# Patient Record
Sex: MALE | Race: WHITE | URBAN - METROPOLITAN AREA
[De-identification: names, ages, dates, MRNs, and addresses within clinical notes are randomized per-mention and may not be internally consistent; named-entity substitution may affect disease eponyms.]

---

## 2020-12-14 ENCOUNTER — HISTORICAL (OUTPATIENT)
Dept: ADMINISTRATIVE | Facility: HOSPITAL | Age: 65
End: 2020-12-14

## 2022-04-07 ENCOUNTER — HISTORICAL (OUTPATIENT)
Dept: ADMINISTRATIVE | Facility: HOSPITAL | Age: 67
End: 2022-04-07

## 2022-04-24 VITALS
DIASTOLIC BLOOD PRESSURE: 75 MMHG | SYSTOLIC BLOOD PRESSURE: 156 MMHG | WEIGHT: 260.38 LBS | BODY MASS INDEX: 33.42 KG/M2 | HEIGHT: 74 IN

## 2022-10-22 ENCOUNTER — LAB REQUISITION (OUTPATIENT)
Dept: LAB | Facility: HOSPITAL | Age: 67
End: 2022-10-22

## 2022-10-22 DIAGNOSIS — I61.3 NONTRAUMATIC INTRACEREBRAL HEMORRHAGE IN BRAIN STEM: ICD-10-CM

## 2022-10-22 LAB
ALBUMIN SERPL-MCNC: 3.3 GM/DL (ref 3.4–4.8)
ALBUMIN/GLOB SERPL: 1 RATIO (ref 1.1–2)
ALP SERPL-CCNC: 77 UNIT/L (ref 40–150)
ALT SERPL-CCNC: 25 UNIT/L (ref 0–55)
AST SERPL-CCNC: 18 UNIT/L (ref 5–34)
BASOPHILS # BLD AUTO: 0.12 X10(3)/MCL (ref 0–0.2)
BASOPHILS NFR BLD AUTO: 1.4 %
BILIRUBIN DIRECT+TOT PNL SERPL-MCNC: 0.6 MG/DL
BUN SERPL-MCNC: 15.8 MG/DL (ref 8.4–25.7)
CALCIUM SERPL-MCNC: 9.3 MG/DL (ref 8.8–10)
CHLORIDE SERPL-SCNC: 104 MMOL/L (ref 98–107)
CHOLEST SERPL-MCNC: 110 MG/DL
CHOLEST/HDLC SERPL: 4 {RATIO} (ref 0–5)
CO2 SERPL-SCNC: 26 MMOL/L (ref 23–31)
CREAT SERPL-MCNC: 1.14 MG/DL (ref 0.73–1.18)
EOSINOPHIL # BLD AUTO: 0.47 X10(3)/MCL (ref 0–0.9)
EOSINOPHIL NFR BLD AUTO: 5.6 %
ERYTHROCYTE [DISTWIDTH] IN BLOOD BY AUTOMATED COUNT: 14.6 % (ref 11.5–17)
EST. AVERAGE GLUCOSE BLD GHB EST-MCNC: 119.8 MG/DL
GFR SERPLBLD CREATININE-BSD FMLA CKD-EPI: >60 MLS/MIN/1.73/M2
GLOBULIN SER-MCNC: 3.4 GM/DL (ref 2.4–3.5)
GLUCOSE SERPL-MCNC: 143 MG/DL (ref 82–115)
HBA1C MFR BLD: 5.8 %
HCT VFR BLD AUTO: 34.4 % (ref 42–52)
HDLC SERPL-MCNC: 27 MG/DL (ref 35–60)
HGB BLD-MCNC: 12 GM/DL (ref 14–18)
IMM GRANULOCYTES # BLD AUTO: 0.03 X10(3)/MCL (ref 0–0.04)
IMM GRANULOCYTES NFR BLD AUTO: 0.4 %
LDLC SERPL CALC-MCNC: 54 MG/DL (ref 50–140)
LYMPHOCYTES # BLD AUTO: 2 X10(3)/MCL (ref 0.6–4.6)
LYMPHOCYTES NFR BLD AUTO: 24 %
MCH RBC QN AUTO: 30.1 PG (ref 27–31)
MCHC RBC AUTO-ENTMCNC: 34.9 MG/DL (ref 33–36)
MCV RBC AUTO: 86.2 FL (ref 80–94)
MONOCYTES # BLD AUTO: 0.84 X10(3)/MCL (ref 0.1–1.3)
MONOCYTES NFR BLD AUTO: 10.1 %
NEUTROPHILS # BLD AUTO: 4.9 X10(3)/MCL (ref 2.1–9.2)
NEUTROPHILS NFR BLD AUTO: 58.5 %
NRBC BLD AUTO-RTO: 0 %
PLATELET # BLD AUTO: 308 X10(3)/MCL (ref 130–400)
PMV BLD AUTO: 8.9 FL (ref 7.4–10.4)
POTASSIUM SERPL-SCNC: 4.2 MMOL/L (ref 3.5–5.1)
PREALB SERPL-MCNC: 17.1 MG/DL (ref 16–42)
PROT SERPL-MCNC: 6.7 GM/DL (ref 5.8–7.6)
RBC # BLD AUTO: 3.99 X10(6)/MCL (ref 4.7–6.1)
SODIUM SERPL-SCNC: 142 MMOL/L (ref 136–145)
TRIGL SERPL-MCNC: 143 MG/DL (ref 34–140)
VLDLC SERPL CALC-MCNC: 29 MG/DL
WBC # SPEC AUTO: 8.3 X10(3)/MCL (ref 4.5–11.5)

## 2022-10-22 PROCEDURE — 80061 LIPID PANEL: CPT | Performed by: INTERNAL MEDICINE

## 2022-10-22 PROCEDURE — 84134 ASSAY OF PREALBUMIN: CPT | Performed by: INTERNAL MEDICINE

## 2022-10-22 PROCEDURE — 83036 HEMOGLOBIN GLYCOSYLATED A1C: CPT | Performed by: INTERNAL MEDICINE

## 2022-10-22 PROCEDURE — 85025 COMPLETE CBC W/AUTO DIFF WBC: CPT | Performed by: INTERNAL MEDICINE

## 2022-10-22 PROCEDURE — 80053 COMPREHEN METABOLIC PANEL: CPT | Performed by: INTERNAL MEDICINE

## 2022-12-05 ENCOUNTER — LAB REQUISITION (OUTPATIENT)
Dept: LAB | Facility: HOSPITAL | Age: 67
End: 2022-12-05
Payer: MEDICARE

## 2022-12-05 DIAGNOSIS — I69.391 DYSPHAGIA FOLLOWING CEREBRAL INFARCTION: ICD-10-CM

## 2022-12-05 LAB
ALBUMIN SERPL-MCNC: 3.5 GM/DL (ref 3.4–4.8)
ALBUMIN/GLOB SERPL: 0.8 RATIO (ref 1.1–2)
ALP SERPL-CCNC: 85 UNIT/L (ref 40–150)
ALT SERPL-CCNC: 16 UNIT/L (ref 0–55)
AST SERPL-CCNC: 13 UNIT/L (ref 5–34)
BASOPHILS # BLD AUTO: 0.14 X10(3)/MCL (ref 0–0.2)
BASOPHILS NFR BLD AUTO: 1.4 %
BILIRUBIN DIRECT+TOT PNL SERPL-MCNC: 0.7 MG/DL
BUN SERPL-MCNC: 55 MG/DL (ref 8.4–25.7)
CALCIUM SERPL-MCNC: 9.8 MG/DL (ref 8.8–10)
CHLORIDE SERPL-SCNC: 106 MMOL/L (ref 98–107)
CO2 SERPL-SCNC: 21 MMOL/L (ref 23–31)
CREAT SERPL-MCNC: 1.68 MG/DL (ref 0.73–1.18)
EOSINOPHIL # BLD AUTO: 0.26 X10(3)/MCL (ref 0–0.9)
EOSINOPHIL NFR BLD AUTO: 2.7 %
ERYTHROCYTE [DISTWIDTH] IN BLOOD BY AUTOMATED COUNT: 14.1 % (ref 11.5–17)
GFR SERPLBLD CREATININE-BSD FMLA CKD-EPI: 44 MLS/MIN/1.73/M2
GLOBULIN SER-MCNC: 4.5 GM/DL (ref 2.4–3.5)
GLUCOSE SERPL-MCNC: 116 MG/DL (ref 82–115)
HCT VFR BLD AUTO: 38.5 % (ref 42–52)
HGB BLD-MCNC: 13.4 GM/DL (ref 14–18)
IMM GRANULOCYTES # BLD AUTO: 0.03 X10(3)/MCL (ref 0–0.04)
IMM GRANULOCYTES NFR BLD AUTO: 0.3 %
LYMPHOCYTES # BLD AUTO: 2.65 X10(3)/MCL (ref 0.6–4.6)
LYMPHOCYTES NFR BLD AUTO: 27.1 %
MCH RBC QN AUTO: 31.8 PG (ref 27–31)
MCHC RBC AUTO-ENTMCNC: 34.8 MG/DL (ref 33–36)
MCV RBC AUTO: 91.2 FL (ref 80–94)
MONOCYTES # BLD AUTO: 0.92 X10(3)/MCL (ref 0.1–1.3)
MONOCYTES NFR BLD AUTO: 9.4 %
NEUTROPHILS # BLD AUTO: 5.8 X10(3)/MCL (ref 2.1–9.2)
NEUTROPHILS NFR BLD AUTO: 59.1 %
NRBC BLD AUTO-RTO: 0 %
PLATELET # BLD AUTO: 360 X10(3)/MCL (ref 130–400)
PMV BLD AUTO: 10 FL (ref 7.4–10.4)
POTASSIUM SERPL-SCNC: 4.2 MMOL/L (ref 3.5–5.1)
PROT SERPL-MCNC: 8 GM/DL (ref 5.8–7.6)
RBC # BLD AUTO: 4.22 X10(6)/MCL (ref 4.7–6.1)
SODIUM SERPL-SCNC: 141 MMOL/L (ref 136–145)
WBC # SPEC AUTO: 9.8 X10(3)/MCL (ref 4.5–11.5)

## 2022-12-05 PROCEDURE — 80053 COMPREHEN METABOLIC PANEL: CPT | Performed by: INTERNAL MEDICINE

## 2022-12-05 PROCEDURE — 85025 COMPLETE CBC W/AUTO DIFF WBC: CPT | Performed by: INTERNAL MEDICINE

## 2022-12-09 ENCOUNTER — LAB REQUISITION (OUTPATIENT)
Dept: LAB | Facility: HOSPITAL | Age: 67
End: 2022-12-09
Payer: MEDICARE

## 2022-12-09 DIAGNOSIS — E86.9 VOLUME DEPLETION, UNSPECIFIED: ICD-10-CM

## 2022-12-09 LAB
ALBUMIN SERPL-MCNC: 3.2 GM/DL (ref 3.4–4.8)
ALBUMIN/GLOB SERPL: 0.8 RATIO (ref 1.1–2)
ALP SERPL-CCNC: 75 UNIT/L (ref 40–150)
ALT SERPL-CCNC: 13 UNIT/L (ref 0–55)
AST SERPL-CCNC: 12 UNIT/L (ref 5–34)
BILIRUBIN DIRECT+TOT PNL SERPL-MCNC: 0.6 MG/DL
BUN SERPL-MCNC: 26.1 MG/DL (ref 8.4–25.7)
CALCIUM SERPL-MCNC: 9.2 MG/DL (ref 8.8–10)
CHLORIDE SERPL-SCNC: 106 MMOL/L (ref 98–107)
CO2 SERPL-SCNC: 21 MMOL/L (ref 23–31)
CREAT SERPL-MCNC: 0.99 MG/DL (ref 0.73–1.18)
GFR SERPLBLD CREATININE-BSD FMLA CKD-EPI: >60 MLS/MIN/1.73/M2
GLOBULIN SER-MCNC: 3.8 GM/DL (ref 2.4–3.5)
GLUCOSE SERPL-MCNC: 84 MG/DL (ref 82–115)
POTASSIUM SERPL-SCNC: 3.7 MMOL/L (ref 3.5–5.1)
PROT SERPL-MCNC: 7 GM/DL (ref 5.8–7.6)
SODIUM SERPL-SCNC: 139 MMOL/L (ref 136–145)

## 2022-12-09 PROCEDURE — 80053 COMPREHEN METABOLIC PANEL: CPT | Performed by: NURSE PRACTITIONER

## 2023-01-30 ENCOUNTER — LAB REQUISITION (OUTPATIENT)
Dept: LAB | Facility: HOSPITAL | Age: 68
End: 2023-01-30
Payer: MEDICARE

## 2023-01-30 DIAGNOSIS — I10 ESSENTIAL (PRIMARY) HYPERTENSION: ICD-10-CM

## 2023-01-30 LAB
ALBUMIN SERPL-MCNC: 2.7 G/DL (ref 3.4–4.8)
ALBUMIN/GLOB SERPL: 0.4 RATIO (ref 1.1–2)
ALP SERPL-CCNC: 60 UNIT/L (ref 40–150)
ALT SERPL-CCNC: 77 UNIT/L (ref 0–55)
AST SERPL-CCNC: 38 UNIT/L (ref 5–34)
BASOPHILS # BLD AUTO: 0.11 X10(3)/MCL (ref 0–0.2)
BASOPHILS NFR BLD AUTO: 0.7 %
BILIRUBIN DIRECT+TOT PNL SERPL-MCNC: 0.5 MG/DL
BUN SERPL-MCNC: >125 MG/DL (ref 8.4–25.7)
CALCIUM SERPL-MCNC: 10.4 MG/DL (ref 8.8–10)
CHLORIDE SERPL-SCNC: 113 MMOL/L (ref 98–107)
CO2 SERPL-SCNC: 18 MMOL/L (ref 23–31)
CREAT SERPL-MCNC: 2.74 MG/DL (ref 0.73–1.18)
EOSINOPHIL # BLD AUTO: 0.09 X10(3)/MCL (ref 0–0.9)
EOSINOPHIL NFR BLD AUTO: 0.6 %
ERYTHROCYTE [DISTWIDTH] IN BLOOD BY AUTOMATED COUNT: 14 % (ref 11.5–17)
GFR SERPLBLD CREATININE-BSD FMLA CKD-EPI: 25 MLS/MIN/1.73/M2
GLOBULIN SER-MCNC: 6.1 GM/DL (ref 2.4–3.5)
GLUCOSE SERPL-MCNC: 148 MG/DL (ref 82–115)
HCT VFR BLD AUTO: 32.1 % (ref 42–52)
HGB BLD-MCNC: 10.2 GM/DL (ref 14–18)
IMM GRANULOCYTES # BLD AUTO: 0.16 X10(3)/MCL (ref 0–0.04)
IMM GRANULOCYTES NFR BLD AUTO: 1 %
LYMPHOCYTES # BLD AUTO: 2.43 X10(3)/MCL (ref 0.6–4.6)
LYMPHOCYTES NFR BLD AUTO: 15.1 %
MCH RBC QN AUTO: 31.5 PG
MCHC RBC AUTO-ENTMCNC: 31.8 MG/DL (ref 33–36)
MCV RBC AUTO: 99.1 FL (ref 80–94)
MONOCYTES # BLD AUTO: 1.09 X10(3)/MCL (ref 0.1–1.3)
MONOCYTES NFR BLD AUTO: 6.8 %
NEUTROPHILS # BLD AUTO: 12.24 X10(3)/MCL (ref 2.1–9.2)
NEUTROPHILS NFR BLD AUTO: 75.8 %
NRBC BLD AUTO-RTO: 0 %
PLATELET # BLD AUTO: 519 X10(3)/MCL (ref 130–400)
PMV BLD AUTO: 9.8 FL (ref 7.4–10.4)
POTASSIUM SERPL-SCNC: 5.3 MMOL/L (ref 3.5–5.1)
PROT SERPL-MCNC: 8.8 GM/DL (ref 5.8–7.6)
RBC # BLD AUTO: 3.24 X10(6)/MCL (ref 4.7–6.1)
SODIUM SERPL-SCNC: 145 MMOL/L (ref 136–145)
WBC # SPEC AUTO: 16.1 X10(3)/MCL (ref 4.5–11.5)

## 2023-01-30 PROCEDURE — 85025 COMPLETE CBC W/AUTO DIFF WBC: CPT | Performed by: INTERNAL MEDICINE

## 2023-01-30 PROCEDURE — 80053 COMPREHEN METABOLIC PANEL: CPT | Performed by: INTERNAL MEDICINE

## 2023-02-02 ENCOUNTER — LAB REQUISITION (OUTPATIENT)
Dept: LAB | Facility: HOSPITAL | Age: 68
End: 2023-02-02
Payer: MEDICARE

## 2023-02-02 DIAGNOSIS — R62.7 ADULT FAILURE TO THRIVE: ICD-10-CM

## 2023-02-02 LAB
ALBUMIN SERPL-MCNC: 2.5 G/DL (ref 3.4–4.8)
ALBUMIN/GLOB SERPL: 0.6 RATIO (ref 1.1–2)
ALP SERPL-CCNC: 64 UNIT/L (ref 40–150)
ALT SERPL-CCNC: 61 UNIT/L (ref 0–55)
AST SERPL-CCNC: 23 UNIT/L (ref 5–34)
BILIRUBIN DIRECT+TOT PNL SERPL-MCNC: 0.4 MG/DL
BUN SERPL-MCNC: 68.9 MG/DL (ref 8.4–25.7)
CALCIUM SERPL-MCNC: 9 MG/DL (ref 8.8–10)
CHLORIDE SERPL-SCNC: 119 MMOL/L (ref 98–107)
CO2 SERPL-SCNC: 19 MMOL/L (ref 23–31)
CREAT SERPL-MCNC: 1.25 MG/DL (ref 0.73–1.18)
ERYTHROCYTE [DISTWIDTH] IN BLOOD BY AUTOMATED COUNT: 14 % (ref 11.5–17)
GFR SERPLBLD CREATININE-BSD FMLA CKD-EPI: >60 MLS/MIN/1.73/M2
GLOBULIN SER-MCNC: 4 GM/DL (ref 2.4–3.5)
GLUCOSE SERPL-MCNC: 143 MG/DL (ref 82–115)
HCT VFR BLD AUTO: 29.9 % (ref 42–52)
HGB BLD-MCNC: 9.2 GM/DL (ref 14–18)
MCH RBC QN AUTO: 30.9 PG
MCHC RBC AUTO-ENTMCNC: 30.8 MG/DL (ref 33–36)
MCV RBC AUTO: 100.3 FL (ref 80–94)
NRBC BLD AUTO-RTO: 0 %
PLATELET # BLD AUTO: 436 X10(3)/MCL (ref 130–400)
PMV BLD AUTO: 9.7 FL (ref 7.4–10.4)
POTASSIUM SERPL-SCNC: 5 MMOL/L (ref 3.5–5.1)
PROT SERPL-MCNC: 6.5 GM/DL (ref 5.8–7.6)
RBC # BLD AUTO: 2.98 X10(6)/MCL (ref 4.7–6.1)
SODIUM SERPL-SCNC: 149 MMOL/L (ref 136–145)
WBC # SPEC AUTO: 11.1 X10(3)/MCL (ref 4.5–11.5)

## 2023-02-02 PROCEDURE — 85027 COMPLETE CBC AUTOMATED: CPT | Performed by: NURSE PRACTITIONER

## 2023-02-02 PROCEDURE — 80053 COMPREHEN METABOLIC PANEL: CPT | Performed by: NURSE PRACTITIONER

## 2023-06-02 ENCOUNTER — LAB REQUISITION (OUTPATIENT)
Dept: LAB | Facility: HOSPITAL | Age: 68
End: 2023-06-02
Payer: MEDICARE

## 2023-06-02 DIAGNOSIS — N17.9 ACUTE KIDNEY FAILURE, UNSPECIFIED: ICD-10-CM

## 2023-06-02 DIAGNOSIS — E11.9 TYPE 2 DIABETES MELLITUS WITHOUT COMPLICATIONS: ICD-10-CM

## 2023-06-02 DIAGNOSIS — I63.9 CEREBRAL INFARCTION, UNSPECIFIED: ICD-10-CM

## 2023-06-02 DIAGNOSIS — E78.5 HYPERLIPIDEMIA, UNSPECIFIED: ICD-10-CM

## 2023-06-02 DIAGNOSIS — I10 ESSENTIAL (PRIMARY) HYPERTENSION: ICD-10-CM

## 2023-06-02 LAB
ALBUMIN SERPL-MCNC: 3.3 G/DL (ref 3.4–4.8)
ALBUMIN/GLOB SERPL: 1 RATIO (ref 1.1–2)
ALP SERPL-CCNC: 84 UNIT/L (ref 40–150)
ALT SERPL-CCNC: 7 UNIT/L (ref 0–55)
AST SERPL-CCNC: 12 UNIT/L (ref 5–34)
BASOPHILS # BLD AUTO: 0.08 X10(3)/MCL
BASOPHILS NFR BLD AUTO: 1.1 %
BILIRUBIN DIRECT+TOT PNL SERPL-MCNC: 0.4 MG/DL
BUN SERPL-MCNC: 21 MG/DL (ref 8.4–25.7)
CALCIUM SERPL-MCNC: 9.3 MG/DL (ref 8.8–10)
CHLORIDE SERPL-SCNC: 103 MMOL/L (ref 98–107)
CHOLEST SERPL-MCNC: 153 MG/DL
CHOLEST/HDLC SERPL: 5 {RATIO} (ref 0–5)
CO2 SERPL-SCNC: 25 MMOL/L (ref 23–31)
CREAT SERPL-MCNC: 0.8 MG/DL (ref 0.73–1.18)
EOSINOPHIL # BLD AUTO: 0.36 X10(3)/MCL (ref 0–0.9)
EOSINOPHIL NFR BLD AUTO: 4.9 %
ERYTHROCYTE [DISTWIDTH] IN BLOOD BY AUTOMATED COUNT: 15.2 % (ref 11.5–17)
GFR SERPLBLD CREATININE-BSD FMLA CKD-EPI: >60 MLS/MIN/1.73/M2
GLOBULIN SER-MCNC: 3.4 GM/DL (ref 2.4–3.5)
GLUCOSE SERPL-MCNC: 102 MG/DL (ref 82–115)
HCT VFR BLD AUTO: 37.7 % (ref 42–52)
HDLC SERPL-MCNC: 30 MG/DL (ref 35–60)
HGB BLD-MCNC: 12.3 G/DL (ref 14–18)
IMM GRANULOCYTES # BLD AUTO: 0.02 X10(3)/MCL (ref 0–0.04)
IMM GRANULOCYTES NFR BLD AUTO: 0.3 %
LDLC SERPL CALC-MCNC: 87 MG/DL (ref 50–140)
LYMPHOCYTES # BLD AUTO: 2.08 X10(3)/MCL (ref 0.6–4.6)
LYMPHOCYTES NFR BLD AUTO: 28.1 %
MCH RBC QN AUTO: 28.7 PG (ref 27–31)
MCHC RBC AUTO-ENTMCNC: 32.6 G/DL (ref 33–36)
MCV RBC AUTO: 88.1 FL (ref 80–94)
MONOCYTES # BLD AUTO: 0.58 X10(3)/MCL (ref 0.1–1.3)
MONOCYTES NFR BLD AUTO: 7.8 %
NEUTROPHILS # BLD AUTO: 4.29 X10(3)/MCL (ref 2.1–9.2)
NEUTROPHILS NFR BLD AUTO: 57.8 %
NRBC BLD AUTO-RTO: 0 %
PLATELET # BLD AUTO: 331 X10(3)/MCL (ref 130–400)
PMV BLD AUTO: 9.8 FL (ref 7.4–10.4)
POTASSIUM SERPL-SCNC: 3.7 MMOL/L (ref 3.5–5.1)
PREALB SERPL-MCNC: 23.5 MG/DL (ref 16–42)
PROT SERPL-MCNC: 6.7 GM/DL (ref 5.8–7.6)
PSA SERPL-MCNC: 3.13 NG/ML
RBC # BLD AUTO: 4.28 X10(6)/MCL (ref 4.7–6.1)
SODIUM SERPL-SCNC: 139 MMOL/L (ref 136–145)
TRIGL SERPL-MCNC: 178 MG/DL (ref 34–140)
VLDLC SERPL CALC-MCNC: 36 MG/DL
WBC # SPEC AUTO: 7.41 X10(3)/MCL (ref 4.5–11.5)

## 2023-06-02 PROCEDURE — 80053 COMPREHEN METABOLIC PANEL: CPT | Performed by: INTERNAL MEDICINE

## 2023-06-02 PROCEDURE — 84134 ASSAY OF PREALBUMIN: CPT | Performed by: INTERNAL MEDICINE

## 2023-06-02 PROCEDURE — 85025 COMPLETE CBC W/AUTO DIFF WBC: CPT | Performed by: INTERNAL MEDICINE

## 2023-06-02 PROCEDURE — 84153 ASSAY OF PSA TOTAL: CPT | Performed by: INTERNAL MEDICINE

## 2023-06-02 PROCEDURE — 80061 LIPID PANEL: CPT | Performed by: INTERNAL MEDICINE

## 2023-09-27 ENCOUNTER — LAB REQUISITION (OUTPATIENT)
Dept: LAB | Facility: HOSPITAL | Age: 68
End: 2023-09-27
Payer: MEDICARE

## 2023-09-27 DIAGNOSIS — R53.81 OTHER MALAISE: ICD-10-CM

## 2023-09-27 LAB
ALBUMIN SERPL-MCNC: 3.4 G/DL (ref 3.4–4.8)
ALBUMIN/GLOB SERPL: 0.8 RATIO (ref 1.1–2)
ALP SERPL-CCNC: 88 UNIT/L (ref 40–150)
ALT SERPL-CCNC: 10 UNIT/L (ref 0–55)
AST SERPL-CCNC: 15 UNIT/L (ref 5–34)
BASOPHILS # BLD AUTO: 0.09 X10(3)/MCL
BASOPHILS NFR BLD AUTO: 1.1 %
BILIRUB SERPL-MCNC: 0.4 MG/DL
BUN SERPL-MCNC: 19.9 MG/DL (ref 8.4–25.7)
CALCIUM SERPL-MCNC: 9.8 MG/DL (ref 8.8–10)
CHLORIDE SERPL-SCNC: 104 MMOL/L (ref 98–107)
CO2 SERPL-SCNC: 24 MMOL/L (ref 23–31)
CREAT SERPL-MCNC: 0.82 MG/DL (ref 0.73–1.18)
EOSINOPHIL # BLD AUTO: 0.26 X10(3)/MCL (ref 0–0.9)
EOSINOPHIL NFR BLD AUTO: 3.2 %
ERYTHROCYTE [DISTWIDTH] IN BLOOD BY AUTOMATED COUNT: 13.8 % (ref 11.5–17)
GFR SERPLBLD CREATININE-BSD FMLA CKD-EPI: >60 MLS/MIN/1.73/M2
GLOBULIN SER-MCNC: 4.3 GM/DL (ref 2.4–3.5)
GLUCOSE SERPL-MCNC: 94 MG/DL (ref 82–115)
HCT VFR BLD AUTO: 38 % (ref 42–52)
HGB BLD-MCNC: 12.4 G/DL (ref 14–18)
IMM GRANULOCYTES # BLD AUTO: 0.01 X10(3)/MCL (ref 0–0.04)
IMM GRANULOCYTES NFR BLD AUTO: 0.1 %
LYMPHOCYTES # BLD AUTO: 2.16 X10(3)/MCL (ref 0.6–4.6)
LYMPHOCYTES NFR BLD AUTO: 26.4 %
MCH RBC QN AUTO: 30.1 PG (ref 27–31)
MCHC RBC AUTO-ENTMCNC: 32.6 G/DL (ref 33–36)
MCV RBC AUTO: 92.2 FL (ref 80–94)
MONOCYTES # BLD AUTO: 0.79 X10(3)/MCL (ref 0.1–1.3)
MONOCYTES NFR BLD AUTO: 9.7 %
NEUTROPHILS # BLD AUTO: 4.86 X10(3)/MCL (ref 2.1–9.2)
NEUTROPHILS NFR BLD AUTO: 59.5 %
NRBC BLD AUTO-RTO: 0 %
PLATELET # BLD AUTO: 344 X10(3)/MCL (ref 130–400)
PMV BLD AUTO: 10.1 FL (ref 7.4–10.4)
POTASSIUM SERPL-SCNC: 4.1 MMOL/L (ref 3.5–5.1)
PROT SERPL-MCNC: 7.7 GM/DL (ref 5.8–7.6)
RBC # BLD AUTO: 4.12 X10(6)/MCL (ref 4.7–6.1)
SODIUM SERPL-SCNC: 141 MMOL/L (ref 136–145)
WBC # SPEC AUTO: 8.17 X10(3)/MCL (ref 4.5–11.5)

## 2023-09-27 PROCEDURE — 80053 COMPREHEN METABOLIC PANEL: CPT

## 2023-09-27 PROCEDURE — 85025 COMPLETE CBC W/AUTO DIFF WBC: CPT

## 2023-11-30 ENCOUNTER — LAB REQUISITION (OUTPATIENT)
Dept: LAB | Facility: HOSPITAL | Age: 68
End: 2023-11-30
Payer: MEDICARE

## 2023-11-30 DIAGNOSIS — E11.9 TYPE 2 DIABETES MELLITUS WITHOUT COMPLICATIONS: ICD-10-CM

## 2023-11-30 DIAGNOSIS — I63.9 CEREBRAL INFARCTION, UNSPECIFIED: ICD-10-CM

## 2023-11-30 DIAGNOSIS — N17.9 ACUTE KIDNEY FAILURE, UNSPECIFIED: ICD-10-CM

## 2023-11-30 LAB
ALBUMIN SERPL-MCNC: 3.4 G/DL (ref 3.4–4.8)
ALBUMIN/GLOB SERPL: 1.1 RATIO (ref 1.1–2)
ALP SERPL-CCNC: 62 UNIT/L (ref 40–150)
ALT SERPL-CCNC: 9 UNIT/L (ref 0–55)
AST SERPL-CCNC: 10 UNIT/L (ref 5–34)
BASOPHILS # BLD AUTO: 0.06 X10(3)/MCL
BASOPHILS NFR BLD AUTO: 0.8 %
BILIRUB SERPL-MCNC: 0.5 MG/DL
BUN SERPL-MCNC: 23.3 MG/DL (ref 8.4–25.7)
CALCIUM SERPL-MCNC: 9 MG/DL (ref 8.8–10)
CHLORIDE SERPL-SCNC: 108 MMOL/L (ref 98–107)
CHOLEST SERPL-MCNC: 129 MG/DL
CHOLEST/HDLC SERPL: 5 {RATIO} (ref 0–5)
CO2 SERPL-SCNC: 25 MMOL/L (ref 23–31)
CREAT SERPL-MCNC: 0.84 MG/DL (ref 0.73–1.18)
EOSINOPHIL # BLD AUTO: 0.11 X10(3)/MCL (ref 0–0.9)
EOSINOPHIL NFR BLD AUTO: 1.5 %
ERYTHROCYTE [DISTWIDTH] IN BLOOD BY AUTOMATED COUNT: 13.2 % (ref 11.5–17)
GFR SERPLBLD CREATININE-BSD FMLA CKD-EPI: >60 MLS/MIN/1.73/M2
GLOBULIN SER-MCNC: 3.1 GM/DL (ref 2.4–3.5)
GLUCOSE SERPL-MCNC: 103 MG/DL (ref 82–115)
HCT VFR BLD AUTO: 35.1 % (ref 42–52)
HDLC SERPL-MCNC: 28 MG/DL (ref 35–60)
HGB BLD-MCNC: 11.4 G/DL (ref 14–18)
IMM GRANULOCYTES # BLD AUTO: 0.03 X10(3)/MCL (ref 0–0.04)
IMM GRANULOCYTES NFR BLD AUTO: 0.4 %
LDLC SERPL CALC-MCNC: 82 MG/DL (ref 50–140)
LYMPHOCYTES # BLD AUTO: 2.21 X10(3)/MCL (ref 0.6–4.6)
LYMPHOCYTES NFR BLD AUTO: 29.2 %
MCH RBC QN AUTO: 30 PG (ref 27–31)
MCHC RBC AUTO-ENTMCNC: 32.5 G/DL (ref 33–36)
MCV RBC AUTO: 92.4 FL (ref 80–94)
MONOCYTES # BLD AUTO: 0.6 X10(3)/MCL (ref 0.1–1.3)
MONOCYTES NFR BLD AUTO: 7.9 %
NEUTROPHILS # BLD AUTO: 4.57 X10(3)/MCL (ref 2.1–9.2)
NEUTROPHILS NFR BLD AUTO: 60.2 %
NRBC BLD AUTO-RTO: 0 %
PLATELET # BLD AUTO: 280 X10(3)/MCL (ref 130–400)
PMV BLD AUTO: 10.3 FL (ref 7.4–10.4)
POTASSIUM SERPL-SCNC: 3.8 MMOL/L (ref 3.5–5.1)
PREALB SERPL-MCNC: 23.4 MG/DL (ref 16–42)
PROT SERPL-MCNC: 6.5 GM/DL (ref 5.8–7.6)
RBC # BLD AUTO: 3.8 X10(6)/MCL (ref 4.7–6.1)
SODIUM SERPL-SCNC: 141 MMOL/L (ref 136–145)
TRIGL SERPL-MCNC: 97 MG/DL (ref 34–140)
VLDLC SERPL CALC-MCNC: 19 MG/DL
WBC # SPEC AUTO: 7.58 X10(3)/MCL (ref 4.5–11.5)

## 2023-11-30 PROCEDURE — 80053 COMPREHEN METABOLIC PANEL: CPT | Performed by: INTERNAL MEDICINE

## 2023-11-30 PROCEDURE — 84134 ASSAY OF PREALBUMIN: CPT | Performed by: INTERNAL MEDICINE

## 2023-11-30 PROCEDURE — 80061 LIPID PANEL: CPT | Performed by: INTERNAL MEDICINE

## 2023-11-30 PROCEDURE — 85025 COMPLETE CBC W/AUTO DIFF WBC: CPT | Performed by: INTERNAL MEDICINE

## 2023-12-06 ENCOUNTER — LAB REQUISITION (OUTPATIENT)
Dept: LAB | Facility: HOSPITAL | Age: 68
End: 2023-12-06
Payer: MEDICARE

## 2023-12-06 DIAGNOSIS — R05.9 COUGH, UNSPECIFIED: ICD-10-CM

## 2023-12-06 LAB
BASOPHILS # BLD AUTO: 0.03 X10(3)/MCL
BASOPHILS NFR BLD AUTO: 0.6 %
EOSINOPHIL # BLD AUTO: 0.04 X10(3)/MCL (ref 0–0.9)
EOSINOPHIL NFR BLD AUTO: 0.8 %
ERYTHROCYTE [DISTWIDTH] IN BLOOD BY AUTOMATED COUNT: 13.4 % (ref 11.5–17)
HCT VFR BLD AUTO: 36.6 % (ref 42–52)
HGB BLD-MCNC: 12.2 G/DL (ref 14–18)
IMM GRANULOCYTES # BLD AUTO: 0.02 X10(3)/MCL (ref 0–0.04)
IMM GRANULOCYTES NFR BLD AUTO: 0.4 %
LYMPHOCYTES # BLD AUTO: 1.51 X10(3)/MCL (ref 0.6–4.6)
LYMPHOCYTES NFR BLD AUTO: 28.7 %
MCH RBC QN AUTO: 30.2 PG (ref 27–31)
MCHC RBC AUTO-ENTMCNC: 33.3 G/DL (ref 33–36)
MCV RBC AUTO: 90.6 FL (ref 80–94)
MONOCYTES # BLD AUTO: 0.51 X10(3)/MCL (ref 0.1–1.3)
MONOCYTES NFR BLD AUTO: 9.7 %
NEUTROPHILS # BLD AUTO: 3.16 X10(3)/MCL (ref 2.1–9.2)
NEUTROPHILS NFR BLD AUTO: 59.8 %
NRBC BLD AUTO-RTO: 0 %
PLATELET # BLD AUTO: 258 X10(3)/MCL (ref 130–400)
PMV BLD AUTO: 10.2 FL (ref 7.4–10.4)
RBC # BLD AUTO: 4.04 X10(6)/MCL (ref 4.7–6.1)
WBC # SPEC AUTO: 5.27 X10(3)/MCL (ref 4.5–11.5)

## 2023-12-06 PROCEDURE — 85025 COMPLETE CBC W/AUTO DIFF WBC: CPT | Performed by: INTERNAL MEDICINE

## 2024-01-11 ENCOUNTER — LAB REQUISITION (OUTPATIENT)
Dept: LAB | Facility: HOSPITAL | Age: 69
End: 2024-01-11
Payer: OTHER GOVERNMENT

## 2024-01-11 DIAGNOSIS — R11.2 NAUSEA WITH VOMITING, UNSPECIFIED: ICD-10-CM

## 2024-01-11 LAB
ALBUMIN SERPL-MCNC: 3.8 G/DL (ref 3.4–4.8)
ALBUMIN/GLOB SERPL: 0.9 RATIO (ref 1.1–2)
ALP SERPL-CCNC: 85 UNIT/L (ref 40–150)
ALT SERPL-CCNC: 21 UNIT/L (ref 0–55)
AST SERPL-CCNC: 16 UNIT/L (ref 5–34)
BASOPHILS # BLD AUTO: 0.06 X10(3)/MCL
BASOPHILS NFR BLD AUTO: 0.3 %
BILIRUB SERPL-MCNC: 0.9 MG/DL
BUN SERPL-MCNC: 19.5 MG/DL (ref 8.4–25.7)
CALCIUM SERPL-MCNC: 9.9 MG/DL (ref 8.8–10)
CHLORIDE SERPL-SCNC: 106 MMOL/L (ref 98–107)
CO2 SERPL-SCNC: 21 MMOL/L (ref 23–31)
CREAT SERPL-MCNC: 0.8 MG/DL (ref 0.73–1.18)
EOSINOPHIL # BLD AUTO: 0.01 X10(3)/MCL (ref 0–0.9)
EOSINOPHIL NFR BLD AUTO: 0.1 %
ERYTHROCYTE [DISTWIDTH] IN BLOOD BY AUTOMATED COUNT: 14.2 % (ref 11.5–17)
GFR SERPLBLD CREATININE-BSD FMLA CKD-EPI: >60 MLS/MIN/1.73/M2
GLOBULIN SER-MCNC: 4.4 GM/DL (ref 2.4–3.5)
GLUCOSE SERPL-MCNC: 124 MG/DL (ref 82–115)
HCT VFR BLD AUTO: 42.8 % (ref 42–52)
HGB BLD-MCNC: 14.3 G/DL (ref 14–18)
IMM GRANULOCYTES # BLD AUTO: 0.08 X10(3)/MCL (ref 0–0.04)
IMM GRANULOCYTES NFR BLD AUTO: 0.4 %
LYMPHOCYTES # BLD AUTO: 1.3 X10(3)/MCL (ref 0.6–4.6)
LYMPHOCYTES NFR BLD AUTO: 6.6 %
MCH RBC QN AUTO: 30.2 PG (ref 27–31)
MCHC RBC AUTO-ENTMCNC: 33.4 G/DL (ref 33–36)
MCV RBC AUTO: 90.5 FL (ref 80–94)
MONOCYTES # BLD AUTO: 1.43 X10(3)/MCL (ref 0.1–1.3)
MONOCYTES NFR BLD AUTO: 7.3 %
NEUTROPHILS # BLD AUTO: 16.73 X10(3)/MCL (ref 2.1–9.2)
NEUTROPHILS NFR BLD AUTO: 85.3 %
NRBC BLD AUTO-RTO: 0 %
PLATELET # BLD AUTO: 375 X10(3)/MCL (ref 130–400)
PMV BLD AUTO: 10.1 FL (ref 7.4–10.4)
POTASSIUM SERPL-SCNC: 3.5 MMOL/L (ref 3.5–5.1)
PROT SERPL-MCNC: 8.2 GM/DL (ref 5.8–7.6)
RBC # BLD AUTO: 4.73 X10(6)/MCL (ref 4.7–6.1)
SODIUM SERPL-SCNC: 141 MMOL/L (ref 136–145)
WBC # SPEC AUTO: 19.61 X10(3)/MCL (ref 4.5–11.5)

## 2024-01-11 PROCEDURE — 80053 COMPREHEN METABOLIC PANEL: CPT | Performed by: NURSE PRACTITIONER

## 2024-01-11 PROCEDURE — 85025 COMPLETE CBC W/AUTO DIFF WBC: CPT | Performed by: NURSE PRACTITIONER

## 2024-01-12 ENCOUNTER — LAB REQUISITION (OUTPATIENT)
Dept: LAB | Facility: HOSPITAL | Age: 69
End: 2024-01-12
Payer: OTHER GOVERNMENT

## 2024-01-12 DIAGNOSIS — D72.829 ELEVATED WHITE BLOOD CELL COUNT, UNSPECIFIED: ICD-10-CM

## 2024-01-12 LAB — LACTATE SERPL-SCNC: 1.8 MMOL/L (ref 0.5–2.2)

## 2024-01-12 PROCEDURE — 83605 ASSAY OF LACTIC ACID: CPT | Performed by: NURSE PRACTITIONER

## 2024-01-29 ENCOUNTER — LAB REQUISITION (OUTPATIENT)
Dept: LAB | Facility: HOSPITAL | Age: 69
End: 2024-01-29
Payer: OTHER GOVERNMENT

## 2024-01-29 DIAGNOSIS — Z29.9 ENCOUNTER FOR PROPHYLACTIC MEASURES, UNSPECIFIED: ICD-10-CM

## 2024-01-29 LAB
BASOPHILS # BLD AUTO: 0.12 X10(3)/MCL
BASOPHILS NFR BLD AUTO: 0.8 %
EOSINOPHIL # BLD AUTO: 0.13 X10(3)/MCL (ref 0–0.9)
EOSINOPHIL NFR BLD AUTO: 0.8 %
ERYTHROCYTE [DISTWIDTH] IN BLOOD BY AUTOMATED COUNT: 14.1 % (ref 11.5–17)
HCT VFR BLD AUTO: 31.8 % (ref 42–52)
HGB BLD-MCNC: 10.4 G/DL (ref 14–18)
IMM GRANULOCYTES # BLD AUTO: 0.23 X10(3)/MCL (ref 0–0.04)
IMM GRANULOCYTES NFR BLD AUTO: 1.4 %
LYMPHOCYTES # BLD AUTO: 2.52 X10(3)/MCL (ref 0.6–4.6)
LYMPHOCYTES NFR BLD AUTO: 15.8 %
MCH RBC QN AUTO: 29.8 PG (ref 27–31)
MCHC RBC AUTO-ENTMCNC: 32.7 G/DL (ref 33–36)
MCV RBC AUTO: 91.1 FL (ref 80–94)
MONOCYTES # BLD AUTO: 1.02 X10(3)/MCL (ref 0.1–1.3)
MONOCYTES NFR BLD AUTO: 6.4 %
NEUTROPHILS # BLD AUTO: 11.92 X10(3)/MCL (ref 2.1–9.2)
NEUTROPHILS NFR BLD AUTO: 74.8 %
NRBC BLD AUTO-RTO: 0 %
PLATELET # BLD AUTO: 540 X10(3)/MCL (ref 130–400)
PMV BLD AUTO: 8.8 FL (ref 7.4–10.4)
RBC # BLD AUTO: 3.49 X10(6)/MCL (ref 4.7–6.1)
WBC # SPEC AUTO: 15.94 X10(3)/MCL (ref 4.5–11.5)

## 2024-01-29 PROCEDURE — 85025 COMPLETE CBC W/AUTO DIFF WBC: CPT | Performed by: NURSE PRACTITIONER

## 2024-02-03 NOTE — PROVIDER TRANSFER
(Physician in Lead of Transfers)  Outside Transfer Acceptance Note / Regional Referral Center    Upon patient arrival to floor, please send SecureChat to [facility] or call extension [phone] (if no answer, do NOT leave a callback number after the beep, rather please send a SecureChat to [facility]), for Hospital Medicine admit team assignment and for additional admit orders for the patient.  Do not page the attending physician associated with the patient on arrival (this physician may not be on duty at the time of arrival).  Rather, always send a SecureChat to [facility] or call [phone] to reach the triage physician for orders and team assignment.    Referring facility: [facility]   Referring provider: JAIDA JUAN  Accepting facility: [facility]  Accepting provider: KESHA PARHAM  Reason for transfer: Higher Level of Care   Transfer diagnosis: CBD STONE  Transfer specialty requested: Biliary  Transfer specialty notified: Yes  Transfer level: NUMBER 1-5: 2  Bed type requested: med-tele  Isolation status: No active isolations   Admission class or status: IP- Inpatient      Narrative     68-year-old male with a history of stroke with G-tube placement, hypertension, diabetes, hyperlipidemia, recent cholecystitis, and dysphagia admitted to [facility] on January 30 from a local nursing home because of abnormal labs/imaging.  He was found to have high white blood cells and was sent to the ED. Patient had no specific complaints.  He was noted to have a recent CT scan concerning for cholecystitis.  He was seen by General Surgery, and their note mentions patient had reported abdominal pain for over a year.  -CT abdomen and pelvis noted cholelithiasis and choledocholithiasis with changes of acute cholecystitis.  Fatty stranding around the gallbladder and adjacent duodenum.  No significant biliary ductal dilatation.  Possible constipation with no  evidence of an acute process elsewhere within the abdomen.  Development of small nonspecific pericardial effusion.  -Ultrasound showed cholelithiasis and gallbladder sludge with questionable mild thickening of the gallbladder walls.  No other significant findings by ultrasound.  No biliary dilatation.    He was felt to have acute cholecystitis with cholelithiasis, and he underwent laparoscopic cholecystectomy with intraoperative cholangiogram on February 1.  The operative report noted the gallbladder was necrotic and fell apart.  The back wall of the gallbladder had an abscess that was perforated.  There were spilled stones and purulent bile.  A cholangiogram showed patent common duct with single stone at the ampulla that appears to large to pass spontaneously.  There is flow of bile around that.  Referring team spoke with Biliary Service at [facility].  It was felt that the patient would likely need ERCP.  Will plan transfer to Hospital Medicine at [facility] for further treatment.  Referring team noted patient has residual deficits from prior stroke, but he is able to interact.  He has a PEG-tube and an abdominal drain in place.  He is receiving meropenem and LR.  Oxygenation is stable on room air.    February 2: Sodium 141, potassium 3.8, chloride 111, CO2 21, BUN 17.02, creatinine 0.63, glucose 98, albumin 2.3, total bilirubin 0.4, AST 22, ALT 16, white blood cells 14.45, hemoglobin 7.1, hematocrit 22.2, platelets 370    January 31:  Sodium 138, potassium 3.9, chloride 106, CO2 24, BUN 24.12, creatinine 0.79, glucose 113, total bilirubin 0.5, AST 11, ALT 13, white blood cells 13.96, hemoglobin 9.9, hematocrit 30.6, platelets 444  -Abdominal ultrasound noted cholelithiasis and gallbladder sludge with questionable mild thickening of the gallbladder walls.  Evaluation of the gallbladder is limited by adjacent bowel.  No biliary dilatation.    January 30:  Total bilirubin 0.3, AST 12, ALT 16, lipase 28,  white blood cells 14.94, hemoglobin 9.7, platelets 479    VS:  Afebrile, heart rate 85, respirations 16, blood pressure 112/59, O2 sats 98% on room air      Instructions    Admit to Hospital Medicine  Consult Biliary Service      LIU Baez MD  Hospital Medicine Staff  Cell: [phone]

## 2024-02-04 ENCOUNTER — HOSPITAL ENCOUNTER (INPATIENT)
Facility: HOSPITAL | Age: 69
LOS: 19 days | Discharge: NURSING, REHAB OR LONG-TERM CARE | DRG: 862 | End: 2024-02-23
Attending: INTERNAL MEDICINE | Admitting: INTERNAL MEDICINE
Payer: OTHER GOVERNMENT

## 2024-02-04 DIAGNOSIS — R00.0 TACHYCARDIA: ICD-10-CM

## 2024-02-04 DIAGNOSIS — K83.1 COMMON BILE DUCT (CBD) OBSTRUCTION: ICD-10-CM

## 2024-02-04 DIAGNOSIS — R94.31 QT PROLONGATION: ICD-10-CM

## 2024-02-04 DIAGNOSIS — R33.9 URINARY RETENTION: ICD-10-CM

## 2024-02-04 DIAGNOSIS — R18.8 ABDOMINAL FLUID COLLECTION: Primary | ICD-10-CM

## 2024-02-04 DIAGNOSIS — R07.9 CHEST PAIN: ICD-10-CM

## 2024-02-04 DIAGNOSIS — Z91.89 AT RISK FOR PROLONGED QT INTERVAL SYNDROME: ICD-10-CM

## 2024-02-04 PROBLEM — E11.9 TYPE 2 DIABETES MELLITUS: Status: ACTIVE | Noted: 2024-02-04

## 2024-02-04 PROBLEM — I63.9 CVA (CEREBRAL VASCULAR ACCIDENT): Status: ACTIVE | Noted: 2024-02-04

## 2024-02-04 PROBLEM — I10 PRIMARY HYPERTENSION: Status: ACTIVE | Noted: 2024-02-04

## 2024-02-04 PROCEDURE — 63600175 PHARM REV CODE 636 W HCPCS

## 2024-02-04 PROCEDURE — 25000003 PHARM REV CODE 250

## 2024-02-04 PROCEDURE — 11000001 HC ACUTE MED/SURG PRIVATE ROOM

## 2024-02-04 RX ORDER — NALOXONE HCL 0.4 MG/ML
0.02 VIAL (ML) INJECTION
Status: DISCONTINUED | OUTPATIENT
Start: 2024-02-04 | End: 2024-02-23 | Stop reason: HOSPADM

## 2024-02-04 RX ORDER — INSULIN ASPART 100 [IU]/ML
0-5 INJECTION, SOLUTION INTRAVENOUS; SUBCUTANEOUS
Status: DISCONTINUED | OUTPATIENT
Start: 2024-02-04 | End: 2024-02-23 | Stop reason: HOSPADM

## 2024-02-04 RX ORDER — MORPHINE SULFATE 2 MG/ML
2 INJECTION, SOLUTION INTRAMUSCULAR; INTRAVENOUS EVERY 4 HOURS PRN
Status: DISCONTINUED | OUTPATIENT
Start: 2024-02-04 | End: 2024-02-23 | Stop reason: HOSPADM

## 2024-02-04 RX ORDER — PANTOPRAZOLE SODIUM 40 MG/1
40 TABLET, DELAYED RELEASE ORAL DAILY
Status: ON HOLD | COMMUNITY
End: 2024-02-04 | Stop reason: CLARIF

## 2024-02-04 RX ORDER — IBUPROFEN 200 MG
16 TABLET ORAL
Status: DISCONTINUED | OUTPATIENT
Start: 2024-02-04 | End: 2024-02-23 | Stop reason: HOSPADM

## 2024-02-04 RX ORDER — SODIUM CHLORIDE 0.9 % (FLUSH) 0.9 %
10 SYRINGE (ML) INJECTION EVERY 12 HOURS PRN
Status: DISCONTINUED | OUTPATIENT
Start: 2024-02-04 | End: 2024-02-23 | Stop reason: HOSPADM

## 2024-02-04 RX ORDER — TALC
6 POWDER (GRAM) TOPICAL NIGHTLY PRN
Status: DISCONTINUED | OUTPATIENT
Start: 2024-02-04 | End: 2024-02-23 | Stop reason: HOSPADM

## 2024-02-04 RX ORDER — ONDANSETRON HYDROCHLORIDE 2 MG/ML
4 INJECTION, SOLUTION INTRAVENOUS EVERY 8 HOURS PRN
Status: DISCONTINUED | OUTPATIENT
Start: 2024-02-04 | End: 2024-02-07

## 2024-02-04 RX ORDER — IBUPROFEN 200 MG
24 TABLET ORAL
Status: DISCONTINUED | OUTPATIENT
Start: 2024-02-04 | End: 2024-02-23 | Stop reason: HOSPADM

## 2024-02-04 RX ORDER — GLUCAGON 1 MG
1 KIT INJECTION
Status: DISCONTINUED | OUTPATIENT
Start: 2024-02-04 | End: 2024-02-23 | Stop reason: HOSPADM

## 2024-02-04 RX ORDER — SODIUM CHLORIDE, SODIUM LACTATE, POTASSIUM CHLORIDE, CALCIUM CHLORIDE 600; 310; 30; 20 MG/100ML; MG/100ML; MG/100ML; MG/100ML
INJECTION, SOLUTION INTRAVENOUS CONTINUOUS
Status: DISCONTINUED | OUTPATIENT
Start: 2024-02-04 | End: 2024-02-12

## 2024-02-04 RX ADMIN — SODIUM CHLORIDE, POTASSIUM CHLORIDE, SODIUM LACTATE AND CALCIUM CHLORIDE: 600; 310; 30; 20 INJECTION, SOLUTION INTRAVENOUS at 09:02

## 2024-02-04 RX ADMIN — MEROPENEM 1 G: 1 INJECTION INTRAVENOUS at 09:02

## 2024-02-04 NOTE — Clinical Note
Right: Abdomen.   Scrubbed with Chlorhexidine/Alcohol.    Hair: N/A.  Skin prep dry before draping.  Prepped by: Trace Avila RT.

## 2024-02-05 PROBLEM — K81.9 CHOLECYSTITIS: Status: ACTIVE | Noted: 2024-02-05

## 2024-02-05 PROBLEM — E83.39 HYPOPHOSPHATEMIA: Status: ACTIVE | Noted: 2024-02-05

## 2024-02-05 PROBLEM — D64.9 NORMOCYTIC ANEMIA: Status: ACTIVE | Noted: 2024-02-05

## 2024-02-05 PROBLEM — E83.42 HYPOMAGNESEMIA: Chronic | Status: ACTIVE | Noted: 2024-02-05

## 2024-02-05 PROBLEM — I63.9 CVA (CEREBRAL VASCULAR ACCIDENT): Chronic | Status: ACTIVE | Noted: 2024-02-04

## 2024-02-05 PROBLEM — E83.42 HYPOMAGNESEMIA: Status: ACTIVE | Noted: 2024-02-05

## 2024-02-05 PROBLEM — E11.9 TYPE 2 DIABETES MELLITUS: Chronic | Status: ACTIVE | Noted: 2024-02-04

## 2024-02-05 PROBLEM — I10 PRIMARY HYPERTENSION: Chronic | Status: ACTIVE | Noted: 2024-02-04

## 2024-02-05 LAB
ALBUMIN SERPL BCP-MCNC: 2 G/DL (ref 3.5–5.2)
ALP SERPL-CCNC: 55 U/L (ref 55–135)
ALT SERPL W/O P-5'-P-CCNC: 7 U/L (ref 10–44)
ANION GAP SERPL CALC-SCNC: 5 MMOL/L (ref 8–16)
AST SERPL-CCNC: 8 U/L (ref 10–40)
BASOPHILS # BLD AUTO: 0.06 K/UL (ref 0–0.2)
BASOPHILS NFR BLD: 0.7 % (ref 0–1.9)
BILIRUB SERPL-MCNC: 0.4 MG/DL (ref 0.1–1)
BUN SERPL-MCNC: 12 MG/DL (ref 8–23)
CALCIUM SERPL-MCNC: 8.6 MG/DL (ref 8.7–10.5)
CHLORIDE SERPL-SCNC: 106 MMOL/L (ref 95–110)
CO2 SERPL-SCNC: 25 MMOL/L (ref 23–29)
CREAT SERPL-MCNC: 0.6 MG/DL (ref 0.5–1.4)
DIFFERENTIAL METHOD BLD: ABNORMAL
EOSINOPHIL # BLD AUTO: 0.3 K/UL (ref 0–0.5)
EOSINOPHIL NFR BLD: 3.3 % (ref 0–8)
ERYTHROCYTE [DISTWIDTH] IN BLOOD BY AUTOMATED COUNT: 13.5 % (ref 11.5–14.5)
EST. GFR  (NO RACE VARIABLE): >60 ML/MIN/1.73 M^2
ESTIMATED AVG GLUCOSE: 91 MG/DL (ref 68–131)
GLUCOSE SERPL-MCNC: 87 MG/DL (ref 70–110)
HBA1C MFR BLD: 4.8 % (ref 4–5.6)
HCT VFR BLD AUTO: 25.8 % (ref 40–54)
HGB BLD-MCNC: 8.5 G/DL (ref 14–18)
IMM GRANULOCYTES # BLD AUTO: 0.05 K/UL (ref 0–0.04)
IMM GRANULOCYTES NFR BLD AUTO: 0.6 % (ref 0–0.5)
LYMPHOCYTES # BLD AUTO: 2.4 K/UL (ref 1–4.8)
LYMPHOCYTES NFR BLD: 28.2 % (ref 18–48)
MAGNESIUM SERPL-MCNC: 1.4 MG/DL (ref 1.6–2.6)
MCH RBC QN AUTO: 30.1 PG (ref 27–31)
MCHC RBC AUTO-ENTMCNC: 32.9 G/DL (ref 32–36)
MCV RBC AUTO: 92 FL (ref 82–98)
MONOCYTES # BLD AUTO: 0.6 K/UL (ref 0.3–1)
MONOCYTES NFR BLD: 7 % (ref 4–15)
NEUTROPHILS # BLD AUTO: 5.2 K/UL (ref 1.8–7.7)
NEUTROPHILS NFR BLD: 60.2 % (ref 38–73)
NRBC BLD-RTO: 0 /100 WBC
OHS QRS DURATION: 72 MS
OHS QTC CALCULATION: 428 MS
PHOSPHATE SERPL-MCNC: 1.9 MG/DL (ref 2.7–4.5)
PLATELET # BLD AUTO: 308 K/UL (ref 150–450)
PMV BLD AUTO: 8.9 FL (ref 9.2–12.9)
POCT GLUCOSE: 104 MG/DL (ref 70–110)
POCT GLUCOSE: 83 MG/DL (ref 70–110)
POCT GLUCOSE: 90 MG/DL (ref 70–110)
POCT GLUCOSE: 91 MG/DL (ref 70–110)
POTASSIUM SERPL-SCNC: 3.5 MMOL/L (ref 3.5–5.1)
PROT SERPL-MCNC: 6.3 G/DL (ref 6–8.4)
RBC # BLD AUTO: 2.82 M/UL (ref 4.6–6.2)
SODIUM SERPL-SCNC: 136 MMOL/L (ref 136–145)
WBC # BLD AUTO: 8.61 K/UL (ref 3.9–12.7)

## 2024-02-05 PROCEDURE — 25000003 PHARM REV CODE 250

## 2024-02-05 PROCEDURE — 99223 1ST HOSP IP/OBS HIGH 75: CPT | Mod: ,,, | Performed by: PHYSICIAN ASSISTANT

## 2024-02-05 PROCEDURE — 85025 COMPLETE CBC W/AUTO DIFF WBC: CPT

## 2024-02-05 PROCEDURE — 25000003 PHARM REV CODE 250: Performed by: PHYSICIAN ASSISTANT

## 2024-02-05 PROCEDURE — 93010 ELECTROCARDIOGRAM REPORT: CPT | Mod: ,,, | Performed by: INTERNAL MEDICINE

## 2024-02-05 PROCEDURE — 93005 ELECTROCARDIOGRAM TRACING: CPT

## 2024-02-05 PROCEDURE — 63600175 PHARM REV CODE 636 W HCPCS

## 2024-02-05 PROCEDURE — 36415 COLL VENOUS BLD VENIPUNCTURE: CPT

## 2024-02-05 PROCEDURE — 11000001 HC ACUTE MED/SURG PRIVATE ROOM

## 2024-02-05 PROCEDURE — 83036 HEMOGLOBIN GLYCOSYLATED A1C: CPT

## 2024-02-05 PROCEDURE — 80053 COMPREHEN METABOLIC PANEL: CPT

## 2024-02-05 PROCEDURE — 63600175 PHARM REV CODE 636 W HCPCS: Performed by: PHYSICIAN ASSISTANT

## 2024-02-05 PROCEDURE — 84100 ASSAY OF PHOSPHORUS: CPT

## 2024-02-05 PROCEDURE — 83735 ASSAY OF MAGNESIUM: CPT

## 2024-02-05 PROCEDURE — 99223 1ST HOSP IP/OBS HIGH 75: CPT | Mod: ,,, | Performed by: INTERNAL MEDICINE

## 2024-02-05 PROCEDURE — 25000003 PHARM REV CODE 250: Performed by: HOSPITALIST

## 2024-02-05 RX ORDER — ASPIRIN 81 MG/1
81 TABLET ORAL DAILY
Status: DISCONTINUED | OUTPATIENT
Start: 2024-02-05 | End: 2024-02-06

## 2024-02-05 RX ORDER — METRONIDAZOLE 500 MG/100ML
500 INJECTION, SOLUTION INTRAVENOUS
Status: DISCONTINUED | OUTPATIENT
Start: 2024-02-05 | End: 2024-02-10

## 2024-02-05 RX ORDER — SODIUM,POTASSIUM PHOSPHATES 280-250MG
2 POWDER IN PACKET (EA) ORAL 3 TIMES DAILY
Status: COMPLETED | OUTPATIENT
Start: 2024-02-05 | End: 2024-02-05

## 2024-02-05 RX ORDER — SERTRALINE HYDROCHLORIDE 25 MG/1
25 TABLET, FILM COATED ORAL DAILY
Status: DISCONTINUED | OUTPATIENT
Start: 2024-02-05 | End: 2024-02-23 | Stop reason: HOSPADM

## 2024-02-05 RX ORDER — SENNOSIDES 8.6 MG/1
8.6 TABLET ORAL DAILY
Status: DISCONTINUED | OUTPATIENT
Start: 2024-02-05 | End: 2024-02-15

## 2024-02-05 RX ORDER — ENOXAPARIN SODIUM 100 MG/ML
40 INJECTION SUBCUTANEOUS EVERY 24 HOURS
Status: DISCONTINUED | OUTPATIENT
Start: 2024-02-05 | End: 2024-02-06

## 2024-02-05 RX ORDER — POTASSIUM CHLORIDE 20 MEQ/1
40 TABLET, EXTENDED RELEASE ORAL
Status: COMPLETED | OUTPATIENT
Start: 2024-02-05 | End: 2024-02-05

## 2024-02-05 RX ORDER — ATORVASTATIN CALCIUM 40 MG/1
80 TABLET, FILM COATED ORAL NIGHTLY
Status: DISCONTINUED | OUTPATIENT
Start: 2024-02-05 | End: 2024-02-23 | Stop reason: HOSPADM

## 2024-02-05 RX ORDER — FAMOTIDINE 20 MG/1
20 TABLET, FILM COATED ORAL DAILY
Status: DISCONTINUED | OUTPATIENT
Start: 2024-02-05 | End: 2024-02-05

## 2024-02-05 RX ORDER — LISINOPRIL 10 MG/1
10 TABLET ORAL DAILY
Status: DISCONTINUED | OUTPATIENT
Start: 2024-02-05 | End: 2024-02-08

## 2024-02-05 RX ORDER — MAGNESIUM SULFATE HEPTAHYDRATE 40 MG/ML
2 INJECTION, SOLUTION INTRAVENOUS
Status: COMPLETED | OUTPATIENT
Start: 2024-02-05 | End: 2024-02-05

## 2024-02-05 RX ORDER — FAMOTIDINE 20 MG/1
20 TABLET, FILM COATED ORAL 2 TIMES DAILY
Status: DISCONTINUED | OUTPATIENT
Start: 2024-02-05 | End: 2024-02-10

## 2024-02-05 RX ORDER — SODIUM,POTASSIUM PHOSPHATES 280-250MG
2 POWDER IN PACKET (EA) ORAL 3 TIMES DAILY
Status: DISCONTINUED | OUTPATIENT
Start: 2024-02-05 | End: 2024-02-05

## 2024-02-05 RX ADMIN — METRONIDAZOLE 500 MG: 500 INJECTION, SOLUTION INTRAVENOUS at 03:02

## 2024-02-05 RX ADMIN — POTASSIUM & SODIUM PHOSPHATES POWDER PACK 280-160-250 MG 2 PACKET: 280-160-250 PACK at 03:02

## 2024-02-05 RX ADMIN — MAGNESIUM SULFATE HEPTAHYDRATE 2 G: 40 INJECTION, SOLUTION INTRAVENOUS at 11:02

## 2024-02-05 RX ADMIN — CEFTRIAXONE 2 G: 2 INJECTION, POWDER, FOR SOLUTION INTRAMUSCULAR; INTRAVENOUS at 03:02

## 2024-02-05 RX ADMIN — POTASSIUM CHLORIDE 40 MEQ: 1500 TABLET, EXTENDED RELEASE ORAL at 08:02

## 2024-02-05 RX ADMIN — ASPIRIN 81 MG: 81 TABLET, COATED ORAL at 03:02

## 2024-02-05 RX ADMIN — SENNOSIDES 8.6 MG: 8.6 TABLET, FILM COATED ORAL at 03:02

## 2024-02-05 RX ADMIN — LISINOPRIL 10 MG: 10 TABLET ORAL at 11:02

## 2024-02-05 RX ADMIN — MEROPENEM 1 G: 1 INJECTION INTRAVENOUS at 04:02

## 2024-02-05 RX ADMIN — FAMOTIDINE 20 MG: 20 TABLET, FILM COATED ORAL at 09:02

## 2024-02-05 RX ADMIN — POTASSIUM & SODIUM PHOSPHATES POWDER PACK 280-160-250 MG 2 PACKET: 280-160-250 PACK at 08:02

## 2024-02-05 RX ADMIN — ATORVASTATIN CALCIUM 80 MG: 40 TABLET, FILM COATED ORAL at 09:02

## 2024-02-05 RX ADMIN — MAGNESIUM SULFATE HEPTAHYDRATE 2 G: 40 INJECTION, SOLUTION INTRAVENOUS at 09:02

## 2024-02-05 RX ADMIN — POTASSIUM CHLORIDE 40 MEQ: 1500 TABLET, EXTENDED RELEASE ORAL at 09:02

## 2024-02-05 RX ADMIN — BUSPIRONE HYDROCHLORIDE 7.5 MG: 5 TABLET ORAL at 08:02

## 2024-02-05 RX ADMIN — FAMOTIDINE 20 MG: 20 TABLET, FILM COATED ORAL at 11:02

## 2024-02-05 RX ADMIN — MORPHINE SULFATE 2 MG: 2 INJECTION, SOLUTION INTRAMUSCULAR; INTRAVENOUS at 09:02

## 2024-02-05 RX ADMIN — SODIUM CHLORIDE, POTASSIUM CHLORIDE, SODIUM LACTATE AND CALCIUM CHLORIDE: 600; 310; 30; 20 INJECTION, SOLUTION INTRAVENOUS at 04:02

## 2024-02-05 RX ADMIN — SERTRALINE HYDROCHLORIDE 25 MG: 25 TABLET ORAL at 03:02

## 2024-02-05 RX ADMIN — POTASSIUM & SODIUM PHOSPHATES POWDER PACK 280-160-250 MG 2 PACKET: 280-160-250 PACK at 09:02

## 2024-02-05 RX ADMIN — ENOXAPARIN SODIUM 40 MG: 40 INJECTION SUBCUTANEOUS at 06:02

## 2024-02-05 RX ADMIN — MORPHINE SULFATE 2 MG: 2 INJECTION, SOLUTION INTRAMUSCULAR; INTRAVENOUS at 12:02

## 2024-02-05 NOTE — TREATMENT PLAN
Home Medication Reconciliation obtained from [facility]    Fluconazole nasal spray 1 spray per nostril BID  Reglan 5mg TID  ASA EC 81mg QD  Buspar 7.5mg QD  Megace 625mg QD  Omeprazole 40mg QD  Senna 8.6mg QD  Zoloft 25mg QD  Sulcrafate 10mg QD  Insulin-Regular sliding scale with bolus feeds  Trazodone 50mg qhs  Zofran 3mg PRN    Will restart home medications as appropriate  -Cole Messina MD

## 2024-02-05 NOTE — HPI
Mr Velazquez is a 68 year old male with history of CVA with dysphagia s/p PEG tube, HTN, HLD, DMII presents from OSH for AES eval of possible retained stone after cholecystectomy 02/01.    Patient initially presented from NH with leukocytosis w/o specific symptoms, imaging showed cholecystitis. He underwent lap irlanda 2/1, the operative report noted the gallbladder was necrotic and fell apart. The back wall of the gallbladder had an abscess that was perforated. There were spilled stones and purulent bile. A cholangiogram showed patent common duct with single stone at the ampulla that appears too large to pass spontaneously. Patient transferred to [facility] for possible ERCP.  While at OSH patient was treated with empiric Meropenem. On arrival, patient reports mild abdominal pain, denies N/V/D. He is AFVSS.  No leukocytosis. LFTS WNL. ID consulted for recommendations.  Patient reports being told he had a PCN allergy as a child. Cannot recall specific reaction. Gi following. Ordered MRCP.

## 2024-02-05 NOTE — HPI
68M w/PMH stroke with G-tube placement, hypertension, diabetes, hyperlipidemia, recent cholecystitis, and dysphagia presents as transfer from OSH for AES eval. He initially presented from NH with leukocytosis w/o specific symptoms, imaging showed cholecystitis. Underwent lap irlanda 2/1, the operative report noted the gallbladder was necrotic and fell apart. The back wall of the gallbladder had an abscess that was perforated. There were spilled stones and purulent bile. A cholangiogram showed patent common duct with single stone at the ampulla that appears too large to pass spontaneously. Patient is transferred to [facility] for possible ERCP.     On arrival, patient reports some abdominal pain, denies N/V/D. He is AFVSS. Admitted to  for further management.

## 2024-02-05 NOTE — CONSULTS
[facility] - Surgery  Infectious Disease  Consult Note    Patient Name: Seymour Velazquez  MRN: [id number]  Admission Date: 2/4/2024  Hospital Length of Stay: 1 days  Attending Physician: Bhumi Joshua*  Primary Care Provider: Eliazar Rosas MD     Isolation Status: No active isolations    Patient information was obtained from patient and past medical records.      Inpatient consult to Infectious Diseases  Consult performed by: Lesly Alford PA-C  Consult ordered by: Vincenzo Boston MD        Assessment/Plan:     GI  Cholecystitis    68 year old male with history of CVA, dysphagia, PEG tube, HTN,  DMII presents from OSH for AES eval of possible retained stone after cholecystectomy 02/01 and need for ERCP. Patient presented to OSH with cholecystitis s/p laparoscopic cholecystectomy 2/1. Necrotic gallbladder noted with abscess that perforated c/b spilled stones and purulent bile. He was treated with empiric Meropenem. On arrival to [facility] afebrile without a leukocytosis. LFTS WNL. HDS. GI consulted. MRCP ordered.    Recommendations  Discussed documented PCN allergy. Patient does not recall specific allergic reaction but was told he had an allergy as a child. Denies known hx of anaphylaxis. Recommend deescalating Meropenem to Ceftriaxone and Flagyl. Monitor closely for adverse reactions.   Follow up MRCP  EKG ordered to assess QT interval in the event IV antibiotics can be transitioned to orals (? flouroquinolone)  Discussed plan with hospital medicine service. ID will follow.        Thank you for your consult. I will follow-up with patient. Please contact us if you have any additional questions.    Lesly Alford PA-C  Infectious Disease  [facility] - Surgery    Subjective:     Principal Problem: Common bile duct (CBD) obstruction    HPI: Mr Velazquez is a 68 year old male with history of CVA with dysphagia s/p PEG tube, HTN, HLD, DMII presents from OSH for AES eval of possible retained stone after  cholecystectomy 02/01.    Patient initially presented from NH with leukocytosis w/o specific symptoms, imaging showed cholecystitis. He underwent lap irlanda 2/1, the operative report noted the gallbladder was necrotic and fell apart. The back wall of the gallbladder had an abscess that was perforated. There were spilled stones and purulent bile. A cholangiogram showed patent common duct with single stone at the ampulla that appears too large to pass spontaneously. Patient transferred to [facility] for possible ERCP.  While at OSH patient was treated with empiric Meropenem. On arrival, patient reports mild abdominal pain, denies N/V/D. He is AFVSS.  No leukocytosis. LFTS WNL. ID consulted for recommendations.  Patient reports being told he had a PCN allergy as a child. Cannot recall specific reaction. Gi following. Ordered MRCP.    History reviewed. No pertinent past medical history.    Past Surgical History:   Procedure Laterality Date    ABDOMINAL SURGERY         Review of patient's allergies indicates:   Allergen Reactions    Pcn [penicillins]        Medications:  No medications prior to admission.     Antibiotics (From admission, onward)      Start     Stop Route Frequency Ordered    02/04/24 2115  meropenem (MERREM) 1 g in sodium chloride 0.9 % 100 mL IVPB (MB+)         -- IV Every 8 hours (non-standard times) 02/04/24 2005          Antifungals (From admission, onward)      None          Antivirals (From admission, onward)      None             Immunization History   Administered Date(s) Administered    COVID-19, mRNA, LNP-S, PF (Moderna 2023)Ages 12+ 10/19/2023    COVID-19, mRNA, LNP-S, bivalent booster, PF (Moderna Omicron)6mo-5Years 05/25/2023    COVID-19, mRNA, LNP-S, bivalent booster, PF (PFIZER OMICRON) 12/14/2022       Family History    None       Social History     Socioeconomic History    Marital status: Unknown   Tobacco Use    Smoking status: Never    Smokeless tobacco: Never   Substance and Sexual Activity     Alcohol use: Not Currently     Review of Systems   Constitutional:  Negative for chills, diaphoresis and fever.   HENT:  Positive for trouble swallowing. Negative for congestion and sore throat.    Respiratory:  Negative for cough and shortness of breath.    Cardiovascular:  Negative for chest pain and leg swelling.   Gastrointestinal:  Positive for abdominal pain (mild). Negative for diarrhea, nausea and vomiting.   Genitourinary:  Negative for dysuria.   Musculoskeletal:  Negative for arthralgias and myalgias.   Skin:  Negative for rash.   Neurological:  Positive for weakness. Negative for light-headedness and headaches.   Hematological:  Bruises/bleeds easily.   Psychiatric/Behavioral:  Negative for agitation and confusion. The patient is not nervous/anxious.      Objective:     Vital Signs (Most Recent):  Temp: 97.7 °F (36.5 °C) (02/05/24 1125)  Pulse: 77 (02/05/24 1125)  Resp: 16 (02/05/24 1125)  BP: 132/72 (02/05/24 1125)  SpO2: 99 % (02/05/24 1125) Vital Signs (24h Range):  Temp:  [97.7 °F (36.5 °C)-98.4 °F (36.9 °C)] 97.7 °F (36.5 °C)  Pulse:  [75-83] 77  Resp:  [16-19] 16  SpO2:  [96 %-99 %] 99 %  BP: (118-159)/(62-74) 132/72     Weight: 79.6 kg (175 lb 8 oz)  Body mass index is 22.53 kg/m².    Estimated Creatinine Clearance: 132.7 mL/min (based on SCr of 0.6 mg/dL).     Physical Exam  Vitals and nursing note reviewed.   Constitutional:       General: He is not in acute distress.     Appearance: Normal appearance. He is well-developed. He is not ill-appearing or diaphoretic.   HENT:      Head: Normocephalic and atraumatic.      Right Ear: External ear normal.      Left Ear: External ear normal.      Nose: Nose normal.      Mouth/Throat:      Mouth: Mucous membranes are dry.   Eyes:      General: No scleral icterus.        Right eye: No discharge.         Left eye: No discharge.      Extraocular Movements: Extraocular movements intact.      Conjunctiva/sclera: Conjunctivae normal.   Cardiovascular:       "Rate and Rhythm: Normal rate and regular rhythm.   Pulmonary:      Effort: Pulmonary effort is normal. No respiratory distress.      Breath sounds: No stridor.   Abdominal:      General: There is no distension.      Palpations: Abdomen is soft.      Tenderness: There is no abdominal tenderness.      Comments: PEG  Drain with SS output   Musculoskeletal:      Right lower leg: No edema.      Left lower leg: No edema.   Skin:     General: Skin is dry.      Coloration: Skin is pale. Skin is not jaundiced.      Findings: No erythema.   Neurological:      General: No focal deficit present.      Mental Status: He is alert and oriented to person, place, and time. Mental status is at baseline.   Psychiatric:         Mood and Affect: Mood normal.         Behavior: Behavior normal.         Thought Content: Thought content normal.         Judgment: Judgment normal.          Significant Labs: Blood Culture: No results for input(s): "LABBLOO" in the last 4320 hours.  CBC:   Recent Labs   Lab 02/05/24  0440   WBC 8.61   HGB 8.5*   HCT 25.8*        CMP:   Recent Labs   Lab 02/05/24  0440      K 3.5      CO2 25   GLU 87   BUN 12   CREATININE 0.6   CALCIUM 8.6*   PROT 6.3   ALBUMIN 2.0*   BILITOT 0.4   ALKPHOS 55   AST 8*   ALT 7*   ANIONGAP 5*     Microbiology Results (last 7 days)       ** No results found for the last 168 hours. **          Procalcitonin: No results for input(s): "PROCAL" in the last 48 hours.  Quantiferon: No results for input(s): "NIL", "TBAG", "TBAGNIL", "MITOGENNIL", "TBGOLD" in the last 48 hours.  Respiratory Culture: No results for input(s): "GSRESP", "RESPIRATORYC" in the last 4320 hours.  Urine Culture: No results for input(s): "LABURIN" in the last 4320 hours.  Urine Studies: No results for input(s): "COLORU", "APPEARANCEUA", "PHUR", "SPECGRAV", "PROTEINUA", "GLUCUA", "KETONESU", "BILIRUBINUA", "OCCULTUA", "NITRITE", "UROBILINOGEN", "LEUKOCYTESUR", "RBCUA", "WBCUA", "BACTERIA", " ""SQUAMEPITHEL", "HYALINECASTS" in the last 4320 hours.    Invalid input(s): "WRIGHTSUR"  Wound Culture: No results for input(s): "LABAERO" in the last 4320 hours.  Recent Lab Results         02/05/24  1126   02/05/24  0526   02/05/24  0440   02/05/24  0008        Albumin     2.0         ALP     55         ALT     7         Anion Gap     5         AST     8         Baso #     0.06         Basophil %     0.7         BILIRUBIN TOTAL     0.4  Comment: For infants and newborns, interpretation of results should be based  on gestational age, weight and in agreement with clinical  observations.    Premature Infant recommended reference ranges:  Up to 24 hours.............<8.0 mg/dL  Up to 48 hours............<12.0 mg/dL  3-5 days..................<15.0 mg/dL  6-29 days.................<15.0 mg/dL           BUN     12         Calcium     8.6         Chloride     106         CO2     25         Creatinine     0.6         Differential Method     Automated         eGFR     >60.0         Eos #     0.3         Eos %     3.3         Estimated Avg Glucose     91         Glucose     87         Gran # (ANC)     5.2         Gran %     60.2         Hematocrit     25.8         Hemoglobin     8.5         Hemoglobin A1C External     4.8  Comment: ADA Screening Guidelines:  5.7-6.4%  Consistent with prediabetes  >or=6.5%  Consistent with diabetes    High levels of fetal hemoglobin interfere with the HbA1C  assay. Heterozygous hemoglobin variants (HbS, HgC, etc)do  not significantly interfere with this assay.   However, presence of multiple variants may affect accuracy.           Immature Grans (Abs)     0.05  Comment: Mild elevation in immature granulocytes is non specific and   can be seen in a variety of conditions including stress response,   acute inflammation, trauma and pregnancy. Correlation with other   laboratory and clinical findings is essential.           Immature Granulocytes     0.6         Lymph #     2.4         Lymph %     " 28.2         Magnesium      1.4         MCH     30.1         MCHC     32.9         MCV     92         Mono #     0.6         Mono %     7.0         MPV     8.9         nRBC     0         Phosphorus Level     1.9         Platelet Count     308         POCT Glucose 83   104     91       Potassium     3.5         PROTEIN TOTAL     6.3         RBC     2.82         RDW     13.5         Sodium     136         WBC     8.61                 Significant Imaging: MRCP ordered

## 2024-02-05 NOTE — SUBJECTIVE & OBJECTIVE
No past medical history on file.    No past surgical history on file.    Review of patient's allergies indicates:  Not on File    No current facility-administered medications on file prior to encounter.     No current outpatient medications on file prior to encounter.     Family History    None       Tobacco Use    Smoking status: Not on file    Smokeless tobacco: Not on file   Substance and Sexual Activity    Alcohol use: Not on file    Drug use: Not on file    Sexual activity: Not on file     Review of Systems   Constitutional:  Negative for chills and fever.   HENT:  Positive for trouble swallowing. Negative for rhinorrhea and sore throat.    Eyes:  Negative for photophobia.   Respiratory:  Negative for cough and shortness of breath.    Cardiovascular:  Negative for chest pain and leg swelling.   Gastrointestinal:  Positive for abdominal pain. Negative for diarrhea, nausea and vomiting.   Endocrine: Negative for polyuria.   Genitourinary:  Negative for dysuria.   Musculoskeletal:  Negative for arthralgias and myalgias.   Skin:  Negative for rash.   Neurological:  Positive for weakness. Negative for light-headedness and headaches.   Psychiatric/Behavioral:  Negative for agitation and confusion.      Objective:     Vital Signs (Most Recent):    Vital Signs (24h Range):           There is no height or weight on file to calculate BMI.     Physical Exam  Constitutional:       General: He is not in acute distress.     Appearance: Normal appearance. He is ill-appearing.   HENT:      Head: Normocephalic and atraumatic.      Right Ear: External ear normal.      Left Ear: External ear normal.      Nose: Nose normal.      Mouth/Throat:      Mouth: Mucous membranes are moist.      Pharynx: Oropharynx is clear.   Eyes:      General: No scleral icterus.     Extraocular Movements: Extraocular movements intact.      Conjunctiva/sclera: Conjunctivae normal.   Cardiovascular:      Rate and Rhythm: Normal rate and regular rhythm.       Heart sounds: Normal heart sounds.   Pulmonary:      Effort: Pulmonary effort is normal.      Breath sounds: Normal breath sounds. No wheezing or rales.   Abdominal:      General: Abdomen is flat. Bowel sounds are normal. There is no distension.      Palpations: Abdomen is soft.      Tenderness: There is abdominal tenderness.   Musculoskeletal:         General: No swelling. Normal range of motion.      Cervical back: Normal range of motion.   Skin:     General: Skin is warm and dry.   Neurological:      Mental Status: He is alert.                Significant Labs: All pertinent labs within the past 24 hours have been reviewed.    Significant Imaging: I have reviewed all pertinent imaging results/findings within the past 24 hours.   hip

## 2024-02-05 NOTE — PROGRESS NOTES
Outside medications:  - Protonix 40mg IV daily   - Morphine 4mg IV q4hrs PRN   - Benadryl 25mg  q6hrs PRN   - Zofran 4mg IV q6hrs  - Tylenol q8hrs - start 8hrs after surgery  - Tramadol 50mg q4hr PRN   - Humulin R per sliding scale   - KCL 10% oral solution 40meq/30ml take 20meq (15ml) daily  - Melatonin 3mmg qHS  - Reglan 10mg q6hr PRN   - Senokot 8.6mg 2tab BID   - Buspar 7.5mg daily   - Megace 5ml daily   - Zoloift 25mg PRN daily for anxiety  - Merrem 1g/50ml NS q8hrs  - LR 100ml/hr

## 2024-02-05 NOTE — PROGRESS NOTES
Spoke with nurse from [facility]. Report patient was receiving Glucerna 1.5 300cc bolus 4 times a day with a 200 cc flush 4 times a day.

## 2024-02-05 NOTE — H&P
[facility] Medicine  History & Physical    Patient Name: Seymour Velazquez  MRN: [id number]  Patient Class: IP- Inpatient  Admission Date: 2/4/2024  Attending Physician: Bhumi Joshua*   Primary Care Provider: Eliazar Rosas MD         Patient information was obtained from patient, past medical records, and ER records.     Subjective:     Principal Problem:<principal problem not specified>    Chief Complaint: No chief complaint on file.       HPI: 68M w/PMH stroke with G-tube placement, hypertension, diabetes, hyperlipidemia, recent cholecystitis, and dysphagia presents as transfer from OSH for AES eval. He initially presented from NH with leukocytosis w/o specific symptoms, imaging showed cholecystitis. Underwent lap irlanda 2/1, the operative report noted the gallbladder was necrotic and fell apart. The back wall of the gallbladder had an abscess that was perforated. There were spilled stones and purulent bile. A cholangiogram showed patent common duct with single stone at the ampulla that appears too large to pass spontaneously. [facility] for possible ERCP.     On arrival, patient reports some abdominal pain, denies N/V/D. He is AFVSS. Admitted to  for further management.     No past medical history on file.    No past surgical history on file.    Review of patient's allergies indicates:  Not on File    No current facility-administered medications on file prior to encounter.     No current outpatient medications on file prior to encounter.     Family History    None       Tobacco Use    Smoking status: Not on file    Smokeless tobacco: Not on file   Substance and Sexual Activity    Alcohol use: Not on file    Drug use: Not on file    Sexual activity: Not on file     Review of Systems   Constitutional:  Negative for chills and fever.   HENT:  Positive for trouble swallowing. Negative for rhinorrhea and sore throat.    Eyes:  Negative for photophobia.    Respiratory:  Negative for cough and shortness of breath.    Cardiovascular:  Negative for chest pain and leg swelling.   Gastrointestinal:  Positive for abdominal pain. Negative for diarrhea, nausea and vomiting.   Endocrine: Negative for polyuria.   Genitourinary:  Negative for dysuria.   Musculoskeletal:  Negative for arthralgias and myalgias.   Skin:  Negative for rash.   Neurological:  Positive for weakness. Negative for light-headedness and headaches.   Psychiatric/Behavioral:  Negative for agitation and confusion.      Objective:     Vital Signs (Most Recent):    Vital Signs (24h Range):           There is no height or weight on file to calculate BMI.     Physical Exam  Constitutional:       General: He is not in acute distress.     Appearance: Normal appearance. He is ill-appearing.   HENT:      Head: Normocephalic and atraumatic.      Right Ear: External ear normal.      Left Ear: External ear normal.      Nose: Nose normal.      Mouth/Throat:      Mouth: Mucous membranes are moist.      Pharynx: Oropharynx is clear.   Eyes:      General: No scleral icterus.     Extraocular Movements: Extraocular movements intact.      Conjunctiva/sclera: Conjunctivae normal.   Cardiovascular:      Rate and Rhythm: Normal rate and regular rhythm.      Heart sounds: Normal heart sounds.   Pulmonary:      Effort: Pulmonary effort is normal.      Breath sounds: Normal breath sounds. No wheezing or rales.   Abdominal:      General: Abdomen is flat. Bowel sounds are normal. There is no distension.      Palpations: Abdomen is soft.      Tenderness: There is abdominal tenderness.   Musculoskeletal:         General: No swelling. Normal range of motion.      Cervical back: Normal range of motion.   Skin:     General: Skin is warm and dry.   Neurological:      Mental Status: He is alert.                Significant Labs: All pertinent labs within the past 24 hours have been reviewed.    Significant Imaging: I have reviewed all  "pertinent imaging results/findings within the past 24 hours.  Assessment/Plan:     CVA (cerebral vascular accident)  Patient is s/p CVA with residual dysphagia and weakness. He is s/p PEG tube and receives bolus tube feedings.         Type 2 diabetes mellitus  Patient's FSGs are controlled on current medication regimen.  Last A1c reviewed-   Lab Results   Component Value Date    HGBA1C 5.8 10/22/2022     Most recent fingerstick glucose reviewed- No results for input(s): "POCTGLUCOSE" in the last 24 hours.  Current correctional scale  Low  Maintain anti-hyperglycemic dose as follows-   Antihyperglycemics (From admission, onward)      Start     Stop Route Frequency Ordered    02/04/24 2059  insulin aspart U-100 pen 0-5 Units         -- SubQ Before meals & nightly PRN 02/04/24 2000          Hold Oral hypoglycemics while patient is in the hospital.    Last A1c normal over a year ago. Repeat ordered. Patient on SSI at NH.    -LDSSI    Primary hypertension  Patient is normotensive on arrival, per chart he is not currently on any antihypertensives.     Will utilize p.r.n. blood pressure medication only if patient's blood pressure greater than 180/110 and he develops symptoms such as worsening chest pain or shortness of breath.    Common bile duct (CBD) obstruction  68M w/ recent CVA and residual dysphagia s/p PEG tube presents from OSH for retained gallstone in ampulla, s/p lap cholecystectomy in which the gallbladder was found necrotic and friable. His only symptom is abdominal pain. Labs show leukocytosis. He is transferred for AES eval and possible ERCP.     -was on meropenem at OSH, continued for now and consulted ID  -AES consulted, appreciate recs  -LR infusion 100cc/hr  -NPO for possibility of procedure        VTE Risk Mitigation (From admission, onward)           Ordered     IP VTE LOW RISK PATIENT  Once         02/04/24 2000     Place sequential compression device  Until discontinued         02/04/24 2000         "                       Evelin Hoff (daughter) - [phone]   Can be contacted for any questions.       BRODY COLEMAN MD  Department of Hospital Medicine  [facility] - Surgery

## 2024-02-05 NOTE — CARE UPDATE
I have reviewed the chart of Seymour Velazquez and collaborated with Bhumi Joshua* in the care of the patient who is hospitalized for the following:    Active Hospital Problems    Diagnosis    *Common bile duct (CBD) obstruction    Hypomagnesemia    Hypophosphatemia    Primary hypertension    Type 2 diabetes mellitus    CVA (cerebral vascular accident)          I have reviewed Seymour Velazquez with the multidisciplinary team during discharge huddle.       Diann Farrell PA-C  [facility]

## 2024-02-05 NOTE — ASSESSMENT & PLAN NOTE
68 year old male with history of CVA, dysphagia, PEG tube, HTN,  DMII presents from OSH for AES eval of possible retained stone after cholecystectomy 02/01 and need for ERCP. Patient presented to OSH with cholecystitis s/p laparoscopic cholecystectomy 2/1. Necrotic gallbladder noted with abscess that perforated c/b spilled stones and purulent bile. He was treated with empiric Meropenem. On arrival to [facility] afebrile without a leukocytosis. LFTS WNL. HDS. GI consulted. MRCP ordered.    Recommendations  Discussed documented PCN allergy. Patient does not recall specific allergic reaction but was told he had an allergy as a child. Denies known hx of anaphylaxis. Recommend deescalating Meropenem to Ceftriaxone and Flagyl. Monitor closely for adverse reactions.   Follow up MRCP  EKG ordered to assess QT interval in the event IV antibiotics can be transitioned to orals (? flouroquinolone)  Discussed plan with ID staff. ID will follow.

## 2024-02-05 NOTE — CONSULTS
[facility] - Surgery  Wound Care    Patient Name:  Seymour Velazquez   MRN:  [id number]  Date: 2/5/2024  Diagnosis: Common bile duct (CBD) obstruction    History:     History reviewed. No pertinent past medical history.    Social History     Socioeconomic History    Marital status: Unknown   Tobacco Use    Smoking status: Never    Smokeless tobacco: Never   Substance and Sexual Activity    Alcohol use: Not Currently       Precautions:     Allergies as of 02/03/2024    (Not on File)       [facility] Assessment Details/Treatment   Patient seen for wound care consultation.   Reviewed chart for this encounter.   See Flow Sheet for findings.    Pt in bed and agreeable to care. The superior and inferior incision sites were moistened with vashe and the packing was removed. The wound beds are red and moist with controlled bleeding. The sites were cleansed with vashe, AMD antimicrobial packing strips were loosely packed into the wound bed and secured with a mepore dressing. Wound care will follow-up on Wednesday for dressing change. The right foot is intact, pink, dry and blanchable.     RECOMMENDATIONS:  - Abd incision sites: wound care will follow  - Turning every 2 hours  - Waffle mattress overlay  - Heel protecting boots   - Bedside nursing to maintain pressure injury prevention interventions        02/05/24 1246        Altered Skin Integrity  midline Upper quadrant   No Date First Assessed or Time First Assessed found.   Altered Skin Integrity Present on Admission - Did Patient arrive to the hospital with altered skin?: yes  Side: (c)   Orientation: midline  Location: Upper quadrant   Wound Image      Dressing Appearance Dry;Intact   Drainage Amount Small   Drainage Characteristics/Odor Serosanguineous;Bleeding controlled   Appearance Pink;Red;Moist   Periwound Area Intact;Dry   Care Cleansed with:;Sterile normal saline   Dressing Removed   Packing packing removed;packed with;other (see comment)  (AMD antimicrobial packign  strips)        Altered Skin Integrity  midline Lower quadrant   No Date First Assessed or Time First Assessed found.   Altered Skin Integrity Present on Admission - Did Patient arrive to the hospital with altered skin?: yes  Side: (c)   Orientation: midline  Location: (c) Lower quadrant   Wound Image    Dressing Appearance Dry;Intact   Drainage Amount Small   Drainage Characteristics/Odor Serosanguineous   Appearance Pink;Red;Moist   Periwound Area Intact;Dry   Care Cleansed with:;Sterile normal saline   Dressing Removed   Packing packing removed;packed with;other (see comment)  (AMD antimicrobial packign strips)       Recommendations made to primary team for above care via secure chat. Wound care will follow-up on Wednesday.     02/05/2024

## 2024-02-05 NOTE — HOSPITAL COURSE
Patient admitted to Hospital Medicine service for medical management and evaluation of suspected CBD obstruction following complicated laparoscopic cholecystectomy. AES was consulted on admission with recommendation of MRCP. MRCP w/ and w/o contrast currently pending. ID was consulted with continuation of Merrem from outside hospital. Merrem de-escalated to Rocephin/Flagyl per ID recs. List of home medications was obtained from home facility and were continued as appropriate. MRCP[02/06/24] noted fluid collection in the gallbladder fossa, concerning for  abscess vs. biloma. Also noted choledocholithiasis with gallstones posterior to the liver. AES performed ERCP 02/06/24 with removal of choledocolithiasis  by biliary sphincterotomy and balloon extraction. General Surgery consult noted no indication surgical intervention at the time, but can consider IR drainage of fluid collection if clinical status worsens. Intermittent episodes of N/V with associated tachycardia throughout hospitalization, without acute abdomen. Unable to complete IR drainage of fluid collection on 2/7 2/2 N/V. Scheduling antiemetics for better control. Repeat attempt at fluid drainage and drain placement with IR successful without complication morning of 2/9. Slowly evolving AURORA was not responsive to increased IVF resuscitation; urine studies inconsistent with pre-renal etiology. Consulted Nephrology for further assistance. Patient had a retroperitoneal ultrasound showing mild bilateral hydronephrosis and a distended bladder. Rosario catheter was placed on 2/11 and patient put out almost 1L with catheter insertion. SLP evaluated patient and he was started on pleasure feeds in addition to his tube feeds.     His course was complicated by increased nausea/vomiting and PEG tube dislodgement, which was replaced twice by the Gastroenterology team. Abdominal binder placed to secure PEG tube.  Increasing concerns for sepsis with worsening vitals/labs, so  broadened antibiotics coverage with Meropenem and added PO Vancomycin per ID recs out of concerns for c diff. He also developed new onset hypoxia and required breathing treatment and NT suctioning in addition to oxygen supplementation that was thought to be secondary to an aspiration event. His diarrhea quickly resolved and sample was not able to be collected for c diff. He received a CT Chest/Abdomen/Pelvis with contrast on 2/16 that noted decreased size of gallbladder fossa fluid collection and no new focal fluid collections but also reported his stomach was distended with air and fluid on CT imaging so PEG tube feedings held and PEG tube was placed to gravity with significant drainage. He was given 1u pRbc for symptomatic anemia on 2/16/24. He was also started fluconazole for oral thrush and CT w/ concerns for esophagitis. Tube feeds were resumed on 2/17.    He had clinical improvement on 2/19 with reduced cough, decreased oxygen requirement, and was able to tolerate tube feeds at goal.The output from his two right sided abdominal drains was still elevated. Final recommendations and end of treatment dates were received from Infectious Disease and patient set to complete courses of cefepime/metronidazole on 2/24 and complete fluconazole course on 2/29. Due to continued drain output, patient was not able to have his drains removed during his hospitalization, and will require outpatient IR follow-up. He will be discharged to SNF to complete IV antibiotics and monitor drain management. He will need to follow with IR and receive a CTAP in two weeks in [place].

## 2024-02-05 NOTE — CONSULTS
[facility]  Gastroenterology  Consult Note    Patient Name: Seymour Velazquez  MRN: [id number]  Admission Date: 2/4/2024  Hospital Length of Stay: 1 days  Code Status: Full Code   Attending Provider: Bhumi Joshua*   Consulting Provider: Von Angelo MD  Primary Care Physician: Eliazar Rosas MD  Principal Problem:<principal problem not specified>    Inpatient consult to [facility] ([facility])  Consult performed by: Von Angelo MD  Consult ordered by: Vincenzo Boston MD        Subjective:     HPI:   Mr Velazquez is a 69yo PMHx CVA with PEG tube, HTN, HLD, T2DM, dysphagia presents from OSH for AES eval of possible retained stone after cholecystectomy 02/01.    He underwent lap irlanda on 02/01 with IOC. Necrotic GB seen with abscess. Spilled stones and purulent bile. IOC demonstrated CBD stone at ampulla.    VS since arrival unremarkable  CBC w/o leukocytosis, Hgb 8.5, plts wnl  BMP unremarkable  LFTs unremarkable    No imaging here        Objective:     Vitals:    02/05/24 0759   BP: (!) 159/74   Pulse: 83   Resp: 16   Temp: 98.1 °F (36.7 °C)         Constitutional:  not in acute distress and well developed  HENT: Head: Normal, normocephalic, atraumatic.  Eyes: conjunctiva clear and sclera nonicteric  GI: soft, non-tender, without masses or organomegaly, PEG in place  Skin: normal color  Neurological: alert        Assessment/Plan:     69yo PMHx CVA with PEG tube, HTN, HLD, T2DM, dysphagia presents from OSH for AES eval of possible retained stone after cholecystectomy 02/01.    Currently normal LFTs with no abdominal pain--recommend MRCP    Problem List:  ?retained stone    Recommendations:  Order MRCP    Thank you for involving us in the care of Seymour Velazquez. Please call with any additional questions, concerns or changes in the patient's clinical status. We will continue to follow.     Von Angelo MD  Gastroenterology Fellow PGY VI  [facility]  [place]

## 2024-02-05 NOTE — ASSESSMENT & PLAN NOTE
Patient is normotensive on arrival, per chart he is not currently on any antihypertensives.     Will utilize p.r.n. blood pressure medication only if patient's blood pressure greater than 180/110 and he develops symptoms such as worsening chest pain or shortness of breath.

## 2024-02-05 NOTE — ASSESSMENT & PLAN NOTE
Patient is s/p CVA with residual dysphagia and weakness. He is s/p PEG tube and receives bolus tube feedings.

## 2024-02-05 NOTE — ASSESSMENT & PLAN NOTE
68M w/ recent CVA and residual dysphagia s/p PEG tube presents from OSH for retained gallstone in ampulla, s/p lap cholecystectomy in which the gallbladder was found necrotic and friable. His only symptom is abdominal pain. Labs show leukocytosis. He is transferred for AES eval and possible ERCP.     -was on meropenem at OSH, continued for now and consulted ID  -AES consulted, appreciate recs  -LR infusion 100cc/hr  -NPO for possibility of procedure

## 2024-02-05 NOTE — NURSING
Nurses Note -- 4 Eyes      2/5/2024   4:06 PM      Skin assessed during: Q Shift Change      [] No Altered Skin Integrity Present    []Prevention Measures Documented      [x] Yes- Altered Skin Integrity Present or Discovered   [] LDA Added if Not in Epic (Describe Wound)   [] New Altered Skin Integrity was Present on Admit and Documented in LDA   [x] Wound Image Taken    Wound Care Consulted? No    Attending Nurse:  Cele BATES RN    Second RN/Staff Member:  SOHAIL Vazquez

## 2024-02-05 NOTE — PLAN OF CARE
[facility] - Surgery  Initial Discharge Assessment       Primary Care Provider: Eliazar Rosas MD    Admission Diagnosis: Common bile duct (CBD) obstruction [K83.1]    Admission Date: 2/4/2024  Expected Discharge Date: 2/6/2024    Transition of Care Barriers: None    Payor: [employer] / Plan: [place] [employer] / Product Type: Government /     Extended Emergency Contact Information  Primary Emergency Contact: Evelin Hoff  Mobile Phone: [phone]  Relation: Daughter    Discharge Plan A: Home with family  Discharge Plan B: Home Health, Home with family    No Pharmacies Listed    Initial Assessment (most recent)       Adult Discharge Assessment - 02/05/24 1513          Discharge Assessment    Assessment Type Discharge Planning Assessment     Confirmed/corrected address, phone number and insurance Yes     Confirmed Demographics Correct on Facesheet     Source of Information patient     Communicated PEYTON with patient/caregiver Yes     People in Home sibling(s)     Do you expect to return to your current living situation? Yes     Do you have help at home or someone to help you manage your care at home? Yes     Who are your caregiver(s) and their phone number(s)? siblings     Prior to hospitilization cognitive status: Alert/Oriented     Current cognitive status: Alert/Oriented     Home Layout Able to live on 1st floor     Equipment Currently Used at Home cane, straight     Do you currently have service(s) that help you manage your care at home? No     Do you take prescription medications? Yes     Do you have prescription coverage? Yes     Do you have any problems affording any of your prescribed medications? No     Is the patient taking medications as prescribed? yes     How do you get to doctors appointments? public transportation     Are you on dialysis? No     Do you take coumadin? No     Discharge Plan A Home with family     Discharge Plan B Home Health;Home with family     DME Needed Upon Discharge   none     Discharge Plan discussed with: Patient     Transition of Care Barriers None                   Patient lives with his brother and sister in a two story apartment with his bed/bath on second floor with 10 steps to enter. Patient does not feel he will need any post acute services upon hospital discharge. Patient states he will need transportation home.

## 2024-02-05 NOTE — NURSING
Nurses Note -- 4 Eyes      2/5/2024   1:21 AM      Skin assessed during: Admit      [] No Altered Skin Integrity Present    [x]Prevention Measures Documented      [] Yes- Altered Skin Integrity Present or Discovered   [x] LDA Added if Not in Epic (Describe Wound)   [x] New Altered Skin Integrity was Present on Admit and Documented in LDA   [x] Wound Image Taken    Wound Care Consulted? Yes    Attending Nurse:  Jossie Butler RN/Staff Member:  Raghav Kent

## 2024-02-05 NOTE — ASSESSMENT & PLAN NOTE
"Patient's FSGs are controlled on current medication regimen.  Last A1c reviewed-   Lab Results   Component Value Date    HGBA1C 5.8 10/22/2022     Most recent fingerstick glucose reviewed- No results for input(s): "POCTGLUCOSE" in the last 24 hours.  Current correctional scale  Low  Maintain anti-hyperglycemic dose as follows-   Antihyperglycemics (From admission, onward)      Start     Stop Route Frequency Ordered    02/04/24 2059  insulin aspart U-100 pen 0-5 Units         -- SubQ Before meals & nightly PRN 02/04/24 2000          Hold Oral hypoglycemics while patient is in the hospital.    Last A1c normal over a year ago. Repeat ordered. Patient on SSI at NH.    -LDSSI  "

## 2024-02-05 NOTE — SUBJECTIVE & OBJECTIVE
History reviewed. No pertinent past medical history.    Past Surgical History:   Procedure Laterality Date    ABDOMINAL SURGERY         Review of patient's allergies indicates:   Allergen Reactions    Pcn [penicillins]        Medications:  No medications prior to admission.     Antibiotics (From admission, onward)      Start     Stop Route Frequency Ordered    02/04/24 2115  meropenem (MERREM) 1 g in sodium chloride 0.9 % 100 mL IVPB (MB+)         -- IV Every 8 hours (non-standard times) 02/04/24 2005          Antifungals (From admission, onward)      None          Antivirals (From admission, onward)      None             Immunization History   Administered Date(s) Administered    COVID-19, mRNA, LNP-S, PF (Moderna 2023)Ages 12+ 10/19/2023    COVID-19, mRNA, LNP-S, bivalent booster, PF (Moderna Omicron)6mo-5Years 05/25/2023    COVID-19, mRNA, LNP-S, bivalent booster, PF (PFIZER OMICRON) 12/14/2022       Family History    None       Social History     Socioeconomic History    Marital status: Unknown   Tobacco Use    Smoking status: Never    Smokeless tobacco: Never   Substance and Sexual Activity    Alcohol use: Not Currently     Review of Systems   Constitutional:  Negative for chills, diaphoresis and fever.   HENT:  Positive for trouble swallowing. Negative for congestion and sore throat.    Respiratory:  Negative for cough and shortness of breath.    Cardiovascular:  Negative for chest pain and leg swelling.   Gastrointestinal:  Positive for abdominal pain (mild). Negative for diarrhea, nausea and vomiting.   Genitourinary:  Negative for dysuria.   Musculoskeletal:  Negative for arthralgias and myalgias.   Skin:  Negative for rash.   Neurological:  Positive for weakness. Negative for light-headedness and headaches.   Hematological:  Bruises/bleeds easily.   Psychiatric/Behavioral:  Negative for agitation and confusion. The patient is not nervous/anxious.      Objective:     Vital Signs (Most Recent):  Temp: 97.7  °F (36.5 °C) (02/05/24 1125)  Pulse: 77 (02/05/24 1125)  Resp: 16 (02/05/24 1125)  BP: 132/72 (02/05/24 1125)  SpO2: 99 % (02/05/24 1125) Vital Signs (24h Range):  Temp:  [97.7 °F (36.5 °C)-98.4 °F (36.9 °C)] 97.7 °F (36.5 °C)  Pulse:  [75-83] 77  Resp:  [16-19] 16  SpO2:  [96 %-99 %] 99 %  BP: (118-159)/(62-74) 132/72     Weight: 79.6 kg (175 lb 8 oz)  Body mass index is 22.53 kg/m².    Estimated Creatinine Clearance: 132.7 mL/min (based on SCr of 0.6 mg/dL).     Physical Exam  Vitals and nursing note reviewed.   Constitutional:       General: He is not in acute distress.     Appearance: Normal appearance. He is well-developed. He is not ill-appearing or diaphoretic.   HENT:      Head: Normocephalic and atraumatic.      Right Ear: External ear normal.      Left Ear: External ear normal.      Nose: Nose normal.      Mouth/Throat:      Mouth: Mucous membranes are dry.   Eyes:      General: No scleral icterus.        Right eye: No discharge.         Left eye: No discharge.      Extraocular Movements: Extraocular movements intact.      Conjunctiva/sclera: Conjunctivae normal.   Cardiovascular:      Rate and Rhythm: Normal rate and regular rhythm.   Pulmonary:      Effort: Pulmonary effort is normal. No respiratory distress.      Breath sounds: No stridor.   Abdominal:      General: There is no distension.      Palpations: Abdomen is soft.      Tenderness: There is no abdominal tenderness.      Comments: PEG  Drain with SS output   Musculoskeletal:      Right lower leg: No edema.      Left lower leg: No edema.   Skin:     General: Skin is dry.      Coloration: Skin is pale. Skin is not jaundiced.      Findings: No erythema.   Neurological:      General: No focal deficit present.      Mental Status: He is alert and oriented to person, place, and time. Mental status is at baseline.   Psychiatric:         Mood and Affect: Mood normal.         Behavior: Behavior normal.         Thought Content: Thought content normal.        "  Judgment: Judgment normal.          Significant Labs: Blood Culture: No results for input(s): "LABBLOO" in the last 4320 hours.  CBC:   Recent Labs   Lab 02/05/24  0440   WBC 8.61   HGB 8.5*   HCT 25.8*        CMP:   Recent Labs   Lab 02/05/24  0440      K 3.5      CO2 25   GLU 87   BUN 12   CREATININE 0.6   CALCIUM 8.6*   PROT 6.3   ALBUMIN 2.0*   BILITOT 0.4   ALKPHOS 55   AST 8*   ALT 7*   ANIONGAP 5*     Microbiology Results (last 7 days)       ** No results found for the last 168 hours. **          Procalcitonin: No results for input(s): "PROCAL" in the last 48 hours.  Quantiferon: No results for input(s): "NIL", "TBAG", "TBAGNIL", "MITOGENNIL", "TBGOLD" in the last 48 hours.  Respiratory Culture: No results for input(s): "GSRESP", "RESPIRATORYC" in the last 4320 hours.  Urine Culture: No results for input(s): "LABURIN" in the last 4320 hours.  Urine Studies: No results for input(s): "COLORU", "APPEARANCEUA", "PHUR", "SPECGRAV", "PROTEINUA", "GLUCUA", "KETONESU", "BILIRUBINUA", "OCCULTUA", "NITRITE", "UROBILINOGEN", "LEUKOCYTESUR", "RBCUA", "WBCUA", "BACTERIA", "SQUAMEPITHEL", "HYALINECASTS" in the last 4320 hours.    Invalid input(s): "WRIGHTSUR"  Wound Culture: No results for input(s): "LABAERO" in the last 4320 hours.  Recent Lab Results         02/05/24  1126   02/05/24  0526   02/05/24  0440   02/05/24  0008        Albumin     2.0         ALP     55         ALT     7         Anion Gap     5         AST     8         Baso #     0.06         Basophil %     0.7         BILIRUBIN TOTAL     0.4  Comment: For infants and newborns, interpretation of results should be based  on gestational age, weight and in agreement with clinical  observations.    Premature Infant recommended reference ranges:  Up to 24 hours.............<8.0 mg/dL  Up to 48 hours............<12.0 mg/dL  3-5 days..................<15.0 mg/dL  6-29 days.................<15.0 mg/dL           BUN     12         Calcium     " 8.6         Chloride     106         CO2     25         Creatinine     0.6         Differential Method     Automated         eGFR     >60.0         Eos #     0.3         Eos %     3.3         Estimated Avg Glucose     91         Glucose     87         Gran # (ANC)     5.2         Gran %     60.2         Hematocrit     25.8         Hemoglobin     8.5         Hemoglobin A1C External     4.8  Comment: ADA Screening Guidelines:  5.7-6.4%  Consistent with prediabetes  >or=6.5%  Consistent with diabetes    High levels of fetal hemoglobin interfere with the HbA1C  assay. Heterozygous hemoglobin variants (HbS, HgC, etc)do  not significantly interfere with this assay.   However, presence of multiple variants may affect accuracy.           Immature Grans (Abs)     0.05  Comment: Mild elevation in immature granulocytes is non specific and   can be seen in a variety of conditions including stress response,   acute inflammation, trauma and pregnancy. Correlation with other   laboratory and clinical findings is essential.           Immature Granulocytes     0.6         Lymph #     2.4         Lymph %     28.2         Magnesium      1.4         MCH     30.1         MCHC     32.9         MCV     92         Mono #     0.6         Mono %     7.0         MPV     8.9         nRBC     0         Phosphorus Level     1.9         Platelet Count     308         POCT Glucose 83   104     91       Potassium     3.5         PROTEIN TOTAL     6.3         RBC     2.82         RDW     13.5         Sodium     136         WBC     8.61                 Significant Imaging: MRCP ordered

## 2024-02-06 ENCOUNTER — ANESTHESIA (OUTPATIENT)
Dept: ENDOSCOPY | Facility: HOSPITAL | Age: 69
DRG: 862 | End: 2024-02-06
Payer: OTHER GOVERNMENT

## 2024-02-06 ENCOUNTER — ANESTHESIA EVENT (OUTPATIENT)
Dept: ENDOSCOPY | Facility: HOSPITAL | Age: 69
DRG: 862 | End: 2024-02-06
Payer: OTHER GOVERNMENT

## 2024-02-06 PROBLEM — E83.39 HYPOPHOSPHATEMIA: Status: RESOLVED | Noted: 2024-02-05 | Resolved: 2024-02-06

## 2024-02-06 PROBLEM — R18.8 ABDOMINAL FLUID COLLECTION: Status: ACTIVE | Noted: 2024-02-06

## 2024-02-06 PROBLEM — E83.42 HYPOMAGNESEMIA: Status: RESOLVED | Noted: 2024-02-05 | Resolved: 2024-02-06

## 2024-02-06 PROBLEM — J90 BILATERAL PLEURAL EFFUSION: Status: ACTIVE | Noted: 2024-02-06

## 2024-02-06 PROBLEM — E87.1 HYPONATREMIA: Status: ACTIVE | Noted: 2024-02-06

## 2024-02-06 PROBLEM — Z78.9 ON TUBE FEEDING DIET: Status: ACTIVE | Noted: 2024-02-06

## 2024-02-06 PROBLEM — K80.50 CHOLEDOCHOLITHIASIS: Status: ACTIVE | Noted: 2024-02-06

## 2024-02-06 LAB
ALBUMIN SERPL BCP-MCNC: 2.1 G/DL (ref 3.5–5.2)
ALP SERPL-CCNC: 58 U/L (ref 55–135)
ALT SERPL W/O P-5'-P-CCNC: 8 U/L (ref 10–44)
ANION GAP SERPL CALC-SCNC: 9 MMOL/L (ref 8–16)
AST SERPL-CCNC: 15 U/L (ref 10–40)
BASOPHILS # BLD AUTO: 0.09 K/UL (ref 0–0.2)
BASOPHILS NFR BLD: 1.1 % (ref 0–1.9)
BILIRUB SERPL-MCNC: 0.4 MG/DL (ref 0.1–1)
BUN SERPL-MCNC: 10 MG/DL (ref 8–23)
CALCIUM SERPL-MCNC: 8.7 MG/DL (ref 8.7–10.5)
CHLORIDE SERPL-SCNC: 105 MMOL/L (ref 95–110)
CO2 SERPL-SCNC: 20 MMOL/L (ref 23–29)
CREAT SERPL-MCNC: 0.6 MG/DL (ref 0.5–1.4)
DIFFERENTIAL METHOD BLD: ABNORMAL
EOSINOPHIL # BLD AUTO: 0.4 K/UL (ref 0–0.5)
EOSINOPHIL NFR BLD: 4.5 % (ref 0–8)
ERYTHROCYTE [DISTWIDTH] IN BLOOD BY AUTOMATED COUNT: 13.5 % (ref 11.5–14.5)
EST. GFR  (NO RACE VARIABLE): >60 ML/MIN/1.73 M^2
GLUCOSE SERPL-MCNC: 69 MG/DL (ref 70–110)
HCT VFR BLD AUTO: 24.8 % (ref 40–54)
HGB BLD-MCNC: 8.5 G/DL (ref 14–18)
IMM GRANULOCYTES # BLD AUTO: 0.09 K/UL (ref 0–0.04)
IMM GRANULOCYTES NFR BLD AUTO: 1.1 % (ref 0–0.5)
LYMPHOCYTES # BLD AUTO: 2.5 K/UL (ref 1–4.8)
LYMPHOCYTES NFR BLD: 29.5 % (ref 18–48)
MAGNESIUM SERPL-MCNC: 2.1 MG/DL (ref 1.6–2.6)
MCH RBC QN AUTO: 29.6 PG (ref 27–31)
MCHC RBC AUTO-ENTMCNC: 34.3 G/DL (ref 32–36)
MCV RBC AUTO: 86 FL (ref 82–98)
MONOCYTES # BLD AUTO: 0.6 K/UL (ref 0.3–1)
MONOCYTES NFR BLD: 7.6 % (ref 4–15)
NEUTROPHILS # BLD AUTO: 4.7 K/UL (ref 1.8–7.7)
NEUTROPHILS NFR BLD: 56.2 % (ref 38–73)
NRBC BLD-RTO: 0 /100 WBC
PHOSPHATE SERPL-MCNC: 3.3 MG/DL (ref 2.7–4.5)
PLATELET # BLD AUTO: 395 K/UL (ref 150–450)
PLATELET BLD QL SMEAR: ABNORMAL
PMV BLD AUTO: 9.2 FL (ref 9.2–12.9)
POCT GLUCOSE: 118 MG/DL (ref 70–110)
POCT GLUCOSE: 65 MG/DL (ref 70–110)
POCT GLUCOSE: 66 MG/DL (ref 70–110)
POCT GLUCOSE: 79 MG/DL (ref 70–110)
POCT GLUCOSE: 91 MG/DL (ref 70–110)
POTASSIUM SERPL-SCNC: 4.8 MMOL/L (ref 3.5–5.1)
PROT SERPL-MCNC: 6.5 G/DL (ref 6–8.4)
RBC # BLD AUTO: 2.87 M/UL (ref 4.6–6.2)
SODIUM SERPL-SCNC: 134 MMOL/L (ref 136–145)
WBC # BLD AUTO: 8.41 K/UL (ref 3.9–12.7)

## 2024-02-06 PROCEDURE — 25000003 PHARM REV CODE 250: Performed by: PHYSICIAN ASSISTANT

## 2024-02-06 PROCEDURE — 63600175 PHARM REV CODE 636 W HCPCS

## 2024-02-06 PROCEDURE — 0FC98ZZ EXTIRPATION OF MATTER FROM COMMON BILE DUCT, VIA NATURAL OR ARTIFICIAL OPENING ENDOSCOPIC: ICD-10-PCS | Performed by: INTERNAL MEDICINE

## 2024-02-06 PROCEDURE — 25000003 PHARM REV CODE 250: Performed by: NURSE ANESTHETIST, CERTIFIED REGISTERED

## 2024-02-06 PROCEDURE — 37000008 HC ANESTHESIA 1ST 15 MINUTES: Performed by: INTERNAL MEDICINE

## 2024-02-06 PROCEDURE — 36415 COLL VENOUS BLD VENIPUNCTURE: CPT

## 2024-02-06 PROCEDURE — 63600175 PHARM REV CODE 636 W HCPCS: Performed by: NURSE ANESTHETIST, CERTIFIED REGISTERED

## 2024-02-06 PROCEDURE — C1769 GUIDE WIRE: HCPCS | Performed by: INTERNAL MEDICINE

## 2024-02-06 PROCEDURE — 80053 COMPREHEN METABOLIC PANEL: CPT

## 2024-02-06 PROCEDURE — 25500020 PHARM REV CODE 255: Performed by: INTERNAL MEDICINE

## 2024-02-06 PROCEDURE — 25500020 PHARM REV CODE 255: Performed by: HOSPITALIST

## 2024-02-06 PROCEDURE — 63600175 PHARM REV CODE 636 W HCPCS: Mod: JZ,JG | Performed by: PHYSICIAN ASSISTANT

## 2024-02-06 PROCEDURE — D9220A PRA ANESTHESIA: Mod: CRNA,,, | Performed by: NURSE ANESTHETIST, CERTIFIED REGISTERED

## 2024-02-06 PROCEDURE — 85025 COMPLETE CBC W/AUTO DIFF WBC: CPT

## 2024-02-06 PROCEDURE — 21400001 HC TELEMETRY ROOM

## 2024-02-06 PROCEDURE — 43264 ERCP REMOVE DUCT CALCULI: CPT | Mod: ,,, | Performed by: INTERNAL MEDICINE

## 2024-02-06 PROCEDURE — 43262 ENDO CHOLANGIOPANCREATOGRAPH: CPT | Mod: 51,,, | Performed by: INTERNAL MEDICINE

## 2024-02-06 PROCEDURE — 27201674 HC SPHINCTERTOME: Performed by: INTERNAL MEDICINE

## 2024-02-06 PROCEDURE — 27202125 HC BALLOON, EXTRACTION (ANY): Performed by: INTERNAL MEDICINE

## 2024-02-06 PROCEDURE — 83735 ASSAY OF MAGNESIUM: CPT

## 2024-02-06 PROCEDURE — 25000003 PHARM REV CODE 250

## 2024-02-06 PROCEDURE — 74328 X-RAY BILE DUCT ENDOSCOPY: CPT | Mod: 26,,, | Performed by: INTERNAL MEDICINE

## 2024-02-06 PROCEDURE — 27000221 HC OXYGEN, UP TO 24 HOURS

## 2024-02-06 PROCEDURE — 37000009 HC ANESTHESIA EA ADD 15 MINS: Performed by: INTERNAL MEDICINE

## 2024-02-06 PROCEDURE — A9585 GADOBUTROL INJECTION: HCPCS | Performed by: HOSPITALIST

## 2024-02-06 PROCEDURE — 25000003 PHARM REV CODE 250: Performed by: HOSPITALIST

## 2024-02-06 PROCEDURE — 43264 ERCP REMOVE DUCT CALCULI: CPT | Performed by: INTERNAL MEDICINE

## 2024-02-06 PROCEDURE — 43262 ENDO CHOLANGIOPANCREATOGRAPH: CPT | Performed by: INTERNAL MEDICINE

## 2024-02-06 PROCEDURE — 84100 ASSAY OF PHOSPHORUS: CPT

## 2024-02-06 PROCEDURE — 74328 X-RAY BILE DUCT ENDOSCOPY: CPT | Mod: TC | Performed by: INTERNAL MEDICINE

## 2024-02-06 PROCEDURE — 94761 N-INVAS EAR/PLS OXIMETRY MLT: CPT

## 2024-02-06 PROCEDURE — D9220A PRA ANESTHESIA: Mod: ANES,,, | Performed by: ANESTHESIOLOGY

## 2024-02-06 RX ORDER — LIDOCAINE HYDROCHLORIDE 20 MG/ML
INJECTION INTRAVENOUS
Status: DISCONTINUED | OUTPATIENT
Start: 2024-02-06 | End: 2024-02-06

## 2024-02-06 RX ORDER — BUSPIRONE HYDROCHLORIDE 7.5 MG/1
7.5 TABLET ORAL DAILY
Status: ON HOLD | COMMUNITY
End: 2024-02-22 | Stop reason: HOSPADM

## 2024-02-06 RX ORDER — SENNOSIDES 8.6 MG/1
1 TABLET ORAL DAILY
Status: ON HOLD | COMMUNITY
End: 2024-02-22

## 2024-02-06 RX ORDER — GADOBUTROL 604.72 MG/ML
10 INJECTION INTRAVENOUS
Status: COMPLETED | OUTPATIENT
Start: 2024-02-06 | End: 2024-02-06

## 2024-02-06 RX ORDER — TRAZODONE HYDROCHLORIDE 50 MG/1
50 TABLET ORAL NIGHTLY
Status: ON HOLD | COMMUNITY
End: 2024-02-22

## 2024-02-06 RX ORDER — ONDANSETRON HYDROCHLORIDE 2 MG/ML
4 INJECTION, SOLUTION INTRAVENOUS DAILY PRN
Status: DISCONTINUED | OUTPATIENT
Start: 2024-02-06 | End: 2024-02-06 | Stop reason: HOSPADM

## 2024-02-06 RX ORDER — OMEPRAZOLE 40 MG/1
40 CAPSULE, DELAYED RELEASE ORAL EVERY MORNING
Status: ON HOLD | COMMUNITY
End: 2024-02-22 | Stop reason: HOSPADM

## 2024-02-06 RX ORDER — SODIUM CHLORIDE, SODIUM LACTATE, POTASSIUM CHLORIDE, CALCIUM CHLORIDE 600; 310; 30; 20 MG/100ML; MG/100ML; MG/100ML; MG/100ML
INJECTION, SOLUTION INTRAVENOUS CONTINUOUS PRN
Status: DISCONTINUED | OUTPATIENT
Start: 2024-02-06 | End: 2024-02-06

## 2024-02-06 RX ORDER — NAPROXEN SODIUM 220 MG/1
81 TABLET, FILM COATED ORAL DAILY
Status: ON HOLD | COMMUNITY
End: 2024-02-22 | Stop reason: HOSPADM

## 2024-02-06 RX ORDER — MEGESTROL ACETATE 125 MG/ML
625 SUSPENSION ORAL DAILY
Status: ON HOLD | COMMUNITY
End: 2024-02-22 | Stop reason: HOSPADM

## 2024-02-06 RX ORDER — FLUTICASONE PROPIONATE 50 MCG
1 SPRAY, SUSPENSION (ML) NASAL 2 TIMES DAILY
COMMUNITY

## 2024-02-06 RX ORDER — SUCRALFATE 1 G/10ML
1 SUSPENSION ORAL
COMMUNITY

## 2024-02-06 RX ORDER — FENTANYL CITRATE 50 UG/ML
25 INJECTION, SOLUTION INTRAMUSCULAR; INTRAVENOUS EVERY 5 MIN PRN
Status: DISCONTINUED | OUTPATIENT
Start: 2024-02-06 | End: 2024-02-06 | Stop reason: HOSPADM

## 2024-02-06 RX ORDER — METOCLOPRAMIDE HYDROCHLORIDE 5 MG/5ML
5 SOLUTION ORAL 3 TIMES DAILY
Status: ON HOLD | COMMUNITY
End: 2024-02-22 | Stop reason: HOSPADM

## 2024-02-06 RX ORDER — SERTRALINE HYDROCHLORIDE 25 MG/1
25 TABLET, FILM COATED ORAL DAILY
Status: ON HOLD | COMMUNITY
End: 2024-02-22 | Stop reason: HOSPADM

## 2024-02-06 RX ORDER — ONDANSETRON HYDROCHLORIDE 2 MG/ML
INJECTION, SOLUTION INTRAVENOUS
Status: DISCONTINUED | OUTPATIENT
Start: 2024-02-06 | End: 2024-02-06

## 2024-02-06 RX ORDER — ONDANSETRON 4 MG/1
4 TABLET, FILM COATED ORAL EVERY 8 HOURS PRN
Status: ON HOLD | COMMUNITY
End: 2024-02-22

## 2024-02-06 RX ORDER — KETAMINE HCL IN 0.9 % NACL 50 MG/5 ML
SYRINGE (ML) INTRAVENOUS
Status: DISCONTINUED | OUTPATIENT
Start: 2024-02-06 | End: 2024-02-06

## 2024-02-06 RX ORDER — PROPOFOL 10 MG/ML
INJECTION, EMULSION INTRAVENOUS
Status: DISCONTINUED | OUTPATIENT
Start: 2024-02-06 | End: 2024-02-06

## 2024-02-06 RX ADMIN — LISINOPRIL 10 MG: 10 TABLET ORAL at 08:02

## 2024-02-06 RX ADMIN — Medication 25 MG: at 10:02

## 2024-02-06 RX ADMIN — FAMOTIDINE 20 MG: 20 TABLET, FILM COATED ORAL at 08:02

## 2024-02-06 RX ADMIN — LIDOCAINE HYDROCHLORIDE 50 MG: 20 INJECTION INTRAVENOUS at 10:02

## 2024-02-06 RX ADMIN — METRONIDAZOLE 500 MG: 500 INJECTION, SOLUTION INTRAVENOUS at 01:02

## 2024-02-06 RX ADMIN — SERTRALINE HYDROCHLORIDE 25 MG: 25 TABLET ORAL at 08:02

## 2024-02-06 RX ADMIN — GLYCOPYRROLATE 0.2 MG: 0.2 INJECTION INTRAMUSCULAR; INTRAVENOUS at 10:02

## 2024-02-06 RX ADMIN — DEXTROSE MONOHYDRATE 125 ML: 100 INJECTION, SOLUTION INTRAVENOUS at 01:02

## 2024-02-06 RX ADMIN — SODIUM CHLORIDE, SODIUM LACTATE, POTASSIUM CHLORIDE, AND CALCIUM CHLORIDE: 600; 310; 30; 20 INJECTION, SOLUTION INTRAVENOUS at 10:02

## 2024-02-06 RX ADMIN — PROPOFOL 50 MG: 10 INJECTION, EMULSION INTRAVENOUS at 10:02

## 2024-02-06 RX ADMIN — CEFTRIAXONE 2 G: 2 INJECTION, POWDER, FOR SOLUTION INTRAMUSCULAR; INTRAVENOUS at 02:02

## 2024-02-06 RX ADMIN — MORPHINE SULFATE 2 MG: 2 INJECTION, SOLUTION INTRAMUSCULAR; INTRAVENOUS at 01:02

## 2024-02-06 RX ADMIN — SODIUM CHLORIDE, POTASSIUM CHLORIDE, SODIUM LACTATE AND CALCIUM CHLORIDE: 600; 310; 30; 20 INJECTION, SOLUTION INTRAVENOUS at 04:02

## 2024-02-06 RX ADMIN — BUSPIRONE HYDROCHLORIDE 7.5 MG: 5 TABLET ORAL at 08:02

## 2024-02-06 RX ADMIN — METRONIDAZOLE 500 MG: 500 INJECTION, SOLUTION INTRAVENOUS at 02:02

## 2024-02-06 RX ADMIN — ENOXAPARIN SODIUM 40 MG: 40 INJECTION SUBCUTANEOUS at 04:02

## 2024-02-06 RX ADMIN — METRONIDAZOLE 500 MG: 500 INJECTION, SOLUTION INTRAVENOUS at 11:02

## 2024-02-06 RX ADMIN — SENNOSIDES 8.6 MG: 8.6 TABLET, FILM COATED ORAL at 08:02

## 2024-02-06 RX ADMIN — MORPHINE SULFATE 2 MG: 2 INJECTION, SOLUTION INTRAMUSCULAR; INTRAVENOUS at 04:02

## 2024-02-06 RX ADMIN — ATORVASTATIN CALCIUM 80 MG: 40 TABLET, FILM COATED ORAL at 08:02

## 2024-02-06 RX ADMIN — ONDANSETRON 4 MG: 2 INJECTION INTRAMUSCULAR; INTRAVENOUS at 10:02

## 2024-02-06 RX ADMIN — ASPIRIN 81 MG: 81 TABLET, COATED ORAL at 08:02

## 2024-02-06 RX ADMIN — GADOBUTROL 10 ML: 604.72 INJECTION INTRAVENOUS at 12:02

## 2024-02-06 RX ADMIN — ONDANSETRON 4 MG: 2 INJECTION INTRAMUSCULAR; INTRAVENOUS at 09:02

## 2024-02-06 RX ADMIN — METRONIDAZOLE 500 MG: 500 INJECTION, SOLUTION INTRAVENOUS at 08:02

## 2024-02-06 NOTE — CONSULTS
"[facility]  Adult Nutrition  Consult Note    SUMMARY     Recommendations    1. EN Recommendation: Glucerna 1.5 @ 45 mL/hr x 24 hours to provide 1620 kcal, 89 g PRO and 820 mL fluid.   - Monitor s/s of intolerance such as abdominal pain/distension/nausea/vomiting.   a. Start at 10 mL/hr ADAT to goal rate within 24-48 hours.    2. ADAT as tolerated. Low fat restriction.   3. If TPN warranted recommend 210 g D + 74 g AA + SMOF lipids to provide 1510 kcal, 74 g PRO (GIR 1.83)   4. RD to monitor and follow-up.    Goals: Meet % EEN/EPN by follow-up date.  Nutrition Goal Status: new  Communication of RD Recs: other (comment) (POC)    Assessment and Plan    Nutrition Problem  Inadequate oral intake    Related to (etiology):   Decreased ability to consume sufficient energy    Signs and Symptoms (as evidenced by):   NPO     Interventions/Recommendations (treatment strategy):  Collaboration of nutrition care with other providers  EN  ADAT    Nutrition Diagnosis Status:   New      Reason for Assessment    Reason For Assessment: consult  Diagnosis: other (see comments) (Common bile duct (CBD) obstruction)  Relevant Medical History: DM2, HTN, CVA  Interdisciplinary Rounds: did not attend  General Information Comments: TF rec's.  Nutrition Discharge Planning: Pending Clinical Course    Nutrition Risk Screen    Nutrition Risk Screen: tube feeding or parenteral nutrition    Nutrition/Diet History    Spiritual, Cultural Beliefs, Confucianism Practices, Values that Affect Care: no    Anthropometrics    Temp: 97.4 °F (36.3 °C)  Height Method: Stated  Height: 6' 2" (188 cm)  Height (inches): 74 in  Weight Method: Bed Scale  Weight: 79.6 kg (175 lb 8 oz)  Weight (lb): 175.5 lb  Ideal Body Weight (IBW), Male: 190 lb  % Ideal Body Weight, Male (lb): 92.37 %  BMI (Calculated): 22.5       Lab/Procedures/Meds    Pertinent Labs Reviewed: reviewed  Pertinent Medications Reviewed: reviewed  Pertinent Medications Comments: " atorvastatin, lisinopril, lactated ringers, senna, famotidine, enoxaparin      Estimated/Assessed Needs    Weight Used For Calorie Calculations: 79.4 kg (175 lb)  Energy Calorie Requirements (kcal): 3955-4305 kcal/d (MSJ x 1.25)  Energy Need Method: [place]-St Jeor  Protein Requirements: 64-79 g/d (0.8-1.0 g/kg)  Weight Used For Protein Calculations: 79.4 kg (175 lb)        RDA Method (mL): 1633  CHO Requirement: 204-255 g      Nutrition Prescription Ordered    Current Diet Order: NPO  Current Nutrition Support Formula Ordered: Isosource 1.5  Current Nutrition Support Rate Ordered: 10 (ml)  Current Nutrition Support Frequency Ordered: Continuous    Evaluation of Received Nutrient/Fluid Intake    Enteral Calories (kcal): 360  Enteral Protein (gm): 16  Enteral (Free Water) Fluid (mL): 183  I/O: -1.90 L  Energy Calories Required: not meeting needs  Protein Required: not meeting needs  Fluid Required: not meeting needs  Comments: LBM: 2/5  % Intake of Estimated Energy Needs: 75 - 100 %  % Meal Intake: NPO    Nutrition Risk    Level of Risk/Frequency of Follow-up: low (1x/ week)       Monitor and Evaluation    Food and Nutrient Intake: energy intake, food and beverage intake, enteral nutrition intake, parenteral nutrition intake  Food and Nutrient Adminstration: diet order, enteral and parenteral nutrition administration  Physical Activity and Function: nutrition-related ADLs and IADLs, factors affecting access to physical activity  Anthropometric Measurements: weight, weight change, body mass index  Biochemical Data, Medical Tests and Procedures: lipid profile, glucose/endocrine profile, gastrointestinal profile, electrolyte and renal panel, inflammatory profile  Nutrition-Focused Physical Findings: overall appearance, extremities, muscles and bones, head and eyes, skin       Nutrition Follow-Up    RD Follow-up?: Yes    Jacinto Morales Registration Eligible, Provisional LDN

## 2024-02-06 NOTE — CONSULTS
[facility]  General Surgery  Consult Note    Inpatient consult to General Surgery  Consult performed by: Meera Hdz MD  Consult ordered by: Florencia Jones MD  Reason for consult: gallstones in abdomen  Assessment/Recommendations: 69yo M w/PMH CVA w/dysphagia s/p PEG, HTN, HLD, DM2 who is 5 days postop from lap cholecystectomy at OSH transferred for [facility] for choledocolithiasis s/p ERCP 2/6 with clearance of the duct who general surgery is consulted for management of retained gallstones in the abdomen    - patient asymptomatic from gallstones - no peritonitis, abscess visible on MRI, leukocytosis or concerning features for complications from retained stones  - patient is at risk of developing abscess or erosion of stone into other structures but some patients will remain asymptomatic from spilled stones  - fluid collection in gallbladder fossa can be expected post-op, no air in collection and given patient's clinical status, less likely that this is an abscess or biloma  - do not recommend surgical intervention, can consider IR drainage of fluid collection if patient's clinical status changes and he develops worsening abdominal pain or signs of sepsis  - recommend recording drain output, can follow up with home surgeon for drain removal and care  - general surgery will sign off at this time, please call with questions        Subjective:     Chief Complaint/Reason for Admission: choledocolithiasis    History of Present Illness: Mr. Velazquez is a 69yo M w/PMH CVA w/dysphagia s/p PEG, HTN, HLD, DM2 who is 5 days postop from lap cholecystectomy at OSH transferred for [facility] for choledocolithiasis s/p ERCP 2/6 with clearance of the duct who general surgery is consulted for management of retained gallstones in the abdomen.    Patient states he underwent laparoscopic cholecystectomy on 2/1. He had a retained stone in his CBD and was transferred here for AES services. Underwent ERCP today and the duct was cleared.  Patient is having as expected pain around his incision. Denies worsening abdominal pain, fevers, chills, nausea, vomiting, changes to bowel or bladder habits. PATEL drain in place in RUQ is serosanguinous.    No current facility-administered medications on file prior to encounter.     Current Outpatient Medications on File Prior to Encounter   Medication Sig    aspirin 81 MG Chew Take 81 mg by mouth once daily.    busPIRone (BUSPAR) 7.5 MG tablet Take 7.5 mg by mouth once daily.    fluticasone propionate (FLONASE) 50 mcg/actuation nasal spray 1 spray by Each Nostril route 2 (two) times a day.    megestroL (MEGACE ES) 625 mg/5 mL (125 mg/mL) Susp Take 625 mg by mouth once daily.    metoclopramide HCl (REGLAN) 5 mg/5 mL Soln Take 5 mg by mouth 3 (three) times daily.    omeprazole (PRILOSEC) 40 MG capsule Take 40 mg by mouth every morning.    ondansetron (ZOFRAN) 4 MG tablet Take 4 mg by mouth every 8 (eight) hours as needed for Nausea.    senna (SENOKOT) 8.6 mg tablet Take 1 tablet by mouth once daily.    sertraline (ZOLOFT) 25 MG tablet Take 25 mg by mouth once daily.    sucralfate (CARAFATE) 100 mg/mL suspension Take 1 g by mouth 4 (four) times daily before meals and nightly.    traZODone (DESYREL) 50 MG tablet Take 50 mg by mouth every evening.       Review of patient's allergies indicates:   Allergen Reactions    Pcn [penicillins]        Past Medical History:   Diagnosis Date    CVA (cerebral vascular accident)     DM2 (diabetes mellitus, type 2)     Dysphagia     HLD (hyperlipidemia)     HTN (hypertension)      Past Surgical History:   Procedure Laterality Date    ABDOMINAL SURGERY      INSERTION, PEG TUBE       Family History    None       Tobacco Use    Smoking status: Never    Smokeless tobacco: Never   Substance and Sexual Activity    Alcohol use: Not Currently    Drug use: Not on file    Sexual activity: Not on file     Review of Systems   Constitutional:  Negative for chills and fever.   Respiratory:  Negative  for chest tightness and shortness of breath.    Cardiovascular:  Negative for chest pain.   Gastrointestinal:  Negative for abdominal pain, constipation, diarrhea, nausea and vomiting.   Genitourinary:  Negative for dysuria and hematuria.   Musculoskeletal:  Negative for arthralgias.   Neurological:  Negative for dizziness and headaches.   Psychiatric/Behavioral:  Negative for agitation and confusion.      Objective:     Vital Signs (Most Recent):  Temp: 98 °F (36.7 °C) (02/06/24 1200)  Pulse: 70 (02/06/24 1200)  Resp: 18 (02/06/24 1331)  BP: 129/68 (02/06/24 1200)  SpO2: 100 % (02/06/24 1200) Vital Signs (24h Range):  Temp:  [97.6 °F (36.4 °C)-98.2 °F (36.8 °C)] 98 °F (36.7 °C)  Pulse:  [] 70  Resp:  [14-19] 18  SpO2:  [96 %-100 %] 100 %  BP: (114-144)/(56-74) 129/68     Weight: 79.6 kg (175 lb 8 oz)  Body mass index is 22.53 kg/m².      Intake/Output Summary (Last 24 hours) at 2/6/2024 1442  Last data filed at 2/6/2024 1101  Gross per 24 hour   Intake --   Output 1200 ml   Net -1200 ml       Physical Exam  Constitutional:       Appearance: Normal appearance.   HENT:      Head: Normocephalic and atraumatic.   Cardiovascular:      Rate and Rhythm: Normal rate.   Pulmonary:      Effort: Pulmonary effort is normal. No respiratory distress.   Abdominal:      General: There is no distension.      Palpations: Abdomen is soft.      Comments: Incisions with dressing in place, clean and dry. PATEL drain in place in RUQ, serosanguinous.   Skin:     General: Skin is warm and dry.      Capillary Refill: Capillary refill takes less than 2 seconds.   Neurological:      Mental Status: He is alert.         Significant Labs:  BMP:   Recent Labs   Lab 02/06/24  0528   GLU 69*   *   K 4.8      CO2 20*   BUN 10   CREATININE 0.6   CALCIUM 8.7   MG 2.1     CBC:   Recent Labs   Lab 02/06/24  0528   WBC 8.41   RBC 2.87*   HGB 8.5*   HCT 24.8*      MCV 86   MCH 29.6   MCHC 34.3       Significant Diagnostics:  I have  reviewed all pertinent imaging results/findings within the past 24 hours.    Assessment/Plan:   67yo M w/PMH CVA w/dysphagia s/p PEG, HTN, HLD, DM2 who is 5 days postop from lap cholecystectomy at OSH transferred for [facility] for choledocolithiasis s/p ERCP 2/6 with clearance of the duct who general surgery is consulted for management of retained gallstones in the abdomen    - patient asymptomatic from gallstones - no peritonitis, abscess visible on MRI, leukocytosis or concerning features for complications from retained stones  - patient is at risk of developing abscess or erosion of stone into other structures but some patients will remain asymptomatic from spilled stones  - fluid collection in gallbladder fossa can be expected post-op, no air in collection and given patient's clinical status, less likely that this is an abscess or biloma  - do not recommend surgical intervention, can consider IR drainage of fluid collection if patient's clinical status changes and he develops worsening abdominal pain or signs of sepsis  - recommend recording drain output, can follow up with home surgeon for drain removal and care  - general surgery will sign off at this time, please call with questions        Thank you for your consult. I will sign off. Please contact us if you have any additional questions.    Meera Hdz MD  General Surgery  [facility]

## 2024-02-06 NOTE — PLAN OF CARE
Recommendations    1. EN Recommendation: Glucerna 1.5 @ 45 mL/hr x 24 hours to provide 1620 kcal, 89 g PRO and 820 mL fluid.   - Monitor s/s of intolerance such as abdominal pain/distension/nausea/vomiting.   a. Start at 10 mL/hr ADAT to goal rate within 24-48 hours.    2. ADAT as tolerated. Low fat restriction.   3. If TPN warranted recommend 210 g D + 74 g AA + SMOF lipids to provide 1510 kcal, 74 g PRO (GIR 1.83)   4. RD to monitor and follow-up.    Goals: Meet % EEN/EPN by follow-up date.  Nutrition Goal Status: new  Communication of RD Recs: other (comment) (POC)

## 2024-02-06 NOTE — SUBJECTIVE & OBJECTIVE
Interval History: No acute events overnight, afebrile, hemodynamically stable. AES performed ERCP this morning. Denies any acute concerns this morning. Abdominal exam without acute abdomen signs.       Objective:     Vital Signs (Most Recent):  Temp: 98 °F (36.7 °C) (02/06/24 1200)  Pulse: 70 (02/06/24 1200)  Resp: 18 (02/06/24 1331)  BP: 129/68 (02/06/24 1200)  SpO2: 100 % (02/06/24 1200) Vital Signs (24h Range):  Temp:  [97.6 °F (36.4 °C)-98.2 °F (36.8 °C)] 98 °F (36.7 °C)  Pulse:  [] 70  Resp:  [14-19] 18  SpO2:  [96 %-100 %] 100 %  BP: (114-144)/(56-74) 129/68     Weight: 79.6 kg (175 lb 8 oz)  Body mass index is 22.53 kg/m².    Intake/Output Summary (Last 24 hours) at 2/6/2024 1508  Last data filed at 2/6/2024 1101  Gross per 24 hour   Intake --   Output 1200 ml   Net -1200 ml         Physical Exam  Constitutional:       General: He is not in acute distress.     Appearance: Normal appearance. He is ill-appearing. He is not toxic-appearing or diaphoretic.   HENT:      Head: Normocephalic and atraumatic.      Mouth/Throat:      Mouth: Mucous membranes are moist.   Eyes:      General: No scleral icterus.        Right eye: No discharge.         Left eye: No discharge.   Cardiovascular:      Rate and Rhythm: Normal rate and regular rhythm.      Heart sounds: Normal heart sounds. No murmur heard.  Pulmonary:      Effort: Pulmonary effort is normal.      Breath sounds: Normal breath sounds. No wheezing or rales.   Abdominal:      General: Abdomen is flat. Bowel sounds are normal. There is no distension.      Palpations: Abdomen is soft.      Tenderness: There is no abdominal tenderness. There is no guarding or rebound.   Musculoskeletal:         General: No swelling.      Cervical back: Normal range of motion.   Skin:     General: Skin is warm and dry.   Neurological:      Mental Status: He is alert.             Significant Labs: All pertinent labs within the past 24 hours have been reviewed.  LABS:  Recent Labs    Lab 02/05/24 0440 02/06/24  0528    134*   K 3.5 4.8    105   CO2 25 20*   BUN 12 10   CREATININE 0.6 0.6   GLU 87 69*   ANIONGAP 5* 9     Recent Labs   Lab 02/05/24 0440 02/06/24  0528   MG 1.4* 2.1   PHOS 1.9* 3.3     Recent Labs   Lab 02/05/24 0440 02/06/24  0528   AST 8* 15   ALT 7* 8*   ALKPHOS 55 58   BILITOT 0.4 0.4   ALBUMIN 2.0* 2.1*     POCT Glucose:   Recent Labs   Lab 02/06/24  0158 02/06/24  0801 02/06/24  0959   POCTGLUCOSE 118* 66* 79    Recent Labs   Lab 02/05/24 0440 02/06/24  0528   WBC 8.61 8.41   HGB 8.5* 8.5*   HCT 25.8* 24.8*    395   GRAN 60.2  5.2 56.2  4.7            Significant Imaging: I have reviewed all pertinent imaging results/findings within the past 24 hours.  FL ERCP Biliary And Pancreatic By Non Rad Tech   Final Result      MRI Abdomen W WO Contrast_INC MRCP (XPD)   Final Result   Abnormal      Postoperative changes of recent laparoscopic cholecystectomy with intraperitoneal free air in the anterior upper abdomen, slightly greater than would be expected for cholecystectomy on 02/01/2024.  Consider serial abdominal exams to evaluate for peritoneal signs.      7.0 cm fluid collection in the gallbladder fossa, likely representing abscess and/or biloma.      Choledocholithiasis.  Few spilled gallstones are noted posterior to the liver.      Bilateral pleural effusions.      This report was flagged in Epic as abnormal..      This result was discovered at approximately 01:20.  Findings were discussed via telephone by Brenda Belle MD with Indio Victor MD on 2/6/2024 at 01:40.      Electronically signed by resident: Brenda Belle   Date:    02/06/2024   Time:    02:01      Electronically signed by: Claude Harmon MD   Date:    02/06/2024   Time:    02:50          Inpatient Medications:  Continuous Infusions:   lactated ringers 100 mL/hr at 02/06/24 0433     Scheduled Meds:   aspirin  81 mg Oral Daily    atorvastatin  80 mg Per G Tube QHS    busPIRone   7.5 mg Per G Tube Daily    cefTRIAXone (Rocephin) IV (PEDS and ADULTS)  2 g Intravenous Q24H    enoxparin  40 mg Subcutaneous Daily    famotidine  20 mg Per G Tube BID    lisinopriL  10 mg Per G Tube Daily    metronidazole  500 mg Intravenous Q8H    senna  8.6 mg Per G Tube Daily    sertraline  25 mg Per G Tube Daily     PRN Meds:dextrose 10%, dextrose 10%, glucagon (human recombinant), glucose, glucose, insulin aspart U-100, melatonin, morphine, naloxone, ondansetron, sodium chloride 0.9%

## 2024-02-06 NOTE — ASSESSMENT & PLAN NOTE
-Patient's with Normocytic anemia..   -Hemoglobin stable.   -Etiology likely due to chronic disease .  -Current CBC reviewed-    Recent Labs   Lab 02/05/24  0440 02/06/24 0528   HGB 8.5* 8.5*         Component Value Date/Time    MCV 86 02/06/2024 0528    RDW 13.5 02/06/2024 0528         PLAN  - Monitor serial CBC and transfuse if patient becomes hemodynamically unstable, symptomatic or H/H drops below 7/21.  -Followup iron studies

## 2024-02-06 NOTE — ASSESSMENT & PLAN NOTE
Hx of PEG  tube placement s/p CVA    PLAN:  -Will start trickle feeds with Isosource  -Nutrition consulted for tube feed recs

## 2024-02-06 NOTE — ANESTHESIA PREPROCEDURE EVALUATION
"                                                                                                             02/06/2024  Seymour Velazquez is a 68 y.o., male.  Pre-operative evaluation for Procedure(s) (LRB):  ERCP (ENDOSCOPIC RETROGRADE CHOLANGIOPANCREATOGRAPHY) (N/A)    Seymour Velazquez is a 68 y.o. male   Aphasic s/p CVA  Paper phone consent obtained with pt's daughter Evelin Hoff. Placed in chart.     Patient Active Problem List   Diagnosis    Common bile duct (CBD) obstruction    Primary hypertension    Type 2 diabetes mellitus    CVA (cerebral vascular accident)    Hypomagnesemia    Hypophosphatemia    Cholecystitis    Normocytic anemia    Hyponatremia    Bilateral pleural effusion    Choledocholithiasis    Abdominal fluid collection       Past Surgical History:   Procedure Laterality Date    ABDOMINAL SURGERY         Social History     Socioeconomic History    Marital status: Unknown   Tobacco Use    Smoking status: Never    Smokeless tobacco: Never   Substance and Sexual Activity    Alcohol use: Not Currently       No current facility-administered medications on file prior to encounter.     No current outpatient medications on file prior to encounter.       Review of patient's allergies indicates:   Allergen Reactions    Pcn [penicillins]          CBC:   Recent Labs     02/05/24  0440 02/06/24  0528   WBC 8.61 8.41   RBC 2.82* 2.87*   HGB 8.5* 8.5*   HCT 25.8* 24.8*    395   MCV 92 86   MCH 30.1 29.6   MCHC 32.9 34.3       CMP:   Recent Labs     02/05/24  0440 02/06/24  0528    134*   K 3.5 4.8    105   CO2 25 20*   BUN 12 10   CREATININE 0.6 0.6   GLU 87 69*   MG 1.4* 2.1   PHOS 1.9* 3.3   CALCIUM 8.6* 8.7   ALBUMIN 2.0* 2.1*   PROT 6.3 6.5   ALKPHOS 55 58   ALT 7* 8*   AST 8* 15   BILITOT 0.4 0.4       INR  No results for input(s): "PT", "INR", "PROTIME", "APTT" in the last 72 hours.      Diagnostic Studies:    EKG:   Results for orders placed or performed during the hospital " "encounter of 02/04/24   EKG 12-lead    Collection Time: 02/05/24 11:50 AM   Result Value Ref Range    QRS Duration 72 ms    [facility] QTC Calculation 428 ms    Narrative    Test Reason : Z91.89,    Vent. Rate : 079 BPM     Atrial Rate : 079 BPM     P-R Int : 176 ms          QRS Dur : 072 ms      QT Int : 374 ms       P-R-T Axes : 058 086 077 degrees     QTc Int : 428 ms    Normal sinus rhythm  Normal ECG  No previous ECGs available  Confirmed by NASEEM PEREZ MD ([id number]) on 2/5/2024 1:09:35 PM    Referred By: JAIDA JUAN           Confirmed By:NASEEM PEREZ MD       TTE:  No results found for this or any previous visit.  No results found for: "EF"   No results found for this or any previous visit.    ANA:  No results found for this or any previous visit.    Stress Test:  No results found for this or any previous visit.       LHC:  No results found for this or any previous visit.       PFT:  No results found for: "FEV1", "FVC", "WTB8GSP", "TLC", "DLCO"     ALLERGIES:     Review of patient's allergies indicates:   Allergen Reactions    Pcn [penicillins]      LDA:          Lines/Drains/Airways       Drain  Duration                  Closed/Suction Drain Lateral RLQ Bulb -- days         Gastrostomy/Enterostomy LUQ feeding -- days              Peripheral Intravenous Line  Duration                  Peripheral IV - Single Lumen 02/04/24 2125 20 G Left;Posterior Hand 1 day         Peripheral IV - Single Lumen 02/06/24 0145 Distal;Left;Posterior Forearm <1 day                   Anesthesia Evaluation      Airway   Mallampati: III  TM distance: > 6 cm  Neck ROM: Normal ROM  Dental      Pulmonary    Cardiovascular   (+) hypertension    Rate: Normal    Neuro/Psych    (+) CVA    GI/Hepatic/Renal      Endo/Other    (+) diabetes mellitus  Abdominal                           Pre-op Assessment    I have reviewed the Patient Summary Reports.     I have reviewed the Nursing Notes. I have reviewed the NPO Status.   I have reviewed the " Medications.     Review of Systems  Anesthesia Hx:  No problems with previous Anesthesia             Denies Family Hx of Anesthesia complications.    Denies Personal Hx of Anesthesia complications.                    Cardiovascular:     Hypertension                                        Pulmonary:  Pulmonary Normal                       Renal/:  Renal/ Normal                 Neurological:   CVA                                    Endocrine:  Diabetes               Physical Exam  General: Well nourished    Airway:  Mallampati: III   Mouth Opening: Normal  TM Distance: > 6 cm  Tongue: Normal  Neck ROM: Normal ROM    Dental:  Severe periodontal disease; unable to articulate which teeth are loose/chipped/broken  Chest/Lungs:  Normal Respiratory Rate    Heart:  Rate: Normal        Anesthesia Plan  Type of Anesthesia, risks & benefits discussed:    Anesthesia Type: Gen Natural Airway, MAC  Intra-op Monitoring Plan: Standard ASA Monitors  Post Op Pain Control Plan: multimodal analgesia and IV/PO Opioids PRN  Induction:  IV  Airway Plan: Direct, Post-Induction  Informed Consent: Informed consent signed with the Patient representative and all parties understand the risks and agree with anesthesia plan.  All questions answered. Patient consented to blood products? Yes  ASA Score: 3  Day of Surgery Review of History & Physical: H&P Update referred to the surgeon/provider.    Ready For Surgery From Anesthesia Perspective.     .

## 2024-02-06 NOTE — NURSING TRANSFER
Nursing Transfer Note      2/6/2024   12:14 PM    Reason patient is being transferred: Recovery criteria met    PACU nurse giving handoff: ALCIDES Crane    Nurse receiving handoff: JENNIFER Abraham    Transfer to 557    Transfer via stretcher    Transfer with cardiac monitoring & continuous pulse oximetry     Transported by PCT    Telemetry: Box # [id number] HR 69 NSR O2 99%  Verified on & working by PACU RN: Yes    Transfer Vital Signs @ 1200  Temperature: 98  Blood Pressure: 129/68  Heart Rate: 70   O2 Sat: 100% on RA  Respirations: 14    Medicines/Equipment sent with patient: None    Any special needs or follow-up needed: See GI Note/recs    Patient belongings transferred with patient: No    Chart send with patient: Yes    Notified: Patient declined    Patient reassessed prior to transfer at: 2/6/24 @ 1200

## 2024-02-06 NOTE — ASSESSMENT & PLAN NOTE
68M w/ recent CVA and residual dysphagia s/p PEG tube presents from OSH for retained gallstone in ampulla, s/p lap cholecystectomy in which the gallbladder was found necrotic and friable. His only symptom is abdominal pain. Labs show leukocytosis. He is transferred for AES eval and possible ERCP.     -MRCP[02/06/24] noted fluid collection in the gallbladder fossa, concerning for  abscess vs. biloma. Also noted choledocholithiasis with gallstones posterior to the liver.  -AES performed ERCP 02/06/24 with removal of choledocolithiasis  by biliary sphincterotomy and balloon extraction.     -General Surgery consult noted no indication for surgical intervention at the time, but can consider IR drainage of fluid collection if clinical status worsens.  -Ceftriaxone/Metronidazole per ID recs  -AES consulted, appreciate recs  -LR infusion 100cc/hr

## 2024-02-06 NOTE — TRANSFER OF CARE
"Anesthesia Transfer of Care Note    Patient: Seymour Velazquez    Procedure(s) Performed: Procedure(s) (LRB):  ERCP (ENDOSCOPIC RETROGRADE CHOLANGIOPANCREATOGRAPHY) (N/A)    Patient location: PACU    Anesthesia Type: general    Transport from OR: Transported from OR on room air with adequate spontaneous ventilation    Post pain: adequate analgesia    Post assessment: no apparent anesthetic complications    Post vital signs: stable    Level of consciousness: awake, alert and oriented    Nausea/Vomiting: no nausea/vomiting    Complications: none    Transfer of care protocol was followed      Last vitals: Visit Vitals  /68 (Patient Position: Lying)   Pulse 67   Temp 36.7 °C (98 °F) (Temporal)   Resp 16   Ht 6' 2" (1.88 m)   Wt 79.6 kg (175 lb 8 oz)   SpO2 96%   BMI 22.53 kg/m²     "

## 2024-02-06 NOTE — PROVATION PATIENT INSTRUCTIONS
Discharge Summary/Instructions after an Endoscopic Procedure  Patient Name: Seymour Velazquez  Patient MRN: [id number]  Patient YOB: 1955  Tuesday, February 6, 2024  Jeff Stearns MD  Dear patient,  As a result of recent federal legislation (The Federal Cures Act), you may   receive lab or pathology results from your procedure in your [facility]   account before your physician is able to contact you. Your physician or   their representative will relay the results to you with their   recommendations at their soonest availability.  Thank you,  RESTRICTIONS:  During your procedure today, you received medications for sedation.  These   medications may affect your judgment, balance and coordination.  Therefore,   for 24 hours, you have the following restrictions:   - DO NOT drive a car, operate machinery, make legal/financial decisions,   sign important papers or drink alcohol.    ACTIVITY:  Today: no heavy lifting, straining or running due to procedural   sedation/anesthesia.  The following day: return to full activity including work.  DIET:  Eat and drink normally unless instructed otherwise.     TREATMENT FOR COMMON SIDE EFFECTS:  - Mild abdominal pain, nausea, belching, bloating or excessive gas:  rest,   eat lightly and use a heating pad.  - Sore Throat: treat with throat lozenges and/or gargle with warm salt   water.  - Because air was used during the procedure, expelling large amounts of air   from your rectum or belching is normal.  - If a bowel prep was taken, you may not have a bowel movement for 1-3 days.    This is normal.  SYMPTOMS TO WATCH FOR AND REPORT TO YOUR PHYSICIAN:  1. Abdominal pain or bloating, other than gas cramps.  2. Chest pain.  3. Back pain.  4. Signs of infection such as: chills or fever occurring within 24 hours   after the procedure.  5. Rectal bleeding, which would show as bright red, maroon, or black stools.   (A tablespoon of blood from the rectum is not serious, especially  if   hemorrhoids are present.)  6. Vomiting.  7. Weakness or dizziness.  GO DIRECTLY TO THE NEAREST EMERGENCY ROOM IF YOU HAVE ANY OF THE FOLLOWING:      Difficulty breathing              Chills and/or fever over 101 F   Persistent vomiting and/or vomiting blood   Severe abdominal pain   Severe chest pain   Black, tarry stools   Bleeding- more than one tablespoon   Any other symptom or condition that you feel may need urgent attention  Your doctor recommends these additional instructions:  If any biopsies were taken, your doctors clinic will contact you in 1 to 2   weeks with any results.  - [facility] to [facility].   - Resume previous diet.   - Continue present medications.  For questions, problems or results please call your physician - Jeff Stearns MD at Work:  [phone].  [facility], EMERGENCY ROOM PHONE NUMBER: [phone]  IF A COMPLICATION OR EMERGENCY SITUATION ARISES AND YOU ARE UNABLE TO REACH   YOUR PHYSICIAN - GO DIRECTLY TO THE EMERGENCY ROOM.  Jeff Stearns MD  2/6/2024 11:09:31 AM  This report has been verified and signed electronically.  Dear patient,  As a result of recent federal legislation (The Federal Cures Act), you may   receive lab or pathology results from your procedure in your [facility]   account before your physician is able to contact you. Your physician or   their representative will relay the results to you with their   recommendations at their soonest availability.  Thank you,  PROVATION

## 2024-02-06 NOTE — ASSESSMENT & PLAN NOTE
Patient's FSGs are controlled on current medication regimen.  Last A1c reviewed-   Lab Results   Component Value Date    HGBA1C 4.8 02/05/2024     Most recent fingerstick glucose reviewed-   Recent Labs   Lab 02/06/24  0126 02/06/24  0158 02/06/24  0801 02/06/24  0959   POCTGLUCOSE 65* 118* 66* 79     Current correctional scale  Low  Maintain anti-hyperglycemic dose as follows-   Antihyperglycemics (From admission, onward)      Start     Stop Route Frequency Ordered    02/04/24 2059  insulin aspart U-100 pen 0-5 Units         -- SubQ Before meals & nightly PRN 02/04/24 2000          Hold Oral hypoglycemics while patient is in the hospital.     Patient on SSI at NH.    [facility]

## 2024-02-06 NOTE — ASSESSMENT & PLAN NOTE
"Patient found to have small pleural effusion on imaging [MRCP(02/06/24)"Small right-sided pleural effusion.  Trace left-sided pleural effusion"]. Most likely etiology includes  limited mobility due to recent history of hospitalization for cholecystectomy . Management to include  monitoring at this time    "

## 2024-02-06 NOTE — ASSESSMENT & PLAN NOTE
[address], [address] [facility], [address] [place] [address]  [phone]    Procedure History & Physical      Deana Kraig     HPI:    Patient  is here for Right cervical MB RFA for neck pain.   Labs/imaging studies reviewed   All question and concerns addressed including R/B/A associated with the procedure    Past Medical History:   Diagnosis Date    BRCA1 gene mutation positive     Cousin Pos. per mmmo hx sheet    COVID 2021    pt states moderate; twice 2021, May 2022-sinus infection only 4 days only    Joint pain     Low back pain     degenerative disk disease    Lupus ([address])     Menometrorrhagia 2022    Neck pain     RA (rheumatoid arthritis) ([address])     RH    Radiculopathy     Sjogren's disease ([address])        Past Surgical History:   Procedure Laterality Date    ANESTHESIA NERVE BLOCK Bilateral 2019    BILATERAL LUMBAR TRANSFORAMINAL EPIDURAL STERIOD INJECTION AT L5-S1 UNDER FLUOROSCOPY performed by Jareth Medley MD at [facility] Left 2022    LEFT ELBOW CUBITAL TUNNEL RELEASE performed by Madhavi Esposito DO at [facility]      SECTION      x2    CHOLECYSTECTOMY, LAPAROSCOPIC N/A 2021    LAPAROSCOPIC CHOLECYSTECTOMY performed by King Davis MD at [address] [place] [address]      x2    [address] [facility] N/A 2022    DILATATION AND CURETTAGE HYSTEROSCOPY performed by Chika Portillo MD at [facility] Left     cubital nerve    ENDOSCOPY, COLON, DIAGNOSTIC      EPIDURAL STEROID INJECTION Left 2019    LUMBAR EPIDURAL STEROID INJECTION L5-S1 (LEFT PARAMEDIAN APPROACH) UNDER FLUOROSCOPIC GUIDANCE) performed by Jareth Medley MD at [address] N/A 2021    LAPAROSCOPIC HIATAL HERNIA RTEPAIR WITH MESH performed by King Davis MD at [address] Patient has hyponatremia which is controlled,We will aim to correct the sodium by 4-6mEq in 24 hours. We will monitor sodium Daily. The hyponatremia is due to Dehydration/hypovolemia. . We will treat the hyponatremia with IV fluids and resumption of tube feeds. The patient's sodium results have been reviewed and are listed below.  Recent Labs   Lab 02/06/24  0528   *

## 2024-02-06 NOTE — PLAN OF CARE
Patient alert and oriented. Patient sitting up in bed. Patient denies any discomfort or distress at this time. Will continue to monitor.

## 2024-02-06 NOTE — PROGRESS NOTES
[facility] - [facility]  [facility]  Progress Note    Patient Name: Seymour Velazquez  MRN: [id number]  Patient Class: IP- Inpatient   Admission Date: 2/4/2024  Length of Stay: 2 days  Attending Physician: Bhumi Joshua*  Primary Care Provider: Eliazar Rosas MD        Subjective:     Principal Problem:Common bile duct (CBD) obstruction        HPI:  68M w/PMH stroke with G-tube placement, hypertension, diabetes, hyperlipidemia, recent cholecystitis, and dysphagia presents as transfer from OSH for AES eval. He initially presented from NH with leukocytosis w/o specific symptoms, imaging showed cholecystitis. Underwent lap [place] 2/1, the operative report noted the gallbladder was necrotic and fell apart. The back wall of the gallbladder had an abscess that was perforated. There were spilled stones and purulent bile. A cholangiogram showed patent common duct with single stone at the ampulla that appears too large to pass spontaneously. Patient is transferred to C for possible ERCP.     On arrival, patient reports some abdominal pain, denies N/V/D. He is AFVSS. Admitted to  for further management.     Overview/Hospital Course:  Patient admitted to Hospital Medicine service for medical management and evaluation of suspected CBD obstruction following complicated laparoscopic cholecystectomy. AES was consulted on admission with recommendation of MRCP. MRCP w/ and w/o contrast currently pending. ID was consulted with continuation of Merrem from outside hospital. Merrem de-escalated to Rocephin/Flagyl per ID recs. List of home medications was obtained from home facility and were continued as appropriate. MRCP[02/06/24] noted fluid collection in the gallbladder fossa, concerning for  abscess vs. biloma. Also noted choledocholithiasis with gallstones posterior to the liver. AES performed ERCP 02/06/24 with removal of choledocolithiasis  by biliary sphincterotomy and balloon extraction. General Surgery  consult noted no indication surgical intervention at the time, but can consider IR drainage of fluid collection if clinical status worsens.    Interval History: No acute events overnight, afebrile, hemodynamically stable. AES performed ERCP this morning. Denies any acute concerns this morning. Abdominal exam without acute abdomen signs.       Objective:     Vital Signs (Most Recent):  Temp: 98 °F (36.7 °C) (02/06/24 1200)  Pulse: 70 (02/06/24 1200)  Resp: 18 (02/06/24 1331)  BP: 129/68 (02/06/24 1200)  SpO2: 100 % (02/06/24 1200) Vital Signs (24h Range):  Temp:  [97.6 °F (36.4 °C)-98.2 °F (36.8 °C)] 98 °F (36.7 °C)  Pulse:  [] 70  Resp:  [14-19] 18  SpO2:  [96 %-100 %] 100 %  BP: (114-144)/(56-74) 129/68     Weight: 79.6 kg (175 lb 8 oz)  Body mass index is 22.53 kg/m².    Intake/Output Summary (Last 24 hours) at 2/6/2024 1508  Last data filed at 2/6/2024 1101  Gross per 24 hour   Intake --   Output 1200 ml   Net -1200 ml         Physical Exam  Constitutional:       General: He is not in acute distress.     Appearance: Normal appearance. He is ill-appearing. He is not toxic-appearing or diaphoretic.   HENT:      Head: Normocephalic and atraumatic.      Mouth/Throat:      Mouth: Mucous membranes are moist.   Eyes:      General: No scleral icterus.        Right eye: No discharge.         Left eye: No discharge.   Cardiovascular:      Rate and Rhythm: Normal rate and regular rhythm.      Heart sounds: Normal heart sounds. No murmur heard.  Pulmonary:      Effort: Pulmonary effort is normal.      Breath sounds: Normal breath sounds. No wheezing or rales.   Abdominal:      General: Abdomen is flat. Bowel sounds are normal. There is no distension.      Palpations: Abdomen is soft.      Tenderness: There is no abdominal tenderness. There is no guarding or rebound.   Musculoskeletal:         General: No swelling.      Cervical back: Normal range of motion.   Skin:     General: Skin is warm and dry.   Neurological:       Mental Status: He is alert.             Significant Labs: All pertinent labs within the past 24 hours have been reviewed.  LABS:  Recent Labs   Lab 02/05/24 0440 02/06/24  0528    134*   K 3.5 4.8    105   CO2 25 20*   BUN 12 10   CREATININE 0.6 0.6   GLU 87 69*   ANIONGAP 5* 9     Recent Labs   Lab 02/05/24 0440 02/06/24  0528   MG 1.4* 2.1   PHOS 1.9* 3.3     Recent Labs   Lab 02/05/24 0440 02/06/24  0528   AST 8* 15   ALT 7* 8*   ALKPHOS 55 58   BILITOT 0.4 0.4   ALBUMIN 2.0* 2.1*     POCT Glucose:   Recent Labs   Lab 02/06/24  0158 02/06/24  0801 02/06/24  0959   POCTGLUCOSE 118* 66* 79    Recent Labs   Lab 02/05/24 0440 02/06/24  0528   WBC 8.61 8.41   HGB 8.5* 8.5*   HCT 25.8* 24.8*    395   GRAN 60.2  5.2 56.2  4.7            Significant Imaging: I have reviewed all pertinent imaging results/findings within the past 24 hours.  FL ERCP Biliary And Pancreatic By Non Rad Tech   Final Result      MRI Abdomen W WO Contrast_INC MRCP (XPD)   Final Result   Abnormal      Postoperative changes of recent laparoscopic cholecystectomy with intraperitoneal free air in the anterior upper abdomen, slightly greater than would be expected for cholecystectomy on 02/01/2024.  Consider serial abdominal exams to evaluate for peritoneal signs.      7.0 cm fluid collection in the gallbladder fossa, likely representing abscess and/or biloma.      Choledocholithiasis.  Few spilled gallstones are noted posterior to the liver.      Bilateral pleural effusions.      This report was flagged in Epic as abnormal..      This result was discovered at approximately 01:20.  Findings were discussed via telephone by Brenda Belle MD with [place]io Victor MD on 2/6/2024 at 01:40.      Electronically signed by resident: Brenda Belle   Date:    02/06/2024   Time:    02:01      Electronically signed by: Claude Harmon MD   Date:    02/06/2024   Time:    02:50          Inpatient Medications:  Continuous Infusions:    "lactated ringers 100 mL/hr at 02/06/24 0433     Scheduled Meds:   aspirin  81 mg Oral Daily    atorvastatin  80 mg Per G Tube QHS    busPIRone  7.5 mg Per G Tube Daily    cefTRIAXone (Rocephin) IV (PEDS and ADULTS)  2 g Intravenous Q24H    enoxparin  40 mg Subcutaneous Daily    famotidine  20 mg Per G Tube BID    lisinopriL  10 mg Per G Tube Daily    metronidazole  500 mg Intravenous Q8H    senna  8.6 mg Per G Tube Daily    sertraline  25 mg Per G Tube Daily     PRN Meds:dextrose 10%, dextrose 10%, glucagon (human recombinant), glucose, glucose, insulin aspart U-100, melatonin, morphine, naloxone, ondansetron, sodium chloride 0.9%      Assessment/Plan:      * Common bile duct (CBD) obstruction  68M w/ recent CVA and residual dysphagia s/p PEG tube presents from OSH for retained gallstone in ampulla, s/p lap cholecystectomy in which the gallbladder was found necrotic and friable. His only symptom is abdominal pain. Labs show leukocytosis. He is transferred for AES eval and possible ERCP.     -MRCP[02/06/24] noted fluid collection in the gallbladder fossa, concerning for  abscess vs. biloma. Also noted choledocholithiasis with gallstones posterior to the liver.  -AES performed ERCP 02/06/24 with removal of choledocolithiasis  by biliary sphincterotomy and balloon extraction.     -General Surgery consult noted no indication for surgical intervention at the time, but can consider IR drainage of fluid collection if clinical status worsens.  -Ceftriaxone/Metronidazole per ID recs  -AES consulted, appreciate recs  -LR infusion 100cc/hr      On tube feeding diet  Hx of PEG  tube placement s/p CVA    PLAN:  -Will start trickle feeds with Isosource  -Nutrition consulted for tube feed recs      Abdominal fluid collection  See "Common bile duct (CBD) obstruction " A&P        Choledocholithiasis  See "Common bile duct (CBD) obstruction " A&P        Bilateral pleural effusion  Patient found to have small pleural effusion on " "imaging [MRCP(02/06/24)"Small right-sided pleural effusion.  Trace left-sided pleural effusion"]. Most likely etiology includes  limited mobility due to recent history of hospitalization for cholecystectomy . Management to include  monitoring at this time      Hyponatremia  Patient has hyponatremia which is controlled,We will aim to correct the sodium by 4-6mEq in 24 hours. We will monitor sodium Daily. The hyponatremia is due to Dehydration/hypovolemia. . We will treat the hyponatremia with IV fluids and resumption of tube feeds. The patient's sodium results have been reviewed and are listed below.  Recent Labs   Lab 02/06/24 0528   *       Normocytic anemia  -Patient's with Normocytic anemia..   -Hemoglobin stable.   -Etiology likely due to chronic disease .  -Current CBC reviewed-    Recent Labs   Lab 02/05/24  0440 02/06/24 0528   HGB 8.5* 8.5*         Component Value Date/Time    MCV 86 02/06/2024 0528    RDW 13.5 02/06/2024 0528         PLAN  - Monitor serial CBC and transfuse if patient becomes hemodynamically unstable, symptomatic or H/H drops below 7/21.  -Followup iron studies    Cholecystitis  See "Common bile duct (CBD) obstruction " A&P        CVA (cerebral vascular accident)  Patient is s/p CVA with residual dysphagia and weakness. He is s/p PEG tube and receives bolus tube feedings.         Type 2 diabetes mellitus  Patient's FSGs are controlled on current medication regimen.  Last A1c reviewed-   Lab Results   Component Value Date    HGBA1C 4.8 02/05/2024     Most recent fingerstick glucose reviewed-   Recent Labs   Lab 02/06/24  0126 02/06/24  0158 02/06/24  0801 02/06/24  0959   POCTGLUCOSE 65* 118* 66* 79     Current correctional scale  Low  Maintain anti-hyperglycemic dose as follows-   Antihyperglycemics (From admission, onward)      Start     Stop Route Frequency Ordered    02/04/24 2059  insulin aspart U-100 pen 0-5 Units         -- SubQ Before meals & nightly PRN 02/04/24 2000      "     Hold Oral hypoglycemics while patient is in the hospital.     Patient on SSI at NH.    -LDSSI    Primary hypertension  Patient is normotensive on arrival, per chart he is not currently on any antihypertensives.     Will utilize p.r.n. blood pressure medication only if patient's blood pressure greater than 180/110 and he develops symptoms such as worsening chest pain or shortness of breath.      VTE Risk Mitigation (From admission, onward)           Ordered     enoxaparin injection 40 mg  Daily         02/05/24 1410     IP VTE HIGH RISK PATIENT  Once         02/05/24 1410     Place sequential compression device  Until discontinued         02/04/24 2000                    Discharge Planning   PEYTON: 2/9/2024     Code Status: Full Code   Is the patient medically ready for discharge?: No    Reason for patient still in hospital (select all that apply): Laboratory test, Treatment, Consult recommendations, and Pending disposition  Discharge Plan A: Home with family                  Johnnie Brasher DO  Department of Hospital Medicine   [facility] - [facility]

## 2024-02-06 NOTE — ANESTHESIA POSTPROCEDURE EVALUATION
Anesthesia Post Evaluation    Patient: Seymour Velazquez    Procedure(s) Performed: Procedure(s) (LRB):  ERCP (ENDOSCOPIC RETROGRADE CHOLANGIOPANCREATOGRAPHY) (N/A)    Final Anesthesia Type: general      Patient location during evaluation: PACU  Patient participation: Yes- Able to Participate  Level of consciousness: awake and alert  Post-procedure vital signs: reviewed and stable  Pain management: adequate  Airway patency: patent    PONV status at discharge: No PONV  Anesthetic complications: no      Cardiovascular status: hemodynamically stable  Respiratory status: spontaneous ventilation  Follow-up not needed.              Vitals Value Taken Time   /69 02/06/24 1109   Temp 36.8 °C (98.2 °F) 02/06/24 1109   Pulse 92 02/06/24 1116   Resp 18 02/06/24 1116   SpO2 100 % 02/06/24 1116   Vitals shown include unvalidated device data.      No case tracking events are documented in the log.      Pain/Arnold Score: Pain Rating Prior to Med Admin: 8 (2/5/2024  9:03 PM)  Pain Rating Post Med Admin: 2 (2/5/2024  9:33 PM)  Arnold Score: 7 (2/6/2024 11:09 AM)

## 2024-02-06 NOTE — TREATMENT PLAN
"AES Treatment Plan    Reviewed MRCP with Dr Stearns who agreed need to remove stone seen in CBD. Patient has been NPO except for meds. Will proceed with ERCP today.     Also of note, MRCP read indicated "Gallbladder: Postoperative changes of cholecystectomy.  There is a complex fluid collection in the gallbladder fossa measuring 3.2 x 7.0 cm with peripheral enhancement.  There are spilled gallstones noted posterior to the liver (series 4, image 33)." Discussed with Primary team and requested if General Surgery has not already weighed in on this finding - to please consult them.    Case added on for ERCP in Endo 2nd floor today.    We will follow up with patient tomorrow for post procedure assessment.       "

## 2024-02-07 PROBLEM — R00.0 TACHYCARDIA: Status: ACTIVE | Noted: 2024-02-07

## 2024-02-07 LAB
ALBUMIN SERPL BCP-MCNC: 2.4 G/DL (ref 3.5–5.2)
ALP SERPL-CCNC: 118 U/L (ref 55–135)
ALT SERPL W/O P-5'-P-CCNC: 13 U/L (ref 10–44)
ANION GAP SERPL CALC-SCNC: 16 MMOL/L (ref 8–16)
AST SERPL-CCNC: 17 U/L (ref 10–40)
BASOPHILS # BLD AUTO: 0.07 K/UL (ref 0–0.2)
BASOPHILS NFR BLD: 0.5 % (ref 0–1.9)
BILIRUB SERPL-MCNC: 0.4 MG/DL (ref 0.1–1)
BUN SERPL-MCNC: 13 MG/DL (ref 8–23)
CALCIUM SERPL-MCNC: 9.2 MG/DL (ref 8.7–10.5)
CHLORIDE SERPL-SCNC: 102 MMOL/L (ref 95–110)
CO2 SERPL-SCNC: 17 MMOL/L (ref 23–29)
CREAT SERPL-MCNC: 0.9 MG/DL (ref 0.5–1.4)
DIFFERENTIAL METHOD BLD: ABNORMAL
EOSINOPHIL # BLD AUTO: 0 K/UL (ref 0–0.5)
EOSINOPHIL NFR BLD: 0.1 % (ref 0–8)
ERYTHROCYTE [DISTWIDTH] IN BLOOD BY AUTOMATED COUNT: 13.8 % (ref 11.5–14.5)
EST. GFR  (NO RACE VARIABLE): >60 ML/MIN/1.73 M^2
FERRITIN SERPL-MCNC: 1256 NG/ML (ref 20–300)
GLUCOSE SERPL-MCNC: 129 MG/DL (ref 70–110)
HCT VFR BLD AUTO: 30.1 % (ref 40–54)
HGB BLD-MCNC: 9.7 G/DL (ref 14–18)
IMM GRANULOCYTES # BLD AUTO: 0.07 K/UL (ref 0–0.04)
IMM GRANULOCYTES NFR BLD AUTO: 0.5 % (ref 0–0.5)
INR PPP: 1.3 (ref 0.8–1.2)
IRON SERPL-MCNC: 12 UG/DL (ref 45–160)
LACTATE SERPL-SCNC: 1.4 MMOL/L (ref 0.5–2.2)
LIPASE SERPL-CCNC: 20 U/L (ref 4–60)
LYMPHOCYTES # BLD AUTO: 1 K/UL (ref 1–4.8)
LYMPHOCYTES NFR BLD: 6.4 % (ref 18–48)
MAGNESIUM SERPL-MCNC: 1.9 MG/DL (ref 1.6–2.6)
MCH RBC QN AUTO: 29.4 PG (ref 27–31)
MCHC RBC AUTO-ENTMCNC: 32.2 G/DL (ref 32–36)
MCV RBC AUTO: 91 FL (ref 82–98)
MONOCYTES # BLD AUTO: 0.6 K/UL (ref 0.3–1)
MONOCYTES NFR BLD: 3.7 % (ref 4–15)
NEUTROPHILS # BLD AUTO: 13.6 K/UL (ref 1.8–7.7)
NEUTROPHILS NFR BLD: 88.8 % (ref 38–73)
NRBC BLD-RTO: 0 /100 WBC
OHS QRS DURATION: 64 MS
OHS QTC CALCULATION: 428 MS
PHOSPHATE SERPL-MCNC: 4.2 MG/DL (ref 2.7–4.5)
PLATELET # BLD AUTO: 407 K/UL (ref 150–450)
PLATELET BLD QL SMEAR: ABNORMAL
PMV BLD AUTO: 8.6 FL (ref 9.2–12.9)
POCT GLUCOSE: 123 MG/DL (ref 70–110)
POCT GLUCOSE: 127 MG/DL (ref 70–110)
POCT GLUCOSE: 131 MG/DL (ref 70–110)
POCT GLUCOSE: 154 MG/DL (ref 70–110)
POTASSIUM SERPL-SCNC: 4.2 MMOL/L (ref 3.5–5.1)
PROT SERPL-MCNC: 7.7 G/DL (ref 6–8.4)
PROTHROMBIN TIME: 13.4 SEC (ref 9–12.5)
RBC # BLD AUTO: 3.3 M/UL (ref 4.6–6.2)
SATURATED IRON: 6 % (ref 20–50)
SODIUM SERPL-SCNC: 135 MMOL/L (ref 136–145)
TOTAL IRON BINDING CAPACITY: 195 UG/DL (ref 250–450)
TRANSFERRIN SERPL-MCNC: 132 MG/DL (ref 200–375)
WBC # BLD AUTO: 15.3 K/UL (ref 3.9–12.7)

## 2024-02-07 PROCEDURE — 21400001 HC TELEMETRY ROOM

## 2024-02-07 PROCEDURE — 63600175 PHARM REV CODE 636 W HCPCS: Performed by: INTERNAL MEDICINE

## 2024-02-07 PROCEDURE — 36415 COLL VENOUS BLD VENIPUNCTURE: CPT

## 2024-02-07 PROCEDURE — 63600175 PHARM REV CODE 636 W HCPCS

## 2024-02-07 PROCEDURE — 80053 COMPREHEN METABOLIC PANEL: CPT

## 2024-02-07 PROCEDURE — 85025 COMPLETE CBC W/AUTO DIFF WBC: CPT

## 2024-02-07 PROCEDURE — 99232 SBSQ HOSP IP/OBS MODERATE 35: CPT | Mod: ,,,

## 2024-02-07 PROCEDURE — 83735 ASSAY OF MAGNESIUM: CPT

## 2024-02-07 PROCEDURE — 25000003 PHARM REV CODE 250: Performed by: PHYSICIAN ASSISTANT

## 2024-02-07 PROCEDURE — 25000003 PHARM REV CODE 250

## 2024-02-07 PROCEDURE — 82728 ASSAY OF FERRITIN: CPT | Performed by: STUDENT IN AN ORGANIZED HEALTH CARE EDUCATION/TRAINING PROGRAM

## 2024-02-07 PROCEDURE — 83690 ASSAY OF LIPASE: CPT

## 2024-02-07 PROCEDURE — 84100 ASSAY OF PHOSPHORUS: CPT

## 2024-02-07 PROCEDURE — 83540 ASSAY OF IRON: CPT | Performed by: STUDENT IN AN ORGANIZED HEALTH CARE EDUCATION/TRAINING PROGRAM

## 2024-02-07 PROCEDURE — 99223 1ST HOSP IP/OBS HIGH 75: CPT | Mod: ,,, | Performed by: FAMILY MEDICINE

## 2024-02-07 PROCEDURE — 25000003 PHARM REV CODE 250: Performed by: HOSPITALIST

## 2024-02-07 PROCEDURE — 63600175 PHARM REV CODE 636 W HCPCS: Performed by: PHYSICIAN ASSISTANT

## 2024-02-07 PROCEDURE — 83605 ASSAY OF LACTIC ACID: CPT

## 2024-02-07 PROCEDURE — 93010 ELECTROCARDIOGRAM REPORT: CPT | Mod: ,,, | Performed by: INTERNAL MEDICINE

## 2024-02-07 PROCEDURE — 63600175 PHARM REV CODE 636 W HCPCS: Performed by: HOSPITALIST

## 2024-02-07 PROCEDURE — 99233 SBSQ HOSP IP/OBS HIGH 50: CPT | Mod: ,,, | Performed by: PHYSICIAN ASSISTANT

## 2024-02-07 PROCEDURE — 93005 ELECTROCARDIOGRAM TRACING: CPT

## 2024-02-07 PROCEDURE — 63600175 PHARM REV CODE 636 W HCPCS: Performed by: STUDENT IN AN ORGANIZED HEALTH CARE EDUCATION/TRAINING PROGRAM

## 2024-02-07 PROCEDURE — 85610 PROTHROMBIN TIME: CPT | Performed by: FAMILY MEDICINE

## 2024-02-07 RX ORDER — ONDANSETRON HYDROCHLORIDE 2 MG/ML
4 INJECTION, SOLUTION INTRAVENOUS EVERY 6 HOURS PRN
Status: DISCONTINUED | OUTPATIENT
Start: 2024-02-07 | End: 2024-02-07

## 2024-02-07 RX ORDER — AMLODIPINE BESYLATE 5 MG/1
5 TABLET ORAL DAILY
Status: DISCONTINUED | OUTPATIENT
Start: 2024-02-07 | End: 2024-02-16

## 2024-02-07 RX ORDER — DIPHENHYDRAMINE HYDROCHLORIDE 50 MG/ML
INJECTION INTRAMUSCULAR; INTRAVENOUS
Status: COMPLETED | OUTPATIENT
Start: 2024-02-07 | End: 2024-02-07

## 2024-02-07 RX ORDER — PROCHLORPERAZINE EDISYLATE 5 MG/ML
5 INJECTION INTRAMUSCULAR; INTRAVENOUS ONCE
Status: COMPLETED | OUTPATIENT
Start: 2024-02-07 | End: 2024-02-07

## 2024-02-07 RX ORDER — METOCLOPRAMIDE HYDROCHLORIDE 5 MG/ML
5 INJECTION INTRAMUSCULAR; INTRAVENOUS EVERY 8 HOURS
Status: DISCONTINUED | OUTPATIENT
Start: 2024-02-07 | End: 2024-02-16

## 2024-02-07 RX ORDER — PROCHLORPERAZINE EDISYLATE 5 MG/ML
5 INJECTION INTRAMUSCULAR; INTRAVENOUS EVERY 6 HOURS PRN
Status: DISCONTINUED | OUTPATIENT
Start: 2024-02-07 | End: 2024-02-23 | Stop reason: HOSPADM

## 2024-02-07 RX ORDER — ONDANSETRON HYDROCHLORIDE 2 MG/ML
8 INJECTION, SOLUTION INTRAVENOUS EVERY 6 HOURS PRN
Status: DISCONTINUED | OUTPATIENT
Start: 2024-02-07 | End: 2024-02-23 | Stop reason: HOSPADM

## 2024-02-07 RX ADMIN — METOCLOPRAMIDE HYDROCHLORIDE 5 MG: 10 INJECTION, SOLUTION INTRAMUSCULAR; INTRAVENOUS at 10:02

## 2024-02-07 RX ADMIN — SODIUM CHLORIDE, POTASSIUM CHLORIDE, SODIUM LACTATE AND CALCIUM CHLORIDE: 600; 310; 30; 20 INJECTION, SOLUTION INTRAVENOUS at 04:02

## 2024-02-07 RX ADMIN — SERTRALINE HYDROCHLORIDE 25 MG: 25 TABLET ORAL at 09:02

## 2024-02-07 RX ADMIN — ONDANSETRON 4 MG: 2 INJECTION INTRAMUSCULAR; INTRAVENOUS at 05:02

## 2024-02-07 RX ADMIN — SODIUM CHLORIDE, POTASSIUM CHLORIDE, SODIUM LACTATE AND CALCIUM CHLORIDE: 600; 310; 30; 20 INJECTION, SOLUTION INTRAVENOUS at 11:02

## 2024-02-07 RX ADMIN — LISINOPRIL 10 MG: 10 TABLET ORAL at 09:02

## 2024-02-07 RX ADMIN — MORPHINE SULFATE 2 MG: 2 INJECTION, SOLUTION INTRAMUSCULAR; INTRAVENOUS at 05:02

## 2024-02-07 RX ADMIN — ONDANSETRON 4 MG: 2 INJECTION INTRAMUSCULAR; INTRAVENOUS at 01:02

## 2024-02-07 RX ADMIN — METRONIDAZOLE 500 MG: 500 INJECTION, SOLUTION INTRAVENOUS at 04:02

## 2024-02-07 RX ADMIN — METRONIDAZOLE 500 MG: 500 INJECTION, SOLUTION INTRAVENOUS at 08:02

## 2024-02-07 RX ADMIN — MORPHINE SULFATE 2 MG: 2 INJECTION, SOLUTION INTRAMUSCULAR; INTRAVENOUS at 01:02

## 2024-02-07 RX ADMIN — AMLODIPINE BESYLATE 5 MG: 5 TABLET ORAL at 09:02

## 2024-02-07 RX ADMIN — ONDANSETRON 4 MG: 2 INJECTION INTRAMUSCULAR; INTRAVENOUS at 03:02

## 2024-02-07 RX ADMIN — ONDANSETRON 8 MG: 2 INJECTION INTRAMUSCULAR; INTRAVENOUS at 10:02

## 2024-02-07 RX ADMIN — DIPHENHYDRAMINE HYDROCHLORIDE 50 MG: 50 INJECTION, SOLUTION INTRAMUSCULAR; INTRAVENOUS at 03:02

## 2024-02-07 RX ADMIN — PROCHLORPERAZINE EDISYLATE 5 MG: 5 INJECTION INTRAMUSCULAR; INTRAVENOUS at 09:02

## 2024-02-07 RX ADMIN — FAMOTIDINE 20 MG: 20 TABLET, FILM COATED ORAL at 09:02

## 2024-02-07 RX ADMIN — CEFTRIAXONE 2 G: 2 INJECTION, POWDER, FOR SOLUTION INTRAMUSCULAR; INTRAVENOUS at 02:02

## 2024-02-07 RX ADMIN — PROCHLORPERAZINE EDISYLATE 5 MG: 5 INJECTION INTRAMUSCULAR; INTRAVENOUS at 01:02

## 2024-02-07 RX ADMIN — BUSPIRONE HYDROCHLORIDE 7.5 MG: 5 TABLET ORAL at 09:02

## 2024-02-07 RX ADMIN — SENNOSIDES 8.6 MG: 8.6 TABLET, FILM COATED ORAL at 09:02

## 2024-02-07 RX ADMIN — METRONIDAZOLE 500 MG: 500 INJECTION, SOLUTION INTRAVENOUS at 11:02

## 2024-02-07 NOTE — ASSESSMENT & PLAN NOTE
Patient's FSGs are controlled on current medication regimen.  Last A1c reviewed-   Lab Results   Component Value Date    HGBA1C 4.8 02/05/2024     Most recent fingerstick glucose reviewed-   Recent Labs   Lab 02/06/24  1907 02/06/24  2320 02/07/24  0515 02/07/24  1252   POCTGLUCOSE 91 123* 154* 131*       Current correctional scale  Low  Maintain anti-hyperglycemic dose as follows-   Antihyperglycemics (From admission, onward)    Start     Stop Route Frequency Ordered    02/04/24 2059  insulin aspart U-100 pen 0-5 Units         -- SubQ Before meals & nightly PRN 02/04/24 2000        Hold Oral hypoglycemics while patient is in the hospital.     [facility].    [facility]

## 2024-02-07 NOTE — PLAN OF CARE
Pt arrived to  for abscess aspiration. Pt oriented to unit and staff. Plan of care reviewed with patient, patient verbalizes understanding. Comfort measures utilized. Pt safely transferred from stretcher to procedural table. Fall risk reviewed with patient, fall risk interventions maintained. Safety strap applied, positioner pillows utilized to minimize pressure points. Blankets applied. Pt prepped and draped utilizing standard sterile technique. Patient placed on continuous monitoring, as required by sedation policy. Timeouts completed utilizing standard universal time-out, per department and facility policy. RN to remain at bedside, continuous monitoring maintained. Pt resting comfortably. Denies pain/discomfort. Will continue to monitor. See flow sheets for monitoring, medication administration, and updates.

## 2024-02-07 NOTE — NURSING
Found feeding tube disconnected and thin brown drainage coming out of PEG tube, at least 100 ml. Cleaned pt up, paused feeding, plugged tube, and contacted Dr. Joshua, who said she would come assess the pt.     1748: Dr. Joshua saw pt and ordered imaging. Ordered tube feed to be discontinued for now.

## 2024-02-07 NOTE — ASSESSMENT & PLAN NOTE
68M w/ recent CVA and residual dysphagia s/p PEG tube presents from OSH for retained gallstone in ampulla, s/p lap cholecystectomy in which the gallbladder was found necrotic and friable. His only symptom is abdominal pain. Labs show leukocytosis. He is transferred for AES eval and possible ERCP.     -MRCP[02/06/24] noted fluid collection in the gallbladder fossa, concerning for  abscess vs. biloma. Also noted choledocholithiasis with gallstones posterior to the liver.  -AES performed ERCP 02/06/24 with removal of choledocolithiasis  by biliary sphincterotomy and balloon extraction.     -General Surgery consult noted no indication for surgical intervention at the time, but can consider IR drainage of fluid collection if clinical status worsens.  -Ceftriaxone/Metronidazole per ID recs  -AES consulted, appreciate recs  -IR consulted for drainage of fluid collection, appreciate recs  -Scheduling reglan for N/V with additional antiemetics PRN  -LR infusion 100cc/hr

## 2024-02-07 NOTE — ASSESSMENT & PLAN NOTE
68 year old male with history of CVA, dysphagia, PEG tube, HTN,  DMII presents from OSH for AES eval of possible obstructing CBD stone after cholecystectomy 02/01. Patient presented to OSH with cholecystitis s/p laparoscopic cholecystectomy 2/1. Necrotic gallbladder noted with abscess that perforated c/b spilled stones and purulent bile. He was treated with empiric Meropenem. On arrival to [facility] afebrile without a leukocytosis. LFTS WNL. HDS. GI consulted. MRCP ordered and revealed a complex fluid collection in the gallbladder fossa with peripheral enhancement and spilled gallstones noted posterior to the liver. Patient underwent ERCP 2/6 and complete removal of stones accomplished.  IR has been consulted for drainage of this collection.    Recommendations  Continue Ceftriaxone and Flagyl.   IR consulted for drainage of gallbladder fossa collection  Please send fluid for cell count w/ diff, gram stain, aerobic, anaerobic, AFB and fungal cultures   Will follow cultures to guide antibiotics  Discussed plan with ID staff and primary team. ID will follow.

## 2024-02-07 NOTE — PROGRESS NOTES
[facility]  Infectious Disease  Progress Note    Patient Name: Seymour Velazquez  MRN: [id number]  Admission Date: 2/4/2024  Length of Stay: 3 days  Attending Physician: Bhumi Joshua*  Primary Care Provider: Eliazar Rosas MD    Isolation Status: No active isolations  Assessment/Plan:      GI  Cholecystitis    68 year old male with history of CVA, dysphagia, PEG tube, HTN,  DMII presents from OSH for AES eval of possible obstructing CBD stone after cholecystectomy 02/01. Patient presented to OSH with cholecystitis s/p laparoscopic cholecystectomy 2/1. Necrotic gallbladder noted with abscess that perforated c/b spilled stones and purulent bile. He was treated with empiric Meropenem. On arrival to [facility] afebrile without a leukocytosis. LFTS WNL. HDS. GI consulted. MRCP ordered and revealed a complex fluid collection in the gallbladder fossa with peripheral enhancement and spilled gallstones noted posterior to the liver. Patient underwent ERCP 2/6 and complete removal of stones accomplished.  IR has been consulted for drainage of this collection.    Recommendations  Continue Ceftriaxone and Flagyl.   IR consulted for drainage of gallbladder fossa collection  Please send fluid for cell count w/ diff, gram stain, aerobic, anaerobic, AFB and fungal cultures   Will follow cultures to guide antibiotics  Discussed plan with ID staff and primary team. ID will follow.            Thank you for your consult. I will follow-up with patient. Please contact us if you have any additional questions.    Lesly Alford PA-C  Infectious Disease  [facility]    Subjective:     Principal Problem:Common bile duct (CBD) obstruction    HPI: Mr Velazquez is a 68 year old male with history of CVA with dysphagia s/p PEG tube, HTN, HLD, DMII presents from OSH for AES eval of possible retained stone after cholecystectomy 02/01.    Patient initially presented from NH with leukocytosis w/o specific symptoms, imaging  showed cholecystitis. He underwent lap irlanda 2/1, the operative report noted the gallbladder was necrotic and fell apart. The back wall of the gallbladder had an abscess that was perforated. There were spilled stones and purulent bile. A cholangiogram showed patent common duct with single stone at the ampulla that appears too large to pass spontaneously. Patient transferred to [facility] for possible ERCP.  While at OSH patient was treated with empiric Meropenem. On arrival, patient reports mild abdominal pain, denies N/V/D. He is AFVSS.  No leukocytosis. LFTS WNL. ID consulted for recommendations.  Patient reports being told he had a PCN allergy as a child. Cannot recall specific reaction. Gi following. Ordered MRCP.  Interval History: NAEON. Afebrile. WBC 15K today. S/P ERCP yesterday. MRI w/ 7.0 cm fluid collection in the gallbladder fossa, likely representing abscess and/or biloma. Overall doing well though reports nausea.     Review of Systems   Constitutional:  Negative for chills, diaphoresis and fever.   HENT:  Negative for congestion and sore throat.    Respiratory:  Negative for cough and shortness of breath.    Cardiovascular:  Negative for chest pain and leg swelling.   Gastrointestinal:  Positive for abdominal pain and nausea. Negative for diarrhea and vomiting.   Genitourinary:  Negative for dysuria.   Musculoskeletal:  Negative for arthralgias and myalgias.   Skin:  Negative for rash.   Neurological:  Positive for weakness. Negative for light-headedness and headaches.   Hematological:  Bruises/bleeds easily.   Psychiatric/Behavioral:  Negative for agitation and confusion. The patient is not nervous/anxious.      Objective:     Vital Signs (Most Recent):  Temp: 96.2 °F (35.7 °C) (02/07/24 0746)  Pulse: 108 (02/07/24 0746)  Resp: 14 (02/07/24 0746)  BP: (!) 157/81 (02/07/24 0746)  SpO2: 100 % (02/07/24 0746) Vital Signs (24h Range):  Temp:  [96.2 °F (35.7 °C)-98.8 °F (37.1 °C)] 96.2 °F (35.7 °C)  Pulse:   [] 108  Resp:  [14-19] 14  SpO2:  [96 %-100 %] 100 %  BP: (114-173)/(66-90) 157/81     Weight: 79.6 kg (175 lb 8 oz)  Body mass index is 22.53 kg/m².    Estimated Creatinine Clearance: 88.4 mL/min (based on SCr of 0.9 mg/dL).     Physical Exam  Vitals and nursing note reviewed.   Constitutional:       General: He is not in acute distress.     Appearance: Normal appearance. He is well-developed. He is not ill-appearing or diaphoretic.   HENT:      Head: Normocephalic and atraumatic.      Right Ear: External ear normal.      Left Ear: External ear normal.      Nose: Nose normal.      Mouth/Throat:      Mouth: Mucous membranes are dry.   Eyes:      General: No scleral icterus.        Right eye: No discharge.         Left eye: No discharge.      Conjunctiva/sclera: Conjunctivae normal.   Cardiovascular:      Rate and Rhythm: Regular rhythm. Tachycardia present.   Pulmonary:      Effort: Pulmonary effort is normal. No respiratory distress.      Breath sounds: No stridor.   Abdominal:      General: There is no distension.      Palpations: Abdomen is soft.      Tenderness: There is abdominal tenderness.      Comments: PEG  Drain with SS output   Musculoskeletal:      Right lower leg: No edema.      Left lower leg: No edema.   Skin:     General: Skin is dry.      Coloration: Skin is pale. Skin is not jaundiced.      Findings: No erythema.   Neurological:      General: No focal deficit present.      Mental Status: He is alert and oriented to person, place, and time. Mental status is at baseline.   Psychiatric:         Mood and Affect: Mood normal.         Behavior: Behavior normal.         Thought Content: Thought content normal.         Judgment: Judgment normal.          Significant Labs: CBC:   Recent Labs   Lab 02/06/24 0528 02/07/24 0223   WBC 8.41 15.30*   HGB 8.5* 9.7*   HCT 24.8* 30.1*    407     CMP:   Recent Labs   Lab 02/06/24 0528 02/07/24 0223   * 135*   K 4.8 4.2    102   CO2 20*  17*   GLU 69* 129*   BUN 10 13   CREATININE 0.6 0.9   CALCIUM 8.7 9.2   PROT 6.5 7.7   ALBUMIN 2.1* 2.4*   BILITOT 0.4 0.4   ALKPHOS 58 118   AST 15 17   ALT 8* 13   ANIONGAP 9 16     Microbiology Results (last 7 days)       ** No results found for the last 168 hours. **          Recent Lab Results  (Last 5 results in the past 24 hours)        02/07/24  0905   02/07/24  0515   02/07/24  0223   02/06/24  2320   02/06/24  1907        Albumin     2.4           ALP     118           ALT     13           Anion Gap     16           AST     17           Baso #     0.07           Basophil %     0.5           BILIRUBIN TOTAL     0.4  Comment: For infants and newborns, interpretation of results should be based  on gestational age, weight and in agreement with clinical  observations.    Premature Infant recommended reference ranges:  Up to 24 hours.............<8.0 mg/dL  Up to 48 hours............<12.0 mg/dL  3-5 days..................<15.0 mg/dL  6-29 days.................<15.0 mg/dL             BUN     13           Calcium     9.2           Chloride     102           CO2     17           Creatinine     0.9           Differential Method     Automated           eGFR     >60.0           Eos #     0.0           Eos %     0.1           Ferritin     1,256           Glucose     129           Gran # (ANC)     13.6           Gran %     88.8           Hematocrit     30.1           Hemoglobin     9.7           Immature Grans (Abs)     0.07  Comment: Mild elevation in immature granulocytes is non specific and   can be seen in a variety of conditions including stress response,   acute inflammation, trauma and pregnancy. Correlation with other   laboratory and clinical findings is essential.             Immature Granulocytes     0.5           INR 1.3  Comment: Coumadin Therapy:  2.0 - 3.0 for INR for all indicators except mechanical heart valves  and antiphospholipid syndromes which should use 2.5 - 3.5.                 Iron     12            Lactic Acid Level 1.4  Comment: Falsely low lactic acid results can be found in samples   containing >=13.0 mg/dL total bilirubin and/or >=3.5 mg/dL   direct bilirubin.                 Lipase     20           Lymph #     1.0           Lymph %     6.4           Magnesium      1.9           MCH     29.4           MCHC     32.2           MCV     91           Mono #     0.6           Mono %     3.7           MPV     8.6           nRBC     0           Phosphorus Level     4.2           Platelet Estimate     Appears normal           Platelet Count     407           POCT Glucose   154     123   91       Potassium     4.2           PROTEIN TOTAL     7.7           PT 13.4               RBC     3.30           RDW     13.8           Saturated Iron     6           Sodium     135           TIBC     195           Transferrin     132           WBC     15.30                                  Significant Imaging:     MRI Abdomen W WO Contrast_INC MRCP (XPD) [[id number]] (Abnormal) Resulted: 02/06/24 0250   Order Status: Completed Updated: 02/06/24 0252   Narrative:     EXAMINATION:  MRI ABDOMEN WITH AND WO_INC MRCP (XPD)    CLINICAL HISTORY:  Cholelithiasis;    TECHNIQUE:  Multiplanar multisequence images of the abdomen before and after administration of 10 mL Gadavist intravenous contrast. Additional thick slab fluid sensitive MRCP sequences were obtained with maximum intensity projection images for evaluation of the biliary system.    COMPARISON:  None    FINDINGS:  Inferior Thorax: Small right-sided pleural effusion.  Trace left-sided pleural effusion.    Peritoneal Space: Small volume of intraperitoneal free air in the anterior upper abdomen.    Liver: Normal homogeneous background signal. No focal mass.    Gallbladder: Postoperative changes of cholecystectomy.  There is a complex fluid collection in the gallbladder fossa measuring 3.2 x 7.0 cm with peripheral enhancement.  There are spilled gallstones noted posterior to the  liver (series 4, image 33).    Bile Ducts: Stone is noted in the distal common bile duct (series 3, image 25).  No ductal dilation or stricture.    Pancreas: No mass or peripancreatic fat stranding.    Spleen: Enlarged, measuring 13.4 cm.    Adrenals: Unremarkable.    Bowel/Mesentery: Gastrostomy tube in place within the stomach.  Liquid stool in the proximal colon.  No evidence of bowel obstruction or inflammation.    Urinary Tract: Bilateral renal cysts.  No solid mass or evidence of obstructive uropathy.    Retroperitoneum:  No significant adenopathy.    Abdominal wall:  Normal.    Vasculature: No aneurysm.    Bones: Normal marrow.   Impression:       Postoperative changes of recent laparoscopic cholecystectomy with intraperitoneal free air in the anterior upper abdomen, slightly greater than would be expected for cholecystectomy on 02/01/2024.  Consider serial abdominal exams to evaluate for peritoneal signs.    7.0 cm fluid collection in the gallbladder fossa, likely representing abscess and/or biloma.    Choledocholithiasis.  Few spilled gallstones are noted posterior to the liver.    Bilateral pleural effusions.    This report was flagged in Epic as abnormal..    This result was discovered at approximately 01:20.  Findings were discussed via telephone by Brenda Belle MD with Indio Victor MD on 2/6/2024 at 01:40.    Electronically signed by resident: Brenda Belle  Date: 02/06/2024  Time: 02:01    Electronically signed by: Claude Harmon MD  Date: 02/06/2024  Time: 02:50

## 2024-02-07 NOTE — PROGRESS NOTES
Patient with x1 episode of vomiting. HR to 140's after vomiting. Zofron IVP given. Patient reports nausea is better. Currently denying any CP, SOB or generalized abdominal pain. Upon abdominal palpation patient reports RUQ and LLQ pain with palpation. Dr. Galvez notified of above.

## 2024-02-07 NOTE — PROGRESS NOTES
[facility] - Surgery  [facility] Medicine  Progress Note    Patient Name: Seymour Velazquez  MRN: [id number]  Patient Class: IP- Inpatient   Admission Date: 2/4/2024  Length of Stay: 3 days  Attending Physician: Bhumi Joshua*  Primary Care Provider: Eliazar Rosas MD        Subjective:     Principal Problem:Common bile duct (CBD) obstruction        HPI:  68M w/PMH stroke with G-tube placement, hypertension, diabetes, hyperlipidemia, recent cholecystitis, and dysphagia presents as transfer from OSH for AES eval. He initially presented from NH with leukocytosis w/o specific symptoms, imaging showed cholecystitis. Underwent lap irlanda 2/1, the operative report noted the gallbladder was necrotic and fell apart. The back wall of the gallbladder had an abscess that was perforated. There were spilled stones and purulent bile. A cholangiogram showed patent common duct with single stone at the ampulla that appears too large to pass spontaneously. Patient is transferred to [facility] for possible ERCP.     On arrival, patient reports some abdominal pain, denies N/V/D. He is AFVSS. Admitted to  for further management.     Overview/Hospital Course:  Patient admitted to Hospital Medicine service for medical management and evaluation of suspected CBD obstruction following complicated laparoscopic cholecystectomy. AES was consulted on admission with recommendation of MRCP. MRCP w/ and w/o contrast currently pending. ID was consulted with continuation of Merrem from outside hospital. Merrem de-escalated to Rocephin/Flagyl per ID recs. List of home medications was obtained from home facility and were continued as appropriate. MRCP[02/06/24] noted fluid collection in the gallbladder fossa, concerning for  abscess vs. biloma. Also noted choledocholithiasis with gallstones posterior to the liver. AES performed ERCP 02/06/24 with removal of choledocolithiasis  by biliary sphincterotomy and balloon extraction. General Surgery  consult noted no indication surgical intervention at the time, but can consider IR drainage of fluid collection if clinical status worsens. Intermittent episodes of N/V with associated tachycardia throughout hospitalization, without acute abdomen. Unable to complete IR drainage of fluid collection on 2/7 2/2 N/V. Scheduling antiemetics for better control.    Interval History: Patient with episode of acute abdominal pain (RUQ, LLQ), N/V, and tachycardia to 140s this morning. No acute abdomen on assessment and episode ceased with administration of IV zofran. Repeat episode of similar quality in IR suite prior to drainage of fluid collection; patient was returned to floor with plans for repeat attempt. Patient otherwise stable without complaint.      Objective:     Vital Signs (Most Recent):  Temp: 96 °F (35.6 °C) (02/07/24 1600)  Pulse: 110 (02/07/24 1600)  Resp: 16 (02/07/24 1600)  BP: (!) 175/80 (02/07/24 1600)  SpO2: 99 % (02/07/24 1600) Vital Signs (24h Range):  Temp:  [96 °F (35.6 °C)-98.8 °F (37.1 °C)] 96 °F (35.6 °C)  Pulse:  [] 110  Resp:  [14-19] 16  SpO2:  [96 %-100 %] 99 %  BP: (145-194)/() 175/80     Weight: 79.6 kg (175 lb 8 oz)  Body mass index is 22.53 kg/m².    Intake/Output Summary (Last 24 hours) at 2/7/2024 1615  Last data filed at 2/7/2024 1346  Gross per 24 hour   Intake --   Output 390 ml   Net -390 ml           Physical Exam  Constitutional:       General: He is not in acute distress.     Appearance: Normal appearance. He is not ill-appearing, toxic-appearing or diaphoretic.   HENT:      Head: Normocephalic and atraumatic.      Mouth/Throat:      Mouth: Mucous membranes are moist.   Eyes:      General: No scleral icterus.        Right eye: No discharge.         Left eye: No discharge.   Cardiovascular:      Rate and Rhythm: Normal rate and regular rhythm.      Heart sounds: Normal heart sounds. No murmur heard.  Pulmonary:      Effort: Pulmonary effort is normal.      Breath sounds:  Normal breath sounds. No wheezing or rales.   Abdominal:      General: Abdomen is flat. Bowel sounds are normal. There is no distension.      Palpations: Abdomen is soft.      Tenderness: There is abdominal tenderness (Mild TTP in RUQ). There is no guarding or rebound.   Musculoskeletal:         General: No swelling.      Cervical back: Normal range of motion.      Right lower leg: No edema.      Left lower leg: No edema.   Skin:     General: Skin is warm and dry.   Neurological:      General: No focal deficit present.      Mental Status: He is alert and oriented to person, place, and time. Mental status is at baseline.             Significant Labs: All pertinent labs within the past 24 hours have been reviewed.  LABS:  Recent Labs   Lab 02/05/24 0440 02/06/24 0528 02/07/24 0223    134* 135*   K 3.5 4.8 4.2    105 102   CO2 25 20* 17*   BUN 12 10 13   CREATININE 0.6 0.6 0.9   GLU 87 69* 129*   ANIONGAP 5* 9 16       Recent Labs   Lab 02/05/24 0440 02/06/24 0528 02/07/24 0223   MG 1.4* 2.1 1.9   PHOS 1.9* 3.3 4.2       Recent Labs   Lab 02/05/24 0440 02/06/24 0528 02/07/24  0223   AST 8* 15 17   ALT 7* 8* 13   ALKPHOS 55 58 118   BILITOT 0.4 0.4 0.4   ALBUMIN 2.0* 2.1* 2.4*       POCT Glucose:   Recent Labs   Lab 02/06/24  2320 02/07/24  0515 02/07/24  1252   POCTGLUCOSE 123* 154* 131*      Recent Labs   Lab 02/05/24 0440 02/06/24 0528 02/07/24  0223   WBC 8.61 8.41 15.30*   HGB 8.5* 8.5* 9.7*   HCT 25.8* 24.8* 30.1*    395 407   GRAN 60.2  5.2 56.2  4.7 88.8*  13.6*              Significant Imaging: I have reviewed all pertinent imaging results/findings within the past 24 hours.  X-Ray Chest AP Portable   Final Result      No definite acute intrathoracic process seen.         Electronically signed by: Jennifer Lorenzo MD   Date:    02/07/2024   Time:    15:49      FL ERCP Biliary And Pancreatic By Non Rad Tech   Final Result      MRI Abdomen W WO Contrast_INC MRCP (XPD)   Final  Result   Abnormal      Postoperative changes of recent laparoscopic cholecystectomy with intraperitoneal free air in the anterior upper abdomen, slightly greater than would be expected for cholecystectomy on 02/01/2024.  Consider serial abdominal exams to evaluate for peritoneal signs.      7.0 cm fluid collection in the gallbladder fossa, likely representing abscess and/or biloma.      Choledocholithiasis.  Few spilled gallstones are noted posterior to the liver.      Bilateral pleural effusions.      This report was flagged in Epic as abnormal..      This result was discovered at approximately 01:20.  Findings were discussed via telephone by Brenda Belle MD with Indio Victor MD on 2/6/2024 at 01:40.      Electronically signed by resident: Brenda Belle   Date:    02/06/2024   Time:    02:01      Electronically signed by: Claude Harmon MD   Date:    02/06/2024   Time:    02:50      IR  CT Limited    (Results Pending)       Inpatient Medications:  Continuous Infusions:   lactated ringers 100 mL/hr at 02/07/24 0405     Scheduled Meds:   amLODIPine  5 mg Per G Tube Daily    atorvastatin  80 mg Per G Tube QHS    busPIRone  7.5 mg Per G Tube Daily    cefTRIAXone (Rocephin) IV (PEDS and ADULTS)  2 g Intravenous Q24H    famotidine  20 mg Per G Tube BID    lisinopriL  10 mg Per G Tube Daily    metoclopramide  5 mg Intravenous Q8H    metronidazole  500 mg Intravenous Q8H    senna  8.6 mg Per G Tube Daily    sertraline  25 mg Per G Tube Daily     PRN Meds:dextrose 10%, dextrose 10%, glucagon (human recombinant), glucose, glucose, insulin aspart U-100, melatonin, morphine, naloxone, ondansetron, prochlorperazine, sodium chloride 0.9%    Review of Systems    Assessment/Plan:      * Common bile duct (CBD) obstruction  68M w/ recent CVA and residual dysphagia s/p PEG tube presents from OSH for retained gallstone in ampulla, s/p lap cholecystectomy in which the gallbladder was found necrotic and friable. His only  "symptom is abdominal pain. Labs show leukocytosis. He is transferred for AES eval and possible ERCP.     -MRCP[02/06/24] noted fluid collection in the gallbladder fossa, concerning for  abscess vs. biloma. Also noted choledocholithiasis with gallstones posterior to the liver.  -AES performed ERCP 02/06/24 with removal of choledocolithiasis  by biliary sphincterotomy and balloon extraction.     -General Surgery consult noted no indication for surgical intervention at the time, but can consider IR drainage of fluid collection if clinical status worsens.  -Ceftriaxone/Metronidazole per ID recs  -AES consulted, appreciate recs  -IR consulted for drainage of fluid collection, appreciate recs  -Scheduling reglan for N/V with additional antiemetics PRN  -LR infusion 100cc/hr      On tube feeding diet  Hx of PEG  tube placement s/p CVA    PLAN:  -Will start trickle feeds with Isosource  -Nutrition consulted for tube feed recs  -NPO at midnight for IR drainage of fluid collection      Abdominal fluid collection  See "Common bile duct (CBD) obstruction " A&P        Choledocholithiasis  See "Common bile duct (CBD) obstruction " A&P        Bilateral pleural effusion  Patient found to have small pleural effusion on imaging [MRCP(02/06/24)"Small right-sided pleural effusion.  Trace left-sided pleural effusion"]. Most likely etiology includes  limited mobility due to recent history of hospitalization for cholecystectomy . Management to include  monitoring at this time      Hyponatremia  Patient has hyponatremia which is controlled,We will aim to correct the sodium by 4-6mEq in 24 hours. We will monitor sodium Daily. The hyponatremia is due to Dehydration/hypovolemia. . We will treat the hyponatremia with IV fluids and resumption of tube feeds. The patient's sodium results have been reviewed and are listed below.  Recent Labs   Lab 02/07/24  0223   *         Normocytic anemia  -Patient's with Normocytic anemia..   -Hemoglobin " "stable.   -Etiology likely due to chronic disease .  -Current CBC reviewed-    Recent Labs   Lab 02/05/24  0440 02/06/24  0528 02/07/24 0223   HGB 8.5* 8.5* 9.7*           Component Value Date/Time    MCV 91 02/07/2024 0223    RDW 13.8 02/07/2024 0223    IRON 12 (L) 02/07/2024 0223    TIBC 195 (L) 02/07/2024 0223    FERRITIN 1,256 (H) 02/07/2024 0223         PLAN  - Monitor serial CBC and transfuse if patient becomes hemodynamically unstable, symptomatic or H/H drops below 7/21.  -Followup iron studies    Cholecystitis  See "Common bile duct (CBD) obstruction " A&P        CVA (cerebral vascular accident)  Patient is s/p CVA with residual dysphagia and weakness. He is s/p PEG tube and receives bolus tube feedings.         Type 2 diabetes mellitus  Patient's FSGs are controlled on current medication regimen.  Last A1c reviewed-   Lab Results   Component Value Date    HGBA1C 4.8 02/05/2024     Most recent fingerstick glucose reviewed-   Recent Labs   Lab 02/06/24  1907 02/06/24  2320 02/07/24  0515 02/07/24  1252   POCTGLUCOSE 91 123* 154* 131*       Current correctional scale  Low  Maintain anti-hyperglycemic dose as follows-   Antihyperglycemics (From admission, onward)      Start     Stop Route Frequency Ordered    02/04/24 2059  insulin aspart U-100 pen 0-5 Units         -- SubQ Before meals & nightly PRN 02/04/24 2000          Hold Oral hypoglycemics while patient is in the hospital.     [facility]    [facility]    Primary hypertension  Patient is normotensive on arrival, per chart he is not currently on any antihypertensives.     Will utilize p.r.n. blood pressure medication only if patient's blood pressure greater than 180/110 and he develops symptoms such as worsening chest pain or shortness of breath.    -home lisinopril 10mg, amlodipine 5mg  -will consider uptitration of home reigmen if pressures remain uncontrolled        VTE Risk Mitigation (From admission, onward)           Ordered     IP VTE HIGH " RISK PATIENT  Once         02/05/24 1410     Place sequential compression device  Until discontinued         02/04/24 2000                    Discharge Planning   PEYTON: 2/9/2024     Code Status: Full Code   Is the patient medically ready for discharge?: No    Reason for patient still in hospital (select all that apply): Patient new problem, Laboratory test, Treatment, Consult recommendations, and Pending disposition  Discharge Plan A: Home with family                  Cole Messina MD  Department of [facility] Medicine   [facility] - Surgery

## 2024-02-07 NOTE — PLAN OF CARE
While nurse administering scheduled rocephin prior to procedure, pt vomited several times and became tachycardic to 150's, sustaining. Pt received 1 of 2 grams rocephin; see MAR. Provider and charge nurse notified. 4 mg Zofran and 50 mg diphenhydramine given. Pt did breath holds and valsalva maneuvers. HR in 110-120. Pt getting chest X-ray in IR to rule out aspiration since pt vomited while laying flat.  Report called to ALCIDES Travis.

## 2024-02-07 NOTE — SUBJECTIVE & OBJECTIVE
Subjective:     Interval History:   - still having nausea  - tenderness reported in RUQ   - Lipase normal   - IR consulted    Review of Systems   Constitutional:  Positive for appetite change. Negative for activity change, chills, fatigue and fever.   HENT:  Negative for trouble swallowing.    Respiratory:  Negative for cough and shortness of breath.    Cardiovascular:  Negative for chest pain.   Gastrointestinal:  Positive for abdominal pain, nausea and vomiting. Negative for abdominal distention.   Neurological:  Positive for weakness.     Objective:     Vital Signs (Most Recent):  Temp: 96.2 °F (35.7 °C) (02/07/24 0746)  Pulse: 108 (02/07/24 0746)  Resp: 14 (02/07/24 0746)  BP: (!) 157/81 (02/07/24 0746)  SpO2: 100 % (02/07/24 0746) Vital Signs (24h Range):  Temp:  [96.2 °F (35.7 °C)-98.8 °F (37.1 °C)] 96.2 °F (35.7 °C)  Pulse:  [] 108  Resp:  [14-19] 14  SpO2:  [96 %-100 %] 100 %  BP: (114-173)/(66-90) 157/81     Weight: 79.6 kg (175 lb 8 oz) (02/04/24 1920)  Body mass index is 22.53 kg/m².      Intake/Output Summary (Last 24 hours) at 2/7/2024 0854  Last data filed at 2/7/2024 0352  Gross per 24 hour   Intake --   Output 500 ml   Net -500 ml       Lines/Drains/Airways       Drain  Duration                  Closed/Suction Drain Lateral RLQ Bulb -- days         Gastrostomy/Enterostomy LUQ feeding -- days              Peripheral Intravenous Line  Duration                  Peripheral IV - Single Lumen 02/04/24 2125 20 G Left;Posterior Hand 2 days         Peripheral IV - Single Lumen 02/06/24 0145 Distal;Left;Posterior Forearm 1 day                     Physical Exam  Vitals and nursing note reviewed.   Constitutional:       General: He is not in acute distress.     Appearance: He is not ill-appearing.   Cardiovascular:      Rate and Rhythm: Regular rhythm. Tachycardia present.      Pulses: Normal pulses.   Pulmonary:      Effort: Pulmonary effort is normal. No respiratory distress.   Abdominal:       General: Bowel sounds are normal. There is no distension.      Palpations: Abdomen is soft.      Tenderness: There is abdominal tenderness.   Skin:     General: Skin is warm and dry.      Capillary Refill: Capillary refill takes 2 to 3 seconds.   Neurological:      Mental Status: He is alert and oriented to person, place, and time.          Significant Labs:  CBC:   Recent Labs   Lab 02/06/24 0528 02/07/24 0223   WBC 8.41 15.30*   HGB 8.5* 9.7*   HCT 24.8* 30.1*    407     CMP:   Recent Labs   Lab 02/07/24 0223   *   CALCIUM 9.2   ALBUMIN 2.4*   PROT 7.7   *   K 4.2   CO2 17*      BUN 13   CREATININE 0.9   ALKPHOS 118   ALT 13   AST 17   BILITOT 0.4     All pertinent lab results from the last 24 hours have been reviewed.      Significant Imaging:  Imaging results within the past 24 hours have been reviewed.

## 2024-02-07 NOTE — ASSESSMENT & PLAN NOTE
Patient is normotensive on arrival, per chart he is not currently on any antihypertensives.     Will utilize p.r.n. blood pressure medication only if patient's blood pressure greater than 180/110 and he develops symptoms such as worsening chest pain or shortness of breath.    -home lisinopril 10mg, amlodipine 5mg  -will consider uptitration of home reigmen if pressures remain uncontrolled

## 2024-02-07 NOTE — ASSESSMENT & PLAN NOTE
Patient has hyponatremia which is controlled,We will aim to correct the sodium by 4-6mEq in 24 hours. We will monitor sodium Daily. The hyponatremia is due to Dehydration/hypovolemia. . We will treat the hyponatremia with IV fluids and resumption of tube feeds. The patient's sodium results have been reviewed and are listed below.  Recent Labs   Lab 02/07/24  0223   *

## 2024-02-07 NOTE — PROGRESS NOTES
[facility]  Gastroenterology  Progress Note    Patient Name: Seymour Velazquez  MRN: [id number]  Admission Date: 2/4/2024  Hospital Length of Stay: 3 days  Code Status: Full Code   Attending Provider: Bhumi Joshua*  Consulting Provider: BEBE COLEMAN NP  Primary Care Physician: Eliazar Rosas MD  Principal Problem: Common bile duct (CBD) obstruction        Subjective:     Interval History:   - still having nausea  - tenderness reported in RUQ   - Lipase normal   - IR consulted    Review of Systems   Constitutional:  Positive for appetite change. Negative for activity change, chills, fatigue and fever.   HENT:  Negative for trouble swallowing.    Respiratory:  Negative for cough and shortness of breath.    Cardiovascular:  Negative for chest pain.   Gastrointestinal:  Positive for abdominal pain, nausea and vomiting. Negative for abdominal distention.   Neurological:  Positive for weakness.     Objective:     Vital Signs (Most Recent):  Temp: 96.2 °F (35.7 °C) (02/07/24 0746)  Pulse: 108 (02/07/24 0746)  Resp: 14 (02/07/24 0746)  BP: (!) 157/81 (02/07/24 0746)  SpO2: 100 % (02/07/24 0746) Vital Signs (24h Range):  Temp:  [96.2 °F (35.7 °C)-98.8 °F (37.1 °C)] 96.2 °F (35.7 °C)  Pulse:  [] 108  Resp:  [14-19] 14  SpO2:  [96 %-100 %] 100 %  BP: (114-173)/(66-90) 157/81     Weight: 79.6 kg (175 lb 8 oz) (02/04/24 1920)  Body mass index is 22.53 kg/m².      Intake/Output Summary (Last 24 hours) at 2/7/2024 0854  Last data filed at 2/7/2024 0352  Gross per 24 hour   Intake --   Output 500 ml   Net -500 ml       Lines/Drains/Airways       Drain  Duration                  Closed/Suction Drain Lateral RLQ Bulb -- days         Gastrostomy/Enterostomy LUQ feeding -- days              Peripheral Intravenous Line  Duration                  Peripheral IV - Single Lumen 02/04/24 2125 20 G Left;Posterior Hand 2 days         Peripheral IV - Single Lumen 02/06/24 0145 Distal;Left;Posterior Forearm 1  "day                     Physical Exam  Vitals and nursing note reviewed.   Constitutional:       General: He is not in acute distress.     Appearance: He is not ill-appearing.   Cardiovascular:      Rate and Rhythm: Regular rhythm. Tachycardia present.      Pulses: Normal pulses.   Pulmonary:      Effort: Pulmonary effort is normal. No respiratory distress.   Abdominal:      General: Bowel sounds are normal. There is no distension.      Palpations: Abdomen is soft.      Tenderness: There is abdominal tenderness.   Skin:     General: Skin is warm and dry.      Capillary Refill: Capillary refill takes 2 to 3 seconds.   Neurological:      Mental Status: He is alert and oriented to person, place, and time.          Significant Labs:  CBC:   Recent Labs   Lab 02/06/24  0528 02/07/24 0223   WBC 8.41 15.30*   HGB 8.5* 9.7*   HCT 24.8* 30.1*    407     CMP:   Recent Labs   Lab 02/07/24 0223   *   CALCIUM 9.2   ALBUMIN 2.4*   PROT 7.7   *   K 4.2   CO2 17*      BUN 13   CREATININE 0.9   ALKPHOS 118   ALT 13   AST 17   BILITOT 0.4     All pertinent lab results from the last 24 hours have been reviewed.      Significant Imaging:  Imaging results within the past 24 hours have been reviewed.  Assessment/Plan:     GI  * Common bile duct (CBD) obstruction  69yo PMHx CVA with PEG tube, HTN, HLD, T2DM, dysphagia presents from OSH for AES eval of possible retained stone after cholecystectomy 02/01.     Reviewed MRCP with Dr Stearns who agreed need to remove stone seen in CBD.      Also of note, MRCP read indicated "Gallbladder: Postoperative changes of cholecystectomy.  There is a complex fluid collection in the gallbladder fossa measuring 3.2 x 7.0 cm with peripheral enhancement.  There are spilled gallstones noted posterior to the liver (series 4, image 33)."    ERCP completed 2/6 with Dr Stearns and showed the major papilla appeared normal. Choledocholithiasis was found. Complete removal was accomplished by " biliary sphincterotomy and balloon extraction. A biliary sphincterotomy was performed. The biliary tree was swept.     Recommendations:  - Resume previous tolerated diet  - Anti-emetics per primary team  - General surgery and IR consulted - appreciate recs        Thank you for your consult. I will sign off. Please contact us if you have any additional questions.    BEBE COLEMAN NP  Gastroenterology  [facility] - [facility]   Patient/Caregiver provided printed discharge information.

## 2024-02-07 NOTE — PROGRESS NOTES
HR in 120s on monitor, patient resting in bed, denies any discomfort. Notified Dr. Galvez of elevated HR. Stated she will place an order of an EKG.

## 2024-02-07 NOTE — ASSESSMENT & PLAN NOTE
"67yo PMHx CVA with PEG tube, HTN, HLD, T2DM, dysphagia presents from OSH for AES eval of possible retained stone after cholecystectomy 02/01.     Reviewed MRCP with Dr Stearns who agreed need to remove stone seen in CBD.      Also of note, MRCP read indicated "Gallbladder: Postoperative changes of cholecystectomy.  There is a complex fluid collection in the gallbladder fossa measuring 3.2 x 7.0 cm with peripheral enhancement.  There are spilled gallstones noted posterior to the liver (series 4, image 33)."    ERCP completed 2/6 with Dr Stearns and showed the major papilla appeared normal. Choledocholithiasis was found. Complete removal was accomplished by biliary sphincterotomy and balloon extraction. A biliary sphincterotomy was performed. The biliary tree was swept.     Recommendations:  - Resume previous tolerated diet  - Anti-emetics per primary team  - General surgery and IR consulted - appreciate recs  "

## 2024-02-07 NOTE — PROGRESS NOTES
[facility] - Surgery  Wound Care    Patient Name:  Seymour Velazquez   MRN:  [id number]  Date: 2/7/2024  Diagnosis: Common bile duct (CBD) obstruction    History:     Past Medical History:   Diagnosis Date    CVA (cerebral vascular accident)     DM2 (diabetes mellitus, type 2)     Dysphagia     HLD (hyperlipidemia)     HTN (hypertension)        Social History     Socioeconomic History    Marital status: Unknown   Tobacco Use    Smoking status: Never    Smokeless tobacco: Never   Substance and Sexual Activity    Alcohol use: Not Currently       Precautions:     Allergies as of 02/03/2024    (Not on File)       [facility] Assessment Details/Treatment   Patient seen for wound care follow-up.   Reviewed chart for this encounter.   See Flow Sheet for findings.     Pt in bed and agreeable to care. Packing removed from the abdominal incision sites. The wounds were cleansed with NS, steri-strips applied and covered with foam dressings. Assessment and pictures below. Will follow-up on Friday.      RECOMMENDATIONS:  - Abd incision sites: wound care will follow  - Turning every 2 hours  - Waffle mattress overlay  - Heel protecting boots   - Bedside nursing to maintain pressure injury prevention interventions        02/07/24 1102        Altered Skin Integrity  midline Upper quadrant   No Date First Assessed or Time First Assessed found.   Altered Skin Integrity Present on Admission - Did Patient arrive to the hospital with altered skin?: yes  Side: (c)   Orientation: midline  Location: Upper quadrant   Wound Image    Dressing Appearance Dry;Intact;Dried drainage   Drainage Amount Scant   Drainage Characteristics/Odor Serosanguineous   Appearance Intact;Dry   Periwound Area Intact;[place];Dry   Care Cleansed with:;Sterile normal saline   Dressing Removed;Island/border;Applied;Foam;Other (comment)  (steri-strip)   Packing packing removed        Altered Skin Integrity  midline Lower quadrant   No Date First Assessed or Time First Assessed  found.   Altered Skin Integrity Present on Admission - Did Patient arrive to the hospital with altered skin?: yes  Side: (c)   Orientation: midline  Location: (c) Lower quadrant   Dressing Appearance Intact;Moist drainage   Drainage Amount Scant   Drainage Characteristics/Odor Serosanguineous   Appearance Pink;Red;Moist   Tissue loss description Partial thickness   Periwound Area Intact;Dry   Care Cleansed with:;Sterile normal saline   Dressing Removed;Island/border;Applied;Foam;Other (comment)  (applied steri-strip)   Packing packing removed         Recommendations made to primary team for above plan via secure chat. Orders placed. Wound care will follow-up as needed.     02/07/2024

## 2024-02-07 NOTE — SUBJECTIVE & OBJECTIVE
Interval History: NAEON. Afebrile. WBC 15K today. S/P ERCP yesterday. MRI w/ 7.0 cm fluid collection in the gallbladder fossa, likely representing abscess and/or biloma. Overall doing well though reports nausea.     Review of Systems   Constitutional:  Negative for chills, diaphoresis and fever.   HENT:  Negative for congestion and sore throat.    Respiratory:  Negative for cough and shortness of breath.    Cardiovascular:  Negative for chest pain and leg swelling.   Gastrointestinal:  Positive for abdominal pain and nausea. Negative for diarrhea and vomiting.   Genitourinary:  Negative for dysuria.   Musculoskeletal:  Negative for arthralgias and myalgias.   Skin:  Negative for rash.   Neurological:  Positive for weakness. Negative for light-headedness and headaches.   Hematological:  Bruises/bleeds easily.   Psychiatric/Behavioral:  Negative for agitation and confusion. The patient is not nervous/anxious.      Objective:     Vital Signs (Most Recent):  Temp: 96.2 °F (35.7 °C) (02/07/24 0746)  Pulse: 108 (02/07/24 0746)  Resp: 14 (02/07/24 0746)  BP: (!) 157/81 (02/07/24 0746)  SpO2: 100 % (02/07/24 0746) Vital Signs (24h Range):  Temp:  [96.2 °F (35.7 °C)-98.8 °F (37.1 °C)] 96.2 °F (35.7 °C)  Pulse:  [] 108  Resp:  [14-19] 14  SpO2:  [96 %-100 %] 100 %  BP: (114-173)/(66-90) 157/81     Weight: 79.6 kg (175 lb 8 oz)  Body mass index is 22.53 kg/m².    Estimated Creatinine Clearance: 88.4 mL/min (based on SCr of 0.9 mg/dL).     Physical Exam  Vitals and nursing note reviewed.   Constitutional:       General: He is not in acute distress.     Appearance: Normal appearance. He is well-developed. He is not ill-appearing or diaphoretic.   HENT:      Head: Normocephalic and atraumatic.      Right Ear: External ear normal.      Left Ear: External ear normal.      Nose: Nose normal.      Mouth/Throat:      Mouth: Mucous membranes are dry.   Eyes:      General: No scleral icterus.        Right eye: No discharge.          Left eye: No discharge.      Conjunctiva/sclera: Conjunctivae normal.   Cardiovascular:      Rate and Rhythm: Regular rhythm. Tachycardia present.   Pulmonary:      Effort: Pulmonary effort is normal. No respiratory distress.      Breath sounds: No stridor.   Abdominal:      General: There is no distension.      Palpations: Abdomen is soft.      Tenderness: There is abdominal tenderness.      Comments: PEG  Drain with SS output   Musculoskeletal:      Right lower leg: No edema.      Left lower leg: No edema.   Skin:     General: Skin is dry.      Coloration: Skin is pale. Skin is not jaundiced.      Findings: No erythema.   Neurological:      General: No focal deficit present.      Mental Status: He is alert and oriented to person, place, and time. Mental status is at baseline.   Psychiatric:         Mood and Affect: Mood normal.         Behavior: Behavior normal.         Thought Content: Thought content normal.         Judgment: Judgment normal.          Significant Labs: CBC:   Recent Labs   Lab 02/06/24 0528 02/07/24 0223   WBC 8.41 15.30*   HGB 8.5* 9.7*   HCT 24.8* 30.1*    407     CMP:   Recent Labs   Lab 02/06/24 0528 02/07/24 0223   * 135*   K 4.8 4.2    102   CO2 20* 17*   GLU 69* 129*   BUN 10 13   CREATININE 0.6 0.9   CALCIUM 8.7 9.2   PROT 6.5 7.7   ALBUMIN 2.1* 2.4*   BILITOT 0.4 0.4   ALKPHOS 58 118   AST 15 17   ALT 8* 13   ANIONGAP 9 16     Microbiology Results (last 7 days)       ** No results found for the last 168 hours. **          Recent Lab Results  (Last 5 results in the past 24 hours)        02/07/24  0905   02/07/24  0515   02/07/24  0223   02/06/24  2320   02/06/24  1907        Albumin     2.4           ALP     118           ALT     13           Anion Gap     16           AST     17           Baso #     0.07           Basophil %     0.5           BILIRUBIN TOTAL     0.4  Comment: For infants and newborns, interpretation of results should be based  on gestational age,  weight and in agreement with clinical  observations.    Premature Infant recommended reference ranges:  Up to 24 hours.............<8.0 mg/dL  Up to 48 hours............<12.0 mg/dL  3-5 days..................<15.0 mg/dL  6-29 days.................<15.0 mg/dL             BUN     13           Calcium     9.2           Chloride     102           CO2     17           Creatinine     0.9           Differential Method     Automated           eGFR     >60.0           Eos #     0.0           Eos %     0.1           Ferritin     1,256           Glucose     129           Gran # (ANC)     13.6           Gran %     88.8           Hematocrit     30.1           Hemoglobin     9.7           Immature Grans (Abs)     0.07  Comment: Mild elevation in immature granulocytes is non specific and   can be seen in a variety of conditions including stress response,   acute inflammation, trauma and pregnancy. Correlation with other   laboratory and clinical findings is essential.             Immature Granulocytes     0.5           INR 1.3  Comment: Coumadin Therapy:  2.0 - 3.0 for INR for all indicators except mechanical heart valves  and antiphospholipid syndromes which should use 2.5 - 3.5.                 Iron     12           Lactic Acid Level 1.4  Comment: Falsely low lactic acid results can be found in samples   containing >=13.0 mg/dL total bilirubin and/or >=3.5 mg/dL   direct bilirubin.                 Lipase     20           Lymph #     1.0           Lymph %     6.4           Magnesium      1.9           MCH     29.4           MCHC     32.2           MCV     91           Mono #     0.6           Mono %     3.7           MPV     8.6           nRBC     0           Phosphorus Level     4.2           Platelet Estimate     Appears normal           Platelet Count     407           POCT Glucose   154     123   91       Potassium     4.2           PROTEIN TOTAL     7.7           PT 13.4               RBC     3.30           RDW     13.8            Saturated Iron     6           Sodium     135           TIBC     195           Transferrin     132           WBC     15.30                                  Significant Imaging:     MRI Abdomen W WO Contrast_INC MRCP (XPD) [[id number]] (Abnormal) Resulted: 02/06/24 0250   Order Status: Completed Updated: 02/06/24 0252   Narrative:     EXAMINATION:  MRI ABDOMEN WITH AND WO_INC MRCP (XPD)    CLINICAL HISTORY:  Cholelithiasis;    TECHNIQUE:  Multiplanar multisequence images of the abdomen before and after administration of 10 mL Gadavist intravenous contrast. Additional thick slab fluid sensitive MRCP sequences were obtained with maximum intensity projection images for evaluation of the biliary system.    COMPARISON:  None    FINDINGS:  Inferior Thorax: Small right-sided pleural effusion.  Trace left-sided pleural effusion.    Peritoneal Space: Small volume of intraperitoneal free air in the anterior upper abdomen.    Liver: Normal homogeneous background signal. No focal mass.    Gallbladder: Postoperative changes of cholecystectomy.  There is a complex fluid collection in the gallbladder fossa measuring 3.2 x 7.0 cm with peripheral enhancement.  There are spilled gallstones noted posterior to the liver (series 4, image 33).    Bile Ducts: Stone is noted in the distal common bile duct (series 3, image 25).  No ductal dilation or stricture.    Pancreas: No mass or peripancreatic fat stranding.    Spleen: Enlarged, measuring 13.4 cm.    Adrenals: Unremarkable.    Bowel/Mesentery: Gastrostomy tube in place within the stomach.  Liquid stool in the proximal colon.  No evidence of bowel obstruction or inflammation.    Urinary Tract: Bilateral renal cysts.  No solid mass or evidence of obstructive uropathy.    Retroperitoneum:  No significant adenopathy.    Abdominal wall:  Normal.    Vasculature: No aneurysm.    Bones: Normal marrow.   Impression:       Postoperative changes of recent laparoscopic cholecystectomy with  intraperitoneal free air in the anterior upper abdomen, slightly greater than would be expected for cholecystectomy on 02/01/2024.  Consider serial abdominal exams to evaluate for peritoneal signs.    7.0 cm fluid collection in the gallbladder fossa, likely representing abscess and/or biloma.    Choledocholithiasis.  Few spilled gallstones are noted posterior to the liver.    Bilateral pleural effusions.    This report was flagged in Epic as abnormal..    This result was discovered at approximately 01:20.  Findings were discussed via telephone by Brenda Belle MD with Indio Victor MD on 2/6/2024 at 01:40.    Electronically signed by resident: Brenda Belle  Date: 02/06/2024  Time: 02:01    Electronically signed by: Claude Harmon MD  Date: 02/06/2024  Time: 02:50

## 2024-02-07 NOTE — PROGRESS NOTES
Notified Dr. Galvez of EKG results. No new orders at this time, states to continue to monitor patient.   Patient continues to deny any chest pain or SOB. Care ongoing.

## 2024-02-07 NOTE — SUBJECTIVE & OBJECTIVE
Interval History: Patient with episode of acute abdominal pain (RUQ, LLQ), N/V, and tachycardia to 140s this morning. No acute abdomen on assessment and episode ceased with administration of IV zofran. Repeat episode of similar quality in IR suite prior to drainage of fluid collection; patient was returned to floor with plans for repeat attempt. Patient otherwise stable without complaint.      Objective:     Vital Signs (Most Recent):  Temp: 96 °F (35.6 °C) (02/07/24 1600)  Pulse: 110 (02/07/24 1600)  Resp: 16 (02/07/24 1600)  BP: (!) 175/80 (02/07/24 1600)  SpO2: 99 % (02/07/24 1600) Vital Signs (24h Range):  Temp:  [96 °F (35.6 °C)-98.8 °F (37.1 °C)] 96 °F (35.6 °C)  Pulse:  [] 110  Resp:  [14-19] 16  SpO2:  [96 %-100 %] 99 %  BP: (145-194)/() 175/80     Weight: 79.6 kg (175 lb 8 oz)  Body mass index is 22.53 kg/m².    Intake/Output Summary (Last 24 hours) at 2/7/2024 1615  Last data filed at 2/7/2024 1346  Gross per 24 hour   Intake --   Output 390 ml   Net -390 ml           Physical Exam  Constitutional:       General: He is not in acute distress.     Appearance: Normal appearance. He is not ill-appearing, toxic-appearing or diaphoretic.   HENT:      Head: Normocephalic and atraumatic.      Mouth/Throat:      Mouth: Mucous membranes are moist.   Eyes:      General: No scleral icterus.        Right eye: No discharge.         Left eye: No discharge.   Cardiovascular:      Rate and Rhythm: Normal rate and regular rhythm.      Heart sounds: Normal heart sounds. No murmur heard.  Pulmonary:      Effort: Pulmonary effort is normal.      Breath sounds: Normal breath sounds. No wheezing or rales.   Abdominal:      General: Abdomen is flat. Bowel sounds are normal. There is no distension.      Palpations: Abdomen is soft.      Tenderness: There is abdominal tenderness (Mild TTP in RUQ). There is no guarding or rebound.   Musculoskeletal:         General: No swelling.      Cervical back: Normal range of  motion.      Right lower leg: No edema.      Left lower leg: No edema.   Skin:     General: Skin is warm and dry.   Neurological:      General: No focal deficit present.      Mental Status: He is alert and oriented to person, place, and time. Mental status is at baseline.             Significant Labs: All pertinent labs within the past 24 hours have been reviewed.  LABS:  Recent Labs   Lab 02/05/24 0440 02/06/24 0528 02/07/24 0223    134* 135*   K 3.5 4.8 4.2    105 102   CO2 25 20* 17*   BUN 12 10 13   CREATININE 0.6 0.6 0.9   GLU 87 69* 129*   ANIONGAP 5* 9 16       Recent Labs   Lab 02/05/24 0440 02/06/24 0528 02/07/24 0223   MG 1.4* 2.1 1.9   PHOS 1.9* 3.3 4.2       Recent Labs   Lab 02/05/24 0440 02/06/24 0528 02/07/24 0223   AST 8* 15 17   ALT 7* 8* 13   ALKPHOS 55 58 118   BILITOT 0.4 0.4 0.4   ALBUMIN 2.0* 2.1* 2.4*       POCT Glucose:   Recent Labs   Lab 02/06/24  2320 02/07/24  0515 02/07/24  1252   POCTGLUCOSE 123* 154* 131*      Recent Labs   Lab 02/05/24 0440 02/06/24 0528 02/07/24 0223   WBC 8.61 8.41 15.30*   HGB 8.5* 8.5* 9.7*   HCT 25.8* 24.8* 30.1*    395 407   GRAN 60.2  5.2 56.2  4.7 88.8*  13.6*              Significant Imaging: I have reviewed all pertinent imaging results/findings within the past 24 hours.  X-Ray Chest AP Portable   Final Result      No definite acute intrathoracic process seen.         Electronically signed by: Jennifer Lorenzo MD   Date:    02/07/2024   Time:    15:49      FL ERCP Biliary And Pancreatic By Non Rad Tech   Final Result      MRI Abdomen W WO Contrast_INC MRCP (XPD)   Final Result   Abnormal      Postoperative changes of recent laparoscopic cholecystectomy with intraperitoneal free air in the anterior upper abdomen, slightly greater than would be expected for cholecystectomy on 02/01/2024.  Consider serial abdominal exams to evaluate for peritoneal signs.      7.0 cm fluid collection in the gallbladder fossa, likely  representing abscess and/or biloma.      Choledocholithiasis.  Few spilled gallstones are noted posterior to the liver.      Bilateral pleural effusions.      This report was flagged in Epic as abnormal..      This result was discovered at approximately 01:20.  Findings were discussed via telephone by Brenda Belle MD with Indio Victor MD on 2/6/2024 at 01:40.      Electronically signed by resident: Brenda Belle   Date:    02/06/2024   Time:    02:01      Electronically signed by: Claude Harmon MD   Date:    02/06/2024   Time:    02:50      IR  CT Limited    (Results Pending)       Inpatient Medications:  Continuous Infusions:   lactated ringers 100 mL/hr at 02/07/24 0405     Scheduled Meds:   amLODIPine  5 mg Per G Tube Daily    atorvastatin  80 mg Per G Tube QHS    busPIRone  7.5 mg Per G Tube Daily    cefTRIAXone (Rocephin) IV (PEDS and ADULTS)  2 g Intravenous Q24H    famotidine  20 mg Per G Tube BID    lisinopriL  10 mg Per G Tube Daily    metoclopramide  5 mg Intravenous Q8H    metronidazole  500 mg Intravenous Q8H    senna  8.6 mg Per G Tube Daily    sertraline  25 mg Per G Tube Daily     PRN Meds:dextrose 10%, dextrose 10%, glucagon (human recombinant), glucose, glucose, insulin aspart U-100, melatonin, morphine, naloxone, ondansetron, prochlorperazine, sodium chloride 0.9%    Review of Systems

## 2024-02-07 NOTE — ASSESSMENT & PLAN NOTE
Hx of PEG  tube placement s/p CVA    PLAN:  -Will start trickle feeds with Isosource  -Nutrition consulted for tube feed recs  -NPO at midnight for IR drainage of fluid collection

## 2024-02-07 NOTE — ASSESSMENT & PLAN NOTE
-Patient's with Normocytic anemia..   -Hemoglobin stable.   -Etiology likely due to chronic disease .  -Current CBC reviewed-    Recent Labs   Lab 02/05/24  0440 02/06/24  0528 02/07/24 0223   HGB 8.5* 8.5* 9.7*           Component Value Date/Time    MCV 91 02/07/2024 0223    RDW 13.8 02/07/2024 0223    IRON 12 (L) 02/07/2024 0223    TIBC 195 (L) 02/07/2024 0223    FERRITIN 1,256 (H) 02/07/2024 0223         PLAN  - Monitor serial CBC and transfuse if patient becomes hemodynamically unstable, symptomatic or H/H drops below 7/21.  -Followup iron studies

## 2024-02-08 PROBLEM — N17.9 AKI (ACUTE KIDNEY INJURY): Status: ACTIVE | Noted: 2024-02-08

## 2024-02-08 LAB
ALBUMIN SERPL BCP-MCNC: 2.3 G/DL (ref 3.5–5.2)
ALP SERPL-CCNC: 93 U/L (ref 55–135)
ALT SERPL W/O P-5'-P-CCNC: 9 U/L (ref 10–44)
ANION GAP SERPL CALC-SCNC: 14 MMOL/L (ref 8–16)
AST SERPL-CCNC: 17 U/L (ref 10–40)
BASOPHILS # BLD AUTO: 0.08 K/UL (ref 0–0.2)
BASOPHILS NFR BLD: 0.5 % (ref 0–1.9)
BILIRUB SERPL-MCNC: 0.4 MG/DL (ref 0.1–1)
BUN SERPL-MCNC: 15 MG/DL (ref 8–23)
CALCIUM SERPL-MCNC: 9.6 MG/DL (ref 8.7–10.5)
CHLORIDE SERPL-SCNC: 106 MMOL/L (ref 95–110)
CO2 SERPL-SCNC: 20 MMOL/L (ref 23–29)
CREAT SERPL-MCNC: 1.2 MG/DL (ref 0.5–1.4)
DIFFERENTIAL METHOD BLD: ABNORMAL
EOSINOPHIL # BLD AUTO: 0.2 K/UL (ref 0–0.5)
EOSINOPHIL NFR BLD: 1 % (ref 0–8)
ERYTHROCYTE [DISTWIDTH] IN BLOOD BY AUTOMATED COUNT: 14.2 % (ref 11.5–14.5)
EST. GFR  (NO RACE VARIABLE): >60 ML/MIN/1.73 M^2
GLUCOSE SERPL-MCNC: 97 MG/DL (ref 70–110)
HCT VFR BLD AUTO: 31.1 % (ref 40–54)
HGB BLD-MCNC: 9.8 G/DL (ref 14–18)
IMM GRANULOCYTES # BLD AUTO: 0.07 K/UL (ref 0–0.04)
IMM GRANULOCYTES NFR BLD AUTO: 0.5 % (ref 0–0.5)
LYMPHOCYTES # BLD AUTO: 1.7 K/UL (ref 1–4.8)
LYMPHOCYTES NFR BLD: 11.2 % (ref 18–48)
MAGNESIUM SERPL-MCNC: 1.8 MG/DL (ref 1.6–2.6)
MCH RBC QN AUTO: 29.3 PG (ref 27–31)
MCHC RBC AUTO-ENTMCNC: 31.5 G/DL (ref 32–36)
MCV RBC AUTO: 93 FL (ref 82–98)
MONOCYTES # BLD AUTO: 1.1 K/UL (ref 0.3–1)
MONOCYTES NFR BLD: 7.2 % (ref 4–15)
NEUTROPHILS # BLD AUTO: 12 K/UL (ref 1.8–7.7)
NEUTROPHILS NFR BLD: 79.6 % (ref 38–73)
NRBC BLD-RTO: 0 /100 WBC
PHOSPHATE SERPL-MCNC: 3.8 MG/DL (ref 2.7–4.5)
PLATELET # BLD AUTO: 423 K/UL (ref 150–450)
PMV BLD AUTO: 9.1 FL (ref 9.2–12.9)
POCT GLUCOSE: 104 MG/DL (ref 70–110)
POCT GLUCOSE: 116 MG/DL (ref 70–110)
POCT GLUCOSE: 122 MG/DL (ref 70–110)
POCT GLUCOSE: 122 MG/DL (ref 70–110)
POCT GLUCOSE: 130 MG/DL (ref 70–110)
POTASSIUM SERPL-SCNC: 4.4 MMOL/L (ref 3.5–5.1)
PROT SERPL-MCNC: 7.1 G/DL (ref 6–8.4)
RBC # BLD AUTO: 3.34 M/UL (ref 4.6–6.2)
SODIUM SERPL-SCNC: 140 MMOL/L (ref 136–145)
WBC # BLD AUTO: 15.06 K/UL (ref 3.9–12.7)

## 2024-02-08 PROCEDURE — 83735 ASSAY OF MAGNESIUM: CPT

## 2024-02-08 PROCEDURE — 21400001 HC TELEMETRY ROOM

## 2024-02-08 PROCEDURE — 63600175 PHARM REV CODE 636 W HCPCS

## 2024-02-08 PROCEDURE — 63600175 PHARM REV CODE 636 W HCPCS: Performed by: PHYSICIAN ASSISTANT

## 2024-02-08 PROCEDURE — 25000003 PHARM REV CODE 250: Performed by: PHYSICIAN ASSISTANT

## 2024-02-08 PROCEDURE — 25000003 PHARM REV CODE 250: Performed by: HOSPITALIST

## 2024-02-08 PROCEDURE — 36415 COLL VENOUS BLD VENIPUNCTURE: CPT

## 2024-02-08 PROCEDURE — 84100 ASSAY OF PHOSPHORUS: CPT

## 2024-02-08 PROCEDURE — 85025 COMPLETE CBC W/AUTO DIFF WBC: CPT

## 2024-02-08 PROCEDURE — 25000003 PHARM REV CODE 250

## 2024-02-08 PROCEDURE — 80053 COMPREHEN METABOLIC PANEL: CPT

## 2024-02-08 RX ORDER — HYDRALAZINE HYDROCHLORIDE 20 MG/ML
10 INJECTION INTRAMUSCULAR; INTRAVENOUS EVERY 6 HOURS PRN
Status: DISCONTINUED | OUTPATIENT
Start: 2024-02-08 | End: 2024-02-23 | Stop reason: HOSPADM

## 2024-02-08 RX ORDER — LISINOPRIL 20 MG/1
20 TABLET ORAL DAILY
Status: DISCONTINUED | OUTPATIENT
Start: 2024-02-08 | End: 2024-02-08

## 2024-02-08 RX ADMIN — CEFTRIAXONE 2 G: 2 INJECTION, POWDER, FOR SOLUTION INTRAMUSCULAR; INTRAVENOUS at 02:02

## 2024-02-08 RX ADMIN — MORPHINE SULFATE 2 MG: 2 INJECTION, SOLUTION INTRAMUSCULAR; INTRAVENOUS at 10:02

## 2024-02-08 RX ADMIN — FAMOTIDINE 20 MG: 20 TABLET, FILM COATED ORAL at 09:02

## 2024-02-08 RX ADMIN — LISINOPRIL 20 MG: 20 TABLET ORAL at 09:02

## 2024-02-08 RX ADMIN — METRONIDAZOLE 500 MG: 500 INJECTION, SOLUTION INTRAVENOUS at 03:02

## 2024-02-08 RX ADMIN — BUSPIRONE HYDROCHLORIDE 7.5 MG: 5 TABLET ORAL at 09:02

## 2024-02-08 RX ADMIN — METOCLOPRAMIDE HYDROCHLORIDE 5 MG: 10 INJECTION, SOLUTION INTRAMUSCULAR; INTRAVENOUS at 02:02

## 2024-02-08 RX ADMIN — METRONIDAZOLE 500 MG: 500 INJECTION, SOLUTION INTRAVENOUS at 11:02

## 2024-02-08 RX ADMIN — METRONIDAZOLE 500 MG: 500 INJECTION, SOLUTION INTRAVENOUS at 06:02

## 2024-02-08 RX ADMIN — SODIUM CHLORIDE, POTASSIUM CHLORIDE, SODIUM LACTATE AND CALCIUM CHLORIDE: 600; 310; 30; 20 INJECTION, SOLUTION INTRAVENOUS at 09:02

## 2024-02-08 RX ADMIN — ATORVASTATIN CALCIUM 80 MG: 40 TABLET, FILM COATED ORAL at 09:02

## 2024-02-08 RX ADMIN — AMLODIPINE BESYLATE 5 MG: 5 TABLET ORAL at 09:02

## 2024-02-08 RX ADMIN — SENNOSIDES 8.6 MG: 8.6 TABLET, FILM COATED ORAL at 09:02

## 2024-02-08 RX ADMIN — METOCLOPRAMIDE HYDROCHLORIDE 5 MG: 10 INJECTION, SOLUTION INTRAMUSCULAR; INTRAVENOUS at 05:02

## 2024-02-08 RX ADMIN — METOCLOPRAMIDE HYDROCHLORIDE 5 MG: 10 INJECTION, SOLUTION INTRAMUSCULAR; INTRAVENOUS at 09:02

## 2024-02-08 RX ADMIN — SERTRALINE HYDROCHLORIDE 25 MG: 25 TABLET ORAL at 09:02

## 2024-02-08 NOTE — ASSESSMENT & PLAN NOTE
Patient has hyponatremia which is controlled,We will aim to correct the sodium by 4-6mEq in 24 hours. We will monitor sodium Daily. The hyponatremia is due to Dehydration/hypovolemia. . We will treat the hyponatremia with IV fluids and resumption of tube feeds. The patient's sodium results have been reviewed and are listed below.  Recent Labs   Lab 02/08/24  0423

## 2024-02-08 NOTE — ASSESSMENT & PLAN NOTE
68M w/ recent CVA and residual dysphagia s/p PEG tube presents from OSH for retained gallstone in ampulla, s/p lap cholecystectomy in which the gallbladder was found necrotic and friable. His only symptom is abdominal pain. Labs show leukocytosis. He is transferred for AES eval and possible ERCP.     -MRCP[02/06/24] noted fluid collection in the gallbladder fossa, concerning for  abscess vs. biloma. Also noted choledocholithiasis with gallstones posterior to the liver.  -AES performed ERCP 02/06/24 with removal of choledocolithiasis  by biliary sphincterotomy and balloon extraction.   -IR attempt at drainage of fluid collection [2/7/2024] unsuccessful 2/2 episode of N/V with associated tachycardia and hypertension    -General Surgery consult noted no indication for surgical intervention at the time, but can consider IR drainage of fluid collection if clinical status worsens.  -Ceftriaxone/Metronidazole per ID recs  -AES consulted, appreciate recs  -IR consulted for drainage of fluid collection, appreciate recs. Planning on re-attempting drainage.  -Scheduling reglan for N/V with additional antiemetics PRN  -LR infusion 125cc/hr

## 2024-02-08 NOTE — SUBJECTIVE & OBJECTIVE
Interval History: Tube feeds were noted to be disconnected from PEG yesterday evening with production of thin brown material from Peg. Abdomen soft and nontender with unremarkable KUB/CXR. TF's held in anticipation of repeat attempt of IR drainage of fluid collection today. Patient has remained symptom free with marked improvement in N/V today. Will increase rate of IVF in setting of uptrending Cr.      Objective:     Vital Signs (Most Recent):  Temp: 97.8 °F (36.6 °C) (02/08/24 1210)  Pulse: 104 (02/08/24 1210)  Resp: 16 (02/08/24 1210)  BP: (!) 162/79 (02/08/24 1210)  SpO2: 99 % (02/08/24 1210) Vital Signs (24h Range):  Temp:  [96 °F (35.6 °C)-98 °F (36.7 °C)] 97.8 °F (36.6 °C)  Pulse:  [] 104  Resp:  [15-18] 16  SpO2:  [97 %-100 %] 99 %  BP: (149-194)/() 162/79     Weight: 79.6 kg (175 lb 8 oz)  Body mass index is 22.53 kg/m².    Intake/Output Summary (Last 24 hours) at 2/8/2024 1405  Last data filed at 2/8/2024 0632  Gross per 24 hour   Intake 1200 ml   Output 552.5 ml   Net 647.5 ml           Physical Exam  Constitutional:       General: He is not in acute distress.     Appearance: Normal appearance. He is not ill-appearing, toxic-appearing or diaphoretic.   HENT:      Head: Normocephalic and atraumatic.      Mouth/Throat:      Mouth: Mucous membranes are moist.   Eyes:      General: No scleral icterus.        Right eye: No discharge.         Left eye: No discharge.   Cardiovascular:      Rate and Rhythm: Normal rate and regular rhythm.      Heart sounds: Normal heart sounds. No murmur heard.  Pulmonary:      Effort: Pulmonary effort is normal.      Breath sounds: Normal breath sounds. No wheezing or rales.   Abdominal:      General: Abdomen is flat. Bowel sounds are normal. There is no distension.      Palpations: Abdomen is soft.      Tenderness: There is abdominal tenderness (Mild TTP in RUQ). There is no guarding or rebound.   Musculoskeletal:         General: No swelling.      Cervical back:  Normal range of motion.      Right lower leg: No edema.      Left lower leg: No edema.   Skin:     General: Skin is warm and dry.   Neurological:      General: No focal deficit present.      Mental Status: He is alert and oriented to person, place, and time. Mental status is at baseline.             Significant Labs: All pertinent labs within the past 24 hours have been reviewed.  LABS:  Recent Labs   Lab 02/06/24 0528 02/07/24 0223 02/08/24 0423   * 135* 140   K 4.8 4.2 4.4    102 106   CO2 20* 17* 20*   BUN 10 13 15   CREATININE 0.6 0.9 1.2   GLU 69* 129* 97   ANIONGAP 9 16 14       Recent Labs   Lab 02/06/24 0528 02/07/24 0223 02/08/24  0423   MG 2.1 1.9 1.8   PHOS 3.3 4.2 3.8       Recent Labs   Lab 02/06/24 0528 02/07/24 0223 02/08/24  0423   AST 15 17 17   ALT 8* 13 9*   ALKPHOS 58 118 93   BILITOT 0.4 0.4 0.4   ALBUMIN 2.1* 2.4* 2.3*       POCT Glucose:   Recent Labs   Lab 02/08/24  0052 02/08/24  0549 02/08/24  1205   POCTGLUCOSE 122* 116* 122*      Recent Labs   Lab 02/06/24 0528 02/07/24 0223 02/08/24  0423   WBC 8.41 15.30* 15.06*   HGB 8.5* 9.7* 9.8*   HCT 24.8* 30.1* 31.1*    407 423   GRAN 56.2  4.7 88.8*  13.6* 79.6*  12.0*              Significant Imaging: I have reviewed all pertinent imaging results/findings within the past 24 hours.  X-Ray Abdomen AP 1 View   Final Result      Radiographic findings as above.         Electronically signed by: Aden Lucero   Date:    02/07/2024   Time:    21:21      X-Ray Chest AP Portable   Final Result      No definite acute intrathoracic process seen.         Electronically signed by: Jennifer Lorenzo MD   Date:    02/07/2024   Time:    15:49      FL ERCP Biliary And Pancreatic By Non Rad Tech   Final Result      MRI Abdomen W WO Contrast_INC MRCP (XPD)   Final Result   Abnormal      Postoperative changes of recent laparoscopic cholecystectomy with intraperitoneal free air in the anterior upper abdomen, slightly greater than  would be expected for cholecystectomy on 02/01/2024.  Consider serial abdominal exams to evaluate for peritoneal signs.      7.0 cm fluid collection in the gallbladder fossa, likely representing abscess and/or biloma.      Choledocholithiasis.  Few spilled gallstones are noted posterior to the liver.      Bilateral pleural effusions.      This report was flagged in Epic as abnormal..      This result was discovered at approximately 01:20.  Findings were discussed via telephone by Brenda Belle MD with Indio Victor MD on 2/6/2024 at 01:40.      Electronically signed by resident: Brenda Belle   Date:    02/06/2024   Time:    02:01      Electronically signed by: Claude Harmon MD   Date:    02/06/2024   Time:    02:50      IR  CT Limited    (Results Pending)       Inpatient Medications:  Continuous Infusions:   lactated ringers 100 mL/hr at 02/07/24 2303     Scheduled Meds:   amLODIPine  5 mg Per G Tube Daily    atorvastatin  80 mg Per G Tube QHS    busPIRone  7.5 mg Per G Tube Daily    cefTRIAXone (Rocephin) IV (PEDS and ADULTS)  2 g Intravenous Q24H    famotidine  20 mg Per G Tube BID    metoclopramide  5 mg Intravenous Q8H    metronidazole  500 mg Intravenous Q8H    senna  8.6 mg Per G Tube Daily    sertraline  25 mg Per G Tube Daily     PRN Meds:dextrose 10%, dextrose 10%, glucagon (human recombinant), glucose, glucose, hydrALAZINE, insulin aspart U-100, melatonin, morphine, naloxone, ondansetron, prochlorperazine, sodium chloride 0.9%    Review of Systems   Constitutional:  Negative for activity change and fever.   Respiratory:  Negative for shortness of breath.    Cardiovascular:  Negative for chest pain and palpitations.   Gastrointestinal:  Positive for nausea. Negative for abdominal pain and vomiting.   Genitourinary:  Negative for difficulty urinating.   Neurological:  Negative for headaches.

## 2024-02-08 NOTE — ASSESSMENT & PLAN NOTE
Patient is normotensive on arrival, per chart he is not currently on any antihypertensives.     Will utilize p.r.n. blood pressure medication only if patient's blood pressure greater than 180/110 and he develops symptoms such as worsening chest pain or shortness of breath.    -amlodipine 5mg  -Holding home lisinopril in the setting of uptrending sCr  -Will add PRN hydralazine for SBP>180 and symptomatic  -will consider uptitration of home reigmen if pressures remain uncontrolled

## 2024-02-08 NOTE — PLAN OF CARE
Problem: Adult Inpatient Plan of Care  Goal: Plan of Care Review  Outcome: Ongoing, Progressing  Goal: Patient-Specific Goal (Individualized)  Outcome: Ongoing, Progressing  Goal: Absence of Hospital-Acquired Illness or Injury  Outcome: Ongoing, Progressing  Goal: Optimal Comfort and Wellbeing  Outcome: Ongoing, Progressing  Goal: Readiness for Transition of Care  Outcome: Ongoing, Progressing     Problem: Diabetes Comorbidity  Goal: Blood Glucose Level Within Targeted Range  Outcome: Ongoing, Progressing     Problem: Fall Injury Risk  Goal: Absence of Fall and Fall-Related Injury  Outcome: Ongoing, Progressing     Problem: Impaired Wound Healing  Goal: Optimal Wound Healing  Outcome: Ongoing, Progressing     Problem: Skin Injury Risk Increased  Goal: Skin Health and Integrity  Outcome: Ongoing, Progressing

## 2024-02-08 NOTE — ASSESSMENT & PLAN NOTE
-Patient's with Normocytic anemia..   -Hemoglobin stable.   -Etiology likely due to chronic disease .  -Current CBC reviewed-    Recent Labs   Lab 02/06/24  0528 02/07/24 0223 02/08/24 0423   HGB 8.5* 9.7* 9.8*           Component Value Date/Time    MCV 93 02/08/2024 0423    RDW 14.2 02/08/2024 0423    IRON 12 (L) 02/07/2024 0223    TIBC 195 (L) 02/07/2024 0223    FERRITIN 1,256 (H) 02/07/2024 0223         PLAN  - Monitor serial CBC and transfuse if patient becomes hemodynamically unstable, symptomatic or H/H drops below 7/21.  -Followup iron studies

## 2024-02-08 NOTE — ASSESSMENT & PLAN NOTE
Patient noted to have sinus tachycardia up to HR 140s in the setting of acute episodes of nausea and vomiting. Likely appropriate response to stressor.    -CTM for hemodynamic instability  -EKG PRN for chest pain or hemodynamic instability

## 2024-02-08 NOTE — NURSING
Pt returned to unit. /80. Dr. Messina notified in person about pressure. He said he did not want to order PRN BP meds. No new orders for BP meds at this time.

## 2024-02-08 NOTE — ASSESSMENT & PLAN NOTE
Patient with acute kidney injury/acute renal failure likely due to pre-renal azotemia due to dehydration [facility] is currently worsening. Baseline creatinine  0.8  - Labs reviewed- Renal function/electrolytes with Estimated Creatinine Clearance: 66.3 mL/min (based on SCr of 1.2 mg/dL). according to latest data. Monitor urine output and serial BMP and adjust therapy as needed. Avoid nephrotoxins and renally dose meds for GFR listed above.    Plan  - Trending daily CMP  - will increase IVF rate today  - Will consider urine studies if no improvement  - Avoid nephrotoxic agents, NSAIDS, renally dose meds

## 2024-02-08 NOTE — ASSESSMENT & PLAN NOTE
Patient's FSGs are controlled on current medication regimen.  Last A1c reviewed-   Lab Results   Component Value Date    HGBA1C 4.8 02/05/2024     Most recent fingerstick glucose reviewed-   Recent Labs   Lab 02/07/24  1923 02/08/24  0052 02/08/24  0549 02/08/24  1205   POCTGLUCOSE 127* 122* 116* 122*       Current correctional scale  Low  Maintain anti-hyperglycemic dose as follows-   Antihyperglycemics (From admission, onward)    Start     Stop Route Frequency Ordered    02/04/24 2059  insulin aspart U-100 pen 0-5 Units         -- SubQ Before meals & nightly PRN 02/04/24 2000        Hold Oral hypoglycemics while patient is in the hospital.     [facility].    [facility]

## 2024-02-08 NOTE — ASSESSMENT & PLAN NOTE
Hx of PEG  tube placement s/p CVA    PLAN:  -Will start trickle feeds with Isosource as able pending IR drainage  -Nutrition consulted for tube feed recs

## 2024-02-08 NOTE — PLAN OF CARE
Problem: Adult Inpatient Plan of Care  Goal: Plan of Care Review  2/8/2024 0630 by Nya Dempsey RN  Outcome: Ongoing, Progressing  2/8/2024 0630 by Nya Dempsey RN  Outcome: Ongoing, Progressing  Goal: Patient-Specific Goal (Individualized)  2/8/2024 0630 by Nya Dempsey RN  Outcome: Ongoing, Progressing  2/8/2024 0630 by Nya Dempsey RN  Outcome: Ongoing, Progressing  Goal: Absence of Hospital-Acquired Illness or Injury  2/8/2024 0630 by Nya Dempsey RN  Outcome: Ongoing, Progressing  2/8/2024 0630 by Nya Dempsey RN  Outcome: Ongoing, Progressing  Goal: Optimal Comfort and Wellbeing  2/8/2024 0630 by Nya Dempsey RN  Outcome: Ongoing, Progressing  2/8/2024 0630 by Nya Dempsey RN  Outcome: Ongoing, Progressing  Goal: Readiness for Transition of Care  2/8/2024 0630 by Nya Dempsey RN  Outcome: Ongoing, Progressing  2/8/2024 0630 by Nya Dempsey RN  Outcome: Ongoing, Progressing     Problem: Diabetes Comorbidity  Goal: Blood Glucose Level Within Targeted Range  2/8/2024 0630 by Nya Dempsey RN  Outcome: Ongoing, Progressing  2/8/2024 0630 by Nya Dempsey RN  Outcome: Ongoing, Progressing     Problem: Fall Injury Risk  Goal: Absence of Fall and Fall-Related Injury  2/8/2024 0630 by Nya Dempsey RN  Outcome: Ongoing, Progressing  2/8/2024 0630 by Nya Dempsey RN  Outcome: Ongoing, Progressing     Problem: Impaired Wound Healing  Goal: Optimal Wound Healing  2/8/2024 0630 by Nya Dempsey RN  Outcome: Ongoing, Progressing  2/8/2024 0630 by Nya Dempsey RN  Outcome: Ongoing, Progressing     Problem: Skin Injury Risk Increased  Goal: Skin Health and Integrity  2/8/2024 0630 by Nya Dempsey, RN  Outcome: Ongoing, Progressing  2/8/2024 0630 by Nya Dempsey, RN  Outcome: Ongoing, Progressing

## 2024-02-08 NOTE — PROGRESS NOTES
[facility] perfect[facility] - Surgery  [facility] Medicine  Progress Note    Patient Name: Seymour Velazquez  MRN: [id number]  Patient Class: IP- Inpatient   Admission Date: 2/4/2024  Length of Stay: 4 days  Attending Physician: Delfina Quesada MD  Primary Care Provider: Eliazar Rosas MD        Subjective:     Principal Problem:Common bile duct (CBD) obstruction        HPI:  68M w/PMH stroke with G-tube placement, hypertension, diabetes, hyperlipidemia, recent cholecystitis, and dysphagia presents as transfer from OSH for AES eval. He initially presented from NH with leukocytosis w/o specific symptoms, imaging showed cholecystitis. Underwent lap irlanda 2/1, the operative report noted the gallbladder was necrotic and fell apart. The back wall of the gallbladder had an abscess that was perforated. There were spilled stones and purulent bile. A cholangiogram showed patent common duct with single stone at the ampulla that appears too large to pass spontaneously. Patient is transferred to C for possible ERCP.     On arrival, patient reports some abdominal pain, denies N/V/D. He is AFVSS. Admitted to  for further management.     Overview/Hospital Course:  Patient admitted to Hospital Medicine service for medical management and evaluation of suspected CBD obstruction following complicated laparoscopic cholecystectomy. AES was consulted on admission with recommendation of MRCP. MRCP w/ and w/o contrast currently pending. ID was consulted with continuation of Merrem from outside hospital. Merrem de-escalated to Rocephin/Flagyl per ID recs. List of home medications was obtained from home facility and were continued as appropriate. MRCP[02/06/24] noted fluid collection in the gallbladder fossa, concerning for  abscess vs. biloma. Also noted choledocholithiasis with gallstones posterior to the liver. AES performed ERCP 02/06/24 with removal of choledocolithiasis  by biliary sphincterotomy and balloon extraction. General Surgery consult  noted no indication surgical intervention at the time, but can consider IR drainage of fluid collection if clinical status worsens. Intermittent episodes of N/V with associated tachycardia throughout hospitalization, without acute abdomen. Unable to complete IR drainage of fluid collection on 2/7 2/2 N/V. Scheduling antiemetics for better control.    Interval History: Tube feeds were noted to be disconnected from PEG yesterday evening with production of thin brown material from Peg. Abdomen soft and nontender with unremarkable KUB/CXR. TF's held in anticipation of repeat attempt of IR drainage of fluid collection today. Patient has remained symptom free with marked improvement in N/V today. Will increase rate of IVF in setting of uptrending Cr.      Objective:     Vital Signs (Most Recent):  Temp: 97.8 °F (36.6 °C) (02/08/24 1210)  Pulse: 104 (02/08/24 1210)  Resp: 16 (02/08/24 1210)  BP: (!) 162/79 (02/08/24 1210)  SpO2: 99 % (02/08/24 1210) Vital Signs (24h Range):  Temp:  [96 °F (35.6 °C)-98 °F (36.7 °C)] 97.8 °F (36.6 °C)  Pulse:  [] 104  Resp:  [15-18] 16  SpO2:  [97 %-100 %] 99 %  BP: (149-194)/() 162/79     Weight: 79.6 kg (175 lb 8 oz)  Body mass index is 22.53 kg/m².    Intake/Output Summary (Last 24 hours) at 2/8/2024 1405  Last data filed at 2/8/2024 0632  Gross per 24 hour   Intake 1200 ml   Output 552.5 ml   Net 647.5 ml           Physical Exam  Constitutional:       General: He is not in acute distress.     Appearance: Normal appearance. He is not ill-appearing, toxic-appearing or diaphoretic.   HENT:      Head: Normocephalic and atraumatic.      Mouth/Throat:      Mouth: Mucous membranes are moist.   Eyes:      General: No scleral icterus.        Right eye: No discharge.         Left eye: No discharge.   Cardiovascular:      Rate and Rhythm: Normal rate and regular rhythm.      Heart sounds: Normal heart sounds. No murmur heard.  Pulmonary:      Effort: Pulmonary effort is normal.       Breath sounds: Normal breath sounds. No wheezing or rales.   Abdominal:      General: Abdomen is flat. Bowel sounds are normal. There is no distension.      Palpations: Abdomen is soft.      Tenderness: There is abdominal tenderness (Mild TTP in RUQ). There is no guarding or rebound.   Musculoskeletal:         General: No swelling.      Cervical back: Normal range of motion.      Right lower leg: No edema.      Left lower leg: No edema.   Skin:     General: Skin is warm and dry.   Neurological:      General: No focal deficit present.      Mental Status: He is alert and oriented to person, place, and time. Mental status is at baseline.             Significant Labs: All pertinent labs within the past 24 hours have been reviewed.  LABS:  Recent Labs   Lab 02/06/24 0528 02/07/24 0223 02/08/24 0423   * 135* 140   K 4.8 4.2 4.4    102 106   CO2 20* 17* 20*   BUN 10 13 15   CREATININE 0.6 0.9 1.2   GLU 69* 129* 97   ANIONGAP 9 16 14       Recent Labs   Lab 02/06/24 0528 02/07/24 0223 02/08/24  0423   MG 2.1 1.9 1.8   PHOS 3.3 4.2 3.8       Recent Labs   Lab 02/06/24 0528 02/07/24 0223 02/08/24  0423   AST 15 17 17   ALT 8* 13 9*   ALKPHOS 58 118 93   BILITOT 0.4 0.4 0.4   ALBUMIN 2.1* 2.4* 2.3*       POCT Glucose:   Recent Labs   Lab 02/08/24  0052 02/08/24  0549 02/08/24  1205   POCTGLUCOSE 122* 116* 122*      Recent Labs   Lab 02/06/24 0528 02/07/24 0223 02/08/24  0423   WBC 8.41 15.30* 15.06*   HGB 8.5* 9.7* 9.8*   HCT 24.8* 30.1* 31.1*    407 423   GRAN 56.2  4.7 88.8*  13.6* 79.6*  12.0*              Significant Imaging: I have reviewed all pertinent imaging results/findings within the past 24 hours.  X-Ray Abdomen AP 1 View   Final Result      Radiographic findings as above.         Electronically signed by: Aden Lucero   Date:    02/07/2024   Time:    21:21      X-Ray Chest AP Portable   Final Result      No definite acute intrathoracic process seen.         Electronically signed  by: Jennifer Lorenzo MD   Date:    02/07/2024   Time:    15:49      FL ERCP Biliary And Pancreatic By Non Rad Tech   Final Result      MRI Abdomen W WO Contrast_INC MRCP (XPD)   Final Result   Abnormal      Postoperative changes of recent laparoscopic cholecystectomy with intraperitoneal free air in the anterior upper abdomen, slightly greater than would be expected for cholecystectomy on 02/01/2024.  Consider serial abdominal exams to evaluate for peritoneal signs.      7.0 cm fluid collection in the gallbladder fossa, likely representing abscess and/or biloma.      Choledocholithiasis.  Few spilled gallstones are noted posterior to the liver.      Bilateral pleural effusions.      This report was flagged in Epic as abnormal..      This result was discovered at approximately 01:20.  Findings were discussed via telephone by Brenda Belle MD with Indio Victor MD on 2/6/2024 at 01:40.      Electronically signed by resident: Brenda Belle   Date:    02/06/2024   Time:    02:01      Electronically signed by: Claude Harmon MD   Date:    02/06/2024   Time:    02:50      IR  CT Limited    (Results Pending)       Inpatient Medications:  Continuous Infusions:   lactated ringers 100 mL/hr at 02/07/24 2303     Scheduled Meds:   amLODIPine  5 mg Per G Tube Daily    atorvastatin  80 mg Per G Tube QHS    busPIRone  7.5 mg Per G Tube Daily    cefTRIAXone (Rocephin) IV (PEDS and ADULTS)  2 g Intravenous Q24H    famotidine  20 mg Per G Tube BID    metoclopramide  5 mg Intravenous Q8H    metronidazole  500 mg Intravenous Q8H    senna  8.6 mg Per G Tube Daily    sertraline  25 mg Per G Tube Daily     PRN Meds:dextrose 10%, dextrose 10%, glucagon (human recombinant), glucose, glucose, hydrALAZINE, insulin aspart U-100, melatonin, morphine, naloxone, ondansetron, prochlorperazine, sodium chloride 0.9%    Review of Systems   Constitutional:  Negative for activity change and fever.   Respiratory:  Negative for shortness of  breath.    Cardiovascular:  Negative for chest pain and palpitations.   Gastrointestinal:  Positive for nausea. Negative for abdominal pain and vomiting.   Genitourinary:  Negative for difficulty urinating.   Neurological:  Negative for headaches.       Assessment/Plan:      * Common bile duct (CBD) obstruction  68M w/ recent CVA and residual dysphagia s/p PEG tube presents from OSH for retained gallstone in ampulla, s/p lap cholecystectomy in which the gallbladder was found necrotic and friable. His only symptom is abdominal pain. Labs show leukocytosis. He is transferred for AES eval and possible ERCP.     -MRCP[02/06/24] noted fluid collection in the gallbladder fossa, concerning for  abscess vs. biloma. Also noted choledocholithiasis with gallstones posterior to the liver.  -AES performed ERCP 02/06/24 with removal of choledocolithiasis  by biliary sphincterotomy and balloon extraction.   -IR attempt at drainage of fluid collection [2/7/2024] unsuccessful 2/2 episode of N/V with associated tachycardia and hypertension    -General Surgery consult noted no indication for surgical intervention at the time, but can consider IR drainage of fluid collection if clinical status worsens.  -Ceftriaxone/Metronidazole per ID recs  -AES consulted, appreciate recs  -IR consulted for drainage of fluid collection, appreciate recs. Planning on re-attempting drainage.  -Scheduling reglan for N/V with additional antiemetics PRN  -LR infusion 125cc/hr      AURORA (acute kidney injury)  Patient with acute kidney injury/acute renal failure likely due to pre-renal azotemia due to dehydration AURORA is currently worsening. Baseline creatinine  0.8  - Labs reviewed- Renal function/electrolytes with Estimated Creatinine Clearance: 66.3 mL/min (based on SCr of 1.2 mg/dL). according to latest data. Monitor urine output and serial BMP and adjust therapy as needed. Avoid nephrotoxins and renally dose meds for GFR listed above.    Plan  - Trending  "daily CMP  - will increase IVF rate today  - Will consider urine studies if no improvement  - Avoid nephrotoxic agents, NSAIDS, renally dose meds    Tachycardia  Patient noted to have sinus tachycardia up to HR 140s in the setting of acute episodes of nausea and vomiting. Likely appropriate response to stressor.    -CTM for hemodynamic instability  -EKG PRN for chest pain or hemodynamic instability    On tube feeding diet  Hx of PEG  tube placement s/p CVA    PLAN:  -Will start trickle feeds with Isosource as able pending IR drainage  -Nutrition consulted for tube feed recs      Abdominal fluid collection  See "Common bile duct (CBD) obstruction " A&P        Choledocholithiasis  See "Common bile duct (CBD) obstruction " A&P        Bilateral pleural effusion  Patient found to have small pleural effusion on imaging [MRCP(02/06/24)"Small right-sided pleural effusion.  Trace left-sided pleural effusion"]. Most likely etiology includes  limited mobility due to recent history of hospitalization for cholecystectomy . Management to include  monitoring at this time      Hyponatremia  Patient has hyponatremia which is controlled,We will aim to correct the sodium by 4-6mEq in 24 hours. We will monitor sodium Daily. The hyponatremia is due to Dehydration/hypovolemia. . We will treat the hyponatremia with IV fluids and resumption of tube feeds. The patient's sodium results have been reviewed and are listed below.  Recent Labs   Lab 02/08/24 0423            Normocytic anemia  -Patient's with Normocytic anemia..   -Hemoglobin stable.   -Etiology likely due to chronic disease .  -Current CBC reviewed-    Recent Labs   Lab 02/06/24  0528 02/07/24  0223 02/08/24 0423   HGB 8.5* 9.7* 9.8*           Component Value Date/Time    MCV 93 02/08/2024 0423    RDW 14.2 02/08/2024 0423    IRON 12 (L) 02/07/2024 0223    TIBC 195 (L) 02/07/2024 0223    FERRITIN 1,256 (H) 02/07/2024 0223         PLAN  - Monitor serial CBC and transfuse if " "patient becomes hemodynamically unstable, symptomatic or H/H drops below 7/21.  -Followup iron studies    Cholecystitis  See "Common bile duct (CBD) obstruction " A&P        CVA (cerebral vascular accident)  Patient is s/p CVA with residual dysphagia and weakness. He is s/p PEG tube and receives bolus tube feedings.         Type 2 diabetes mellitus  Patient's FSGs are controlled on current medication regimen.  Last A1c reviewed-   Lab Results   Component Value Date    HGBA1C 4.8 02/05/2024     Most recent fingerstick glucose reviewed-   Recent Labs   Lab 02/07/24  1923 02/08/24  0052 02/08/24  0549 02/08/24  1205   POCTGLUCOSE 127* 122* 116* 122*       Current correctional scale  Low  Maintain anti-hyperglycemic dose as follows-   Antihyperglycemics (From admission, onward)      Start     Stop Route Frequency Ordered    02/04/24 2059  insulin aspart U-100 pen 0-5 Units         -- SubQ Before meals & nightly PRN 02/04/24 2000          Hold Oral hypoglycemics while patient is in the hospital.     Patient on SSI at NH.    -LDSSI    Primary hypertension  Patient is normotensive on arrival, per chart he is not currently on any antihypertensives.     Will utilize p.r.n. blood pressure medication only if patient's blood pressure greater than 180/110 and he develops symptoms such as worsening chest pain or shortness of breath.    -amlodipine 5mg  -Holding home lisinopril in the setting of uptrending sCr  -Will add PRN hydralazine for SBP>180 and symptomatic  -will consider uptitration of home reigmen if pressures remain uncontrolled        VTE Risk Mitigation (From admission, onward)           Ordered     IP VTE HIGH RISK PATIENT  Once         02/05/24 1410     Place sequential compression device  Until discontinued         02/04/24 2000                    Discharge Planning   PEYTON: 2/12/2024     Code Status: Full Code   Is the patient medically ready for discharge?: No    Reason for patient still in hospital (select all " that apply): Patient trending condition, Treatment, Consult recommendations, and Pending disposition  Discharge Plan A: Home with family                  oCle Messina MD  Department of Hospital Medicine   [facility] - Surgery

## 2024-02-09 LAB
ALBUMIN SERPL BCP-MCNC: 2.1 G/DL (ref 3.5–5.2)
ALP SERPL-CCNC: 80 U/L (ref 55–135)
ALT SERPL W/O P-5'-P-CCNC: 8 U/L (ref 10–44)
ANION GAP SERPL CALC-SCNC: 13 MMOL/L (ref 8–16)
APPEARANCE FLD: NORMAL
AST SERPL-CCNC: 22 U/L (ref 10–40)
BASOPHILS # BLD AUTO: 0.09 K/UL (ref 0–0.2)
BASOPHILS NFR BLD: 0.6 % (ref 0–1.9)
BILIRUB SERPL-MCNC: 0.4 MG/DL (ref 0.1–1)
BODY FLD TYPE: NORMAL
BUN SERPL-MCNC: 16 MG/DL (ref 8–23)
CALCIUM SERPL-MCNC: 9.1 MG/DL (ref 8.7–10.5)
CHLORIDE SERPL-SCNC: 109 MMOL/L (ref 95–110)
CO2 SERPL-SCNC: 17 MMOL/L (ref 23–29)
COLOR FLD: NORMAL
CREAT SERPL-MCNC: 1.2 MG/DL (ref 0.5–1.4)
CREAT UR-MCNC: 57 MG/DL (ref 23–375)
DIFFERENTIAL METHOD BLD: ABNORMAL
EOSINOPHIL # BLD AUTO: 0.2 K/UL (ref 0–0.5)
EOSINOPHIL NFR BLD: 1 % (ref 0–8)
ERYTHROCYTE [DISTWIDTH] IN BLOOD BY AUTOMATED COUNT: 14.1 % (ref 11.5–14.5)
EST. GFR  (NO RACE VARIABLE): >60 ML/MIN/1.73 M^2
GLUCOSE SERPL-MCNC: 104 MG/DL (ref 70–110)
GRAM STN SPEC: NORMAL
GRAM STN SPEC: NORMAL
HCT VFR BLD AUTO: 29.9 % (ref 40–54)
HGB BLD-MCNC: 9.5 G/DL (ref 14–18)
IMM GRANULOCYTES # BLD AUTO: 0.09 K/UL (ref 0–0.04)
IMM GRANULOCYTES NFR BLD AUTO: 0.6 % (ref 0–0.5)
LYMPHOCYTES # BLD AUTO: 1.7 K/UL (ref 1–4.8)
LYMPHOCYTES NFR BLD: 10.8 % (ref 18–48)
LYMPHOCYTES NFR FLD MANUAL: 1 %
MAGNESIUM SERPL-MCNC: 1.6 MG/DL (ref 1.6–2.6)
MCH RBC QN AUTO: 30 PG (ref 27–31)
MCHC RBC AUTO-ENTMCNC: 31.8 G/DL (ref 32–36)
MCV RBC AUTO: 94 FL (ref 82–98)
MONOCYTES # BLD AUTO: 1.2 K/UL (ref 0.3–1)
MONOCYTES NFR BLD: 7.7 % (ref 4–15)
NEUTROPHILS # BLD AUTO: 12.3 K/UL (ref 1.8–7.7)
NEUTROPHILS NFR BLD: 79.3 % (ref 38–73)
NEUTROPHILS NFR FLD MANUAL: 99 %
NRBC BLD-RTO: 0 /100 WBC
PHOSPHATE SERPL-MCNC: 3.7 MG/DL (ref 2.7–4.5)
PLATELET # BLD AUTO: 312 K/UL (ref 150–450)
PMV BLD AUTO: 9.5 FL (ref 9.2–12.9)
POCT GLUCOSE: 104 MG/DL (ref 70–110)
POCT GLUCOSE: 110 MG/DL (ref 70–110)
POCT GLUCOSE: 116 MG/DL (ref 70–110)
POTASSIUM SERPL-SCNC: 4.3 MMOL/L (ref 3.5–5.1)
PROT SERPL-MCNC: 7 G/DL (ref 6–8.4)
RBC # BLD AUTO: 3.17 M/UL (ref 4.6–6.2)
SODIUM SERPL-SCNC: 139 MMOL/L (ref 136–145)
SODIUM UR-SCNC: 60 MMOL/L (ref 20–250)
UUN UR-MCNC: 312 MG/DL (ref 140–1050)
WBC # BLD AUTO: 15.52 K/UL (ref 3.9–12.7)
WBC # FLD: NORMAL /CU MM

## 2024-02-09 PROCEDURE — 63600175 PHARM REV CODE 636 W HCPCS

## 2024-02-09 PROCEDURE — 87070 CULTURE OTHR SPECIMN AEROBIC: CPT

## 2024-02-09 PROCEDURE — 25000003 PHARM REV CODE 250

## 2024-02-09 PROCEDURE — 87205 SMEAR GRAM STAIN: CPT

## 2024-02-09 PROCEDURE — 63600175 PHARM REV CODE 636 W HCPCS: Performed by: STUDENT IN AN ORGANIZED HEALTH CARE EDUCATION/TRAINING PROGRAM

## 2024-02-09 PROCEDURE — 21400001 HC TELEMETRY ROOM

## 2024-02-09 PROCEDURE — 0W9G30Z DRAINAGE OF PERITONEAL CAVITY WITH DRAINAGE DEVICE, PERCUTANEOUS APPROACH: ICD-10-PCS | Performed by: STUDENT IN AN ORGANIZED HEALTH CARE EDUCATION/TRAINING PROGRAM

## 2024-02-09 PROCEDURE — 63600175 PHARM REV CODE 636 W HCPCS: Performed by: HOSPITALIST

## 2024-02-09 PROCEDURE — 85025 COMPLETE CBC W/AUTO DIFF WBC: CPT

## 2024-02-09 PROCEDURE — 84100 ASSAY OF PHOSPHORUS: CPT

## 2024-02-09 PROCEDURE — 82570 ASSAY OF URINE CREATININE: CPT

## 2024-02-09 PROCEDURE — 84300 ASSAY OF URINE SODIUM: CPT

## 2024-02-09 PROCEDURE — 25000003 PHARM REV CODE 250: Performed by: PHYSICIAN ASSISTANT

## 2024-02-09 PROCEDURE — 89051 BODY FLUID CELL COUNT: CPT

## 2024-02-09 PROCEDURE — 25000003 PHARM REV CODE 250: Performed by: HOSPITALIST

## 2024-02-09 PROCEDURE — 87075 CULTR BACTERIA EXCEPT BLOOD: CPT

## 2024-02-09 PROCEDURE — 87102 FUNGUS ISOLATION CULTURE: CPT

## 2024-02-09 PROCEDURE — 83735 ASSAY OF MAGNESIUM: CPT

## 2024-02-09 PROCEDURE — 80053 COMPREHEN METABOLIC PANEL: CPT

## 2024-02-09 PROCEDURE — 25000003 PHARM REV CODE 250: Performed by: STUDENT IN AN ORGANIZED HEALTH CARE EDUCATION/TRAINING PROGRAM

## 2024-02-09 PROCEDURE — 99233 SBSQ HOSP IP/OBS HIGH 50: CPT | Mod: ,,, | Performed by: PHYSICIAN ASSISTANT

## 2024-02-09 PROCEDURE — 87206 SMEAR FLUORESCENT/ACID STAI: CPT

## 2024-02-09 PROCEDURE — 84540 ASSAY OF URINE/UREA-N: CPT

## 2024-02-09 PROCEDURE — 87116 MYCOBACTERIA CULTURE: CPT

## 2024-02-09 PROCEDURE — 36415 COLL VENOUS BLD VENIPUNCTURE: CPT

## 2024-02-09 PROCEDURE — 63600175 PHARM REV CODE 636 W HCPCS: Mod: JZ,JG | Performed by: PHYSICIAN ASSISTANT

## 2024-02-09 RX ORDER — NAPROXEN SODIUM 220 MG/1
81 TABLET, FILM COATED ORAL DAILY
Status: DISCONTINUED | OUTPATIENT
Start: 2024-02-10 | End: 2024-02-23 | Stop reason: HOSPADM

## 2024-02-09 RX ORDER — MORPHINE SULFATE 2 MG/ML
INJECTION, SOLUTION INTRAMUSCULAR; INTRAVENOUS
Status: DISPENSED
Start: 2024-02-09 | End: 2024-02-09

## 2024-02-09 RX ORDER — MIDAZOLAM HYDROCHLORIDE 1 MG/ML
INJECTION INTRAMUSCULAR; INTRAVENOUS
Status: COMPLETED | OUTPATIENT
Start: 2024-02-09 | End: 2024-02-09

## 2024-02-09 RX ORDER — LIDOCAINE HYDROCHLORIDE 10 MG/ML
INJECTION INFILTRATION; PERINEURAL
Status: COMPLETED | OUTPATIENT
Start: 2024-02-09 | End: 2024-02-09

## 2024-02-09 RX ORDER — FENTANYL CITRATE 50 UG/ML
INJECTION, SOLUTION INTRAMUSCULAR; INTRAVENOUS
Status: COMPLETED | OUTPATIENT
Start: 2024-02-09 | End: 2024-02-09

## 2024-02-09 RX ORDER — ENOXAPARIN SODIUM 100 MG/ML
40 INJECTION SUBCUTANEOUS EVERY 24 HOURS
Status: DISCONTINUED | OUTPATIENT
Start: 2024-02-09 | End: 2024-02-23 | Stop reason: HOSPADM

## 2024-02-09 RX ORDER — ONDANSETRON HYDROCHLORIDE 2 MG/ML
INJECTION, SOLUTION INTRAVENOUS
Status: COMPLETED | OUTPATIENT
Start: 2024-02-09 | End: 2024-02-09

## 2024-02-09 RX ADMIN — FAMOTIDINE 20 MG: 20 TABLET, FILM COATED ORAL at 11:02

## 2024-02-09 RX ADMIN — METOCLOPRAMIDE HYDROCHLORIDE 5 MG: 10 INJECTION, SOLUTION INTRAMUSCULAR; INTRAVENOUS at 09:02

## 2024-02-09 RX ADMIN — CEFTRIAXONE 2 G: 2 INJECTION, POWDER, FOR SOLUTION INTRAMUSCULAR; INTRAVENOUS at 02:02

## 2024-02-09 RX ADMIN — METOCLOPRAMIDE HYDROCHLORIDE 5 MG: 10 INJECTION, SOLUTION INTRAMUSCULAR; INTRAVENOUS at 02:02

## 2024-02-09 RX ADMIN — FENTANYL CITRATE 25 MCG: 0.05 INJECTION, SOLUTION INTRAMUSCULAR; INTRAVENOUS at 09:02

## 2024-02-09 RX ADMIN — AMLODIPINE BESYLATE 5 MG: 5 TABLET ORAL at 11:02

## 2024-02-09 RX ADMIN — ATORVASTATIN CALCIUM 80 MG: 40 TABLET, FILM COATED ORAL at 09:02

## 2024-02-09 RX ADMIN — ONDANSETRON 4 MG: 2 INJECTION INTRAMUSCULAR; INTRAVENOUS at 08:02

## 2024-02-09 RX ADMIN — FENTANYL CITRATE 25 MCG: 0.05 INJECTION, SOLUTION INTRAMUSCULAR; INTRAVENOUS at 08:02

## 2024-02-09 RX ADMIN — BUSPIRONE HYDROCHLORIDE 7.5 MG: 5 TABLET ORAL at 11:02

## 2024-02-09 RX ADMIN — METRONIDAZOLE 500 MG: 500 INJECTION, SOLUTION INTRAVENOUS at 11:02

## 2024-02-09 RX ADMIN — SODIUM CHLORIDE, POTASSIUM CHLORIDE, SODIUM LACTATE AND CALCIUM CHLORIDE: 600; 310; 30; 20 INJECTION, SOLUTION INTRAVENOUS at 05:02

## 2024-02-09 RX ADMIN — MIDAZOLAM HYDROCHLORIDE 0.5 MG: 2 INJECTION, SOLUTION INTRAMUSCULAR; INTRAVENOUS at 09:02

## 2024-02-09 RX ADMIN — FENTANYL CITRATE 50 MCG: 0.05 INJECTION, SOLUTION INTRAMUSCULAR; INTRAVENOUS at 08:02

## 2024-02-09 RX ADMIN — MORPHINE SULFATE 2 MG: 2 INJECTION, SOLUTION INTRAMUSCULAR; INTRAVENOUS at 12:02

## 2024-02-09 RX ADMIN — FAMOTIDINE 20 MG: 20 TABLET, FILM COATED ORAL at 09:02

## 2024-02-09 RX ADMIN — ENOXAPARIN SODIUM 40 MG: 40 INJECTION SUBCUTANEOUS at 05:02

## 2024-02-09 RX ADMIN — MIDAZOLAM HYDROCHLORIDE 0.5 MG: 2 INJECTION, SOLUTION INTRAMUSCULAR; INTRAVENOUS at 08:02

## 2024-02-09 RX ADMIN — MORPHINE SULFATE 2 MG: 2 INJECTION, SOLUTION INTRAMUSCULAR; INTRAVENOUS at 10:02

## 2024-02-09 RX ADMIN — METOCLOPRAMIDE HYDROCHLORIDE 5 MG: 10 INJECTION, SOLUTION INTRAMUSCULAR; INTRAVENOUS at 05:02

## 2024-02-09 RX ADMIN — MIDAZOLAM HYDROCHLORIDE 1 MG: 2 INJECTION, SOLUTION INTRAMUSCULAR; INTRAVENOUS at 08:02

## 2024-02-09 RX ADMIN — METRONIDAZOLE 500 MG: 500 INJECTION, SOLUTION INTRAVENOUS at 03:02

## 2024-02-09 RX ADMIN — METRONIDAZOLE 500 MG: 500 INJECTION, SOLUTION INTRAVENOUS at 06:02

## 2024-02-09 RX ADMIN — SERTRALINE HYDROCHLORIDE 25 MG: 25 TABLET ORAL at 11:02

## 2024-02-09 RX ADMIN — ONDANSETRON 8 MG: 2 INJECTION INTRAMUSCULAR; INTRAVENOUS at 02:02

## 2024-02-09 RX ADMIN — SENNOSIDES 8.6 MG: 8.6 TABLET, FILM COATED ORAL at 11:02

## 2024-02-09 RX ADMIN — LIDOCAINE HYDROCHLORIDE 10 ML: 10 INJECTION, SOLUTION INFILTRATION; PERINEURAL at 08:02

## 2024-02-09 NOTE — ASSESSMENT & PLAN NOTE
-Patient's with Normocytic anemia..   -Hemoglobin stable.   -Etiology likely due to chronic disease .  -Current CBC reviewed-    Recent Labs   Lab 02/07/24 0223 02/08/24  0423 02/09/24 0514   HGB 9.7* 9.8* 9.5*           Component Value Date/Time    MCV 94 02/09/2024 0514    RDW 14.1 02/09/2024 0514    IRON 12 (L) 02/07/2024 0223    TIBC 195 (L) 02/07/2024 0223    FERRITIN 1,256 (H) 02/07/2024 0223         PLAN  - Monitor serial CBC and transfuse if patient becomes hemodynamically unstable, symptomatic or H/H drops below 7/21.  - Etiology consistent with anemia of chronic disease. Will CTM.

## 2024-02-09 NOTE — ASSESSMENT & PLAN NOTE
Patient with acute kidney injury/acute renal failure likely due to pre-renal azotemia due to dehydration AURORA is currently worsening. Baseline creatinine  0.8  - Labs reviewed- Renal function/electrolytes with Estimated Creatinine Clearance: 66.3 mL/min (based on SCr of 1.2 mg/dL). according to latest data. Monitor urine output and serial BMP and adjust therapy as needed. Avoid nephrotoxins and renally dose meds for GFR listed above.    Plan  - Trending daily CMP  - No improvement in increased IVF resus  - Urine studies pending  - Avoid nephrotoxic agents, NSAIDS, renally dose meds

## 2024-02-09 NOTE — SUBJECTIVE & OBJECTIVE
Interval History: NAEON. Afebrile. WBC 15K today. Patient underwent IR drain placement this morning. Feeling ok post op. Still NPO and very thirsty. Cx are pending.     Review of Systems   Constitutional:  Negative for chills, diaphoresis and fever.   HENT:  Negative for congestion and sore throat.    Respiratory:  Negative for cough and shortness of breath.    Cardiovascular:  Negative for chest pain and leg swelling.   Gastrointestinal:  Positive for abdominal pain and nausea. Negative for diarrhea and vomiting.   Genitourinary:  Negative for dysuria.   Musculoskeletal:  Negative for arthralgias and myalgias.   Skin:  Negative for rash.   Neurological:  Positive for weakness. Negative for light-headedness and headaches.   Hematological:  Bruises/bleeds easily.   Psychiatric/Behavioral:  Negative for agitation and confusion. The patient is not nervous/anxious.      Objective:     Vital Signs (Most Recent):  Temp: 97.8 °F (36.6 °C) (02/09/24 1146)  Pulse: 94 (Simultaneous filing. User may not have seen previous data.) (02/09/24 1146)  Resp: 18 (02/09/24 1146)  BP: (!) 162/77 (02/09/24 1146)  SpO2: 99 % (02/09/24 1146) Vital Signs (24h Range):  Temp:  [96.8 °F (36 °C)-98.3 °F (36.8 °C)] 97.8 °F (36.6 °C)  Pulse:  [] 94  Resp:  [12-20] 18  SpO2:  [97 %-100 %] 99 %  BP: (146-172)/(64-99) 162/77     Weight: 79.6 kg (175 lb 8 oz)  Body mass index is 22.53 kg/m².    Estimated Creatinine Clearance: 66.3 mL/min (based on SCr of 1.2 mg/dL).     Physical Exam  Vitals and nursing note reviewed.   Constitutional:       General: He is not in acute distress.     Appearance: Normal appearance. He is well-developed. He is not ill-appearing or diaphoretic.   HENT:      Head: Normocephalic and atraumatic.      Right Ear: External ear normal.      Left Ear: External ear normal.      Nose: Nose normal.      Mouth/Throat:      Mouth: Mucous membranes are dry.   Eyes:      General: No scleral icterus.        Right eye: No  discharge.         Left eye: No discharge.      Conjunctiva/sclera: Conjunctivae normal.   Cardiovascular:      Rate and Rhythm: Normal rate and regular rhythm.   Pulmonary:      Effort: Pulmonary effort is normal. No respiratory distress.      Breath sounds: No stridor.   Abdominal:      General: There is no distension.      Palpations: Abdomen is soft.      Tenderness: There is abdominal tenderness.      Comments: PEG  Drain with SS output   Musculoskeletal:      Right lower leg: No edema.      Left lower leg: No edema.   Skin:     General: Skin is dry.      Coloration: Skin is pale. Skin is not jaundiced.      Findings: No erythema.   Neurological:      General: No focal deficit present.      Mental Status: He is alert and oriented to person, place, and time. Mental status is at baseline.   Psychiatric:         Mood and Affect: Mood normal.         Behavior: Behavior normal.         Thought Content: Thought content normal.         Judgment: Judgment normal.          Significant Labs: CBC:   Recent Labs   Lab 02/08/24 0423 02/09/24 0514   WBC 15.06* 15.52*   HGB 9.8* 9.5*   HCT 31.1* 29.9*    312       CMP:   Recent Labs   Lab 02/08/24 0423 02/09/24 0514    139   K 4.4 4.3    109   CO2 20* 17*   GLU 97 104   BUN 15 16   CREATININE 1.2 1.2   CALCIUM 9.6 9.1   PROT 7.1 7.0   ALBUMIN 2.3* 2.1*   BILITOT 0.4 0.4   ALKPHOS 93 80   AST 17 22   ALT 9* 8*   ANIONGAP 14 13       Microbiology Results (last 7 days)       Procedure Component Value Units Date/Time    Aerobic culture [[id number]] Collected: 02/09/24 0914    Order Status: Sent Specimen: Abscess from Abdomen Updated: 02/09/24 1139    Fungus culture [[id number]] Collected: 02/09/24 0914    Order Status: Sent Specimen: Body Fluid from Abdomen Updated: 02/09/24 1139    AFB Culture & Smear [[id number]] Collected: 02/09/24 0914    Order Status: Sent Specimen: Body Fluid from Abdomen Updated: 02/09/24 1132    Gram stain [[id number]] Collected:  02/09/24 0914    Order Status: Sent Specimen: Body Fluid from Abdomen Updated: 02/09/24 1132    Culture, Anaerobe [[id number]] Collected: 02/09/24 0914    Order Status: Sent Specimen: Body Fluid from Abdomen Updated: 02/09/24 1132          Recent Lab Results  (Last 5 results in the past 24 hours)        02/09/24  1153   02/09/24  0602   02/09/24  0514   02/08/24 2004 02/08/24  1532        Albumin     2.1           ALP     80           ALT     8           Anion Gap     13           AST     22  Comment: *Result may be interfered by visible hemolysis           Baso #     0.09           Basophil %     0.6           BILIRUBIN TOTAL     0.4  Comment: For infants and newborns, interpretation of results should be based  on gestational age, weight and in agreement with clinical  observations.    Premature Infant recommended reference ranges:  Up to 24 hours.............<8.0 mg/dL  Up to 48 hours............<12.0 mg/dL  3-5 days..................<15.0 mg/dL  6-29 days.................<15.0 mg/dL             BUN     16           Calcium     9.1           Chloride     109           CO2     17           Creatinine     1.2           Differential Method     Automated           eGFR     >60.0           Eos #     0.2           Eos %     1.0           Glucose     104           Gran # (ANC)     12.3           Gran %     79.3           Hematocrit     29.9           Hemoglobin     9.5           Immature Grans (Abs)     0.09  Comment: Mild elevation in immature granulocytes is non specific and   can be seen in a variety of conditions including stress response,   acute inflammation, trauma and pregnancy. Correlation with other   laboratory and clinical findings is essential.             Immature Granulocytes     0.6           Lymph #     1.7           Lymph %     10.8           Magnesium      1.6           MCH     30.0           MCHC     31.8           MCV     94           Mono #     1.2           Mono %     7.7           MPV     9.5            nRBC     0           Phosphorus Level     3.7           Platelet Count     312           POCT Glucose 110   116     104   130       Potassium     4.3           PROTEIN TOTAL     7.0           RBC     3.17           RDW     14.1           Sodium     139           WBC     15.52                                  Significant Imaging:     MRI Abdomen W WO Contrast_INC MRCP (XPD) [[id number]] (Abnormal) Resulted: 02/06/24 0250   Order Status: Completed Updated: 02/06/24 0252   Narrative:     EXAMINATION:  MRI ABDOMEN WITH AND WO_INC MRCP (XPD)    CLINICAL HISTORY:  Cholelithiasis;    TECHNIQUE:  Multiplanar multisequence images of the abdomen before and after administration of 10 mL Gadavist intravenous contrast. Additional thick slab fluid sensitive MRCP sequences were obtained with maximum intensity projection images for evaluation of the biliary system.    COMPARISON:  None    FINDINGS:  Inferior Thorax: Small right-sided pleural effusion.  Trace left-sided pleural effusion.    Peritoneal Space: Small volume of intraperitoneal free air in the anterior upper abdomen.    Liver: Normal homogeneous background signal. No focal mass.    Gallbladder: Postoperative changes of cholecystectomy.  There is a complex fluid collection in the gallbladder fossa measuring 3.2 x 7.0 cm with peripheral enhancement.  There are spilled gallstones noted posterior to the liver (series 4, image 33).    Bile Ducts: Stone is noted in the distal common bile duct (series 3, image 25).  No ductal dilation or stricture.    Pancreas: No mass or peripancreatic fat stranding.    Spleen: Enlarged, measuring 13.4 cm.    Adrenals: Unremarkable.    Bowel/Mesentery: Gastrostomy tube in place within the stomach.  Liquid stool in the proximal colon.  No evidence of bowel obstruction or inflammation.    Urinary Tract: Bilateral renal cysts.  No solid mass or evidence of obstructive uropathy.    Retroperitoneum:  No significant adenopathy.    Abdominal  wall:  Normal.    Vasculature: No aneurysm.    Bones: Normal marrow.   Impression:       Postoperative changes of recent laparoscopic cholecystectomy with intraperitoneal free air in the anterior upper abdomen, slightly greater than would be expected for cholecystectomy on 02/01/2024.  Consider serial abdominal exams to evaluate for peritoneal signs.    7.0 cm fluid collection in the gallbladder fossa, likely representing abscess and/or biloma.    Choledocholithiasis.  Few spilled gallstones are noted posterior to the liver.    Bilateral pleural effusions.    This report was flagged in Epic as abnormal..    This result was discovered at approximately 01:20.  Findings were discussed via telephone by Brenda Belle MD with [place] Victor MD on 2/6/2024 at 01:40.    Electronically signed by resident: Brenda Belle  Date: 02/06/2024  Time: 02:01    Electronically signed by: Claude Harmon MD  Date: 02/06/2024  Time: 02:50

## 2024-02-09 NOTE — PLAN OF CARE
Patient is a 68 year old male admitted on the 2/4/24 with a common bile duct obstruction, with necrotic tissue to the gallbladder, a procedure was done the patient have 3 lap sites with derma bond dressing.accu check Q6 right leg contracted. Patient have new order for tube feeding 1.5 glucerna @ 10ml/hr. The tube feeding stops at midnight for procedure tomorrow.

## 2024-02-09 NOTE — PLAN OF CARE
Problem: Adult Inpatient Plan of Care  Goal: Plan of Care Review  Outcome: Ongoing, Progressing  Goal: Patient-Specific Goal (Individualized)  Outcome: Ongoing, Progressing  Goal: Absence of Hospital-Acquired Illness or Injury  Outcome: Ongoing, Progressing  Goal: Optimal Comfort and Wellbeing  Outcome: Ongoing, Progressing  Goal: Readiness for Transition of Care  Outcome: Ongoing, Progressing     Problem: Diabetes Comorbidity  Goal: Blood Glucose Level Within Targeted Range  Outcome: Ongoing, Progressing     Problem: Skin Injury Risk Increased  Goal: Skin Health and Integrity  Outcome: Ongoing, Progressing     Problem: Pain Acute  Goal: Acceptable Pain Control and Functional Ability  Outcome: Ongoing, Progressing     Addressed patients pain, pump, position, need for potty, and possessions in reach. Patient remains free of falls, and verbalizes understanding in fall prevention and safety. Safety measures remain in place. Reinforced fall prevention and safety education. Call light and personal belongings within reach, bed in lowest position with wheels locked, side rails up x2, Instructed to call with any needs or complaints, verbalized understanding. Continue current plan of care.

## 2024-02-09 NOTE — PROGRESS NOTES
[facility] - [facility]  [facility] Medicine  Progress Note    Patient Name: Seymour Velazquez  MRN: [id number]  Patient Class: IP- Inpatient   Admission Date: 2/4/2024  Length of Stay: 5 days  Attending Physician: Delfina Quesada MD  Primary Care Provider: Eliazar Rosas MD        Subjective:     Principal Problem:Common bile duct (CBD) obstruction        HPI:  68M w/PMH stroke with G-tube placement, hypertension, diabetes, hyperlipidemia, recent cholecystitis, and dysphagia presents as transfer from OSH for AES eval. He initially presented from NH with leukocytosis w/o specific symptoms, imaging showed cholecystitis. Underwent lap irlanda 2/1, the operative report noted the gallbladder was necrotic and fell apart. The back wall of the gallbladder had an abscess that was perforated. There were spilled stones and purulent bile. A cholangiogram showed patent common duct with single stone at the ampulla that appears too large to pass spontaneously. Patient is transferred to C for possible ERCP.     On arrival, patient reports some abdominal pain, denies N/V/D. He is AFVSS. Admitted to  for further management.     Overview/Hospital Course:  Patient admitted to Hospital Medicine service for medical management and evaluation of suspected CBD obstruction following complicated laparoscopic cholecystectomy. AES was consulted on admission with recommendation of MRCP. MRCP w/ and w/o contrast currently pending. ID was consulted with continuation of Merrem from outside hospital. Merrem de-escalated to Rocephin/Flagyl per ID recs. List of home medications was obtained from home facility and were continued as appropriate. MRCP[02/06/24] noted fluid collection in the gallbladder fossa, concerning for  abscess vs. biloma. Also noted choledocholithiasis with gallstones posterior to the liver. AES performed ERCP 02/06/24 with removal of choledocolithiasis  by biliary sphincterotomy and balloon extraction. General Surgery consult  noted no indication surgical intervention at the time, but can consider IR drainage of fluid collection if clinical status worsens. Intermittent episodes of N/V with associated tachycardia throughout hospitalization, without acute abdomen. Unable to complete IR drainage of fluid collection on 2/7 2/2 N/V. Scheduling antiemetics for better control. Repeat attempt at fluid drainage and drain placement with IR successful without complication morning of 2/9. Slowly evolving AURORA was not responsive to increased IVF resuscitation; currently pending urine studies.    Interval History: LYLEEON. Underwent uncomplicated fluid drainage and drain placement with IR today. Patient comfortable, without inappropriate abdominal pain or nausea/vomiting, after the procedure. Will restart tube feeds and placing SLP evaluation for ability to tolerate oral fluid intake. Following fluid cultures for tailoring of antibiotics.      Objective:     Vital Signs (Most Recent):  Temp: 97.8 °F (36.6 °C) (02/09/24 1146)  Pulse: 94 (Simultaneous filing. User may not have seen previous data.) (02/09/24 1146)  Resp: 18 (02/09/24 1146)  BP: (!) 162/77 (02/09/24 1146)  SpO2: 99 % (02/09/24 1146) Vital Signs (24h Range):  Temp:  [96.8 °F (36 °C)-98.3 °F (36.8 °C)] 97.8 °F (36.6 °C)  Pulse:  [] 94  Resp:  [12-20] 18  SpO2:  [97 %-100 %] 99 %  BP: (146-172)/(64-99) 162/77     Weight: 79.6 kg (175 lb 8 oz)  Body mass index is 22.53 kg/m².    Intake/Output Summary (Last 24 hours) at 2/9/2024 1315  Last data filed at 2/9/2024 1040  Gross per 24 hour   Intake 2895.71 ml   Output 875 ml   Net 2020.71 ml           Physical Exam  Constitutional:       General: He is not in acute distress.     Appearance: Normal appearance. He is not ill-appearing, toxic-appearing or diaphoretic.   HENT:      Head: Normocephalic and atraumatic.      Mouth/Throat:      Mouth: Mucous membranes are moist.   Eyes:      General: No scleral icterus.        Right eye: No discharge.          Left eye: No discharge.   Cardiovascular:      Rate and Rhythm: Normal rate and regular rhythm.      Heart sounds: Normal heart sounds. No murmur heard.  Pulmonary:      Effort: Pulmonary effort is normal.      Breath sounds: Normal breath sounds. No wheezing or rales.   Abdominal:      General: Abdomen is flat. Bowel sounds are normal. There is no distension.      Palpations: Abdomen is soft.      Tenderness: There is abdominal tenderness (Mild TTP in RUQ). There is no guarding or rebound.      Comments: Drain in place in RUQ with serosanguinous fluid  PEG in place in the LUQ   Musculoskeletal:         General: No swelling.      Cervical back: Normal range of motion.      Right lower leg: No edema.      Left lower leg: No edema.   Skin:     General: Skin is warm and dry.   Neurological:      General: No focal deficit present.      Mental Status: He is alert and oriented to person, place, and time. Mental status is at baseline.             Significant Labs: All pertinent labs within the past 24 hours have been reviewed.  LABS:  Recent Labs   Lab 02/07/24 0223 02/08/24 0423 02/09/24  0514   * 140 139   K 4.2 4.4 4.3    106 109   CO2 17* 20* 17*   BUN 13 15 16   CREATININE 0.9 1.2 1.2   * 97 104   ANIONGAP 16 14 13       Recent Labs   Lab 02/07/24 0223 02/08/24 0423 02/09/24  0514   MG 1.9 1.8 1.6   PHOS 4.2 3.8 3.7       Recent Labs   Lab 02/07/24 0223 02/08/24 0423 02/09/24  0514   AST 17 17 22   ALT 13 9* 8*   ALKPHOS 118 93 80   BILITOT 0.4 0.4 0.4   ALBUMIN 2.4* 2.3* 2.1*       POCT Glucose:   Recent Labs   Lab 02/08/24 2004 02/09/24  0602 02/09/24  1153   POCTGLUCOSE 104 116* 110      Recent Labs   Lab 02/07/24 0223 02/08/24 0423 02/09/24  0514   WBC 15.30* 15.06* 15.52*   HGB 9.7* 9.8* 9.5*   HCT 30.1* 31.1* 29.9*    423 312   GRAN 88.8*  13.6* 79.6*  12.0* 79.3*  12.3*              Significant Imaging: I have reviewed all pertinent imaging results/findings within the  past 24 hours.  X-Ray Abdomen AP 1 View   Final Result      Radiographic findings as above.         Electronically signed by: Aden Lucero   Date:    02/07/2024   Time:    21:21      X-Ray Chest AP Portable   Final Result      No definite acute intrathoracic process seen.         Electronically signed by: Jennifer Lorenzo MD   Date:    02/07/2024   Time:    15:49      FL ERCP Biliary And Pancreatic By Non Rad Tech   Final Result      MRI Abdomen W WO Contrast_INC MRCP (XPD)   Final Result   Abnormal      Postoperative changes of recent laparoscopic cholecystectomy with intraperitoneal free air in the anterior upper abdomen, slightly greater than would be expected for cholecystectomy on 02/01/2024.  Consider serial abdominal exams to evaluate for peritoneal signs.      7.0 cm fluid collection in the gallbladder fossa, likely representing abscess and/or biloma.      Choledocholithiasis.  Few spilled gallstones are noted posterior to the liver.      Bilateral pleural effusions.      This report was flagged in Epic as abnormal..      This result was discovered at approximately 01:20.  Findings were discussed via telephone by Brenda Belle MD with Indio Victor MD on 2/6/2024 at 01:40.      Electronically signed by resident: Brenda Belle   Date:    02/06/2024   Time:    02:01      Electronically signed by: Claude Harmon MD   Date:    02/06/2024   Time:    02:50      IR  CT Limited    (Results Pending)   IR Abscess Drainage With Tube Placement    (Results Pending)       Inpatient Medications:  Continuous Infusions:   lactated ringers 100 mL/hr at 02/09/24 1207     Scheduled Meds:   amLODIPine  5 mg Per G Tube Daily    atorvastatin  80 mg Per G Tube QHS    busPIRone  7.5 mg Per G Tube Daily    cefTRIAXone (Rocephin) IV (PEDS and ADULTS)  2 g Intravenous Q24H    famotidine  20 mg Per G Tube BID    metoclopramide  5 mg Intravenous Q8H    metronidazole  500 mg Intravenous Q8H    morphine        senna  8.6 mg  Per G Tube Daily    sertraline  25 mg Per G Tube Daily     PRN Meds:dextrose 10%, dextrose 10%, glucagon (human recombinant), glucose, glucose, hydrALAZINE, insulin aspart U-100, melatonin, morphine, morphine, naloxone, ondansetron, prochlorperazine, sodium chloride 0.9%    Review of Systems   Constitutional:  Negative for activity change and fever.   Respiratory:  Negative for shortness of breath.    Cardiovascular:  Negative for chest pain and palpitations.   Gastrointestinal:  Positive for nausea. Negative for abdominal pain and vomiting.   Genitourinary:  Negative for difficulty urinating.   Neurological:  Negative for headaches.       Assessment/Plan:      * Common bile duct (CBD) obstruction  68M w/ recent CVA and residual dysphagia s/p PEG tube presents from OSH for retained gallstone in ampulla, s/p lap cholecystectomy in which the gallbladder was found necrotic and friable. His only symptom is abdominal pain. Labs show leukocytosis. He is transferred for AES eval and possible ERCP.     -MRCP[02/06/24] noted fluid collection in the gallbladder fossa, concerning for  abscess vs. biloma. Also noted choledocholithiasis with gallstones posterior to the liver.  -AES performed ERCP 02/06/24 with removal of choledocolithiasis  by biliary sphincterotomy and balloon extraction.   -IR attempt at drainage of fluid collection [2/7/2024] unsuccessful 2/2 episode of N/V with associated tachycardia and hypertension  -IR reattempt at fluid drainage and drain placement [2/9/24] successful without complication. Drain in place with serosanguinous fluid.    -General Surgery consult noted no indication for surgical intervention at the time, but can consider IR drainage of fluid collection if clinical status worsens.  -Ceftriaxone/Metronidazole per ID recs  -Fluid studies and cultures pending  -AES consulted, appreciate recs  -Appreciate IR's assistance  -Scheduling reglan for N/V with additional antiemetics PRN  -LR infusion  "100cc/hr      AURORA (acute kidney injury)  Patient with acute kidney injury/acute renal failure likely due to pre-renal azotemia due to dehydration AURORA is currently worsening. Baseline creatinine  0.8  - Labs reviewed- Renal function/electrolytes with Estimated Creatinine Clearance: 66.3 mL/min (based on SCr of 1.2 mg/dL). according to latest data. Monitor urine output and serial BMP and adjust therapy as needed. Avoid nephrotoxins and renally dose meds for GFR listed above.    Plan  - Trending daily CMP  - No improvement in increased IVF resus  - Urine studies pending  - Avoid nephrotoxic agents, NSAIDS, renally dose meds    Tachycardia  Patient noted to have sinus tachycardia up to HR 140s in the setting of acute episodes of nausea and vomiting. Likely appropriate response to stressor.    -CTM for hemodynamic instability  -EKG PRN for chest pain or hemodynamic instability    On tube feeding diet  Hx of PEG  tube placement s/p CVA    PLAN:  -Will start trickle feeds with Glucerna as tolerated by patient  -Nutrition consulted for tube feed recs      Abdominal fluid collection  See "Common bile duct (CBD) obstruction " A&P        Choledocholithiasis  See "Common bile duct (CBD) obstruction " A&P        Bilateral pleural effusion  Patient found to have small pleural effusion on imaging [MRCP(02/06/24)"Small right-sided pleural effusion.  Trace left-sided pleural effusion"]. Most likely etiology includes  limited mobility due to recent history of hospitalization for cholecystectomy . Management to include  monitoring at this time      Hyponatremia  Patient has hyponatremia which is controlled,We will aim to correct the sodium by 4-6mEq in 24 hours. We will monitor sodium Daily. The hyponatremia is due to Dehydration/hypovolemia. . We will treat the hyponatremia with IV fluids and resumption of tube feeds. The patient's sodium results have been reviewed and are listed below.  Recent Labs   Lab 02/09/24  0514    " "    Resolved    Normocytic anemia  -Patient's with Normocytic anemia..   -Hemoglobin stable.   -Etiology likely due to chronic disease .  -Current CBC reviewed-    Recent Labs   Lab 02/07/24  0223 02/08/24  0423 02/09/24  0514   HGB 9.7* 9.8* 9.5*           Component Value Date/Time    MCV 94 02/09/2024 0514    RDW 14.1 02/09/2024 0514    IRON 12 (L) 02/07/2024 0223    TIBC 195 (L) 02/07/2024 0223    FERRITIN 1,256 (H) 02/07/2024 0223         PLAN  - Monitor serial CBC and transfuse if patient becomes hemodynamically unstable, symptomatic or H/H drops below 7/21.  - Etiology consistent with anemia of chronic disease. Will CTM.    Cholecystitis  See "Common bile duct (CBD) obstruction " A&P        CVA (cerebral vascular accident)  Patient is s/p CVA with residual dysphagia and weakness. He is s/p PEG tube and receives bolus tube feedings.         Type 2 diabetes mellitus  Patient's FSGs are controlled on current medication regimen.  Last A1c reviewed-   Lab Results   Component Value Date    HGBA1C 4.8 02/05/2024     Most recent fingerstick glucose reviewed-   Recent Labs   Lab 02/08/24  1532 02/08/24  2004 02/09/24  0602 02/09/24  1153   POCTGLUCOSE 130* 104 116* 110       Current correctional scale  Low  Maintain anti-hyperglycemic dose as follows-   Antihyperglycemics (From admission, onward)      Start     Stop Route Frequency Ordered    02/04/24 2059  insulin aspart U-100 pen 0-5 Units         -- SubQ Before meals & nightly PRN 02/04/24 2000          Hold Oral hypoglycemics while patient is in the hospital.     Patient on SSI at NH.    -LDSSI  -Glucerna for tube feeds    Primary hypertension  Patient is normotensive on arrival, per chart he is not currently on any antihypertensives.     Will utilize p.r.n. blood pressure medication only if patient's blood pressure greater than 180/110 and he develops symptoms such as worsening chest pain or shortness of breath.    -amlodipine 5mg  -Holding home lisinopril in the " setting of uptrending sCr  -Will add PRN hydralazine for SBP>180 and symptomatic  -will consider uptitration of home reigmen if pressures remain uncontrolled        VTE Risk Mitigation (From admission, onward)           Ordered     IP VTE HIGH RISK PATIENT  Once         02/05/24 1410     Place sequential compression device  Until discontinued         02/04/24 2000                    Discharge Planning   PEYTON: 2/12/2024     Code Status: Full Code   Is the patient medically ready for discharge?: No    Reason for patient still in hospital (select all that apply): Patient trending condition, Treatment, Consult recommendations, and Pending disposition  Discharge Plan A: Home with family                  Cole Messina MD  Department of Hospital Medicine   [facility] - Surgery

## 2024-02-09 NOTE — ASSESSMENT & PLAN NOTE
Patient's FSGs are controlled on current medication regimen.  Last A1c reviewed-   Lab Results   Component Value Date    HGBA1C 4.8 02/05/2024     Most recent fingerstick glucose reviewed-   Recent Labs   Lab 02/08/24  1532 02/08/24 2004 02/09/24  0602 02/09/24  1153   POCTGLUCOSE 130* 104 116* 110       Current correctional scale  Low  Maintain anti-hyperglycemic dose as follows-   Antihyperglycemics (From admission, onward)      Start     Stop Route Frequency Ordered    02/04/24 2059  insulin aspart U-100 pen 0-5 Units         -- SubQ Before meals & nightly PRN 02/04/24 2000          Hold Oral hypoglycemics while patient is in the hospital.     Patient on SSI at NH.    -LDSSI  -Glucerna for tube feeds

## 2024-02-09 NOTE — PROGRESS NOTES
[facility] - Surgery  Infectious Disease  Progress Note    Patient Name: Seymour Velazquez  MRN: [id number]  Admission Date: 2/4/2024  Length of Stay: 5 days  Attending Physician: Delfina Quesada MD  Primary Care Provider: Eliazar Rosas MD    Isolation Status: No active isolations  Assessment/Plan:      GI  Cholecystitis    68 year old male with history of CVA, dysphagia, PEG tube, HTN,  DMII presents from OSH for AES eval of possible obstructing CBD stone after cholecystectomy 02/01. Patient presented to OSH with cholecystitis s/p laparoscopic cholecystectomy 2/1. Necrotic gallbladder noted with abscess that perforated c/b spilled stones and purulent bile. He was treated with empiric Meropenem. On arrival to [facility] afebrile without a leukocytosis. LFTS WNL. HDS. GI consulted. MRCP ordered and revealed a complex fluid collection in the gallbladder fossa with peripheral enhancement and spilled gallstones noted posterior to the liver. Patient underwent ERCP 2/6 and complete removal of stones accomplished.  He is now s/p drainage of fluid collection this a.m.    Recommendations  Continue Ceftriaxone and Flagyl.   Follow up IR cultures and cell count w/ diff  Will follow cultures to guide antibiotics  Discussed plan with ID staff and primary team. ID will follow.        Thank you for the consult. Please secure chat for any questions.  Lesly Alford PA-C    Subjective:     Principal Problem:Common bile duct (CBD) obstruction    HPI: Mr Velazquez is a 68 year old male with history of CVA with dysphagia s/p PEG tube, HTN, HLD, DMII presents from OSH for AES eval of possible retained stone after cholecystectomy 02/01.    Patient initially presented from NH with leukocytosis w/o specific symptoms, imaging showed cholecystitis. He underwent lap irlanda 2/1, the operative report noted the gallbladder was necrotic and fell apart. The back wall of the gallbladder had an abscess that was perforated. There were spilled stones  and purulent bile. A cholangiogram showed patent common duct with single stone at the ampulla that appears too large to pass spontaneously. Patient transferred to [facility] for possible ERCP.  While at OSH patient was treated with empiric Meropenem. On arrival, patient reports mild abdominal pain, denies N/V/D. He is AFVSS.  No leukocytosis. LFTS WNL. ID consulted for recommendations.  Patient reports being told he had a PCN allergy as a child. Cannot recall specific reaction. Gi following. Ordered MRCP.  Interval History: NAEON. Afebrile. WBC 15K today. Patient underwent IR drain placement this morning. Feeling ok post op. Still NPO and very thirsty. Cx are pending.     Review of Systems   Constitutional:  Negative for chills, diaphoresis and fever.   HENT:  Negative for congestion and sore throat.    Respiratory:  Negative for cough and shortness of breath.    Cardiovascular:  Negative for chest pain and leg swelling.   Gastrointestinal:  Positive for abdominal pain and nausea. Negative for diarrhea and vomiting.   Genitourinary:  Negative for dysuria.   Musculoskeletal:  Negative for arthralgias and myalgias.   Skin:  Negative for rash.   Neurological:  Positive for weakness. Negative for light-headedness and headaches.   Hematological:  Bruises/bleeds easily.   Psychiatric/Behavioral:  Negative for agitation and confusion. The patient is not nervous/anxious.      Objective:     Vital Signs (Most Recent):  Temp: 97.8 °F (36.6 °C) (02/09/24 1146)  Pulse: 94 (Simultaneous filing. User may not have seen previous data.) (02/09/24 1146)  Resp: 18 (02/09/24 1146)  BP: (!) 162/77 (02/09/24 1146)  SpO2: 99 % (02/09/24 1146) Vital Signs (24h Range):  Temp:  [96.8 °F (36 °C)-98.3 °F (36.8 °C)] 97.8 °F (36.6 °C)  Pulse:  [] 94  Resp:  [12-20] 18  SpO2:  [97 %-100 %] 99 %  BP: (146-172)/(64-99) 162/77     Weight: 79.6 kg (175 lb 8 oz)  Body mass index is 22.53 kg/m².    Estimated Creatinine Clearance: 66.3 mL/min (based on  SCr of 1.2 mg/dL).     Physical Exam  Vitals and nursing note reviewed.   Constitutional:       General: He is not in acute distress.     Appearance: Normal appearance. He is well-developed. He is not ill-appearing or diaphoretic.   HENT:      Head: Normocephalic and atraumatic.      Right Ear: External ear normal.      Left Ear: External ear normal.      Nose: Nose normal.      Mouth/Throat:      Mouth: Mucous membranes are dry.   Eyes:      General: No scleral icterus.        Right eye: No discharge.         Left eye: No discharge.      Conjunctiva/sclera: Conjunctivae normal.   Cardiovascular:      Rate and Rhythm: Normal rate and regular rhythm.   Pulmonary:      Effort: Pulmonary effort is normal. No respiratory distress.      Breath sounds: No stridor.   Abdominal:      General: There is no distension.      Palpations: Abdomen is soft.      Tenderness: There is abdominal tenderness.      Comments: PEG  Drain with SS output   Musculoskeletal:      Right lower leg: No edema.      Left lower leg: No edema.   Skin:     General: Skin is dry.      Coloration: Skin is pale. Skin is not jaundiced.      Findings: No erythema.   Neurological:      General: No focal deficit present.      Mental Status: He is alert and oriented to person, place, and time. Mental status is at baseline.   Psychiatric:         Mood and Affect: Mood normal.         Behavior: Behavior normal.         Thought Content: Thought content normal.         Judgment: Judgment normal.          Significant Labs: CBC:   Recent Labs   Lab 02/08/24  0423 02/09/24  0514   WBC 15.06* 15.52*   HGB 9.8* 9.5*   HCT 31.1* 29.9*    312       CMP:   Recent Labs   Lab 02/08/24  0423 02/09/24  0514    139   K 4.4 4.3    109   CO2 20* 17*   GLU 97 104   BUN 15 16   CREATININE 1.2 1.2   CALCIUM 9.6 9.1   PROT 7.1 7.0   ALBUMIN 2.3* 2.1*   BILITOT 0.4 0.4   ALKPHOS 93 80   AST 17 22   ALT 9* 8*   ANIONGAP 14 13       Microbiology Results (last 7 days)        Procedure Component Value Units Date/Time    Aerobic culture [[id number]] Collected: 02/09/24 0914    Order Status: Sent Specimen: Abscess from Abdomen Updated: 02/09/24 1139    Fungus culture [[id number]] Collected: 02/09/24 0914    Order Status: Sent Specimen: Body Fluid from Abdomen Updated: 02/09/24 1139    AFB Culture & Smear [[id number]] Collected: 02/09/24 0914    Order Status: Sent Specimen: Body Fluid from Abdomen Updated: 02/09/24 1132    Gram stain [[id number]] Collected: 02/09/24 0914    Order Status: Sent Specimen: Body Fluid from Abdomen Updated: 02/09/24 1132    Culture, Anaerobe [[id number]] Collected: 02/09/24 0914    Order Status: Sent Specimen: Body Fluid from Abdomen Updated: 02/09/24 1132          Recent Lab Results  (Last 5 results in the past 24 hours)        02/09/24  1153   02/09/24  0602   02/09/24  0514   02/08/24 2004 02/08/24  1532        Albumin     2.1           ALP     80           ALT     8           Anion Gap     13           AST     22  Comment: *Result may be interfered by visible hemolysis           Baso #     0.09           Basophil %     0.6           BILIRUBIN TOTAL     0.4  Comment: For infants and newborns, interpretation of results should be based  on gestational age, weight and in agreement with clinical  observations.    Premature Infant recommended reference ranges:  Up to 24 hours.............<8.0 mg/dL  Up to 48 hours............<12.0 mg/dL  3-5 days..................<15.0 mg/dL  6-29 days.................<15.0 mg/dL             BUN     16           Calcium     9.1           Chloride     109           CO2     17           Creatinine     1.2           Differential Method     Automated           eGFR     >60.0           Eos #     0.2           Eos %     1.0           Glucose     104           Gran # (ANC)     12.3           Gran %     79.3           Hematocrit     29.9           Hemoglobin     9.5           Immature Grans (Abs)     0.09  Comment: Mild elevation  in immature granulocytes is non specific and   can be seen in a variety of conditions including stress response,   acute inflammation, trauma and pregnancy. Correlation with other   laboratory and clinical findings is essential.             Immature Granulocytes     0.6           Lymph #     1.7           Lymph %     10.8           Magnesium      1.6           MCH     30.0           MCHC     31.8           MCV     94           Mono #     1.2           Mono %     7.7           MPV     9.5           nRBC     0           Phosphorus Level     3.7           Platelet Count     312           POCT Glucose 110   116     104   130       Potassium     4.3           PROTEIN TOTAL     7.0           RBC     3.17           RDW     14.1           Sodium     139           WBC     15.52                                  Significant Imaging:     MRI Abdomen W WO Contrast_INC MRCP (XPD) [[id number]] (Abnormal) Resulted: 02/06/24 0250   Order Status: Completed Updated: 02/06/24 0252   Narrative:     EXAMINATION:  MRI ABDOMEN WITH AND WO_INC MRCP (XPD)    CLINICAL HISTORY:  Cholelithiasis;    TECHNIQUE:  Multiplanar multisequence images of the abdomen before and after administration of 10 mL Gadavist intravenous contrast. Additional thick slab fluid sensitive MRCP sequences were obtained with maximum intensity projection images for evaluation of the biliary system.    COMPARISON:  None    FINDINGS:  Inferior Thorax: Small right-sided pleural effusion.  Trace left-sided pleural effusion.    Peritoneal Space: Small volume of intraperitoneal free air in the anterior upper abdomen.    Liver: Normal homogeneous background signal. No focal mass.    Gallbladder: Postoperative changes of cholecystectomy.  There is a complex fluid collection in the gallbladder fossa measuring 3.2 x 7.0 cm with peripheral enhancement.  There are spilled gallstones noted posterior to the liver (series 4, image 33).    Bile Ducts: Stone is noted in the distal common  bile duct (series 3, image 25).  No ductal dilation or stricture.    Pancreas: No mass or peripancreatic fat stranding.    Spleen: Enlarged, measuring 13.4 cm.    Adrenals: Unremarkable.    Bowel/Mesentery: Gastrostomy tube in place within the stomach.  Liquid stool in the proximal colon.  No evidence of bowel obstruction or inflammation.    Urinary Tract: Bilateral renal cysts.  No solid mass or evidence of obstructive uropathy.    Retroperitoneum:  No significant adenopathy.    Abdominal wall:  Normal.    Vasculature: No aneurysm.    Bones: Normal marrow.   Impression:       Postoperative changes of recent laparoscopic cholecystectomy with intraperitoneal free air in the anterior upper abdomen, slightly greater than would be expected for cholecystectomy on 02/01/2024.  Consider serial abdominal exams to evaluate for peritoneal signs.    7.0 cm fluid collection in the gallbladder fossa, likely representing abscess and/or biloma.    Choledocholithiasis.  Few spilled gallstones are noted posterior to the liver.    Bilateral pleural effusions.    This report was flagged in Epic as abnormal..    This result was discovered at approximately 01:20.  Findings were discussed via telephone by Brenda Belle MD with Indio Victor MD on 2/6/2024 at 01:40.    Electronically signed by resident: Brenda Belle  Date: 02/06/2024  Time: 02:01    Electronically signed by: Claude Harmon MD  Date: 02/06/2024  Time: 02:50

## 2024-02-09 NOTE — ASSESSMENT & PLAN NOTE
68 year old male with history of CVA, dysphagia, PEG tube, HTN,  DMII presents from OSH for AES eval of possible obstructing CBD stone after cholecystectomy 02/01. Patient presented to OSH with cholecystitis s/p laparoscopic cholecystectomy 2/1. Necrotic gallbladder noted with abscess that perforated c/b spilled stones and purulent bile. He was treated with empiric Meropenem. On arrival to [facility] afebrile without a leukocytosis. LFTS WNL. HDS. GI consulted. MRCP ordered and revealed a complex fluid collection in the gallbladder fossa with peripheral enhancement and spilled gallstones noted posterior to the liver. Patient underwent ERCP 2/6 and complete removal of stones accomplished.  He is now s/p drainage of fluid collection this a.m.    Recommendations  Continue Ceftriaxone and Flagyl.   Follow up IR cultures and cell count w/ diff  Will follow cultures to guide antibiotics  Discussed plan with ID staff and primary team. ID will follow.

## 2024-02-09 NOTE — ASSESSMENT & PLAN NOTE
68M w/ recent CVA and residual dysphagia s/p PEG tube presents from OSH for retained gallstone in ampulla, s/p lap cholecystectomy in which the gallbladder was found necrotic and friable. His only symptom is abdominal pain. Labs show leukocytosis. He is transferred for AES eval and possible ERCP.     -MRCP[02/06/24] noted fluid collection in the gallbladder fossa, concerning for  abscess vs. biloma. Also noted choledocholithiasis with gallstones posterior to the liver.  -AES performed ERCP 02/06/24 with removal of choledocolithiasis  by biliary sphincterotomy and balloon extraction.   -IR attempt at drainage of fluid collection [2/7/2024] unsuccessful 2/2 episode of N/V with associated tachycardia and hypertension  -IR reattempt at fluid drainage and drain placement [2/9/24] successful without complication. Drain in place with serosanguinous fluid.    -General Surgery consult noted no indication for surgical intervention at the time, but can consider IR drainage of fluid collection if clinical status worsens.  -Ceftriaxone/Metronidazole per ID recs  -Fluid studies and cultures pending  -AES consulted, appreciate recs  -Appreciate IR's assistance  -Scheduling reglan for N/V with additional antiemetics PRN  -LR infusion 100cc/hr

## 2024-02-09 NOTE — PROCEDURES
IR Post-Procedure Note      Pre Op Diagnosis:  RUQ fluid collection    Post Op Diagnosis:  same    Procedure:  Image guided aspiration/drain placement    Procedure performed by:  Deepak Galeana MD  /  Sima Cannon MD    Written Informed Consent Obtained: Yes    Specimen Removed:  Yes; aspirate from fluid collection    Estimated Blood Loss: minimal    Findings:    CT was used for localization of abnormal fluid collection.     Successful aspiration of RUQ collection with placement of 10F drain. A specimen was sent to the lab for further analysis and culture.    The patient tolerated procedure well and there were no complications. Please see procedure report under Imaging for further details.    Plan:    Sent specimen to lab for analysis.      Deepak Galeana MD MSCR  PGY-5 Radiology Resident

## 2024-02-09 NOTE — NURSING
Nurses Note -- 4 Eyes      2/9/2024   4:05 PM      Skin assessed during: Daily Assessment      [x] No Altered Skin Integrity Present    [x]Prevention Measures Documented      [] Yes- Altered Skin Integrity Present or Discovered   [] LDA Added if Not in Epic (Describe Wound)   [] New Altered Skin Integrity was Present on Admit and Documented in LDA   [] Wound Image Taken    Wound Care Consulted? No    Attending Nurse:  Angy Pathak RN    Second RN/Staff Member:  SOHAIL Ruby

## 2024-02-09 NOTE — ASSESSMENT & PLAN NOTE
Patient has hyponatremia which is controlled,We will aim to correct the sodium by 4-6mEq in 24 hours. We will monitor sodium Daily. The hyponatremia is due to Dehydration/hypovolemia. . We will treat the hyponatremia with IV fluids and resumption of tube feeds. The patient's sodium results have been reviewed and are listed below.  Recent Labs   Lab 02/09/24  0514        Resolved

## 2024-02-09 NOTE — SUBJECTIVE & OBJECTIVE
Interval History: LANDON. Underwent uncomplicated fluid drainage and drain placement with IR today. Patient comfortable, without inappropriate abdominal pain or nausea/vomiting, after the procedure. Will restart tube feeds and placing SLP evaluation for ability to tolerate oral fluid intake. Following fluid cultures for tailoring of antibiotics.      Objective:     Vital Signs (Most Recent):  Temp: 97.8 °F (36.6 °C) (02/09/24 1146)  Pulse: 94 (Simultaneous filing. User may not have seen previous data.) (02/09/24 1146)  Resp: 18 (02/09/24 1146)  BP: (!) 162/77 (02/09/24 1146)  SpO2: 99 % (02/09/24 1146) Vital Signs (24h Range):  Temp:  [96.8 °F (36 °C)-98.3 °F (36.8 °C)] 97.8 °F (36.6 °C)  Pulse:  [] 94  Resp:  [12-20] 18  SpO2:  [97 %-100 %] 99 %  BP: (146-172)/(64-99) 162/77     Weight: 79.6 kg (175 lb 8 oz)  Body mass index is 22.53 kg/m².    Intake/Output Summary (Last 24 hours) at 2/9/2024 1315  Last data filed at 2/9/2024 1040  Gross per 24 hour   Intake 2895.71 ml   Output 875 ml   Net 2020.71 ml           Physical Exam  Constitutional:       General: He is not in acute distress.     Appearance: Normal appearance. He is not ill-appearing, toxic-appearing or diaphoretic.   HENT:      Head: Normocephalic and atraumatic.      Mouth/Throat:      Mouth: Mucous membranes are moist.   Eyes:      General: No scleral icterus.        Right eye: No discharge.         Left eye: No discharge.   Cardiovascular:      Rate and Rhythm: Normal rate and regular rhythm.      Heart sounds: Normal heart sounds. No murmur heard.  Pulmonary:      Effort: Pulmonary effort is normal.      Breath sounds: Normal breath sounds. No wheezing or rales.   Abdominal:      General: Abdomen is flat. Bowel sounds are normal. There is no distension.      Palpations: Abdomen is soft.      Tenderness: There is abdominal tenderness (Mild TTP in RUQ). There is no guarding or rebound.      Comments: Drain in place in RUQ with serosanguinous  fluid  PEG in place in the LUQ   Musculoskeletal:         General: No swelling.      Cervical back: Normal range of motion.      Right lower leg: No edema.      Left lower leg: No edema.   Skin:     General: Skin is warm and dry.   Neurological:      General: No focal deficit present.      Mental Status: He is alert and oriented to person, place, and time. Mental status is at baseline.             Significant Labs: All pertinent labs within the past 24 hours have been reviewed.  LABS:  Recent Labs   Lab 02/07/24 0223 02/08/24 0423 02/09/24  0514   * 140 139   K 4.2 4.4 4.3    106 109   CO2 17* 20* 17*   BUN 13 15 16   CREATININE 0.9 1.2 1.2   * 97 104   ANIONGAP 16 14 13       Recent Labs   Lab 02/07/24 0223 02/08/24 0423 02/09/24  0514   MG 1.9 1.8 1.6   PHOS 4.2 3.8 3.7       Recent Labs   Lab 02/07/24 0223 02/08/24 0423 02/09/24  0514   AST 17 17 22   ALT 13 9* 8*   ALKPHOS 118 93 80   BILITOT 0.4 0.4 0.4   ALBUMIN 2.4* 2.3* 2.1*       POCT Glucose:   Recent Labs   Lab 02/08/24 2004 02/09/24  0602 02/09/24  1153   POCTGLUCOSE 104 116* 110      Recent Labs   Lab 02/07/24 0223 02/08/24 0423 02/09/24  0514   WBC 15.30* 15.06* 15.52*   HGB 9.7* 9.8* 9.5*   HCT 30.1* 31.1* 29.9*    423 312   GRAN 88.8*  13.6* 79.6*  12.0* 79.3*  12.3*              Significant Imaging: I have reviewed all pertinent imaging results/findings within the past 24 hours.  X-Ray Abdomen AP 1 View   Final Result      Radiographic findings as above.         Electronically signed by: Aden Lucero   Date:    02/07/2024   Time:    21:21      X-Ray Chest AP Portable   Final Result      No definite acute intrathoracic process seen.         Electronically signed by: Jennifer Lorenzo MD   Date:    02/07/2024   Time:    15:49      FL ERCP Biliary And Pancreatic By Non Rad Tech   Final Result      MRI Abdomen W WO Contrast_INC MRCP (XPD)   Final Result   Abnormal      Postoperative changes of recent  laparoscopic cholecystectomy with intraperitoneal free air in the anterior upper abdomen, slightly greater than would be expected for cholecystectomy on 02/01/2024.  Consider serial abdominal exams to evaluate for peritoneal signs.      7.0 cm fluid collection in the gallbladder fossa, likely representing abscess and/or biloma.      Choledocholithiasis.  Few spilled gallstones are noted posterior to the liver.      Bilateral pleural effusions.      This report was flagged in Epic as abnormal..      This result was discovered at approximately 01:20.  Findings were discussed via telephone by Brenda Belle MD with Indio Victor MD on 2/6/2024 at 01:40.      Electronically signed by resident: Brenda Belle   Date:    02/06/2024   Time:    02:01      Electronically signed by: Claude Harmon MD   Date:    02/06/2024   Time:    02:50      IR  CT Limited    (Results Pending)   IR Abscess Drainage With Tube Placement    (Results Pending)       Inpatient Medications:  Continuous Infusions:   lactated ringers 100 mL/hr at 02/09/24 1207     Scheduled Meds:   amLODIPine  5 mg Per G Tube Daily    atorvastatin  80 mg Per G Tube QHS    busPIRone  7.5 mg Per G Tube Daily    cefTRIAXone (Rocephin) IV (PEDS and ADULTS)  2 g Intravenous Q24H    famotidine  20 mg Per G Tube BID    metoclopramide  5 mg Intravenous Q8H    metronidazole  500 mg Intravenous Q8H    morphine        senna  8.6 mg Per G Tube Daily    sertraline  25 mg Per G Tube Daily     PRN Meds:dextrose 10%, dextrose 10%, glucagon (human recombinant), glucose, glucose, hydrALAZINE, insulin aspart U-100, melatonin, morphine, morphine, naloxone, ondansetron, prochlorperazine, sodium chloride 0.9%    Review of Systems   Constitutional:  Negative for activity change and fever.   Respiratory:  Negative for shortness of breath.    Cardiovascular:  Negative for chest pain and palpitations.   Gastrointestinal:  Positive for nausea. Negative for abdominal pain and vomiting.    Genitourinary:  Negative for difficulty urinating.   Neurological:  Negative for headaches.

## 2024-02-09 NOTE — PLAN OF CARE
Problem: Diabetes Comorbidity  Goal: Blood Glucose Level Within Targeted Range  Outcome: Ongoing, Progressing  Intervention: Monitor and Manage Glycemia  Flowsheets (Taken 2/9/2024 0637)  Glycemic Management: blood glucose monitored     Problem: Diabetes Comorbidity  Goal: Blood Glucose Level Within Targeted Range  Outcome: Ongoing, Progressing  Intervention: Monitor and Manage Glycemia  Flowsheets (Taken 2/9/2024 0637)  Glycemic Management: blood glucose monitored     Problem: Diabetes Comorbidity  Goal: Blood Glucose Level Within Targeted Range  Intervention: Monitor and Manage Glycemia  Flowsheets (Taken 2/9/2024 0637)  Glycemic Management: blood glucose monitored     Problem: Skin Injury Risk Increased  Goal: Skin Health and Integrity  Outcome: Ongoing, Progressing     Problem: Skin Injury Risk Increased  Goal: Skin Health and Integrity  Outcome: Ongoing, Progressing

## 2024-02-09 NOTE — H&P
VIR Pre-procedure H&P         History of Present Illness:  Seymour Velazquez is a 68 y.o. male with a PMHx of stroke with G-tube placement, hypertension, diabetes, hyperlipidemia, recent cholecystitis s/p lap irlanda 2/1. Interventional Radiology has been consulted for image guided percutaneous drain placement/aspiration for management of complex fluid collection in the gallbladder fossa.           Admission H&P reviewed.  Past Medical History:   Diagnosis Date    CVA (cerebral vascular accident)     DM2 (diabetes mellitus, type 2)     Dysphagia     HLD (hyperlipidemia)     HTN (hypertension)      Past Surgical History:   Procedure Laterality Date    ABDOMINAL SURGERY      ERCP N/A 2/6/2024    Procedure: ERCP (ENDOSCOPIC RETROGRADE CHOLANGIOPANCREATOGRAPHY);  Surgeon: Jeff Stearns MD;  Location: [address]);  Service: Endoscopy;  Laterality: N/A;    INSERTION, PEG TUBE         Review of Systems:   As documented in primary team H&P    Home Meds:   Prior to Admission medications    Medication Sig Start Date End Date Taking? Authorizing Provider   aspirin 81 MG Chew Take 81 mg by mouth once daily.    Provider, Historical   busPIRone (BUSPAR) 7.5 MG tablet Take 7.5 mg by mouth once daily.    Provider, Historical   fluticasone propionate (FLONASE) 50 mcg/actuation nasal spray 1 spray by Each Nostril route 2 (two) times a day.    Provider, Historical   megestroL (MEGACE ES) 625 mg/5 mL (125 mg/mL) Susp Take 625 mg by mouth once daily.    Provider, Historical   metoclopramide HCl (REGLAN) 5 mg/5 mL Soln Take 5 mg by mouth 3 (three) times daily.    Provider, Historical   omeprazole (PRILOSEC) 40 MG capsule Take 40 mg by mouth every morning.    Provider, Historical   ondansetron (ZOFRAN) 4 MG tablet Take 4 mg by mouth every 8 (eight) hours as needed for Nausea.    Provider, Historical   senna (SENOKOT) 8.6 mg tablet Take 1 tablet by mouth once daily.    Provider, Historical   sertraline (ZOLOFT) 25 MG tablet Take  25 mg by mouth once daily.    Provider, Historical   sucralfate (CARAFATE) 100 mg/mL suspension Take 1 g by mouth 4 (four) times daily before meals and nightly.    Provider, Historical   traZODone (DESYREL) 50 MG tablet Take 50 mg by mouth every evening.    Provider, Historical     Scheduled Meds:    amLODIPine  5 mg Per G Tube Daily    atorvastatin  80 mg Per G Tube QHS    busPIRone  7.5 mg Per G Tube Daily    cefTRIAXone (Rocephin) IV (PEDS and ADULTS)  2 g Intravenous Q24H    famotidine  20 mg Per G Tube BID    metoclopramide  5 mg Intravenous Q8H    metronidazole  500 mg Intravenous Q8H    senna  8.6 mg Per G Tube Daily    sertraline  25 mg Per G Tube Daily     Continuous Infusions:    lactated ringers 125 mL/hr at 02/09/24 0557     PRN Meds:dextrose 10%, dextrose 10%, glucagon (human recombinant), glucose, glucose, hydrALAZINE, insulin aspart U-100, melatonin, morphine, naloxone, ondansetron, prochlorperazine, sodium chloride 0.9%  Anticoagulants/Antiplatelets: no anticoagulation    Allergies:   Review of patient's allergies indicates:   Allergen Reactions    Pcn [penicillins]           Labs:  Recent Labs   Lab 02/07/24 0905   INR 1.3*       Recent Labs   Lab 02/09/24 0514   WBC 15.52*   HGB 9.5*   HCT 29.9*   MCV 94         Recent Labs   Lab 02/09/24 0514         K 4.3      CO2 17*   BUN 16   CREATININE 1.2   CALCIUM 9.1   MG 1.6   ALT 8*   AST 22   ALBUMIN 2.1*   BILITOT 0.4         Vitals:  Temp: 98 °F (36.7 °C) (02/09/24 0302)  Pulse: 99 (02/09/24 0302)  Resp: 16 (02/09/24 0302)  BP: (!) 155/74 (02/09/24 0302)  SpO2: 99 % (02/09/24 0302)     Physical Exam:  ASA: III  Mallampati: II    General: no acute distress  Mental Status:   oriented to person, place and time  HEENT: normocephalic, atraumatic  Chest: unlabored breathing  Heart: regular heart rate  Abdomen: mild tenderness RUQ  Extremity: no focal deficit       ASSESSMENT/PLAN:   Seymour Velazquez is a 68 y.o. male with a  PMHx of stroke with G-tube placement, hypertension, diabetes, hyperlipidemia, recent cholecystitis s/p lap [place] 2/1. Interventional Radiology has been consulted for image guided percutaneous drain placement/aspiration for management of complex fluid collection in the gallbladder fossa.      Sedation Plan: local/moderate sedation  Patient will undergo image guided drain placement.      Sharon Lloyd MD  VIR Fellow

## 2024-02-09 NOTE — ASSESSMENT & PLAN NOTE
Hx of PEG  tube placement s/p CVA    PLAN:  -Will start trickle feeds with Glucerna as tolerated by patient  -Nutrition consulted for tube feed recs

## 2024-02-10 LAB
ALBUMIN SERPL BCP-MCNC: 2 G/DL (ref 3.5–5.2)
ALP SERPL-CCNC: 70 U/L (ref 55–135)
ALT SERPL W/O P-5'-P-CCNC: 7 U/L (ref 10–44)
ANION GAP SERPL CALC-SCNC: 11 MMOL/L (ref 8–16)
AST SERPL-CCNC: 13 U/L (ref 10–40)
BASOPHILS # BLD AUTO: 0.09 K/UL (ref 0–0.2)
BASOPHILS NFR BLD: 0.9 % (ref 0–1.9)
BILIRUB SERPL-MCNC: 0.3 MG/DL (ref 0.1–1)
BUN SERPL-MCNC: 18 MG/DL (ref 8–23)
CALCIUM SERPL-MCNC: 8.7 MG/DL (ref 8.7–10.5)
CHLORIDE SERPL-SCNC: 108 MMOL/L (ref 95–110)
CK SERPL-CCNC: 47 U/L (ref 20–200)
CO2 SERPL-SCNC: 21 MMOL/L (ref 23–29)
CREAT SERPL-MCNC: 1.6 MG/DL (ref 0.5–1.4)
DIFFERENTIAL METHOD BLD: ABNORMAL
EOSINOPHIL # BLD AUTO: 0.2 K/UL (ref 0–0.5)
EOSINOPHIL NFR BLD: 2.2 % (ref 0–8)
ERYTHROCYTE [DISTWIDTH] IN BLOOD BY AUTOMATED COUNT: 13.8 % (ref 11.5–14.5)
EST. GFR  (NO RACE VARIABLE): 46.6 ML/MIN/1.73 M^2
GLUCOSE SERPL-MCNC: 101 MG/DL (ref 70–110)
HCT VFR BLD AUTO: 29.9 % (ref 40–54)
HGB BLD-MCNC: 9.5 G/DL (ref 14–18)
IMM GRANULOCYTES # BLD AUTO: 0.05 K/UL (ref 0–0.04)
IMM GRANULOCYTES NFR BLD AUTO: 0.5 % (ref 0–0.5)
LYMPHOCYTES # BLD AUTO: 1.4 K/UL (ref 1–4.8)
LYMPHOCYTES NFR BLD: 13.2 % (ref 18–48)
MAGNESIUM SERPL-MCNC: 1.4 MG/DL (ref 1.6–2.6)
MCH RBC QN AUTO: 29.5 PG (ref 27–31)
MCHC RBC AUTO-ENTMCNC: 31.8 G/DL (ref 32–36)
MCV RBC AUTO: 93 FL (ref 82–98)
MONOCYTES # BLD AUTO: 0.9 K/UL (ref 0.3–1)
MONOCYTES NFR BLD: 8.3 % (ref 4–15)
NEUTROPHILS # BLD AUTO: 7.8 K/UL (ref 1.8–7.7)
NEUTROPHILS NFR BLD: 74.9 % (ref 38–73)
NRBC BLD-RTO: 0 /100 WBC
PHOSPHATE SERPL-MCNC: 3.3 MG/DL (ref 2.7–4.5)
PLATELET # BLD AUTO: 349 K/UL (ref 150–450)
PMV BLD AUTO: 8.4 FL (ref 9.2–12.9)
POCT GLUCOSE: 108 MG/DL (ref 70–110)
POCT GLUCOSE: 110 MG/DL (ref 70–110)
POCT GLUCOSE: 110 MG/DL (ref 70–110)
POCT GLUCOSE: 141 MG/DL (ref 70–110)
POTASSIUM SERPL-SCNC: 3.5 MMOL/L (ref 3.5–5.1)
PROT SERPL-MCNC: 6.4 G/DL (ref 6–8.4)
RBC # BLD AUTO: 3.22 M/UL (ref 4.6–6.2)
SODIUM SERPL-SCNC: 140 MMOL/L (ref 136–145)
WBC # BLD AUTO: 10.37 K/UL (ref 3.9–12.7)

## 2024-02-10 PROCEDURE — 63600175 PHARM REV CODE 636 W HCPCS

## 2024-02-10 PROCEDURE — 21400001 HC TELEMETRY ROOM

## 2024-02-10 PROCEDURE — 36415 COLL VENOUS BLD VENIPUNCTURE: CPT

## 2024-02-10 PROCEDURE — 83735 ASSAY OF MAGNESIUM: CPT

## 2024-02-10 PROCEDURE — 25000003 PHARM REV CODE 250: Performed by: PHYSICIAN ASSISTANT

## 2024-02-10 PROCEDURE — 63600175 PHARM REV CODE 636 W HCPCS: Performed by: PHYSICIAN ASSISTANT

## 2024-02-10 PROCEDURE — 36415 COLL VENOUS BLD VENIPUNCTURE: CPT | Mod: XB | Performed by: STUDENT IN AN ORGANIZED HEALTH CARE EDUCATION/TRAINING PROGRAM

## 2024-02-10 PROCEDURE — 97535 SELF CARE MNGMENT TRAINING: CPT

## 2024-02-10 PROCEDURE — 99223 1ST HOSP IP/OBS HIGH 75: CPT | Mod: ,,, | Performed by: INTERNAL MEDICINE

## 2024-02-10 PROCEDURE — 82550 ASSAY OF CK (CPK): CPT | Performed by: STUDENT IN AN ORGANIZED HEALTH CARE EDUCATION/TRAINING PROGRAM

## 2024-02-10 PROCEDURE — 25000003 PHARM REV CODE 250

## 2024-02-10 PROCEDURE — 80053 COMPREHEN METABOLIC PANEL: CPT

## 2024-02-10 PROCEDURE — 84100 ASSAY OF PHOSPHORUS: CPT

## 2024-02-10 PROCEDURE — 85025 COMPLETE CBC W/AUTO DIFF WBC: CPT

## 2024-02-10 PROCEDURE — 63600175 PHARM REV CODE 636 W HCPCS: Mod: JZ,JG | Performed by: INTERNAL MEDICINE

## 2024-02-10 PROCEDURE — 92610 EVALUATE SWALLOWING FUNCTION: CPT

## 2024-02-10 PROCEDURE — 25000003 PHARM REV CODE 250: Performed by: HOSPITALIST

## 2024-02-10 RX ORDER — METRONIDAZOLE 500 MG/100ML
500 INJECTION, SOLUTION INTRAVENOUS
Status: DISCONTINUED | OUTPATIENT
Start: 2024-02-10 | End: 2024-02-16

## 2024-02-10 RX ORDER — MAGNESIUM SULFATE HEPTAHYDRATE 40 MG/ML
2 INJECTION, SOLUTION INTRAVENOUS ONCE
Status: COMPLETED | OUTPATIENT
Start: 2024-02-10 | End: 2024-02-10

## 2024-02-10 RX ORDER — FAMOTIDINE 20 MG/1
20 TABLET, FILM COATED ORAL DAILY
Status: DISCONTINUED | OUTPATIENT
Start: 2024-02-11 | End: 2024-02-13

## 2024-02-10 RX ADMIN — MAGNESIUM SULFATE HEPTAHYDRATE 2 G: 40 INJECTION, SOLUTION INTRAVENOUS at 10:02

## 2024-02-10 RX ADMIN — BUSPIRONE HYDROCHLORIDE 7.5 MG: 5 TABLET ORAL at 10:02

## 2024-02-10 RX ADMIN — ENOXAPARIN SODIUM 40 MG: 40 INJECTION SUBCUTANEOUS at 05:02

## 2024-02-10 RX ADMIN — POTASSIUM BICARBONATE 40 MEQ: 391 TABLET, EFFERVESCENT ORAL at 05:02

## 2024-02-10 RX ADMIN — Medication 6 MG: at 09:02

## 2024-02-10 RX ADMIN — AMLODIPINE BESYLATE 5 MG: 5 TABLET ORAL at 10:02

## 2024-02-10 RX ADMIN — METOCLOPRAMIDE HYDROCHLORIDE 5 MG: 10 INJECTION, SOLUTION INTRAMUSCULAR; INTRAVENOUS at 03:02

## 2024-02-10 RX ADMIN — FAMOTIDINE 20 MG: 20 TABLET, FILM COATED ORAL at 10:02

## 2024-02-10 RX ADMIN — METOCLOPRAMIDE HYDROCHLORIDE 5 MG: 10 INJECTION, SOLUTION INTRAMUSCULAR; INTRAVENOUS at 05:02

## 2024-02-10 RX ADMIN — METOCLOPRAMIDE HYDROCHLORIDE 5 MG: 10 INJECTION, SOLUTION INTRAMUSCULAR; INTRAVENOUS at 09:02

## 2024-02-10 RX ADMIN — CEFTRIAXONE 2 G: 2 INJECTION, POWDER, FOR SOLUTION INTRAMUSCULAR; INTRAVENOUS at 03:02

## 2024-02-10 RX ADMIN — ATORVASTATIN CALCIUM 80 MG: 40 TABLET, FILM COATED ORAL at 09:02

## 2024-02-10 RX ADMIN — MORPHINE SULFATE 2 MG: 2 INJECTION, SOLUTION INTRAMUSCULAR; INTRAVENOUS at 08:02

## 2024-02-10 RX ADMIN — ASPIRIN 81 MG CHEWABLE TABLET 81 MG: 81 TABLET CHEWABLE at 10:02

## 2024-02-10 RX ADMIN — METRONIDAZOLE 500 MG: 500 INJECTION, SOLUTION INTRAVENOUS at 11:02

## 2024-02-10 RX ADMIN — SENNOSIDES 8.6 MG: 8.6 TABLET, FILM COATED ORAL at 10:02

## 2024-02-10 RX ADMIN — METRONIDAZOLE 500 MG: 500 INJECTION, SOLUTION INTRAVENOUS at 08:02

## 2024-02-10 RX ADMIN — SERTRALINE HYDROCHLORIDE 25 MG: 25 TABLET ORAL at 10:02

## 2024-02-10 NOTE — PROGRESS NOTES
[facility] - Surgery  [facility]  Progress Note    Patient Name: Seymour Velazquez  MRN: [id number]  Patient Class: IP- Inpatient   Admission Date: 2/4/2024  Length of Stay: 6 days  Attending Physician: Delfina Quesada MD  Primary Care Provider: Eliazar Rosas MD        Subjective:     Principal Problem:Common bile duct (CBD) obstruction        HPI:  68M w/PMH stroke with G-tube placement, hypertension, diabetes, hyperlipidemia, recent cholecystitis, and dysphagia presents as transfer from OSH for AES eval. He initially presented from NH with leukocytosis w/o specific symptoms, imaging showed cholecystitis. Underwent lap [place]land[place] 2/1, the operative report noted the gallbladder was necrotic and fell apart. The back wall of the gallbladder had an abscess that was perforated. There were spilled stones and purulent bile. A cholangiogram showed patent common duct with single stone at the ampulla that appears too large to pass spontaneously. Patient is transferred to C for possible ERCP.     On arrival, patient reports some abdominal pain, denies N/V/D. He is AFVSS. Admitted to  for further management.     Overview/Hospital Course:  Patient admitted to Hospital Medicine service for medical management and evaluation of suspected CBD obstruction following complicated laparoscopic cholecystectomy. AES was consulted on admission with recommendation of MRCP. MRCP w/ and w/o contrast currently pending. ID was consulted with continuation of Merrem from outside hospital. Merrem de-escalated to Rocephin/Flagyl per ID recs. List of home medications was obtained from home facility and were continued as appropriate. MRCP[02/06/24] noted fluid collection in the gallbladder fossa, concerning for  abscess vs. biloma. Also noted choledocholithiasis with gallstones posterior to the liver. AES performed ERCP 02/06/24 with removal of choledocolithiasis  by biliary sphincterotomy and balloon extraction. General Surgery consult  noted no indication surgical intervention at the time, but can consider IR drainage of fluid collection if clinical status worsens. Intermittent episodes of N/V with associated tachycardia throughout hospitalization, without acute abdomen. Unable to complete IR drainage of fluid collection on 2/7 2/2 N/V. Scheduling antiemetics for better control. Repeat attempt at fluid drainage and drain placement with IR successful without complication morning of 2/9. Slowly evolving AURORA was not responsive to increased IVF resuscitation; urine studies inconsistent with pre-renal etiology. Consulting Nephrology for further assistance.    Interval History: NAEON. AURORA worsening today; will consult Nephrology for concern of ATN. Patient received Vancomycin yesterday intra-op. Tolerating slow basal rate tube feeds. Will trial bolus feeds this evening. Nausea/vomiting has improved; PRNs available as needed. Drain with 25cc of serosanguinous fluid. Appropriate RUQ tenderness.      Objective:     Vital Signs (Most Recent):  Temp: 97 °F (36.1 °C) (02/10/24 1157)  Pulse: 96 (02/10/24 1157)  Resp: 18 (02/10/24 1157)  BP: (!) 162/75 (02/10/24 1157)  SpO2: 98 % (02/10/24 1157) Vital Signs (24h Range):  Temp:  [97 °F (36.1 °C)-98.2 °F (36.8 °C)] 97 °F (36.1 °C)  Pulse:  [] 96  Resp:  [16-18] 18  SpO2:  [98 %-100 %] 98 %  BP: (138-162)/(69-80) 162/75     Weight: 79.6 kg (175 lb 8 oz)  Body mass index is 22.53 kg/m².    Intake/Output Summary (Last 24 hours) at 2/10/2024 1336  Last data filed at 2/10/2024 0417  Gross per 24 hour   Intake 46 ml   Output 875 ml   Net -829 ml           Physical Exam  Constitutional:       General: He is not in acute distress.     Appearance: Normal appearance. He is not ill-appearing, toxic-appearing or diaphoretic.   HENT:      Head: Normocephalic and atraumatic.      Mouth/Throat:      Mouth: Mucous membranes are moist.   Eyes:      General: No scleral icterus.        Right eye: No discharge.         Left  eye: No discharge.   Cardiovascular:      Rate and Rhythm: Normal rate and regular rhythm.      Heart sounds: Normal heart sounds. No murmur heard.  Pulmonary:      Effort: Pulmonary effort is normal.      Breath sounds: Normal breath sounds. No wheezing or rales.   Abdominal:      General: Abdomen is flat. Bowel sounds are normal. There is no distension.      Palpations: Abdomen is soft.      Tenderness: There is abdominal tenderness (Mild TTP in RUQ). There is no guarding or rebound.      Comments: Drain in place in RUQ with serosanguinous fluid  PEG in place in the LUQ   Musculoskeletal:         General: No swelling.      Cervical back: Normal range of motion.      Right lower leg: No edema.      Left lower leg: No edema.   Skin:     General: Skin is warm and dry.   Neurological:      General: No focal deficit present.      Mental Status: He is alert and oriented to person, place, and time. Mental status is at baseline.             Significant Labs: All pertinent labs within the past 24 hours have been reviewed.  LABS:  Recent Labs   Lab 02/08/24 0423 02/09/24  0514 02/10/24  0426    139 140   K 4.4 4.3 3.5    109 108   CO2 20* 17* 21*   BUN 15 16 18   CREATININE 1.2 1.2 1.6*   GLU 97 104 101   ANIONGAP 14 13 11       Recent Labs   Lab 02/08/24  0423 02/09/24  0514 02/10/24  0426   MG 1.8 1.6 1.4*   PHOS 3.8 3.7 3.3       Recent Labs   Lab 02/08/24  0423 02/09/24  0514 02/10/24  0426   AST 17 22 13   ALT 9* 8* 7*   ALKPHOS 93 80 70   BILITOT 0.4 0.4 0.3   ALBUMIN 2.3* 2.1* 2.0*       POCT Glucose:   Recent Labs   Lab 02/10/24  0002 02/10/24  0606 02/10/24  1208   POCTGLUCOSE 110 108 110      Recent Labs   Lab 02/08/24  0423 02/09/24  0514 02/10/24  0426   WBC 15.06* 15.52* 10.37   HGB 9.8* 9.5* 9.5*   HCT 31.1* 29.9* 29.9*    312 349   GRAN 79.6*  12.0* 79.3*  12.3* 74.9*  7.8*              Significant Imaging: I have reviewed all pertinent imaging results/findings within the past 24  hours.  IR Abscess Drainage With Tube Placement   Final Result      Percutaneous placement of a 10 [place] drainage catheter into right upper quadrant collection, yielding 5 mL of bloody fluid.      Plan:      Tube to suction      Flush drain with 10 cc normal saline daily      Consider repeat imaging to evaluate for drain removal once drain output is < 10 cc daily for 2 consecutive days with clinical improvement.      Electronically signed by resident: Deepak Galeana   Date:    02/09/2024   Time:    12:55      Electronically signed by: Sima Cannon   Date:    02/09/2024   Time:    14:09      X-Ray Abdomen AP 1 View   Final Result      Radiographic findings as above.         Electronically signed by: Aden Lucero   Date:    02/07/2024   Time:    21:21      X-Ray Chest AP Portable   Final Result      No definite acute intrathoracic process seen.         Electronically signed by: Jennifer Lorenzo MD   Date:    02/07/2024   Time:    15:49      FL ERCP Biliary And Pancreatic By Non Rad Tech   Final Result      MRI Abdomen W WO Contrast_INC MRCP (XPD)   Final Result   Abnormal      Postoperative changes of recent laparoscopic cholecystectomy with intraperitoneal free air in the anterior upper abdomen, slightly greater than would be expected for cholecystectomy on 02/01/2024.  Consider serial abdominal exams to evaluate for peritoneal signs.      7.0 cm fluid collection in the gallbladder fossa, likely representing abscess and/or biloma.      Choledocholithiasis.  Few spilled gallstones are noted posterior to the liver.      Bilateral pleural effusions.      This report was flagged in Epic as abnormal..      This result was discovered at approximately 01:20.  Findings were discussed via telephone by Brenda Belle MD with Indio Victor MD on 2/6/2024 at 01:40.      Electronically signed by resident: Brenda Belle   Date:    02/06/2024   Time:    02:01      Electronically signed by: Claude Harmon MD    Date:    02/06/2024   Time:    02:50      IR  CT Limited    (Results Pending)   US Retroperitoneal Complete    (Results Pending)       Inpatient Medications:  Continuous Infusions:   lactated ringers 100 mL/hr at 02/09/24 1207     Scheduled Meds:   amLODIPine  5 mg Per G Tube Daily    aspirin  81 mg Per G Tube Daily    atorvastatin  80 mg Per G Tube QHS    busPIRone  7.5 mg Per G Tube Daily    cefTRIAXone (Rocephin) IV (PEDS and ADULTS)  2 g Intravenous Q24H    enoxparin  40 mg Subcutaneous Q24H (prophylaxis, 1700)    famotidine  20 mg Per G Tube BID    metoclopramide  5 mg Intravenous Q8H    metronidazole  500 mg Intravenous Q8H    potassium bicarbonate  40 mEq Per G Tube Q2H    senna  8.6 mg Per G Tube Daily    sertraline  25 mg Per G Tube Daily     PRN Meds:dextrose 10%, dextrose 10%, glucagon (human recombinant), glucose, glucose, hydrALAZINE, insulin aspart U-100, melatonin, morphine, naloxone, ondansetron, prochlorperazine, sodium chloride 0.9%    Review of Systems   Constitutional:  Negative for activity change and fever.   Respiratory:  Negative for shortness of breath.    Cardiovascular:  Negative for chest pain and palpitations.   Gastrointestinal:  Positive for nausea. Negative for abdominal pain and vomiting.   Genitourinary:  Negative for difficulty urinating.   Neurological:  Negative for headaches.       Assessment/Plan:      * Common bile duct (CBD) obstruction  68M w/ recent CVA and residual dysphagia s/p PEG tube presents from OSH for retained gallstone in ampulla, s/p lap cholecystectomy in which the gallbladder was found necrotic and friable. His only symptom is abdominal pain. Labs show leukocytosis. He is transferred for AES eval and possible ERCP.     -MRCP[02/06/24] noted fluid collection in the gallbladder fossa, concerning for  abscess vs. biloma. Also noted choledocholithiasis with gallstones posterior to the liver.  -AES performed ERCP 02/06/24 with removal of choledocolithiasis  by  biliary sphincterotomy and balloon extraction.   -IR attempt at drainage of fluid collection [2/7/2024] unsuccessful 2/2 episode of N/V with associated tachycardia and hypertension  -IR reattempt at fluid drainage and drain placement [2/9/24] successful without complication. Drain in place with serosanguinous fluid.    -General Surgery consult noted no indication for surgical intervention at the time, but can consider IR drainage of fluid collection if clinical status worsens.  -Ceftriaxone/Metronidazole per ID recs  -Fluid studies and cultures with no evidence of bacterial growth thus far  -AES consulted, appreciate recs  -Appreciate IR's assistance  -Scheduling reglan for N/V with additional antiemetics PRN  -LR infusion 100cc/hr      AURORA (acute kidney injury)  Patient with acute kidney injury/acute renal failure likely due to pre-renal azotemia due to dehydration AURORA is currently worsening. Baseline creatinine  0.8  - Labs reviewed- Renal function/electrolytes with Estimated Creatinine Clearance: 49.8 mL/min (A) (based on SCr of 1.6 mg/dL (H)). according to latest data. Monitor urine output and serial BMP and adjust therapy as needed. Avoid nephrotoxins and renally dose meds for GFR listed above.    Patient with slow uptrend in sCr and jump from 1.2 on 2/9 to 1/6 on 2/10. Patient received Vancomycin intra-op 2/9 and a contrasted study during this hospitalization. Consulting Nephrology for concern of intrinsic injury. Urine studies 2/9: U-Na 60, U-Urea 312, U-Creatinine 57.    Plan  - Trending daily CMP  - Nephrology consulted, appreciate recs  - f/u repeat UA and retroperitoneal U/S  - Avoid nephrotoxic agents, NSAIDS, renally dose meds    Tachycardia  Patient noted to have sinus tachycardia up to HR 140s in the setting of acute episodes of nausea and vomiting. Likely appropriate response to stressor.    -CTM for hemodynamic instability  -EKG PRN for chest pain or hemodynamic instability    On tube feeding  "diet  Hx of PEG  tube placement s/p CVA. Tolerated slow basal rate feeds well thus far following fluid drainage.     PLAN:  -Will trial bolus feeds this evening  -Nutrition consulted for tube feed recs      Abdominal fluid collection  See "Common bile duct (CBD) obstruction " A&P        Choledocholithiasis  See "Common bile duct (CBD) obstruction " A&P        Bilateral pleural effusion  Patient found to have small pleural effusion on imaging [MRCP(02/06/24)"Small right-sided pleural effusion.  Trace left-sided pleural effusion"]. Most likely etiology includes  limited mobility due to recent history of hospitalization for cholecystectomy . Management to include  monitoring at this time      Hyponatremia  Patient has hyponatremia which is controlled,We will aim to correct the sodium by 4-6mEq in 24 hours. We will monitor sodium Daily. The hyponatremia is due to Dehydration/hypovolemia. . We will treat the hyponatremia with IV fluids and resumption of tube feeds. The patient's sodium results have been reviewed and are listed below.  Recent Labs   Lab 02/10/24  0426        Resolved    Normocytic anemia  -Patient's with Normocytic anemia..   -Hemoglobin stable.   -Etiology likely due to chronic disease .  -Current CBC reviewed-    Recent Labs   Lab 02/08/24  0423 02/09/24  0514 02/10/24  0426   HGB 9.8* 9.5* 9.5*           Component Value Date/Time    MCV 93 02/10/2024 0426    RDW 13.8 02/10/2024 0426    IRON 12 (L) 02/07/2024 0223    TIBC 195 (L) 02/07/2024 0223    FERRITIN 1,256 (H) 02/07/2024 0223         PLAN  - Monitor serial CBC and transfuse if patient becomes hemodynamically unstable, symptomatic or H/H drops below 7/21.  - Etiology consistent with anemia of chronic disease. Will CTM.    Cholecystitis  See "Common bile duct (CBD) obstruction " A&P        CVA (cerebral vascular accident)  Patient is s/p CVA with residual dysphagia and weakness. He is s/p PEG tube and receives bolus tube feedings. "         Type 2 diabetes mellitus  Patient's FSGs are controlled on current medication regimen.  Last A1c reviewed-   Lab Results   Component Value Date    HGBA1C 4.8 02/05/2024     Most recent fingerstick glucose reviewed-   Recent Labs   Lab 02/09/24  1751 02/10/24  0002 02/10/24  0606 02/10/24  1208   POCTGLUCOSE 104 110 108 110       Current correctional scale  Low  Maintain anti-hyperglycemic dose as follows-   Antihyperglycemics (From admission, onward)      Start     Stop Route Frequency Ordered    02/04/24 2059  insulin aspart U-100 pen 0-5 Units         -- SubQ Before meals & nightly PRN 02/04/24 2000          Hold Oral hypoglycemics while patient is in the hospital.     Patient on SSI at NH.    -LDSSI  -Glucerna for tube feeds    Primary hypertension  Patient is normotensive on arrival, per chart he is not currently on any antihypertensives.     Will utilize p.r.n. blood pressure medication only if patient's blood pressure greater than 180/110 and he develops symptoms such as worsening chest pain or shortness of breath.    -amlodipine 5mg  -Holding home lisinopril in the setting of AURORA  -Will add PRN hydralazine for SBP>180 and symptomatic  -will consider uptitration of home reigmen if pressures remain uncontrolled        VTE Risk Mitigation (From admission, onward)           Ordered     enoxaparin injection 40 mg  Every 24 hours         02/09/24 1408     IP VTE HIGH RISK PATIENT  Once         02/05/24 1410     Place sequential compression device  Until discontinued         02/04/24 2000                    Discharge Planning   PEYTON: 2/12/2024     Code Status: Full Code   Is the patient medically ready for discharge?: No    Reason for patient still in hospital (select all that apply): Patient trending condition, Treatment, Consult recommendations, and Pending disposition  Discharge Plan A: Home with family                  Cole Messina MD  Department of Hospital Medicine   [facility] - Surgery

## 2024-02-10 NOTE — ASSESSMENT & PLAN NOTE
-Patient's with Normocytic anemia..   -Hemoglobin stable.   -Etiology likely due to chronic disease .  -Current CBC reviewed-    Recent Labs   Lab 02/08/24  0423 02/09/24  0514 02/10/24  0426   HGB 9.8* 9.5* 9.5*           Component Value Date/Time    MCV 93 02/10/2024 0426    RDW 13.8 02/10/2024 0426    IRON 12 (L) 02/07/2024 0223    TIBC 195 (L) 02/07/2024 0223    FERRITIN 1,256 (H) 02/07/2024 0223         PLAN  - Monitor serial CBC and transfuse if patient becomes hemodynamically unstable, symptomatic or H/H drops below 7/21.  - Etiology consistent with anemia of chronic disease. Will CTM.

## 2024-02-10 NOTE — ASSESSMENT & PLAN NOTE
Patient with acute kidney injury/acute renal failure likely due to pre-renal azotemia due to dehydration AURORA is currently worsening. Baseline creatinine  0.8  - Labs reviewed- Renal function/electrolytes with Estimated Creatinine Clearance: 49.8 mL/min (A) (based on SCr of 1.6 mg/dL (H)). according to latest data. Monitor urine output and serial BMP and adjust therapy as needed. Avoid nephrotoxins and renally dose meds for GFR listed above.    Patient with slow uptrend in sCr and jump from 1.2 on 2/9 to 1/6 on 2/10. Patient received Vancomycin intra-op 2/9 and a contrasted study during this hospitalization. Consulting Nephrology for concern of intrinsic injury. Urine studies 2/9: U-Na 60, U-Urea 312, U-Creatinine 57.    Plan  - Trending daily CMP  - Nephrology consulted, appreciate recs  - f/u repeat UA and retroperitoneal U/S  - Avoid nephrotoxic agents, NSAIDS, renally dose meds

## 2024-02-10 NOTE — ASSESSMENT & PLAN NOTE
Patient is normotensive on arrival, per chart he is not currently on any antihypertensives.     Will utilize p.r.n. blood pressure medication only if patient's blood pressure greater than 180/110 and he develops symptoms such as worsening chest pain or shortness of breath.    -amlodipine 5mg  -Holding home lisinopril in the setting of AURORA  -Will add PRN hydralazine for SBP>180 and symptomatic  -will consider uptitration of home reigmen if pressures remain uncontrolled

## 2024-02-10 NOTE — ASSESSMENT & PLAN NOTE
Patient has hyponatremia which is controlled,We will aim to correct the sodium by 4-6mEq in 24 hours. We will monitor sodium Daily. The hyponatremia is due to Dehydration/hypovolemia. . We will treat the hyponatremia with IV fluids and resumption of tube feeds. The patient's sodium results have been reviewed and are listed below.  Recent Labs   Lab 02/10/24  0426        Resolved

## 2024-02-10 NOTE — CONSULTS
[facility] - Surgery  Nephrology  Consult Note    Patient Name: Seymour Velazquez  MRN: [id number]  Admission Date: 2/4/2024  Hospital Length of Stay: 6 days  Attending Provider: Delfina Quesada MD   Primary Care Physician: Eliazar Rosas MD  Principal Problem:Common bile duct (CBD) obstruction    Inpatient consult to Nephrology  Consult performed by: Kory Larkin MD  Consult ordered by: Cole Messina MD        Subjective:     HPI: 69 y/o M w/ PMH HTN, DM II, HLD, CVS s/p PEG who was [place] [facility] [place] following complicated cholecystectomy for AES evaluation and management of post-operative complications. Patient underwent lap irlanda on 2/1 at outside hospital, with significant complications including perforated GB abscess, then transferred [place] [facility] [place] where he was initially continued on Meropenem then transitioned to Ceftiriaxone and Flagyl per ID on 2/5 then underwent ERCP on 2/6. Throughout hospitalization patient has dealt with persistent nausea and vomiting, treated with scheduled antiemetics. On 2/9 patient underwent image guided drain placement w/ IR. Patient's baseline Cr is 0.8-1.0, elevated to 1.2 on 2/8, increasing to 1.6 on 2/10, for which consult to nephrology was placed in order to further evaluate AURORA despite IVF resuscitation.     Past Medical History:   Diagnosis Date    CVA (cerebral vascular accident)     DM2 (diabetes mellitus, type 2)     Dysphagia     HLD (hyperlipidemia)     HTN (hypertension)        Past Surgical History:   Procedure Laterality Date    ABDOMINAL SURGERY      ERCP N/A 2/6/2024    Procedure: ERCP (ENDOSCOPIC RETROGRADE CHOLANGIOPANCREATOGRAPHY);  Surgeon: Jeff Stearns MD;  Location: [address];  Service: Endoscopy;  Laterality: N/A;    INSERTION, PEG TUBE         Review of patient's allergies indicates:   Allergen Reactions    Pcn [penicillins]      Current Facility-Administered Medications   Medication Frequency    amLODIPine tablet 5 mg  Daily    aspirin chewable tablet 81 mg Daily    atorvastatin tablet 80 mg QHS    busPIRone split tablet 7.5 mg Daily    cefTRIAXone (ROCEPHIN) 2 g in dextrose 5 % in water (D5W) 100 mL IVPB (MB+) Q24H    dextrose 10% bolus 125 mL 125 mL PRN    dextrose 10% bolus 250 mL 250 mL PRN    enoxaparin injection 40 mg Q24H (prophylaxis, 1700)    famotidine tablet 20 mg BID    glucagon (human recombinant) injection 1 mg PRN    glucose chewable tablet 16 g PRN    glucose chewable tablet 24 g PRN    hydrALAZINE injection 10 mg Q6H PRN    insulin aspart U-100 pen 0-5 Units QID (AC + HS) PRN    lactated ringers infusion Continuous    melatonin tablet 6 mg Nightly PRN    metoclopramide injection 5 mg Q8H    metronidazole IVPB 500 mg Q8H    morphine injection 2 mg Q4H PRN    naloxone 0.4 mg/mL injection 0.02 mg PRN    ondansetron injection 8 mg Q6H PRN    potassium bicarbonate disintegrating tablet 40 mEq Q2H    prochlorperazine injection Soln 5 mg Q6H PRN    senna tablet 8.6 mg Daily    sertraline tablet 25 mg Daily    sodium chloride 0.9% flush 10 mL Q12H PRN     Family History    None       Tobacco Use    Smoking status: Never    Smokeless tobacco: Never   Substance and Sexual Activity    Alcohol use: Not Currently    Drug use: Not on file    Sexual activity: Not on file     Review of Systems   Constitutional:  Positive for activity change.   HENT: Negative.     Eyes: Negative.    Respiratory:  Negative for shortness of breath.    Cardiovascular:  Negative for chest pain.   Gastrointestinal:  Positive for abdominal pain and nausea.   Genitourinary: Negative.    Musculoskeletal: Negative.    Skin: Negative.    Neurological: Negative.    Psychiatric/Behavioral: Negative.       Objective:     Vital Signs (Most Recent):  Temp: 97 °F (36.1 °C) (02/10/24 1157)  Pulse: 96 (02/10/24 1157)  Resp: 18 (02/10/24 1157)  BP: (!) 162/75 (02/10/24 1157)  SpO2: 98 % (02/10/24 1157) Vital Signs (24h Range):  Temp:  [97 °F (36.1 °C)-98.2 °F (36.8  °C)] 97 °F (36.1 °C)  Pulse:  [] 96  Resp:  [16-18] 18  SpO2:  [98 %-100 %] 98 %  BP: (138-162)/(69-80) 162/75     Weight: 79.6 kg (175 lb 8 oz) (02/04/24 1920)  Body mass index is 22.53 kg/m².  Body surface area is 2.04 meters squared.    I/O last 3 completed shifts:  In: 2941.7 [I.V.:2195.8; NG/GT:216; IV Piggyback:529.9]  Out: 1750 [Urine:1725; Drains:25]     Physical Exam  HENT:      Head: Normocephalic.      Nose: Nose normal.      Mouth/Throat:      Mouth: Mucous membranes are moist.   Eyes:      Pupils: Pupils are equal, round, and reactive to light.   Cardiovascular:      Rate and Rhythm: Regular rhythm. Tachycardia present.   Pulmonary:      Effort: Pulmonary effort is normal.   Abdominal:      General: Abdomen is flat.      Tenderness: There is abdominal tenderness.      Comments: PEG in place   Genitourinary:     Comments: Dark brown urine in pacheco bag  Musculoskeletal:      Right lower leg: No edema.      Left lower leg: No edema.   Skin:     General: Skin is warm.      Coloration: Skin is not jaundiced.   Neurological:      Mental Status: He is alert.          Significant Labs:  All labs within the past 24 hours have been reviewed.    Significant Imaging:     Assessment/Plan:     Renal/  AURORA (acute kidney injury)  Cr steadily rising from 0.8-->1.6 despite adequate fluid resuscitation. Adequate urinary output. Received Gadavist and Omnipaque injections intra-procedurally on 2/6, as well as one dose of Vancomycin 2/9. Lisinopril discontinued 2/8. Stable MAPs throughout hospitalization. Urine studies not suggestive of pre-renal etiology. High suspicion for intrinsic renal injury, ATN, possibly in setting of infection vs exposure to nephrotoxic agents.     Plan:  -Renal ultrasound  -Repeat UA to assess for ATN  -Keep MAP above 65  -Daily BMP  -Strict Is and Os  -Avoid exposure to nephrotoxic substances.         Thank you for your consult. I will follow-up with patient. Please contact us if you have  any additional questions.    Kory Mclaughlin MD  Nephrology  [facility] - Surgery

## 2024-02-10 NOTE — SUBJECTIVE & OBJECTIVE
Past Medical History:   Diagnosis Date    CVA (cerebral vascular accident)     DM2 (diabetes mellitus, type 2)     Dysphagia     HLD (hyperlipidemia)     HTN (hypertension)        Past Surgical History:   Procedure Laterality Date    ABDOMINAL SURGERY      ERCP N/A 2/6/2024    Procedure: ERCP (ENDOSCOPIC RETROGRADE CHOLANGIOPANCREATOGRAPHY);  Surgeon: Jeff Stearns MD;  Location: [address]);  Service: Endoscopy;  Laterality: N/A;    INSERTION, PEG TUBE         Review of patient's allergies indicates:   Allergen Reactions    Pcn [penicillins]      Current Facility-Administered Medications   Medication Frequency    amLODIPine tablet 5 mg Daily    aspirin chewable tablet 81 mg Daily    atorvastatin tablet 80 mg QHS    busPIRone split tablet 7.5 mg Daily    cefTRIAXone (ROCEPHIN) 2 g in dextrose 5 % in water (D5W) 100 mL IVPB (MB+) Q24H    dextrose 10% bolus 125 mL 125 mL PRN    dextrose 10% bolus 250 mL 250 mL PRN    enoxaparin injection 40 mg Q24H (prophylaxis, 1700)    famotidine tablet 20 mg BID    glucagon (human recombinant) injection 1 mg PRN    glucose chewable tablet 16 g PRN    glucose chewable tablet 24 g PRN    hydrALAZINE injection 10 mg Q6H PRN    insulin aspart U-100 pen 0-5 Units QID (AC + HS) PRN    lactated ringers infusion Continuous    melatonin tablet 6 mg Nightly PRN    metoclopramide injection 5 mg Q8H    metronidazole IVPB 500 mg Q8H    morphine injection 2 mg Q4H PRN    naloxone 0.4 mg/mL injection 0.02 mg PRN    ondansetron injection 8 mg Q6H PRN    potassium bicarbonate disintegrating tablet 40 mEq Q2H    prochlorperazine injection Soln 5 mg Q6H PRN    senna tablet 8.6 mg Daily    sertraline tablet 25 mg Daily    sodium chloride 0.9% flush 10 mL Q12H PRN     Family History    None       Tobacco Use    Smoking status: Never    Smokeless tobacco: Never   Substance and Sexual Activity    Alcohol use: Not Currently    Drug use: Not on file    Sexual activity: Not on file     Review of  Systems   Constitutional:  Positive for activity change.   HENT: Negative.     Eyes: Negative.    Respiratory:  Negative for shortness of breath.    Cardiovascular:  Negative for chest pain.   Gastrointestinal:  Positive for abdominal pain and nausea.   Genitourinary: Negative.    Musculoskeletal: Negative.    Skin: Negative.    Neurological: Negative.    Psychiatric/Behavioral: Negative.       Objective:     Vital Signs (Most Recent):  Temp: 97 °F (36.1 °C) (02/10/24 1157)  Pulse: 96 (02/10/24 1157)  Resp: 18 (02/10/24 1157)  BP: (!) 162/75 (02/10/24 1157)  SpO2: 98 % (02/10/24 1157) Vital Signs (24h Range):  Temp:  [97 °F (36.1 °C)-98.2 °F (36.8 °C)] 97 °F (36.1 °C)  Pulse:  [] 96  Resp:  [16-18] 18  SpO2:  [98 %-100 %] 98 %  BP: (138-162)/(69-80) 162/75     Weight: 79.6 kg (175 lb 8 oz) (02/04/24 1920)  Body mass index is 22.53 kg/m².  Body surface area is 2.04 meters squared.    I/O last 3 completed shifts:  In: 2941.7 [I.V.:2195.8; NG/GT:216; IV Piggyback:529.9]  Out: 1750 [Urine:1725; Drains:25]     Physical Exam  HENT:      Head: Normocephalic.      Nose: Nose normal.      Mouth/Throat:      Mouth: Mucous membranes are moist.   Eyes:      Pupils: Pupils are equal, round, and reactive to light.   Cardiovascular:      Rate and Rhythm: Regular rhythm. Tachycardia present.   Pulmonary:      Effort: Pulmonary effort is normal.   Abdominal:      General: Abdomen is flat.      Tenderness: There is abdominal tenderness.      Comments: PEG in place   Genitourinary:     Comments: Dark brown urine in pacheco bag  Musculoskeletal:      Right lower leg: No edema.      Left lower leg: No edema.   Skin:     General: Skin is warm.      Coloration: Skin is not jaundiced.   Neurological:      Mental Status: He is alert.          Significant Labs:  All labs within the past 24 hours have been reviewed.    Significant Imaging:

## 2024-02-10 NOTE — ASSESSMENT & PLAN NOTE
Hx of PEG  tube placement s/p CVA. Tolerated slow basal rate feeds well thus far following fluid drainage.     PLAN:  -Will trial bolus feeds this evening  -Nutrition consulted for tube feed recs

## 2024-02-10 NOTE — PT/OT/SLP EVAL
Speech Language Pathology Evaluation  Bedside Swallow    Patient Name:  Seymour Velazquez   MRN:  [id number]  Admitting Diagnosis: Common bile duct (CBD) obstruction    Recommendations:                 General Recommendations:   ongoing swallowing assessment; possible recommendations for MBSS  Diet recommendations:  Pleasure Feeding (thin liquids and purees),     Aspiration Precautions: Continue alternate means of nutrition, Frequent oral care, HOB to 90 degrees, Monitor for s/s of aspiration, Purees and thin liquids for pleasure, Small bites/sips, and Strict aspiration precautions   General Precautions: Standard, aspiration, fall, NPO  Communication strategies:  provide increased time to answer and go to room if call light pushed    Assessment:     Seymour Velazquez is a 68 y.o. male with an SLP diagnosis of Dysphagia.      History:     Past Medical History:   Diagnosis Date    CVA (cerebral vascular accident)     DM2 (diabetes mellitus, type 2)     Dysphagia     HLD (hyperlipidemia)     HTN (hypertension)        Past Surgical History:   Procedure Laterality Date    ABDOMINAL SURGERY      ERCP N/A 2/6/2024    Procedure: ERCP (ENDOSCOPIC RETROGRADE CHOLANGIOPANCREATOGRAPHY);  Surgeon: Jeff Stearns MD;  Location: [address];  Service: Endoscopy;  Laterality: N/A;    INSERTION, PEG TUBE       HPI: 68M w/PMH stroke with G-tube placement, hypertension, diabetes, hyperlipidemia, recent cholecystitis, and dysphagia presents as transfer from OSH for AES eval. He initially presented from NH with leukocytosis w/o specific symptoms, imaging showed cholecystitis. Underwent lap irlanda 2/1, the operative report noted the gallbladder was necrotic and fell apart. The back wall of the gallbladder had an abscess that was perforated. There were spilled stones and purulent bile. A cholangiogram showed patent common duct with single stone at the ampulla that appears too large to pass spontaneously. Patient is transferred  "to [facility] for possible ERCP.       Social History: Patient reports having in [facility] (nursing home) for past 2 months. He reports prior to this he was living at home with family.  Pt having difficulty providing complete history regarding previous stroke and rehabilitation services received. He did recall having undergone a MBSS, but was unable to state if he was found to be aspirating. Pt reports receiving water but no other PO intake at the nursing home.  He does not recall what was being address when he last received SLP services.     Prior Intubation HX:  none during this admission    Modified Barium Swallow: none on file within [facility] system    Chest X-Rays: 2/7/24: No definite acute intrathoracic process seen.     Prior diet: PEG tube (per H&P, since stroke in 2022); pt reports receiving water by mouth at NH, but no other intake.     Subjective     "Can I have some water,please?"    Pain/Comfort:  Pain Rating 1: 0/10    Respiratory Status: Room air    Objective:     Oral Musculature Evaluation  Oral Musculature: WFL (slight right sided asymmetry)  Dentition: present and adequate  Secretion Management: adequate  Mucosal Quality: adequate  Mandibular Strength and Mobility: WFL  Oral Labial Strength and Mobility: WFL  Lingual Strength and Mobility: WFL  Buccal Strength and Mobility: WFL  Volitional Cough: reduced strength  Volitional Swallow: did not elicit upon command  Voice Prior to PO Intake: dry, clear, decreased volume    Bedside Swallow Eval:   Consistencies Assessed:  Thin liquids ice chip x 1, 1/2 tsp x 1, full tsp x 1, cup sip x 1, straw sips x 6  Puree 1/2 tsp x 1, full tsp x 3      Oral Phase:   WFL    Pharyngeal Phase:   Coughing present after pt took large cyclical straw sips, but no s/s of aspiration observed with remaining single sips or with pureed trials.    Compensatory Strategies  Monitoring rate and bolus size    Treatment: Education was provided to pt regarding role of SLP, purpose of " swallowing assessment, concerns for h/o aspiration, PEG tube as continued primary means of nutrition, initiation of thin liquid and purees for pleasure, aspiration precautions, possible need for MBSS to determine if safe for more advance PO intake, and SLP treatment plan and POC.  Pt demonstrated understanding of education provided, but will benefit from continued reinforcement.    Goals:   Multidisciplinary Problems       SLP Goals          Problem: SLP    Goal Priority Disciplines Outcome   SLP Goal     SLP    Description: Speech Language Pathology Goals  Goals expected to be met by 2/17:  1. Pt will tolerate thin liquids and purees for pleasure without s/s of aspiration while following aspiration precautions.   2. Pt will participate in ongoing swallowing assessment to determine if MBSS and/or advanced textures are appropriate.                                Plan:     Patient to be seen:  4 x/week   Plan of Care expires:  03/11/24  Plan of Care reviewed with:  patient   SLP Follow-Up:  Yes       Discharge recommendations:   (tbd)     Time Tracking:     SLP Treatment Date:   02/10/24  Speech Start Time:  1057  Speech Stop Time:  1114     Speech Total Time (min):  17 min    Billable Minutes: Eval Swallow and Oral Function 9 and Self Care/Home Management Training 8    02/10/2024

## 2024-02-10 NOTE — ASSESSMENT & PLAN NOTE
68M w/ recent CVA and residual dysphagia s/p PEG tube presents from OSH for retained gallstone in ampulla, s/p lap cholecystectomy in which the gallbladder was found necrotic and friable. His only symptom is abdominal pain. Labs show leukocytosis. He is transferred for AES eval and possible ERCP.     -MRCP[02/06/24] noted fluid collection in the gallbladder fossa, concerning for  abscess vs. biloma. Also noted choledocholithiasis with gallstones posterior to the liver.  -AES performed ERCP 02/06/24 with removal of choledocolithiasis  by biliary sphincterotomy and balloon extraction.   -IR attempt at drainage of fluid collection [2/7/2024] unsuccessful 2/2 episode of N/V with associated tachycardia and hypertension  -IR reattempt at fluid drainage and drain placement [2/9/24] successful without complication. Drain in place with serosanguinous fluid.    -General Surgery consult noted no indication for surgical intervention at the time, but can consider IR drainage of fluid collection if clinical status worsens.  -Ceftriaxone/Metronidazole per ID recs  -Fluid studies and cultures with no evidence of bacterial growth thus far  -AES consulted, appreciate recs  -Appreciate IR's assistance  -Scheduling reglan for N/V with additional antiemetics PRN  -LR infusion 100cc/hr

## 2024-02-10 NOTE — ASSESSMENT & PLAN NOTE
Patient's FSGs are controlled on current medication regimen.  Last A1c reviewed-   Lab Results   Component Value Date    HGBA1C 4.8 02/05/2024     Most recent fingerstick glucose reviewed-   Recent Labs   Lab 02/09/24  1751 02/10/24  0002 02/10/24  0606 02/10/24  1208   POCTGLUCOSE 104 110 108 110       Current correctional scale  Low  Maintain anti-hyperglycemic dose as follows-   Antihyperglycemics (From admission, onward)    Start     Stop Route Frequency Ordered    02/04/24 2059  insulin aspart U-100 pen 0-5 Units         -- SubQ Before meals & nightly PRN 02/04/24 2000        Hold Oral hypoglycemics while patient is in the hospital.     Patient on SSI at NH.    -LDSSI  -Glucerna for tube feeds

## 2024-02-10 NOTE — HPI
69 y/o M w/ PMH HTN, DM II, HLD, CVS s/p PEG who was transferred to [facility] [place] from OSH following complicated cholecystectomy for AES evaluation and management of post-operative complications. Patient underwent lap irlanda on 2/1 at outside hospital, with significant complications including perforated GB abscess, then transferred to [facility] main [place] where he was initially continued on Meropenem then transitioned to Ceftiriaxone and Flagyl per ID on 2/5 then underwent ERCP on 2/6. Throughout hospitalization patient has dealt with persistent nausea and vomiting, treated with scheduled antiemetics. On 2/9 patient underwent image guided drain placement w/ IR. Patient's baseline Cr is 0.8-1.0, elevated to 1.2 on 2/8, increasing to 1.6 on 2/10, for which consult to nephrology was placed in order to further evaluate AURORA despite IVF resuscitation.

## 2024-02-10 NOTE — ASSESSMENT & PLAN NOTE
Cr steadily rising from 0.8-->1.6 despite adequate fluid resuscitation. Adequate urinary output. Received Gadavist and Omnipaque injections intra-procedurally on 2/6, as well as one dose of Vancomycin 2/9. Lisinopril discontinued 2/8. Stable MAPs throughout hospitalization. Urine studies not suggestive of pre-renal etiology. High suspicion for intrinsic renal injury, ATN, possibly in setting of infection vs exposure to nephrotoxic agents.     Plan:  -Renal ultrasound  -Repeat UA to assess for ATN  -Keep MAP above 65  -Daily BMP  -Strict Is and Os  -Avoid exposure to nephrotoxic substances.

## 2024-02-10 NOTE — PROGRESS NOTES
Pharmacist Renal Dose Adjustment Note    Seymour Velazquez is a 68 y.o. male being treated with the medication famotidine    Patient Data:    Vital Signs (Most Recent):  Temp: 97 °F (36.1 °C) (02/10/24 1157)  Pulse: 96 (02/10/24 1157)  Resp: 18 (02/10/24 1157)  BP: (!) 162/75 (02/10/24 1157)  SpO2: 98 % (02/10/24 1157) Vital Signs (72h Range):  Temp:  [96 °F (35.6 °C)-98.3 °F (36.8 °C)]   Pulse:  []   Resp:  [12-20]   BP: (138-194)/()   SpO2:  [97 %-100 %]      Recent Labs   Lab 02/08/24  0423 02/09/24  0514 02/10/24  0426   CREATININE 1.2 1.2 1.6*     Serum creatinine: 1.6 mg/dL (H) 02/10/24 0426  Estimated creatinine clearance: 49.8 mL/min (A)    Medication:famotidine 20 mg twice daily will be changed to famotidine 20 mg daily.    Pharmacist's Name: Fernanda Zamorano, PharmD  PGY-2 Oncology Pharmacy Resident  Spectra link: [id number]

## 2024-02-10 NOTE — SUBJECTIVE & OBJECTIVE
Interval History: NAEON. AURORA worsening today; will consult Nephrology for concern of ATN. Patient received Vancomycin yesterday intra-op. Tolerating slow basal rate tube feeds. Will trial bolus feeds this evening. Nausea/vomiting has improved; PRNs available as needed. Drain with 25cc of serosanguinous fluid. Appropriate RUQ tenderness.      Objective:     Vital Signs (Most Recent):  Temp: 97 °F (36.1 °C) (02/10/24 1157)  Pulse: 96 (02/10/24 1157)  Resp: 18 (02/10/24 1157)  BP: (!) 162/75 (02/10/24 1157)  SpO2: 98 % (02/10/24 1157) Vital Signs (24h Range):  Temp:  [97 °F (36.1 °C)-98.2 °F (36.8 °C)] 97 °F (36.1 °C)  Pulse:  [] 96  Resp:  [16-18] 18  SpO2:  [98 %-100 %] 98 %  BP: (138-162)/(69-80) 162/75     Weight: 79.6 kg (175 lb 8 oz)  Body mass index is 22.53 kg/m².    Intake/Output Summary (Last 24 hours) at 2/10/2024 1336  Last data filed at 2/10/2024 0417  Gross per 24 hour   Intake 46 ml   Output 875 ml   Net -829 ml           Physical Exam  Constitutional:       General: He is not in acute distress.     Appearance: Normal appearance. He is not ill-appearing, toxic-appearing or diaphoretic.   HENT:      Head: Normocephalic and atraumatic.      Mouth/Throat:      Mouth: Mucous membranes are moist.   Eyes:      General: No scleral icterus.        Right eye: No discharge.         Left eye: No discharge.   Cardiovascular:      Rate and Rhythm: Normal rate and regular rhythm.      Heart sounds: Normal heart sounds. No murmur heard.  Pulmonary:      Effort: Pulmonary effort is normal.      Breath sounds: Normal breath sounds. No wheezing or rales.   Abdominal:      General: Abdomen is flat. Bowel sounds are normal. There is no distension.      Palpations: Abdomen is soft.      Tenderness: There is abdominal tenderness (Mild TTP in RUQ). There is no guarding or rebound.      Comments: Drain in place in RUQ with serosanguinous fluid  PEG in place in the LUQ   Musculoskeletal:         General: No swelling.       Cervical back: Normal range of motion.      Right lower leg: No edema.      Left lower leg: No edema.   Skin:     General: Skin is warm and dry.   Neurological:      General: No focal deficit present.      Mental Status: He is alert and oriented to person, place, and time. Mental status is at baseline.             Significant Labs: All pertinent labs within the past 24 hours have been reviewed.  LABS:  Recent Labs   Lab 02/08/24  0423 02/09/24  0514 02/10/24  0426    139 140   K 4.4 4.3 3.5    109 108   CO2 20* 17* 21*   BUN 15 16 18   CREATININE 1.2 1.2 1.6*   GLU 97 104 101   ANIONGAP 14 13 11       Recent Labs   Lab 02/08/24  0423 02/09/24  0514 02/10/24  0426   MG 1.8 1.6 1.4*   PHOS 3.8 3.7 3.3       Recent Labs   Lab 02/08/24  0423 02/09/24  0514 02/10/24  0426   AST 17 22 13   ALT 9* 8* 7*   ALKPHOS 93 80 70   BILITOT 0.4 0.4 0.3   ALBUMIN 2.3* 2.1* 2.0*       POCT Glucose:   Recent Labs   Lab 02/10/24  0002 02/10/24  0606 02/10/24  1208   POCTGLUCOSE 110 108 110      Recent Labs   Lab 02/08/24  0423 02/09/24  0514 02/10/24  0426   WBC 15.06* 15.52* 10.37   HGB 9.8* 9.5* 9.5*   HCT 31.1* 29.9* 29.9*    312 349   GRAN 79.6*  12.0* 79.3*  12.3* 74.9*  7.8*              Significant Imaging: I have reviewed all pertinent imaging results/findings within the past 24 hours.  IR Abscess Drainage With Tube Placement   Final Result      Percutaneous placement of a 10 Upper sorbian drainage catheter into right upper quadrant collection, yielding 5 mL of bloody fluid.      Plan:      Tube to suction      Flush drain with 10 cc normal saline daily      Consider repeat imaging to evaluate for drain removal once drain output is < 10 cc daily for 2 consecutive days with clinical improvement.      Electronically signed by resident: Deepak Galeana   Date:    02/09/2024   Time:    12:55      Electronically signed by: Sima Cannon   Date:    02/09/2024   Time:    14:09      X-Ray Abdomen AP 1 View   Final  Result      Radiographic findings as above.         Electronically signed by: Aden Lucero   Date:    02/07/2024   Time:    21:21      X-Ray Chest AP Portable   Final Result      No definite acute intrathoracic process seen.         Electronically signed by: Jennifer Lorenzo MD   Date:    02/07/2024   Time:    15:49      FL ERCP Biliary And Pancreatic By Non Rad Tech   Final Result      MRI Abdomen W WO Contrast_INC MRCP (XPD)   Final Result   Abnormal      Postoperative changes of recent laparoscopic cholecystectomy with intraperitoneal free air in the anterior upper abdomen, slightly greater than would be expected for cholecystectomy on 02/01/2024.  Consider serial abdominal exams to evaluate for peritoneal signs.      7.0 cm fluid collection in the gallbladder fossa, likely representing abscess and/or biloma.      Choledocholithiasis.  Few spilled gallstones are noted posterior to the liver.      Bilateral pleural effusions.      This report was flagged in Epic as abnormal..      This result was discovered at approximately 01:20.  Findings were discussed via telephone by Brenda Belle MD with Indio Victor MD on 2/6/2024 at 01:40.      Electronically signed by resident: Brenda Belle   Date:    02/06/2024   Time:    02:01      Electronically signed by: Claude Harmon MD   Date:    02/06/2024   Time:    02:50      IR  CT Limited    (Results Pending)   US Retroperitoneal Complete    (Results Pending)       Inpatient Medications:  Continuous Infusions:   lactated ringers 100 mL/hr at 02/09/24 1207     Scheduled Meds:   amLODIPine  5 mg Per G Tube Daily    aspirin  81 mg Per G Tube Daily    atorvastatin  80 mg Per G Tube QHS    busPIRone  7.5 mg Per G Tube Daily    cefTRIAXone (Rocephin) IV (PEDS and ADULTS)  2 g Intravenous Q24H    enoxparin  40 mg Subcutaneous Q24H (prophylaxis, 1700)    famotidine  20 mg Per G Tube BID    metoclopramide  5 mg Intravenous Q8H    metronidazole  500 mg Intravenous Q8H     potassium bicarbonate  40 mEq Per G Tube Q2H    senna  8.6 mg Per G Tube Daily    sertraline  25 mg Per G Tube Daily     PRN Meds:dextrose 10%, dextrose 10%, glucagon (human recombinant), glucose, glucose, hydrALAZINE, insulin aspart U-100, melatonin, morphine, naloxone, ondansetron, prochlorperazine, sodium chloride 0.9%    Review of Systems   Constitutional:  Negative for activity change and fever.   Respiratory:  Negative for shortness of breath.    Cardiovascular:  Negative for chest pain and palpitations.   Gastrointestinal:  Positive for nausea. Negative for abdominal pain and vomiting.   Genitourinary:  Negative for difficulty urinating.   Neurological:  Negative for headaches.

## 2024-02-11 PROBLEM — E87.1 HYPONATREMIA: Status: RESOLVED | Noted: 2024-02-06 | Resolved: 2024-02-11

## 2024-02-11 LAB
ALBUMIN SERPL BCP-MCNC: 1.9 G/DL (ref 3.5–5.2)
ALP SERPL-CCNC: 75 U/L (ref 55–135)
ALT SERPL W/O P-5'-P-CCNC: 8 U/L (ref 10–44)
ANION GAP SERPL CALC-SCNC: 6 MMOL/L (ref 8–16)
AST SERPL-CCNC: 14 U/L (ref 10–40)
BASOPHILS # BLD AUTO: 0.07 K/UL (ref 0–0.2)
BASOPHILS NFR BLD: 0.7 % (ref 0–1.9)
BILIRUB SERPL-MCNC: 0.2 MG/DL (ref 0.1–1)
BUN SERPL-MCNC: 23 MG/DL (ref 8–23)
CALCIUM SERPL-MCNC: 8.5 MG/DL (ref 8.7–10.5)
CHLORIDE SERPL-SCNC: 107 MMOL/L (ref 95–110)
CO2 SERPL-SCNC: 25 MMOL/L (ref 23–29)
CREAT SERPL-MCNC: 1.7 MG/DL (ref 0.5–1.4)
DIFFERENTIAL METHOD BLD: ABNORMAL
EOSINOPHIL # BLD AUTO: 0.2 K/UL (ref 0–0.5)
EOSINOPHIL NFR BLD: 2.1 % (ref 0–8)
ERYTHROCYTE [DISTWIDTH] IN BLOOD BY AUTOMATED COUNT: 13.7 % (ref 11.5–14.5)
EST. GFR  (NO RACE VARIABLE): 43.4 ML/MIN/1.73 M^2
GLUCOSE SERPL-MCNC: 141 MG/DL (ref 70–110)
HCT VFR BLD AUTO: 29 % (ref 40–54)
HGB BLD-MCNC: 9.1 G/DL (ref 14–18)
IMM GRANULOCYTES # BLD AUTO: 0.05 K/UL (ref 0–0.04)
IMM GRANULOCYTES NFR BLD AUTO: 0.5 % (ref 0–0.5)
LYMPHOCYTES # BLD AUTO: 1.4 K/UL (ref 1–4.8)
LYMPHOCYTES NFR BLD: 13.1 % (ref 18–48)
MAGNESIUM SERPL-MCNC: 1.6 MG/DL (ref 1.6–2.6)
MCH RBC QN AUTO: 29.4 PG (ref 27–31)
MCHC RBC AUTO-ENTMCNC: 31.4 G/DL (ref 32–36)
MCV RBC AUTO: 94 FL (ref 82–98)
MONOCYTES # BLD AUTO: 0.8 K/UL (ref 0.3–1)
MONOCYTES NFR BLD: 7.7 % (ref 4–15)
NEUTROPHILS # BLD AUTO: 8.1 K/UL (ref 1.8–7.7)
NEUTROPHILS NFR BLD: 75.9 % (ref 38–73)
NRBC BLD-RTO: 0 /100 WBC
PHOSPHATE SERPL-MCNC: 2.7 MG/DL (ref 2.7–4.5)
PLATELET # BLD AUTO: 406 K/UL (ref 150–450)
PMV BLD AUTO: 8.7 FL (ref 9.2–12.9)
POCT GLUCOSE: 131 MG/DL (ref 70–110)
POCT GLUCOSE: 137 MG/DL (ref 70–110)
POCT GLUCOSE: 140 MG/DL (ref 70–110)
POCT GLUCOSE: 147 MG/DL (ref 70–110)
POTASSIUM SERPL-SCNC: 3.3 MMOL/L (ref 3.5–5.1)
PROT SERPL-MCNC: 6.3 G/DL (ref 6–8.4)
RBC # BLD AUTO: 3.09 M/UL (ref 4.6–6.2)
SODIUM SERPL-SCNC: 138 MMOL/L (ref 136–145)
WBC # BLD AUTO: 10.69 K/UL (ref 3.9–12.7)

## 2024-02-11 PROCEDURE — 80053 COMPREHEN METABOLIC PANEL: CPT

## 2024-02-11 PROCEDURE — A4216 STERILE WATER/SALINE, 10 ML: HCPCS | Performed by: INTERNAL MEDICINE

## 2024-02-11 PROCEDURE — 63600175 PHARM REV CODE 636 W HCPCS

## 2024-02-11 PROCEDURE — 63600175 PHARM REV CODE 636 W HCPCS: Performed by: HOSPITALIST

## 2024-02-11 PROCEDURE — 25000003 PHARM REV CODE 250

## 2024-02-11 PROCEDURE — 36415 COLL VENOUS BLD VENIPUNCTURE: CPT

## 2024-02-11 PROCEDURE — 25000003 PHARM REV CODE 250: Performed by: INTERNAL MEDICINE

## 2024-02-11 PROCEDURE — C1751 CATH, INF, PER/CENT/MIDLINE: HCPCS

## 2024-02-11 PROCEDURE — 63600175 PHARM REV CODE 636 W HCPCS: Performed by: STUDENT IN AN ORGANIZED HEALTH CARE EDUCATION/TRAINING PROGRAM

## 2024-02-11 PROCEDURE — 51702 INSERT TEMP BLADDER CATH: CPT

## 2024-02-11 PROCEDURE — 76937 US GUIDE VASCULAR ACCESS: CPT

## 2024-02-11 PROCEDURE — 25000003 PHARM REV CODE 250: Performed by: STUDENT IN AN ORGANIZED HEALTH CARE EDUCATION/TRAINING PROGRAM

## 2024-02-11 PROCEDURE — 36410 VNPNXR 3YR/> PHY/QHP DX/THER: CPT

## 2024-02-11 PROCEDURE — 63600175 PHARM REV CODE 636 W HCPCS: Mod: JZ,JG | Performed by: INTERNAL MEDICINE

## 2024-02-11 PROCEDURE — 85025 COMPLETE CBC W/AUTO DIFF WBC: CPT

## 2024-02-11 PROCEDURE — 21400001 HC TELEMETRY ROOM

## 2024-02-11 PROCEDURE — 83735 ASSAY OF MAGNESIUM: CPT

## 2024-02-11 PROCEDURE — 84100 ASSAY OF PHOSPHORUS: CPT

## 2024-02-11 RX ORDER — SODIUM CHLORIDE 0.9 % (FLUSH) 0.9 %
10 SYRINGE (ML) INJECTION
Status: DISCONTINUED | OUTPATIENT
Start: 2024-02-11 | End: 2024-02-23 | Stop reason: HOSPADM

## 2024-02-11 RX ORDER — SODIUM CHLORIDE 0.9 % (FLUSH) 0.9 %
10 SYRINGE (ML) INJECTION EVERY 6 HOURS
Status: DISCONTINUED | OUTPATIENT
Start: 2024-02-11 | End: 2024-02-23 | Stop reason: HOSPADM

## 2024-02-11 RX ORDER — MAGNESIUM SULFATE HEPTAHYDRATE 40 MG/ML
2 INJECTION, SOLUTION INTRAVENOUS ONCE
Status: COMPLETED | OUTPATIENT
Start: 2024-02-11 | End: 2024-02-11

## 2024-02-11 RX ADMIN — MAGNESIUM SULFATE HEPTAHYDRATE 2 G: 40 INJECTION, SOLUTION INTRAVENOUS at 05:02

## 2024-02-11 RX ADMIN — Medication 10 ML: at 05:02

## 2024-02-11 RX ADMIN — BUSPIRONE HYDROCHLORIDE 7.5 MG: 5 TABLET ORAL at 09:02

## 2024-02-11 RX ADMIN — METRONIDAZOLE 500 MG: 500 INJECTION, SOLUTION INTRAVENOUS at 04:02

## 2024-02-11 RX ADMIN — ENOXAPARIN SODIUM 40 MG: 40 INJECTION SUBCUTANEOUS at 05:02

## 2024-02-11 RX ADMIN — SENNOSIDES 8.6 MG: 8.6 TABLET, FILM COATED ORAL at 09:02

## 2024-02-11 RX ADMIN — METRONIDAZOLE 500 MG: 500 INJECTION, SOLUTION INTRAVENOUS at 07:02

## 2024-02-11 RX ADMIN — SERTRALINE HYDROCHLORIDE 25 MG: 25 TABLET ORAL at 09:02

## 2024-02-11 RX ADMIN — METOCLOPRAMIDE HYDROCHLORIDE 5 MG: 10 INJECTION, SOLUTION INTRAMUSCULAR; INTRAVENOUS at 05:02

## 2024-02-11 RX ADMIN — METOCLOPRAMIDE HYDROCHLORIDE 5 MG: 10 INJECTION, SOLUTION INTRAMUSCULAR; INTRAVENOUS at 09:02

## 2024-02-11 RX ADMIN — METRONIDAZOLE 500 MG: 500 INJECTION, SOLUTION INTRAVENOUS at 12:02

## 2024-02-11 RX ADMIN — ASPIRIN 81 MG CHEWABLE TABLET 81 MG: 81 TABLET CHEWABLE at 09:02

## 2024-02-11 RX ADMIN — ONDANSETRON 8 MG: 2 INJECTION INTRAMUSCULAR; INTRAVENOUS at 09:02

## 2024-02-11 RX ADMIN — POTASSIUM BICARBONATE 50 MEQ: 978 TABLET, EFFERVESCENT ORAL at 12:02

## 2024-02-11 RX ADMIN — Medication 10 ML: at 11:02

## 2024-02-11 RX ADMIN — AMLODIPINE BESYLATE 5 MG: 5 TABLET ORAL at 09:02

## 2024-02-11 RX ADMIN — ATORVASTATIN CALCIUM 80 MG: 40 TABLET, FILM COATED ORAL at 09:02

## 2024-02-11 NOTE — PROCEDURES
RAPID RESPONSE VASCULAR ACCESS NOTE       Single lumen 18G, 10CM midline placed in the right brachial vein. Needle advanced into the vessel under real time ultrasound guidance.    Max dwell date: 29 days   Lot number: [id number]

## 2024-02-11 NOTE — PROGRESS NOTES
[facility] - Surgery  [facility] Medicine  Progress Note    Patient Name: Seymour Velazquez  MRN: [id number]  Patient Class: IP- Inpatient   Admission Date: 2/4/2024  Length of Stay: 7 days  Attending Physician: Delfina Quesada MD  Primary Care Provider: Eliazar Rosas MD        Subjective:     Principal Problem:Common bile duct (CBD) obstruction        HPI:  68M w/PMH stroke with G-tube placement, hypertension, diabetes, hyperlipidemia, recent cholecystitis, and dysphagia presents as transfer from OSH for AES eval. He initially presented from NH with leukocytosis w/o specific symptoms, imaging showed cholecystitis. Underwent lap irlanda 2/1, the operative report noted the gallbladder was necrotic and fell apart. The back wall of the gallbladder had an abscess that was perforated. There were spilled stones and purulent bile. A cholangiogram showed patent common duct with single stone at the ampulla that appears too large to pass spontaneously. Patient is transferred to C for possible ERCP.     On arrival, patient reports some abdominal pain, denies N/V/D. He is AFVSS. Admitted to  for further management.     Overview/Hospital Course:  Patient admitted to Hospital Medicine service for medical management and evaluation of suspected CBD obstruction following complicated laparoscopic cholecystectomy. AES was consulted on admission with recommendation of MRCP. MRCP w/ and w/o contrast currently pending. ID was consulted with continuation of Merrem from outside hospital. Merrem de-escalated to Rocephin/Flagyl per ID recs. List of home medications was obtained from home facility and were continued as appropriate. MRCP[02/06/24] noted fluid collection in the gallbladder fossa, concerning for  abscess vs. biloma. Also noted choledocholithiasis with gallstones posterior to the liver. AES performed ERCP 02/06/24 with removal of choledocolithiasis  by biliary sphincterotomy and balloon extraction. General Surgery consult  noted no indication surgical intervention at the time, but can consider IR drainage of fluid collection if clinical status worsens. Intermittent episodes of N/V with associated tachycardia throughout hospitalization, without acute abdomen. Unable to complete IR drainage of fluid collection on 2/7 2/2 N/V. Scheduling antiemetics for better control. Repeat attempt at fluid drainage and drain placement with IR successful without complication morning of 2/9. Slowly evolving AURORA was not responsive to increased IVF resuscitation; urine studies inconsistent with pre-renal etiology. Consulting Nephrology for further assistance.    Interval History: No acute events overnight, afebrile, hemodynamically stable. Denies abdominal pain or any acute health concerns.       Objective:     Vital Signs (Most Recent):  Temp: 98.3 °F (36.8 °C) (02/11/24 1225)  Pulse: 100 (02/11/24 1225)  Resp: 18 (02/11/24 1225)  BP: (!) 158/85 (02/11/24 1225)  SpO2: 100 % (02/11/24 1225) Vital Signs (24h Range):  Temp:  [97.8 °F (36.6 °C)-98.5 °F (36.9 °C)] 98.3 °F (36.8 °C)  Pulse:  [] 100  Resp:  [18-20] 18  SpO2:  [93 %-100 %] 100 %  BP: (132-158)/(69-85) 158/85     Weight: 79.6 kg (175 lb 8 oz)  Body mass index is 22.53 kg/m².    Intake/Output Summary (Last 24 hours) at 2/11/2024 1439  Last data filed at 2/11/2024 0618  Gross per 24 hour   Intake 2125 ml   Output 1140 ml   Net 985 ml         Physical Exam  Vitals and nursing note reviewed.   Constitutional:       General: He is not in acute distress.     Appearance: Normal appearance. He is not ill-appearing, toxic-appearing or diaphoretic.   HENT:      Head: Normocephalic and atraumatic.      Mouth/Throat:      Mouth: Mucous membranes are moist.   Eyes:      General: No scleral icterus.        Right eye: No discharge.         Left eye: No discharge.   Cardiovascular:      Rate and Rhythm: Normal rate and regular rhythm.      Heart sounds: Normal heart sounds. No murmur heard.  Pulmonary:       Effort: Pulmonary effort is normal. No respiratory distress.      Breath sounds: Normal breath sounds. No wheezing or rales.   Abdominal:      General: Abdomen is flat. Bowel sounds are normal. There is no distension.      Palpations: Abdomen is soft.      Tenderness: There is no abdominal tenderness. There is no guarding or rebound.      Comments: -Drain in place in RUQ with serosanguinous fluid  -PEG in place in the LUQ   Musculoskeletal:         General: No swelling.      Cervical back: Normal range of motion. No rigidity.      Right lower leg: No edema.      Left lower leg: No edema.   Skin:     General: Skin is warm and dry.      Coloration: Skin is not jaundiced.   Neurological:      Mental Status: He is alert. Mental status is at baseline.   Psychiatric:         Mood and Affect: Mood normal.         Behavior: Behavior normal.             Significant Labs: All pertinent labs within the past 24 hours have been reviewed.  LABS:  Recent Labs   Lab 02/09/24  0514 02/10/24  0426 02/11/24  0608    140 138   K 4.3 3.5 3.3*    108 107   CO2 17* 21* 25   BUN 16 18 23   CREATININE 1.2 1.6* 1.7*    101 141*   ANIONGAP 13 11 6*     Recent Labs   Lab 02/09/24  0514 02/10/24  0426 02/11/24  0608   MG 1.6 1.4* 1.6   PHOS 3.7 3.3 2.7     Recent Labs   Lab 02/09/24  0514 02/10/24  0426 02/11/24  0608   AST 22 13 14   ALT 8* 7* 8*   ALKPHOS 80 70 75   BILITOT 0.4 0.3 0.2   ALBUMIN 2.1* 2.0* 1.9*     POCT Glucose:   Recent Labs   Lab 02/11/24  0554 02/11/24  0843 02/11/24  1219   POCTGLUCOSE 140* 147* 131*    Recent Labs   Lab 02/09/24  0514 02/10/24  0426 02/11/24  0608   WBC 15.52* 10.37 10.69   HGB 9.5* 9.5* 9.1*   HCT 29.9* 29.9* 29.0*    349 406   GRAN 79.3*  12.3* 74.9*  7.8* 75.9*  8.1*          Microbiology Results (last 7 days)       Procedure Component Value Units Date/Time    Aerobic culture [[id number]] Collected: 02/09/24 0914    Order Status: Completed Specimen: Abscess from Abdomen  Updated: 02/11/24 1350     Aerobic Bacterial Culture No growth    AFB Culture & Smear [[id number]] Collected: 02/09/24 0914    Order Status: Completed Specimen: Body Fluid from Abdomen Updated: 02/10/24 2127     AFB Culture & Smear Culture in progress    Culture, Anaerobe [[id number]] Collected: 02/09/24 0914    Order Status: Completed Specimen: Body Fluid from Abdomen Updated: 02/10/24 0728     Anaerobic Culture Culture in progress    Gram stain [[id number]] Collected: 02/09/24 0914    Order Status: Completed Specimen: Body Fluid from Abdomen Updated: 02/09/24 1844     Gram Stain Result Moderate WBC's      No organisms seen    Fungus culture [[id number]] Collected: 02/09/24 0914    Order Status: Sent Specimen: Body Fluid from Abdomen Updated: 02/09/24 1139            Significant Imaging: I have reviewed all pertinent imaging results/findings within the past 24 hours.      Inpatient Medications:  Continuous Infusions:   lactated ringers Stopped (02/10/24 1026)     Scheduled Meds:   amLODIPine  5 mg Per G Tube Daily    aspirin  81 mg Per G Tube Daily    atorvastatin  80 mg Per G Tube QHS    busPIRone  7.5 mg Per G Tube Daily    cefTRIAXone (Rocephin) IV (PEDS and ADULTS)  2 g Intravenous Q24H    enoxparin  40 mg Subcutaneous Q24H (prophylaxis, 1700)    famotidine  20 mg Per G Tube Daily    magnesium sulfate IVPB  2 g Intravenous Once    metoclopramide  5 mg Intravenous Q8H    metronidazole  500 mg Intravenous Q8H    senna  8.6 mg Per G Tube Daily    sertraline  25 mg Per G Tube Daily     PRN Meds:dextrose 10%, dextrose 10%, glucagon (human recombinant), glucose, glucose, hydrALAZINE, insulin aspart U-100, melatonin, morphine, naloxone, ondansetron, prochlorperazine, sodium chloride 0.9%      Assessment/Plan:      * Common bile duct (CBD) obstruction  68M w/ recent CVA and residual dysphagia s/p PEG tube presents from OSH for retained gallstone in ampulla, s/p lap cholecystectomy in which the gallbladder was found  "necrotic and friable. His only symptom is abdominal pain. Labs show leukocytosis. He is transferred for AES eval and possible ERCP.     -MRCP[02/06/24] noted fluid collection in the gallbladder fossa, concerning for  abscess vs. biloma. Also noted choledocholithiasis with gallstones posterior to the liver.  -AES performed ERCP 02/06/24 with removal of choledocolithiasis  by biliary sphincterotomy and balloon extraction.   -IR attempt at drainage of fluid collection [2/7/2024] unsuccessful 2/2 episode of N/V with associated tachycardia and hypertension  -IR reattempt at fluid drainage and drain placement [2/9/24] successful without complication. Drain in place with serosanguinous fluid.    -General Surgery consult noted no indication for surgical intervention at the time  -Ceftriaxone/Metronidazole per ID recs  -Fluid studies and cultures with no evidence of bacterial growth thus far  -Scheduling reglan for N/V with additional antiemetics PRN      AURORA (acute kidney injury)  Patient with acute kidney injury/acute renal failure likely due to pre-renal azotemia due to dehydration AURORA is currently worsening. Baseline creatinine  0.8  - Labs reviewed- Renal function/electrolytes with Estimated Creatinine Clearance: 46.8 mL/min (A) (based on SCr of 1.7 mg/dL (H)). according to latest data. Monitor urine output and serial BMP and adjust therapy as needed. Avoid nephrotoxins and renally dose meds for GFR listed above.    Patient with slow uptrend in sCr and jump from 1.2 on 2/9 to 1/6 on 2/10. Patient received Vancomycin intra-op 2/9 and a contrasted study during this hospitalization. Consulting Nephrology for concern of intrinsic injury. Urine studies 2/9: U-Na 60, U-Urea 312, U-Creatinine 57.    Recent Labs     02/09/24  0514 02/10/24  0426 02/11/24  0608   BUN 16 18 23   CREATININE 1.2 1.6* 1.7*     Renal U/S noted 02/11/24: "Mild bilateral hydronephrosis." "Elevated segmental artery resistive indices bilaterally, a " "nonspecific sign that can be seen with chronic renal disease or obstruction". "Urinary bladder is significantly distended with a small amount of mobile intraluminal debris"      Plan  -Bladder scan PRN. Rosario placement if concerns for retention  - Trending daily CMP  - Nephrology consulted, appreciate recs  - Avoid nephrotoxic agents, NSAIDS, renally dose meds    Tachycardia  Patient noted to have sinus tachycardia up to HR 140s in the setting of acute episodes of nausea and vomiting. Likely appropriate response to stressor.    -CTM for hemodynamic instability  -EKG PRN for chest pain or hemodynamic instability    On tube feeding diet  Hx of PEG  tube placement s/p CVA. Tolerated slow basal rate feeds well thus far following fluid drainage.     PLAN:  -Nutrition consulted for tube feed recs      Abdominal fluid collection  See "Common bile duct (CBD) obstruction " A&P        Choledocholithiasis  See "Common bile duct (CBD) obstruction " A&P        Bilateral pleural effusion  Patient found to have small pleural effusion on imaging [MRCP(02/06/24)"Small right-sided pleural effusion.  Trace left-sided pleural effusion"]. Most likely etiology includes  limited mobility due to recent history of hospitalization for cholecystectomy . Management to include  monitoring at this time      Normocytic anemia  -Patient's with Normocytic anemia..   -Hemoglobin stable.   -Etiology likely due to chronic disease .  -Current CBC reviewed-    Recent Labs   Lab 02/09/24  0514 02/10/24  0426 02/11/24  0608   HGB 9.5* 9.5* 9.1*         Component Value Date/Time    MCV 94 02/11/2024 0608    RDW 13.7 02/11/2024 0608    IRON 12 (L) 02/07/2024 0223    TIBC 195 (L) 02/07/2024 0223    FERRITIN 1,256 (H) 02/07/2024 0223         PLAN  - Monitor serial CBC and transfuse if patient becomes hemodynamically unstable, symptomatic or H/H drops below 7/21.  - Etiology consistent with anemia of chronic disease. Will CTM.    Cholecystitis  See "Common " "bile duct (CBD) obstruction " A&P        CVA (cerebral vascular accident)  Patient is s/p CVA with residual dysphagia and weakness. He is s/p PEG tube and receives bolus tube feedings.         Type 2 diabetes mellitus  Patient's FSGs are controlled on current medication regimen.  Last A1c reviewed-   Lab Results   Component Value Date    HGBA1C 4.8 02/05/2024     Most recent fingerstick glucose reviewed-   Recent Labs   Lab 02/10/24  1817 02/11/24  0554 02/11/24  0843 02/11/24  1219   POCTGLUCOSE 141* 140* 147* 131*     Current correctional scale  Low  Maintain anti-hyperglycemic dose as follows-   Antihyperglycemics (From admission, onward)      Start     Stop Route Frequency Ordered    02/04/24 2059  insulin aspart U-100 pen 0-5 Units         -- SubQ Before meals & nightly PRN 02/04/24 2000          Hold Oral hypoglycemics while patient is in the hospital.     Patient on SSI at NH.    -LDSSI  -Glucerna for tube feeds    Primary hypertension  Patient is normotensive on arrival, per chart he is not currently on any antihypertensives.     Will utilize p.r.n. blood pressure medication only if patient's blood pressure greater than 180/110 and he develops symptoms such as worsening chest pain or shortness of breath.    -amlodipine 5mg  -Holding home lisinopril in the setting of AURORA  -Will add PRN hydralazine for SBP>180 and symptomatic  -will consider uptitration of home reigmen if pressures remain uncontrolled        VTE Risk Mitigation (From admission, onward)           Ordered     enoxaparin injection 40 mg  Every 24 hours         02/09/24 1408     IP VTE HIGH RISK PATIENT  Once         02/05/24 1410     Place sequential compression device  Until discontinued         02/04/24 2000                    Discharge Planning   PEYTON: 2/12/2024     Code Status: Full Code   Is the patient medically ready for discharge?: No    Reason for patient still in hospital (select all that apply): Patient trending condition, Laboratory " test, Treatment, Consult recommendations, and PT / OT recommendations  Discharge Plan A: Home with family                  Johnnie Brasher DO  Department of Hospital Medicine   [facility] - Surgery

## 2024-02-11 NOTE — ASSESSMENT & PLAN NOTE
"Patient with acute kidney injury/acute renal failure likely due to pre-renal azotemia due to dehydration AURORA is currently worsening. Baseline creatinine  0.8  - Labs reviewed- Renal function/electrolytes with Estimated Creatinine Clearance: 46.8 mL/min (A) (based on SCr of 1.7 mg/dL (H)). according to latest data. Monitor urine output and serial BMP and adjust therapy as needed. Avoid nephrotoxins and renally dose meds for GFR listed above.    Patient with slow uptrend in sCr and jump from 1.2 on 2/9 to 1/6 on 2/10. Patient received Vancomycin intra-op 2/9 and a contrasted study during this hospitalization. Consulting Nephrology for concern of intrinsic injury. Urine studies 2/9: U-Na 60, U-Urea 312, U-Creatinine 57.    Recent Labs     02/09/24  0514 02/10/24  0426 02/11/24  0608   BUN 16 18 23   CREATININE 1.2 1.6* 1.7*     Renal U/S noted 02/11/24: "Mild bilateral hydronephrosis." "Elevated segmental artery resistive indices bilaterally, a nonspecific sign that can be seen with chronic renal disease or obstruction". "Urinary bladder is significantly distended with a small amount of mobile intraluminal debris"      Plan  -Bladder scan PRN. Rosario placement if concerns for retention  - Trending daily CMP  - Nephrology consulted, appreciate recs  - Avoid nephrotoxic agents, NSAIDS, renally dose meds  "

## 2024-02-11 NOTE — ASSESSMENT & PLAN NOTE
68M w/ recent CVA and residual dysphagia s/p PEG tube presents from OSH for retained gallstone in ampulla, s/p lap cholecystectomy in which the gallbladder was found necrotic and friable. His only symptom is abdominal pain. Labs show leukocytosis. He is transferred for AES eval and possible ERCP.     -MRCP[02/06/24] noted fluid collection in the gallbladder fossa, concerning for  abscess vs. biloma. Also noted choledocholithiasis with gallstones posterior to the liver.  -AES performed ERCP 02/06/24 with removal of choledocolithiasis  by biliary sphincterotomy and balloon extraction.   -IR attempt at drainage of fluid collection [2/7/2024] unsuccessful 2/2 episode of N/V with associated tachycardia and hypertension  -IR reattempt at fluid drainage and drain placement [2/9/24] successful without complication. Drain in place with serosanguinous fluid.    -General Surgery consult noted no indication for surgical intervention at the time  -Ceftriaxone/Metronidazole per ID recs  -Fluid studies and cultures with no evidence of bacterial growth thus far  -Scheduling reglan for N/V with additional antiemetics PRN

## 2024-02-11 NOTE — ASSESSMENT & PLAN NOTE
-Patient's with Normocytic anemia..   -Hemoglobin stable.   -Etiology likely due to chronic disease .  -Current CBC reviewed-    Recent Labs   Lab 02/09/24  0514 02/10/24  0426 02/11/24  0608   HGB 9.5* 9.5* 9.1*         Component Value Date/Time    MCV 94 02/11/2024 0608    RDW 13.7 02/11/2024 0608    IRON 12 (L) 02/07/2024 0223    TIBC 195 (L) 02/07/2024 0223    FERRITIN 1,256 (H) 02/07/2024 0223         PLAN  - Monitor serial CBC and transfuse if patient becomes hemodynamically unstable, symptomatic or H/H drops below 7/21.  - Etiology consistent with anemia of chronic disease. Will CTM.

## 2024-02-11 NOTE — ASSESSMENT & PLAN NOTE
Hx of PEG  tube placement s/p CVA. Tolerated slow basal rate feeds well thus far following fluid drainage.     PLAN:  -Nutrition consulted for tube feed recs

## 2024-02-11 NOTE — PLAN OF CARE
Problem: Diabetes Comorbidity  Goal: Blood Glucose Level Within Targeted Range  Outcome: Ongoing, Progressing     Problem: Pain Acute  Goal: Acceptable Pain Control and Functional Ability  Outcome: Ongoing, Progressing     Problem: Skin Injury Risk Increased  Goal: Skin Health and Integrity  Outcome: Ongoing, Progressing     Problem: Adult Inpatient Plan of Care  Goal: Plan of Care Review  Outcome: Ongoing, Progressing  Goal: Patient-Specific Goal (Individualized)  Outcome: Ongoing, Progressing  Goal: Absence of Hospital-Acquired Illness or Injury  Outcome: Ongoing, Progressing  Goal: Optimal Comfort and Wellbeing  Outcome: Ongoing, Progressing  Goal: Readiness for Transition of Care  Outcome: Ongoing, Progressing   He had a uneventful day.  No sign of distress noted at this time.  Safety precautions remain in place.

## 2024-02-11 NOTE — PLAN OF CARE
Problem: Diabetes Comorbidity  Goal: Blood Glucose Level Within Targeted Range  Outcome: Ongoing, Progressing     Problem: Pain Acute  Goal: Acceptable Pain Control and Functional Ability  Outcome: Ongoing, Progressing     Problem: Skin Injury Risk Increased  Goal: Skin Health and Integrity  Outcome: Ongoing, Progressing     Problem: Adult Inpatient Plan of Care  Goal: Plan of Care Review  Outcome: Ongoing, Progressing  Goal: Patient-Specific Goal (Individualized)  Outcome: Ongoing, Progressing  Goal: Absence of Hospital-Acquired Illness or Injury  Outcome: Ongoing, Progressing  Goal: Optimal Comfort and Wellbeing  Outcome: Ongoing, Progressing  Goal: Readiness for Transition of Care  Outcome: Ongoing, Progressing

## 2024-02-11 NOTE — ASSESSMENT & PLAN NOTE
Patient's FSGs are controlled on current medication regimen.  Last A1c reviewed-   Lab Results   Component Value Date    HGBA1C 4.8 02/05/2024     Most recent fingerstick glucose reviewed-   Recent Labs   Lab 02/10/24  1817 02/11/24  0554 02/11/24  0843 02/11/24  1219   POCTGLUCOSE 141* 140* 147* 131*     Current correctional scale  Low  Maintain anti-hyperglycemic dose as follows-   Antihyperglycemics (From admission, onward)      Start     Stop Route Frequency Ordered    02/04/24 2059  insulin aspart U-100 pen 0-5 Units         -- SubQ Before meals & nightly PRN 02/04/24 2000          Hold Oral hypoglycemics while patient is in the hospital.     Patient on SSI at NH.    -LDSSI  -Glucerna for tube feeds

## 2024-02-11 NOTE — SUBJECTIVE & OBJECTIVE
Interval History: No acute events overnight, afebrile, hemodynamically stable. Denies abdominal pain or any acute health concerns.       Objective:     Vital Signs (Most Recent):  Temp: 98.3 °F (36.8 °C) (02/11/24 1225)  Pulse: 100 (02/11/24 1225)  Resp: 18 (02/11/24 1225)  BP: (!) 158/85 (02/11/24 1225)  SpO2: 100 % (02/11/24 1225) Vital Signs (24h Range):  Temp:  [97.8 °F (36.6 °C)-98.5 °F (36.9 °C)] 98.3 °F (36.8 °C)  Pulse:  [] 100  Resp:  [18-20] 18  SpO2:  [93 %-100 %] 100 %  BP: (132-158)/(69-85) 158/85     Weight: 79.6 kg (175 lb 8 oz)  Body mass index is 22.53 kg/m².    Intake/Output Summary (Last 24 hours) at 2/11/2024 1439  Last data filed at 2/11/2024 0618  Gross per 24 hour   Intake 2125 ml   Output 1140 ml   Net 985 ml         Physical Exam  Vitals and nursing note reviewed.   Constitutional:       General: He is not in acute distress.     Appearance: Normal appearance. He is not ill-appearing, toxic-appearing or diaphoretic.   HENT:      Head: Normocephalic and atraumatic.      Mouth/Throat:      Mouth: Mucous membranes are moist.   Eyes:      General: No scleral icterus.        Right eye: No discharge.         Left eye: No discharge.   Cardiovascular:      Rate and Rhythm: Normal rate and regular rhythm.      Heart sounds: Normal heart sounds. No murmur heard.  Pulmonary:      Effort: Pulmonary effort is normal. No respiratory distress.      Breath sounds: Normal breath sounds. No wheezing or rales.   Abdominal:      General: Abdomen is flat. Bowel sounds are normal. There is no distension.      Palpations: Abdomen is soft.      Tenderness: There is no abdominal tenderness. There is no guarding or rebound.      Comments: -Drain in place in RUQ with serosanguinous fluid  -PEG in place in the LUQ   Musculoskeletal:         General: No swelling.      Cervical back: Normal range of motion. No rigidity.      Right lower leg: No edema.      Left lower leg: No edema.   Skin:     General: Skin is warm  and dry.      Coloration: Skin is not jaundiced.   Neurological:      Mental Status: He is alert. Mental status is at baseline.   Psychiatric:         Mood and Affect: Mood normal.         Behavior: Behavior normal.             Significant Labs: All pertinent labs within the past 24 hours have been reviewed.  LABS:  Recent Labs   Lab 02/09/24  0514 02/10/24  0426 02/11/24  0608    140 138   K 4.3 3.5 3.3*    108 107   CO2 17* 21* 25   BUN 16 18 23   CREATININE 1.2 1.6* 1.7*    101 141*   ANIONGAP 13 11 6*     Recent Labs   Lab 02/09/24  0514 02/10/24  0426 02/11/24  0608   MG 1.6 1.4* 1.6   PHOS 3.7 3.3 2.7     Recent Labs   Lab 02/09/24  0514 02/10/24  0426 02/11/24  0608   AST 22 13 14   ALT 8* 7* 8*   ALKPHOS 80 70 75   BILITOT 0.4 0.3 0.2   ALBUMIN 2.1* 2.0* 1.9*     POCT Glucose:   Recent Labs   Lab 02/11/24  0554 02/11/24  0843 02/11/24  1219   POCTGLUCOSE 140* 147* 131*    Recent Labs   Lab 02/09/24  0514 02/10/24  0426 02/11/24  0608   WBC 15.52* 10.37 10.69   HGB 9.5* 9.5* 9.1*   HCT 29.9* 29.9* 29.0*    349 406   GRAN 79.3*  12.3* 74.9*  7.8* 75.9*  8.1*          Microbiology Results (last 7 days)       Procedure Component Value Units Date/Time    Aerobic culture [[id number]] Collected: 02/09/24 0914    Order Status: Completed Specimen: Abscess from Abdomen Updated: 02/11/24 1350     Aerobic Bacterial Culture No growth    AFB Culture & Smear [[id number]] Collected: 02/09/24 0914    Order Status: Completed Specimen: Body Fluid from Abdomen Updated: 02/10/24 2127     AFB Culture & Smear Culture in progress    Culture, Anaerobe [[id number]] Collected: 02/09/24 0914    Order Status: Completed Specimen: Body Fluid from Abdomen Updated: 02/10/24 0728     Anaerobic Culture Culture in progress    Gram stain [[id number]] Collected: 02/09/24 0914    Order Status: Completed Specimen: Body Fluid from Abdomen Updated: 02/09/24 1844     Gram Stain Result Moderate WBC's      No organisms seen     Fungus culture [[id number]] Collected: 02/09/24 0914    Order Status: Sent Specimen: Body Fluid from Abdomen Updated: 02/09/24 1139            Significant Imaging: I have reviewed all pertinent imaging results/findings within the past 24 hours.      Inpatient Medications:  Continuous Infusions:   lactated ringers Stopped (02/10/24 1026)     Scheduled Meds:   amLODIPine  5 mg Per G Tube Daily    aspirin  81 mg Per G Tube Daily    atorvastatin  80 mg Per G Tube QHS    busPIRone  7.5 mg Per G Tube Daily    cefTRIAXone (Rocephin) IV (PEDS and ADULTS)  2 g Intravenous Q24H    enoxparin  40 mg Subcutaneous Q24H (prophylaxis, 1700)    famotidine  20 mg Per G Tube Daily    magnesium sulfate IVPB  2 g Intravenous Once    metoclopramide  5 mg Intravenous Q8H    metronidazole  500 mg Intravenous Q8H    senna  8.6 mg Per G Tube Daily    sertraline  25 mg Per G Tube Daily     PRN Meds:dextrose 10%, dextrose 10%, glucagon (human recombinant), glucose, glucose, hydrALAZINE, insulin aspart U-100, melatonin, morphine, naloxone, ondansetron, prochlorperazine, sodium chloride 0.9%

## 2024-02-12 LAB
BACTERIA SPEC AEROBE CULT: NO GROWTH
POCT GLUCOSE: 111 MG/DL (ref 70–110)
POCT GLUCOSE: 119 MG/DL (ref 70–110)

## 2024-02-12 PROCEDURE — 97166 OT EVAL MOD COMPLEX 45 MIN: CPT

## 2024-02-12 PROCEDURE — 63600175 PHARM REV CODE 636 W HCPCS: Mod: JZ,JG | Performed by: INTERNAL MEDICINE

## 2024-02-12 PROCEDURE — 63600175 PHARM REV CODE 636 W HCPCS

## 2024-02-12 PROCEDURE — 92526 ORAL FUNCTION THERAPY: CPT

## 2024-02-12 PROCEDURE — 97161 PT EVAL LOW COMPLEX 20 MIN: CPT

## 2024-02-12 PROCEDURE — A4216 STERILE WATER/SALINE, 10 ML: HCPCS | Performed by: INTERNAL MEDICINE

## 2024-02-12 PROCEDURE — 21400001 HC TELEMETRY ROOM

## 2024-02-12 PROCEDURE — 25000003 PHARM REV CODE 250: Performed by: PHYSICIAN ASSISTANT

## 2024-02-12 PROCEDURE — 97112 NEUROMUSCULAR REEDUCATION: CPT

## 2024-02-12 PROCEDURE — 97530 THERAPEUTIC ACTIVITIES: CPT

## 2024-02-12 PROCEDURE — 25000003 PHARM REV CODE 250

## 2024-02-12 PROCEDURE — 99233 SBSQ HOSP IP/OBS HIGH 50: CPT | Mod: ,,, | Performed by: INTERNAL MEDICINE

## 2024-02-12 PROCEDURE — 63600175 PHARM REV CODE 636 W HCPCS: Performed by: PHYSICIAN ASSISTANT

## 2024-02-12 PROCEDURE — 25000003 PHARM REV CODE 250: Performed by: INTERNAL MEDICINE

## 2024-02-12 RX ADMIN — ASPIRIN 81 MG CHEWABLE TABLET 81 MG: 81 TABLET CHEWABLE at 09:02

## 2024-02-12 RX ADMIN — CEFTRIAXONE 2 G: 2 INJECTION, POWDER, FOR SOLUTION INTRAMUSCULAR; INTRAVENOUS at 02:02

## 2024-02-12 RX ADMIN — METOCLOPRAMIDE HYDROCHLORIDE 5 MG: 10 INJECTION, SOLUTION INTRAMUSCULAR; INTRAVENOUS at 01:02

## 2024-02-12 RX ADMIN — METRONIDAZOLE 500 MG: 500 INJECTION, SOLUTION INTRAVENOUS at 04:02

## 2024-02-12 RX ADMIN — METOCLOPRAMIDE HYDROCHLORIDE 5 MG: 10 INJECTION, SOLUTION INTRAMUSCULAR; INTRAVENOUS at 08:02

## 2024-02-12 RX ADMIN — CEFTRIAXONE 2 G: 2 INJECTION, POWDER, FOR SOLUTION INTRAMUSCULAR; INTRAVENOUS at 01:02

## 2024-02-12 RX ADMIN — FAMOTIDINE 20 MG: 20 TABLET, FILM COATED ORAL at 09:02

## 2024-02-12 RX ADMIN — ENOXAPARIN SODIUM 40 MG: 40 INJECTION SUBCUTANEOUS at 05:02

## 2024-02-12 RX ADMIN — BUSPIRONE HYDROCHLORIDE 7.5 MG: 5 TABLET ORAL at 09:02

## 2024-02-12 RX ADMIN — Medication 10 ML: at 05:02

## 2024-02-12 RX ADMIN — Medication 10 ML: at 11:02

## 2024-02-12 RX ADMIN — METOCLOPRAMIDE HYDROCHLORIDE 5 MG: 10 INJECTION, SOLUTION INTRAMUSCULAR; INTRAVENOUS at 05:02

## 2024-02-12 RX ADMIN — SERTRALINE HYDROCHLORIDE 25 MG: 25 TABLET ORAL at 09:02

## 2024-02-12 RX ADMIN — SENNOSIDES 8.6 MG: 8.6 TABLET, FILM COATED ORAL at 09:02

## 2024-02-12 RX ADMIN — ATORVASTATIN CALCIUM 80 MG: 40 TABLET, FILM COATED ORAL at 08:02

## 2024-02-12 RX ADMIN — Medication 10 ML: at 01:02

## 2024-02-12 RX ADMIN — METRONIDAZOLE 500 MG: 500 INJECTION, SOLUTION INTRAVENOUS at 06:02

## 2024-02-12 RX ADMIN — AMLODIPINE BESYLATE 5 MG: 5 TABLET ORAL at 09:02

## 2024-02-12 RX ADMIN — METRONIDAZOLE 500 MG: 500 INJECTION, SOLUTION INTRAVENOUS at 11:02

## 2024-02-12 NOTE — ASSESSMENT & PLAN NOTE
"Patient with acute kidney injury/acute renal failure likely due to pre-renal azotemia due to dehydration AURORA is currently worsening. Baseline creatinine  0.8  - Labs reviewed- Renal function/electrolytes with Estimated Creatinine Clearance: 46.8 mL/min (A) (based on SCr of 1.7 mg/dL (H)). according to latest data. Monitor urine output and serial BMP and adjust therapy as needed. Avoid nephrotoxins and renally dose meds for GFR listed above.    Patient with slow uptrend in sCr and jump from 1.2 on 2/9 to 1.6 on 2/10. Patient received Vancomycin intra-op 2/9 and a contrasted study during this hospitalization. Consulting Nephrology for concern of intrinsic injury. Urine studies 2/9: U-Na 60, U-Urea 312, U-Creatinine 57.    Recent Labs     02/10/24  0426 02/11/24  0608   BUN 18 23   CREATININE 1.6* 1.7*       Renal U/S noted 02/11/24: "Mild bilateral hydronephrosis." "Elevated segmental artery resistive indices bilaterally, a nonspecific sign that can be seen with chronic renal disease or obstruction". "Urinary bladder is significantly distended with a small amount of mobile intraluminal debris"      Plan  -Bladder scan PRN.  - Rosario placed 2/11 with 1L output with insertion  - Trending daily CMP (refused labs on 2/12)  - Nephrology consulted, appreciate recs  - Avoid nephrotoxic agents, NSAIDS, renally dose meds  "

## 2024-02-12 NOTE — PROGRESS NOTES
[facility]  [facility]  Progress Note    Patient Name: Seymour Velazquez  MRN: [id number]  Patient Class: IP- Inpatient   Admission Date: 2/4/2024  Length of Stay: 8 days  Attending Physician: Delfina Quesada MD  Primary Care Provider: Eliazar Rosas MD        Subjective:     Principal Problem:Common bile duct (CBD) obstruction        HPI:  68M w/PMH stroke with G-tube placement, hypertension, diabetes, hyperlipidemia, recent cholecystitis, and dysphagia presents as transfer from OSH for AES eval. He initially presented from NH with leukocytosis w/o specific symptoms, imaging showed cholecystitis. Underwent lap irlanda 2/1, the operative report noted the gallbladder was necrotic and fell apart. The back wall of the gallbladder had an abscess that was perforated. There were spilled stones and purulent bile. A cholangiogram showed patent common duct with single stone at the ampulla that appears too large to pass spontaneously. Patient is transferred to [facility] for possible ERCP.     On arrival, patient reports some abdominal pain, denies N/V/D. He is AFVSS. Admitted to  for further management.     Overview/Hospital Course:  Patient admitted to Hospital Medicine service for medical management and evaluation of suspected CBD obstruction following complicated laparoscopic cholecystectomy. AES was consulted on admission with recommendation of MRCP. MRCP w/ and w/o contrast currently pending. ID was consulted with continuation of Merrem from outside hospital. Merrem de-escalated to Rocephin/Flagyl per ID recs. List of home medications was obtained from home facility and were continued as appropriate. MRCP[02/06/24] noted fluid collection in the gallbladder fossa, concerning for  abscess vs. biloma. Also noted choledocholithiasis with gallstones posterior to the liver. AES performed ERCP 02/06/24 with removal of choledocolithiasis  by biliary sphincterotomy and balloon extraction. General Surgery consult  noted no indication surgical intervention at the time, but can consider IR drainage of fluid collection if clinical status worsens. Intermittent episodes of N/V with associated tachycardia throughout hospitalization, without acute abdomen. Unable to complete IR drainage of fluid collection on 2/7 2/2 N/V. Scheduling antiemetics for better control. Repeat attempt at fluid drainage and drain placement with IR successful without complication morning of 2/9. Slowly evolving AURORA was not responsive to increased IVF resuscitation; urine studies inconsistent with pre-renal etiology. Consulted Nephrology for further assistance. Patient had a retroperitoneal ultrasound showing mild bilateral hydronephrosis and a distended bladder. Rosario catheter was placed on 2/11 and patient put out almost 1L with catheter insertion. SLP evaluated patient and he was started on pleasure feeds in addition to his tube feeds.    Interval History:  No acute events overnight.  Patient remained afebrile, and all other vital signs were stable.  He had an ultrasound of the retroperitoneal space yesterday showing mild bilateral hydronephrosis and a distended bladder.  Rosario catheter was placed last night, and patient put out 2.2 L of urine overnight. This morning Mr. Velazquez refused his labs so we are unable to assess his serum creatinine after catheter insertion.  Cultures taken from aspiration have shown no growth today.  Per SLP evaluation, pureed diet was added so that he can start pleasure feeds in addition to his tube feeds.  Today he is pending physical therapy and occupational therapy evaluation, final antibiotic recommendations from Infectious Disease, and follow up with Nephrology for his AURORA.  Due to significant urine output overnight, we will follow his output closely given concerns for postobstructive diuresis.    Review of Systems  Objective:     Vital Signs (Most Recent):  Temp: 98 °F (36.7 °C) (02/12/24 1202)  Pulse: 100 (02/12/24  1202)  Resp: 18 (02/12/24 1202)  BP: 133/73 (02/12/24 1202)  SpO2: 100 % (02/12/24 1202) Vital Signs (24h Range):  Temp:  [97.1 °F (36.2 °C)-98.2 °F (36.8 °C)] 98 °F (36.7 °C)  Pulse:  [] 100  Resp:  [16-18] 18  SpO2:  [98 %-100 %] 100 %  BP: (121-140)/(59-91) 133/73     Weight: 79.6 kg (175 lb 8 oz)  Body mass index is 22.53 kg/m².    Intake/Output Summary (Last 24 hours) at 2/12/2024 1237  Last data filed at 2/12/2024 0518  Gross per 24 hour   Intake 1012 ml   Output 1350 ml   Net -338 ml         Physical Exam  Vitals and nursing note reviewed.   Constitutional:       General: He is not in acute distress.     Appearance: Normal appearance. He is not ill-appearing.   HENT:      Head: Normocephalic and atraumatic.      Mouth/Throat:      Mouth: Mucous membranes are moist.   Eyes:      General: No scleral icterus.  Cardiovascular:      Rate and Rhythm: Normal rate and regular rhythm.      Heart sounds: Normal heart sounds. No murmur heard.  Pulmonary:      Effort: Pulmonary effort is normal. No respiratory distress.      Breath sounds: Normal breath sounds.   Abdominal:      General: Bowel sounds are normal. There is no distension.      Palpations: Abdomen is soft.      Tenderness: There is no abdominal tenderness. There is no guarding or rebound.      Comments: Drain in place in RUQ with serosanguinous fluid. Incision bandaged and clean.  PEG in place in the LUQ   Genitourinary:     Comments: Rosario catheter in place draining light yellow urine, no cloudiness noted  Musculoskeletal:      Right lower leg: No edema.      Left lower leg: No edema.   Skin:     General: Skin is warm and dry.      Coloration: Skin is not jaundiced.   Neurological:      Mental Status: Mental status is at baseline.   Psychiatric:         Mood and Affect: Mood normal.         Behavior: Behavior normal.             Significant Labs: All pertinent labs within the past 24 hours have been reviewed.  None Patient refused overnight labs.      Significant Imaging: I have reviewed all pertinent imaging results/findings within the past 24 hours.    Assessment/Plan:      * Common bile duct (CBD) obstruction  68M w/ recent CVA and residual dysphagia s/p PEG tube presents from OSH for retained gallstone in ampulla, s/p lap cholecystectomy in which the gallbladder was found necrotic and friable. His only symptom is abdominal pain. Labs show leukocytosis. He is transferred for AES eval and possible ERCP.     -MRCP[02/06/24] noted fluid collection in the gallbladder fossa, concerning for  abscess vs. biloma. Also noted choledocholithiasis with gallstones posterior to the liver.  -AES performed ERCP 02/06/24 with removal of choledocolithiasis by biliary sphincterotomy and balloon extraction.   -IR attempt at drainage of fluid collection [2/7/2024] unsuccessful 2/2 episode of N/V with associated tachycardia and hypertension  -IR reattempt at fluid drainage and drain placement [2/9/24] successful without complication. Drain in place with serosanguinous fluid.  -General Surgery consult noted no indication for surgical intervention at the time  -Ceftriaxone/Metronidazole per ID recs. Following cultures from aspiration  -Fluid studies and cultures with no evidence of bacterial growth thus far  -Scheduling reglan for N/V with additional antiemetics PRN      AURORA (acute kidney injury)  Patient with acute kidney injury/acute renal failure likely due to pre-renal azotemia due to dehydration AURORA is currently worsening. Baseline creatinine  0.8  - Labs reviewed- Renal function/electrolytes with Estimated Creatinine Clearance: 46.8 mL/min (A) (based on SCr of 1.7 mg/dL (H)). according to latest data. Monitor urine output and serial BMP and adjust therapy as needed. Avoid nephrotoxins and renally dose meds for GFR listed above.    Patient with slow uptrend in sCr and jump from 1.2 on 2/9 to 1.6 on 2/10. Patient received Vancomycin intra-op 2/9 and a contrasted study during  "this hospitalization. Consulting Nephrology for concern of intrinsic injury. Urine studies 2/9: U-Na 60, U-Urea 312, U-Creatinine 57.    Recent Labs     02/10/24  0426 02/11/24  0608   BUN 18 23   CREATININE 1.6* 1.7*       Renal U/S noted 02/11/24: "Mild bilateral hydronephrosis." "Elevated segmental artery resistive indices bilaterally, a nonspecific sign that can be seen with chronic renal disease or obstruction". "Urinary bladder is significantly distended with a small amount of mobile intraluminal debris"      Plan  -Bladder scan PRN.  - Rosario placed 2/11 with 1L output with insertion  - Trending daily CMP (refused labs on 2/12)  - Nephrology consulted, appreciate recs  - Avoid nephrotoxic agents, NSAIDS, renally dose meds    Tachycardia  Patient noted to have sinus tachycardia up to HR 140s in the setting of acute episodes of nausea and vomiting. Likely appropriate response to stressor.    -CTM for hemodynamic instability  -EKG PRN for chest pain or hemodynamic instability    On tube feeding diet  Hx of PEG  tube placement s/p CVA. Tolerated slow basal rate feeds well thus far following fluid drainage. On bolus feeds prior to admission    PLAN:  -Nutrition consulted for tube feed recs. Currently at goal of 45 mL/hour   -Pleasure feeds of pureed foods and sips of thin liquids okay per SLP      Abdominal fluid collection  See "Common bile duct (CBD) obstruction " A&P        Choledocholithiasis  See "Common bile duct (CBD) obstruction " A&P        Bilateral pleural effusion  Patient found to have small pleural effusion on imaging [MRCP(02/06/24)"Small right-sided pleural effusion.  Trace left-sided pleural effusion"]. Most likely etiology includes  limited mobility due to recent history of hospitalization for cholecystectomy . Management to include  monitoring at this time      Normocytic anemia  -Patient's with Normocytic anemia..   -Hemoglobin stable.   -Etiology likely due to chronic disease .  -Current CBC " "reviewed-    Recent Labs   Lab 02/09/24  0514 02/10/24  0426 02/11/24  0608   HGB 9.5* 9.5* 9.1*           Component Value Date/Time    MCV 94 02/11/2024 0608    RDW 13.7 02/11/2024 0608    IRON 12 (L) 02/07/2024 0223    TIBC 195 (L) 02/07/2024 0223    FERRITIN 1,256 (H) 02/07/2024 0223         PLAN  - Monitor serial CBC and transfuse if patient becomes hemodynamically unstable, symptomatic or H/H drops below 7/21.  - Etiology consistent with anemia of chronic disease. Will CTM.    Cholecystitis  See "Common bile duct (CBD) obstruction " A&P        CVA (cerebral vascular accident)  Patient is s/p CVA with residual dysphagia and weakness. He is s/p PEG tube and receives bolus tube feedings.         Type 2 diabetes mellitus  Patient's FSGs are controlled on current medication regimen.  Last A1c reviewed-   Lab Results   Component Value Date    HGBA1C 4.8 02/05/2024     Most recent fingerstick glucose reviewed-   Recent Labs   Lab 02/11/24  1541 02/12/24  0813   POCTGLUCOSE 137* 111*       Current correctional scale  Low  Maintain anti-hyperglycemic dose as follows-   Antihyperglycemics (From admission, onward)      Start     Stop Route Frequency Ordered    02/04/24 2059  insulin aspart U-100 pen 0-5 Units         -- SubQ Before meals & nightly PRN 02/04/24 2000          Hold Oral hypoglycemics while patient is in the hospital.     Patient on SSI at NH.    -LDSSI  -Glucerna for tube feeds    Primary hypertension  Patient is normotensive on arrival, per chart he is not currently on any antihypertensives.     Will utilize p.r.n. blood pressure medication only if patient's blood pressure greater than 180/110 and he develops symptoms such as worsening chest pain or shortness of breath.    -amlodipine 5mg  -Holding home lisinopril in the setting of AURORA  -Will add PRN hydralazine for SBP>180 and symptomatic  -will consider uptitration of home regimen if pressures remain uncontrolled        VTE Risk Mitigation (From " admission, onward)           Ordered     enoxaparin injection 40 mg  Every 24 hours         02/09/24 1408     IP VTE HIGH RISK PATIENT  Once         02/05/24 1410     Place sequential compression device  Until discontinued         02/04/24 2000                    Discharge Planning   PEYTON: 2/14/2024     Code Status: Full Code   Is the patient medically ready for discharge?: No    Reason for patient still in hospital (select all that apply): Treatment  Discharge Plan A: Home with family                  David Poe MD  Department of [facility] Medicine   [facility] - Surgery

## 2024-02-12 NOTE — ASSESSMENT & PLAN NOTE
Patient is normotensive on arrival, per chart he is not currently on any antihypertensives.     Will utilize p.r.n. blood pressure medication only if patient's blood pressure greater than 180/110 and he develops symptoms such as worsening chest pain or shortness of breath.    -amlodipine 5mg  -Holding home lisinopril in the setting of AURORA  -Will add PRN hydralazine for SBP>180 and symptomatic  -will consider uptitration of home regimen if pressures remain uncontrolled

## 2024-02-12 NOTE — PROGRESS NOTES
[facility] - Surgery  Wound Care    Patient Name:  Seymour Velazquez   MRN:  [id number]  Date: 2/12/2024  Diagnosis: Common bile duct (CBD) obstruction    History:     Past Medical History:   Diagnosis Date    CVA (cerebral vascular accident)     DM2 (diabetes mellitus, type 2)     Dysphagia     HLD (hyperlipidemia)     HTN (hypertension)        Social History     Socioeconomic History    Marital status: Unknown   Tobacco Use    Smoking status: Never    Smokeless tobacco: Never   Substance and Sexual Activity    Alcohol use: Not Currently       Precautions:     Allergies as of 02/03/2024    (Not on File)       WOC Assessment Details/Treatment     Patient seen for wound care follow up for x2 ABD wounds.   Reviewed chart for this encounter.   See Flow Sheet for findings.    Pt found lying in bed, agreeable to care at this time. X1 lower abd wound w/ steri strips intact, no drainage noted - wound is well approximated. X1 upper ML wound dressing removed - scant serosanguinous drainage appreciated. Cleansed w/ NS, patted dry and placed Aquacel Ag to wound, secured w/ foam dressing.    RECOMMENDATIONS:  Continue wound care as ordered.    Discussed POC with patient and primary nurse.   See EMR for orders & patient education.    Bedside nursing to continue care & monitoring.  Bedside nursing to maintain pressure injury prevention interventions.       02/12/24 1040   WOCN Assessment   WOCN Total Time (mins) 15   Visit Date 02/12/24   Visit Time 1040   Consult Type Follow Up   WOCN Speciality Wound   Wound surgical   Intervention assessed;changed;applied;chart review;coordination of care   Teaching on-going        Altered Skin Integrity  midline Upper quadrant   No Date First Assessed or Time First Assessed found.   Altered Skin Integrity Present on Admission - Did Patient arrive to the hospital with altered skin?: yes  Side: (c)   Orientation: midline  Location: Upper quadrant   Wound Image    Dressing Appearance Intact;Moist  drainage   Drainage Amount Scant   Drainage Characteristics/Odor Serosanguineous   Appearance Pink;Red;Moist   Periwound Area Intact;Dry   Care Cleansed with:;Sterile normal saline   Dressing Applied;Silver;Foam   Dressing Change Due 02/14/24        Altered Skin Integrity  midline Lower quadrant   No Date First Assessed or Time First Assessed found.   Altered Skin Integrity Present on Admission - Did Patient arrive to the hospital with altered skin?: yes  Side: (c)   Orientation: midline  Location: (c) Lower quadrant   Wound Image    Dressing Appearance Dry;Intact;Clean   Drainage Amount None   Drainage Characteristics/Odor No odor   Appearance Intact;Dry   Periwound Area Intact;Dry   Wound Edges Approximated   Dressing Other (comment)  (steri strips)

## 2024-02-12 NOTE — PLAN OF CARE
Nephrology Chart Review    Intake/Output Summary (Last 24 hours) at 2/12/2024 1757  Last data filed at 2/12/2024 1714  Gross per 24 hour   Intake 1012 ml   Output 860 ml   Net 152 ml     Vitals:    02/12/24 0806 02/12/24 1115 02/12/24 1202 02/12/24 1527   BP: 138/74  133/73 123/65   BP Location:   Right arm Right arm   Patient Position: Lying  Lying Lying   Pulse: 99 96 100 98   Resp: 16  18 18   Temp: 97.1 °F (36.2 °C)  98 °F (36.7 °C) 97.8 °F (36.6 °C)   TempSrc: Oral  Oral Oral   SpO2: 98%  100% 99%   Weight:       Height:           Recent Labs   Lab 02/09/24  0514 02/10/24  0426 02/11/24  0608    140 138   K 4.3 3.5 3.3*    108 107   CO2 17* 21* 25   BUN 16 18 23   CREATININE 1.2 1.6* 1.7*   CALCIUM 9.1 8.7 8.5*   PHOS 3.7 3.3 2.7     A1c - 02/05/2024 4.8     Patient not seen today although chart reviewed. No renal function panel or metabolic panel obtained as patient refused laboratory draws this morning. Urine output improved s/p Rosario catheter placement on 2/11 with ~2.25 liters of documented UOP in the last 24 hours. Agree with Rosario catheter placement as retroperitoneal US obtained yesterday notable for mild bilateral hydronephrosis with significantly distended urinary bladder.  Agree with stopping IVFs and monitoring for signs of dehydration (rising serum sodium) or volume depletion (i.e. orthostatic hypotension, hypotension, tachycardia, rising BUN and creatinine, etc.). Will re-assess tomorrow. No other related issues identified. Please call nephrology as needed. We will continue to follow.    Kwadwo Ríos MD  Nephrology

## 2024-02-12 NOTE — PLAN OF CARE
Recommendations  1. EN Recommendation- Rec'd continuing Glucerna 1.5 @ 45 mL/hr x 24 hours to provide 1620 kcal, 89 g PRO and 820 mL fluid. - Monitor s/s of intolerance such as abdominal pain/distension/nausea/vomiting.  2.Continue Pureed diet-thin liquids (pleasure feeds)  3. If TPN warranted recommend 210 g D + 74 g AA + SMOF lipids to provide 1510 kcal, 74 g PRO (GIR 1.83)   4. RD to monitor and follow-up.     Goals: Meet % EEN/EPN by follow-up date.  Nutrition Goal Status: continues   Communication of RD Recs: other (comment) (POC)

## 2024-02-12 NOTE — PLAN OF CARE
Problem: Diabetes Comorbidity  Goal: Blood Glucose Level Within Targeted Range  Outcome: Ongoing, Progressing     Problem: Pain Acute  Goal: Acceptable Pain Control and Functional Ability  Outcome: Ongoing, Progressing     Problem: Skin Injury Risk Increased  Goal: Skin Health and Integrity  Outcome: Ongoing, Progressing     Problem: Adult Inpatient Plan of Care  Goal: Plan of Care Review  Outcome: Ongoing, Progressing  Goal: Patient-Specific Goal (Individualized)  Outcome: Ongoing, Progressing  Goal: Absence of Hospital-Acquired Illness or Injury  Outcome: Ongoing, Progressing  Goal: Optimal Comfort and Wellbeing  Outcome: Ongoing, Progressing  Goal: Readiness for Transition of Care  Outcome: Ongoing, Progressing     Problem: Infection  Goal: Absence of Infection Signs and Symptoms  Outcome: Ongoing, Progressing

## 2024-02-12 NOTE — PLAN OF CARE
Pt. Evaluated and goals established     Problem: Occupational Therapy  Goal: Occupational Therapy Goal  Description: Goals to be met by: 3/13/24     Patient will increase functional independence with ADLs by performing:    Grooming while seated with Moderate Assistance.  Toileting from bedside commode with Moderate Assistance for hygiene and clothing management.   Sitting at edge of bed 10 minutes with Moderate Assistance.  Rolling to Bilateral with Moderate Assistance.   Supine to sit with Moderate Assistance.  Stand pivot transfers with Maximum Assistance.  Toilet transfer to bedside commode with Moderate Assistance.    Outcome: Ongoing, Progressing

## 2024-02-12 NOTE — ASSESSMENT & PLAN NOTE
Hx of PEG  tube placement s/p CVA. Tolerated slow basal rate feeds well thus far following fluid drainage. On bolus feeds prior to admission    PLAN:  -Nutrition consulted for tube feed recs. Currently at goal of 45 mL/hour   -Pleasure feeds of pureed foods and sips of thin liquids okay per SLP

## 2024-02-12 NOTE — PT/OT/SLP EVAL
Occupational Therapy   Co-Evaluation    Name: Seymour Velazquez  MRN: [id number]  Admitting Diagnosis: Common bile duct (CBD) obstruction  Recent Surgery: Procedure(s) (LRB):  ERCP (ENDOSCOPIC RETROGRADE CHOLANGIOPANCREATOGRAPHY) (N/A) 6 Days Post-Op    Recommendations:     Discharge Recommendations: Moderate Intensity Therapy  Discharge Equipment Recommendations:  wheelchair, hospital bed, lift device  Barriers to discharge:  Inaccessible home environment    Assessment:     Seymour Velazquez is a 68 y.o. male with a medical diagnosis of Common bile duct (CBD) obstruction.  He presents with no c/o pain however present with poor EOB balance and insight to deficits . Performance deficits affecting function: weakness, impaired endurance, impaired self care skills, impaired functional mobility, gait instability, impaired balance, decreased lower extremity function, decreased safety awareness, pain.  Patient currently demonstrates a need for moderate intensity therapy on a daily basis post acute secondary to a decline in functional status due to surgical procedure    Rehab Prognosis: Fair; patient would benefit from acute skilled OT services to address these deficits and reach maximum level of function.       Plan:     Patient to be seen 4 x/week to address the above listed problems via self-care/home management, neuromuscular re-education, therapeutic activities, therapeutic exercises  Plan of Care Expires: 03/13/24  Plan of Care Reviewed with: patient    Subjective     Chief Complaint: None verbalized  Patient/Family Comments/goals: PT.,reports he is not in pain     Occupational Profile:   *Pt poor historian and at time of Eval therapist was unable to retrieve information from chart due to discrepancies*   Living Environment: Per Pt. Report t reports he lives with sister and brother in 2nd floor apartment with [address] [employer]. Bathroom: walk-in shower   Previous level of function: Ind in all ADLs and fxl mon    Equipment Used at Home: none  Assistance upon Discharge: Unknown currently will continue to assess    Pain/Comfort:  Pain Rating 1: 0/10    Patients cultural, spiritual, Pentec[employer]alism conflicts given the current situation: no    Objective:     Communicated with: Nurse prior to session.  Patient found HOB elevated with PEG Tube, SCD, pacheco catheter, peripheral IV upon OT entry to room.    General Precautions: Standard, fall, aspiration  Orthopedic Precautions: N/A  Braces: N/A  Respiratory Status: Room air    Occupational Performance:    Bed Mobility:    Patient completed Scooting/Bridging with total assistance and 2 persons  Patient completed Supine to Sit with total assistance and 2 persons  Patient completed Sit to Supine with total assistance and 2 persons    Functional Mobility/Transfers: deferred 2/2 poor sitting balance at EOB     Activities of Daily Living:  Politely decline all further ADLs  Pt. Sat at EOB ~123 mins with a total A, significant posterior lean    Cognitive/Visual Perceptual:  Cognitive/Psychosocial Skills:     -       Oriented to: Person, Place, and Situation   -       Follows Commands/attention:Follows one-step commands  -       Communication: clear/fluent  -       Memory: No Deficits noted  -       Safety awareness/insight to disability: impaired   -       Mood/Affect/Coping skills/emotional control: Appropriate to situation    Physical Exam:  Balance:   Static Sitting:  Poor   Dynamic Sitting: Poor  Static Standing: deferred    Dominant hand:    -       L hand   Upper Extremity Range of Motion:     -       Right Upper Extremity: WFL, noted contracture PIP of index, middle and ring finger  -       Left Upper Extremity: WFL  Upper Extremity Strength:    -       Right Upper Extremity: WFL 3/5  -       Left Upper Extremity: WFL 3/5    Strength:    -       Right Upper Extremity: WFL 3/5  -       Left Upper Extremity: WFL 4/5    AMPAC 6 Click ADL:  AMPAC Total Score: 8    Treatment &  Education:  Educated on the importance of grooming and hygiene, postioning for skin integrity and maintaining strength in UE to engage in ADLs   Pt educated on role of occupational therapy, POC, and safety during ADLs and functional mobility. Pt and OT discussed importance of safe, continued mobility to optimize daily living skills. Pt verbalized understanding. Pt given instruction to call for medical staff/nurse for assistance.   PT present for coeval due to pt's multiple medical comorbidities and functional/cognition deficits requiring two skilled therapists to appropriately progress pt's musculoskeletal strength, neuromuscular control, and endurance while taking into consideration medical acuity and pt safety.         Patient left HOB elevated with all lines intact and call button in reachand nurse at bedside    GOALS:   Multidisciplinary Problems       Occupational Therapy Goals          Problem: Occupational Therapy    Goal Priority Disciplines Outcome Interventions   Occupational Therapy Goal     OT, PT/OT Ongoing, Progressing    Description: Goals to be met by: 3/13/24     Patient will increase functional independence with ADLs by performing:    Grooming while seated with Moderate Assistance.  Toileting from bedside commode with Moderate Assistance for hygiene and clothing management.   Sitting at edge of bed 10 minutes with Moderate Assistance.  Rolling to Bilateral with Moderate Assistance.   Supine to sit with Moderate Assistance.  Stand pivot transfers with Maximum Assistance.  Toilet transfer to bedside commode with Moderate Assistance.                         History:     Past Medical History:   Diagnosis Date    CVA (cerebral vascular accident)     DM2 (diabetes mellitus, type 2)     Dysphagia     HLD (hyperlipidemia)     HTN (hypertension)          Past Surgical History:   Procedure Laterality Date    ABDOMINAL SURGERY      ERCP N/A 2/6/2024    Procedure: ERCP (ENDOSCOPIC RETROGRADE  CHOLANGIOPANCREATOGRAPHY);  Surgeon: Jeff Stearns MD;  Location: [facility] ([address] [place] [address]);  Service: Endoscopy;  Laterality: N/A;    INSERTION, PEG TUBE         Time Tracking:     OT Date of Treatment: 02/12/24  OT Start Time: 0941  OT Stop Time: 1010  OT Total Time (min): 29 min    Billable Minutes:Evaluation 15  Therapeutic Activity 14    2/12/2024

## 2024-02-12 NOTE — ASSESSMENT & PLAN NOTE
68M w/ recent CVA and residual dysphagia s/p PEG tube presents from OSH for retained gallstone in ampulla, s/p lap cholecystectomy in which the gallbladder was found necrotic and friable. His only symptom is abdominal pain. Labs show leukocytosis. He is transferred for AES eval and possible ERCP.     -MRCP[02/06/24] noted fluid collection in the gallbladder fossa, concerning for  abscess vs. biloma. Also noted choledocholithiasis with gallstones posterior to the liver.  -AES performed ERCP 02/06/24 with removal of choledocolithiasis by biliary sphincterotomy and balloon extraction.   -IR attempt at drainage of fluid collection [2/7/2024] unsuccessful 2/2 episode of N/V with associated tachycardia and hypertension  -IR reattempt at fluid drainage and drain placement [2/9/24] successful without complication. Drain in place with serosanguinous fluid.  -General Surgery consult noted no indication for surgical intervention at the time  -Ceftriaxone/Metronidazole per ID recs. Following cultures from aspiration  -Fluid studies and cultures with no evidence of bacterial growth thus far  -Scheduling reglan for N/V with additional antiemetics PRN

## 2024-02-12 NOTE — SUBJECTIVE & OBJECTIVE
Interval History: ***    Review of patient's allergies indicates:   Allergen Reactions    Pcn [penicillins]      Tolerated cefazolin 10/2022. -- Patient reports being told he had a PCN allergy as a child. Cannot recall specific reaction.     Current Facility-Administered Medications   Medication Frequency    amLODIPine tablet 5 mg Daily    aspirin chewable tablet 81 mg Daily    atorvastatin tablet 80 mg QHS    busPIRone split tablet 7.5 mg Daily    cefTRIAXone (ROCEPHIN) 2 g in dextrose 5 % in water (D5W) 100 mL IVPB (MB+) Q24H    dextrose 10% bolus 125 mL 125 mL PRN    dextrose 10% bolus 250 mL 250 mL PRN    enoxaparin injection 40 mg Q24H (prophylaxis, 1700)    famotidine tablet 20 mg Daily    glucagon (human recombinant) injection 1 mg PRN    glucose chewable tablet 16 g PRN    glucose chewable tablet 24 g PRN    hydrALAZINE injection 10 mg Q6H PRN    insulin aspart U-100 pen 0-5 Units QID (AC + HS) PRN    lactated ringers infusion Continuous    melatonin tablet 6 mg Nightly PRN    metoclopramide injection 5 mg Q8H    metronidazole IVPB 500 mg Q8H    morphine injection 2 mg Q4H PRN    naloxone 0.4 mg/mL injection 0.02 mg PRN    ondansetron injection 8 mg Q6H PRN    prochlorperazine injection Soln 5 mg Q6H PRN    senna tablet 8.6 mg Daily    sertraline tablet 25 mg Daily    sodium chloride 0.9% flush 10 mL Q12H PRN    sodium chloride 0.9% flush 10 mL Q6H    And    sodium chloride 0.9% flush 10 mL PRN       Objective:     Vital Signs (Most Recent):  Temp: 97.1 °F (36.2 °C) (02/12/24 0806)  Pulse: 99 (02/12/24 0806)  Resp: 16 (02/12/24 0806)  BP: 138/74 (02/12/24 0806)  SpO2: 98 % (02/12/24 0806) Vital Signs (24h Range):  Temp:  [97.1 °F (36.2 °C)-98.3 °F (36.8 °C)] 97.1 °F (36.2 °C)  Pulse:  [] 99  Resp:  [16-18] 16  SpO2:  [98 %-100 %] 98 %  BP: (121-158)/(59-91) 138/74     Weight: 79.6 kg (175 lb 8 oz) (02/04/24 1920)  Body mass index is 22.53 kg/m².  Body surface area is 2.04 meters squared.    I/O last 3  completed shifts:  In: 1892 [I.V.:50; Other:962; NG/GT:880]  Out: 2250 [Urine:2250]     Physical Exam     Significant Labs:  BMP:   Recent Labs   Lab 02/11/24  0608   *      K 3.3*      CO2 25   BUN 23   CREATININE 1.7*   CALCIUM 8.5*   MG 1.6     CBC:   Recent Labs   Lab 02/11/24  0608   WBC 10.69   RBC 3.09*   HGB 9.1*   HCT 29.0*      MCV 94   MCH 29.4   MCHC 31.4*     CMP:   Recent Labs   Lab 02/11/24  0608   *   CALCIUM 8.5*   ALBUMIN 1.9*   PROT 6.3      K 3.3*   CO2 25      BUN 23   CREATININE 1.7*   ALKPHOS 75   ALT 8*   AST 14   BILITOT 0.2     Coagulation:   Recent Labs   Lab 02/07/24  0905   INR 1.3*     LFTs:   Recent Labs   Lab 02/11/24  0608   ALT 8*   AST 14   ALKPHOS 75   BILITOT 0.2   PROT 6.3   ALBUMIN 1.9*     Microbiology Results (last 7 days)       Procedure Component Value Units Date/Time    Aerobic culture [[id number]] Collected: 02/09/24 0914    Order Status: Completed Specimen: Abscess from Abdomen Updated: 02/12/24 0750     Aerobic Bacterial Culture No growth    AFB Culture & Smear [[id number]] Collected: 02/09/24 0914    Order Status: Completed Specimen: Body Fluid from Abdomen Updated: 02/10/24 2127     AFB Culture & Smear Culture in progress    Culture, Anaerobe [[id number]] Collected: 02/09/24 0914    Order Status: Completed Specimen: Body Fluid from Abdomen Updated: 02/10/24 0728     Anaerobic Culture Culture in progress    Gram stain [[id number]] Collected: 02/09/24 0914    Order Status: Completed Specimen: Body Fluid from Abdomen Updated: 02/09/24 1844     Gram Stain Result Moderate WBC's      No organisms seen    Fungus culture [[id number]] Collected: 02/09/24 0914    Order Status: Sent Specimen: Body Fluid from Abdomen Updated: 02/09/24 1139          Specimen (24h ago, onward)      None          Significant Imaging:  I have reviewed all imagining in the last 24 hours.

## 2024-02-12 NOTE — PT/OT/SLP PROGRESS
Speech Language Pathology Treatment    Patient Name:  Seymour Velazquez   MRN:  [id number]  Admitting Diagnosis: Common bile duct (CBD) obstruction    Recommendations:                 General Recommendations:  Dysphagia therapy  Diet recommendations:  Pleasure Feeding ( of thin liquids and pudding/applesauce when awake and readily accepting)    Aspiration Precautions: Standard aspiration precautions   General Precautions: Standard, aspiration, fall, NPO  Communication strategies:  none    Assessment:     Seymour Velazquez is a 68 y.o. male with an SLP diagnosis of Dysphagia.      Subjective     Pt seen resting comfortably in bed  Patient goals: none stated     Pain/Comfort:  Pain Rating 1: 0/10    Respiratory Status: Room air    Objective:     Has the patient been evaluated by SLP for swallowing?   Yes  Keep patient NPO? No   Current Respiratory Status:    Room air     Pt seen for ongoing dysphagia tx. Attempting to administered trials of puree however pt consistently refusing PO trials despite encouragement. Pt also not verbally responding to open ended or yes/no questions this date. No further trials were attempted given pts ongoing refusal. SLP will continue to monitor.     Goals:   Multidisciplinary Problems       SLP Goals          Problem: SLP    Goal Priority Disciplines Outcome   SLP Goal     SLP Ongoing, Progressing   Description: Speech Language Pathology Goals  Goals expected to be met by 2/17:  1. Pt will tolerate thin liquids and purees for pleasure without s/s of aspiration while following aspiration precautions.   2. Pt will participate in ongoing swallowing assessment to determine if MBSS and/or advanced textures are appropriate.                                Plan:     Patient to be seen:  4 x/week   Plan of Care expires:  03/11/24  Plan of Care reviewed with:  patient   SLP Follow-Up:  Yes       Discharge recommendations:   (tbd)   Barriers to Discharge:  None    Time Tracking:     SLP  Treatment Date:   02/12/24  Speech Start Time:  1412  Speech Stop Time:  1420     Speech Total Time (min):  8 min    Billable Minutes: Treatment Swallowing Dysfunction 8    02/12/2024     PROCEDURES:  Right total knee arthroplasty 18-Oct-2019 10:50:18  Jann Finney

## 2024-02-12 NOTE — ASSESSMENT & PLAN NOTE
Patient's FSGs are controlled on current medication regimen.  Last A1c reviewed-   Lab Results   Component Value Date    HGBA1C 4.8 02/05/2024     Most recent fingerstick glucose reviewed-   Recent Labs   Lab 02/11/24  1541 02/12/24  0813   POCTGLUCOSE 137* 111*       Current correctional scale  Low  Maintain anti-hyperglycemic dose as follows-   Antihyperglycemics (From admission, onward)    Start     Stop Route Frequency Ordered    02/04/24 2059  insulin aspart U-100 pen 0-5 Units         -- SubQ Before meals & nightly PRN 02/04/24 2000        Hold Oral hypoglycemics while patient is in the hospital.     Patient on SSI at NH.    -LDSSI  -Glucerna for tube feeds

## 2024-02-12 NOTE — ASSESSMENT & PLAN NOTE
68M with hx CVA, dysphagia, PEG tube, HTN, transferred from [facility] after presenting with acute cholecystitis on 1/30, lap [place] on 2/1 c/b perforated necrotic gallbladder with stone spillage and obstructing CBD stone. He was treated with meropenem prior to transfer. MRCP noted complex collection within gallbladder fossa and gallstones posterior to the liver. He underwent ERCP on 2/6 w/ complete removal of obstructing stones. He underwent IR drain placement on 2/9 with negative cultures, however cell count appears infected w/ 20k WBC  and 99% segs. Cultures from [facility] (blood and urine) are negative.    Recommendations:   - continue ceftriaxone and flagyl  - please ensure accurate drain output is being charted  - flush drains daily as per IR recs  - Obtain CT A/P when drain output is < 10 cc per day x 2 days  - ultimate abx course will depend on resolution of intra-abdominal abscess   ambulate

## 2024-02-12 NOTE — PROGRESS NOTES
"[facility] - [facility]  Adult Nutrition  Progress Note    SUMMARY       Recommendations  1. EN Recommendation- Rec'd continuing Glucerna 1.5 @ 45 mL/hr x 24 hours to provide 1620 kcal, 89 g PRO and 820 mL fluid. - Monitor s/s of intolerance such as abdominal pain/distension/nausea/vomiting.  2.Continue Pureed diet-thin liquids  3. If TPN warranted recommend 210 g D + 74 g AA + SMOF lipids to provide 1510 kcal, 74 g PRO (GIR 1.83)   4. RD to monitor and follow-up.    Goals: Meet % EEN/EPN by follow-up date.  Nutrition Goal Status: continues   Communication of RD Recs: other (comment) (POC)    Assessment and Plan    Nutrition Problem  Inadequate oral intake     Related to (etiology):   Decreased ability to consume sufficient energy     Signs and Symptoms (as evidenced by):   NPO      Interventions/Recommendations (treatment strategy):  Collaboration of nutrition care with other providers  EN  ADAT     Nutrition Diagnosis Status:   Resolved      Reason for Assessment    Reason For Assessment: RD follow-up  Diagnosis: other (see comments) (Common bile duct (CBD) obstruction)  Relevant Medical History: DM2, HTN, CVA  Interdisciplinary Rounds: did not attend  General Information Comments: RD f/u: TF's turned off during visit. Pt cleared for pureed diet- thin liquids receiving pleasure feeds. RD following  Nutrition Discharge Planning: Pending Clinical Course    Nutrition Risk Screen    Nutrition Risk Screen: tube feeding or parenteral nutrition    Nutrition/Diet History    Spiritual, Cultural Beliefs, Synagogue Practices, Values that Affect Care: no    Anthropometrics    Temp: 98 °F (36.7 °C)  Height Method: Stated  Height: 6' 2" (188 cm)  Height (inches): 74 in  Weight Method: Bed Scale  Weight: 79.6 kg (175 lb 8 oz)  Weight (lb): 175.5 lb  Ideal Body Weight (IBW), Male: 190 lb  % Ideal Body Weight, Male (lb): 92.37 %  BMI (Calculated): 22.5       Lab/Procedures/Meds    Pertinent Labs Reviewed: reviewed  Pertinent " Medications Reviewed: reviewed  Pertinent Medications Comments: atorvastatin, lisinopril, lactated ringers, senna, famotidine, enoxaparin    imated/Assessed Needs    Weight Used For Calorie Calculations: 79.4 kg (175 lb)  Energy Calorie Requirements (kcal): 8111-5024 kcal/d (MSJ x 1.25)  Energy Need Method: [place]-St Jeor  Protein Requirements: 64-79 g/d (0.8-1.0 g/kg)  Weight Used For Protein Calculations: 79.4 kg (175 lb)   RDA Method (mL): 1633  CHO Requirement: 204-255 g      Nutrition Prescription Ordered    Current Diet Order: Pureed, thin liquids   Current Nutrition Support Formula Ordered: Glucerna 1.5   Current Nutrition Support Rate Ordered: 45 (ml)  Current Nutrition Support Frequency Ordered: Continuous    Evaluation of Received Nutrient/Fluid Intake    I/O: -1.23 L  Energy Calories Required: not meeting needs  Protein Required: not meeting needs  Fluid Required: not meeting needs  % Intake of Estimated Energy Needs: 0 - 25 %  % Meal Intake: 0 - 25 %    Nutrition Risk    Level of Risk/Frequency of Follow-up: low (1x/ week)     Monitor and Evaluation    Food and Nutrient Intake: energy intake, food and beverage intake, enteral nutrition intake, parenteral nutrition intake  Food and Nutrient Adminstration: diet order, enteral and parenteral nutrition administration  Physical Activity and Function: nutrition-related ADLs and IADLs, factors affecting access to physical activity  Anthropometric Measurements: weight, weight change, body mass index  Biochemical Data, Medical Tests and Procedures: lipid profile, glucose/endocrine profile, gastrointestinal profile, electrolyte and renal panel, inflammatory profile  Nutrition-Focused Physical Findings: overall appearance, extremities, muscles and bones, head and eyes, skin     Nutrition Follow-Up    RD Follow-up?: Yes

## 2024-02-12 NOTE — SUBJECTIVE & OBJECTIVE
Interval History:  No acute events overnight.  Patient remained afebrile, and all other vital signs were stable.  He had an ultrasound of the retroperitoneal space yesterday showing mild bilateral hydronephrosis and a distended bladder.  Rosario catheter was placed last night, and patient put out 2.2 L of urine overnight. This morning Mr. Velazquez refused his labs so we are unable to assess his serum creatinine after catheter insertion.  Cultures taken from aspiration have shown no growth today.  Per SLP evaluation, pureed diet was added so that he can start pleasure feeds in addition to his tube feeds.  Today he is pending physical therapy and occupational therapy evaluation, final antibiotic recommendations from Infectious Disease, and follow up with Nephrology for his [facility].  Due to significant urine output overnight, we will follow his output closely given concerns for postobstructive diuresis.    Review of Systems  Objective:     Vital Signs (Most Recent):  Temp: 98 °F (36.7 °C) (02/12/24 1202)  Pulse: 100 (02/12/24 1202)  Resp: 18 (02/12/24 1202)  BP: 133/73 (02/12/24 1202)  SpO2: 100 % (02/12/24 1202) Vital Signs (24h Range):  Temp:  [97.1 °F (36.2 °C)-98.2 °F (36.8 °C)] 98 °F (36.7 °C)  Pulse:  [] 100  Resp:  [16-18] 18  SpO2:  [98 %-100 %] 100 %  BP: (121-140)/(59-91) 133/73     Weight: 79.6 kg (175 lb 8 oz)  Body mass index is 22.53 kg/m².    Intake/Output Summary (Last 24 hours) at 2/12/2024 1237  Last data filed at 2/12/2024 0518  Gross per 24 hour   Intake 1012 ml   Output 1350 ml   Net -338 ml         Physical Exam  Vitals and nursing note reviewed.   Constitutional:       General: He is not in acute distress.     Appearance: Normal appearance. He is not ill-appearing.   HENT:      Head: Normocephalic and atraumatic.      Mouth/Throat:      Mouth: Mucous membranes are moist.   Eyes:      General: No scleral icterus.  Cardiovascular:      Rate and Rhythm: Normal rate and regular rhythm.      Heart  sounds: Normal heart sounds. No murmur heard.  Pulmonary:      Effort: Pulmonary effort is normal. No respiratory distress.      Breath sounds: Normal breath sounds.   Abdominal:      General: Bowel sounds are normal. There is no distension.      Palpations: Abdomen is soft.      Tenderness: There is no abdominal tenderness. There is no guarding or rebound.      Comments: Drain in place in RUQ with serosanguinous fluid. Incision bandaged and clean.  PEG in place in the LUQ   Genitourinary:     Comments: Rosario catheter in place draining light yellow urine, no cloudiness noted  Musculoskeletal:      Right lower leg: No edema.      Left lower leg: No edema.   Skin:     General: Skin is warm and dry.      Coloration: Skin is not jaundiced.   Neurological:      Mental Status: Mental status is at baseline.   Psychiatric:         Mood and Affect: Mood normal.         Behavior: Behavior normal.             Significant Labs: All pertinent labs within the past 24 hours have been reviewed.  None Patient refused overnight labs.     Significant Imaging: I have reviewed all pertinent imaging results/findings within the past 24 hours.

## 2024-02-12 NOTE — PLAN OF CARE
Problem: Diabetes Comorbidity  Goal: Blood Glucose Level Within Targeted Range  Outcome: Ongoing, Progressing     Problem: Pain Acute  Goal: Acceptable Pain Control and Functional Ability  Outcome: Ongoing, Progressing     Problem: Skin Injury Risk Increased  Goal: Skin Health and Integrity  Outcome: Ongoing, Progressing     Problem: Adult Inpatient Plan of Care  Goal: Plan of Care Review  Outcome: Ongoing, Progressing  Goal: Patient-Specific Goal (Individualized)  Outcome: Ongoing, Progressing  Goal: Absence of Hospital-Acquired Illness or Injury  Outcome: Ongoing, Progressing  Goal: Optimal Comfort and Wellbeing  Outcome: Ongoing, Progressing  Goal: Readiness for Transition of Care  Outcome: Ongoing, Progressing     Problem: Infection  Goal: Absence of Infection Signs and Symptoms  Outcome: Ongoing, Progressing   Overall he has had a great day.  No new complaints.  A mid-line was placed by rapid response team.  No sign of distress noted and safety precautions remain in place.

## 2024-02-12 NOTE — PROGRESS NOTES
[facility]  Infectious Disease  Progress Note    Patient Name: Seymour Velazquez  MRN: [id number]  Admission Date: 2/4/2024  Length of Stay: 8 days  Attending Physician: Delfina Quesada MD  Primary Care Provider: Eliazar Rosas MD    Isolation Status: No active isolations  Assessment/Plan:      GI  Cholecystitis  68M with hx CVA, dysphagia, PEG tube, HTN, transferred from [facility] after presenting with acute cholecystitis on 1/30, lap irlanda on 2/1 c/b perforated necrotic gallbladder with stone spillage and obstructing CBD stone. He was treated with meropenem prior to transfer. MRCP noted complex collection within gallbladder fossa and gallstones posterior to the liver. He underwent ERCP on 2/6 w/ complete removal of obstructing stones. He underwent IR drain placement on 2/9 with negative cultures, however cell count appears infected w/ 20k WBC  and 99% segs. Cultures from [facility] (blood and urine) are negative.    Recommendations:   - continue ceftriaxone and flagyl  - please ensure accurate drain output is being charted  - flush drains daily as per IR recs  - Obtain CT A/P when drain output is < 10 cc per day x 2 days  - ultimate abx course will depend on resolution of intra-abdominal abscess      Anticipated Disposition: TBD    Thank you for your consult. I will follow-up with patient. Please contact us if you have any additional questions.    Ifrah Whiting DO  Infectious Disease    Time: 50 minutes   50% of time spent on face-to-face counseling and coordination of care. Counseling included review of test results, diagnosis, and treatment plan with patient and/or family.          Subjective:     Principal Problem:Common bile duct (CBD) obstruction    HPI: Mr Velazquez is a 68 year old male with history of CVA with dysphagia s/p PEG tube, HTN, HLD, DMII presents from OSH for AES eval of possible retained stone after cholecystectomy 02/01.    Patient initially presented  from NH with leukocytosis w/o specific symptoms, imaging showed cholecystitis. He underwent lap irlanda 2/1, the operative report noted the gallbladder was necrotic and fell apart. The back wall of the gallbladder had an abscess that was perforated. There were spilled stones and purulent bile. A cholangiogram showed patent common duct with single stone at the ampulla that appears too large to pass spontaneously. Patient transferred to [facility] for possible ERCP.  While at OSH patient was treated with empiric Meropenem. On arrival, patient reports mild abdominal pain, denies N/V/D. He is AFVSS.  No leukocytosis. LFTS WNL. ID consulted for recommendations.  Patient reports being told he had a PCN allergy as a child. Cannot recall specific reaction. Gi following. Ordered MRCP.  Interval History: no events overnight, denies significant abd pain    Review of Systems   All other systems reviewed and are negative.    Objective:     Vital Signs (Most Recent):  Temp: 98 °F (36.7 °C) (02/12/24 1202)  Pulse: 100 (02/12/24 1202)  Resp: 18 (02/12/24 1202)  BP: 133/73 (02/12/24 1202)  SpO2: 100 % (02/12/24 1202) Vital Signs (24h Range):  Temp:  [97.1 °F (36.2 °C)-98.2 °F (36.8 °C)] 98 °F (36.7 °C)  Pulse:  [] 100  Resp:  [16-18] 18  SpO2:  [98 %-100 %] 100 %  BP: (121-140)/(59-91) 133/73     Weight: 79.6 kg (175 lb 8 oz)  Body mass index is 22.53 kg/m².    Estimated Creatinine Clearance: 46.8 mL/min (A) (based on SCr of 1.7 mg/dL (H)).     Physical Exam  Constitutional:       General: He is not in acute distress.     Appearance: He is well-developed. He is not ill-appearing or diaphoretic.   HENT:      Head: Normocephalic and atraumatic.      Right Ear: External ear normal.      Left Ear: External ear normal.      Nose: Nose normal.   Eyes:      General: No scleral icterus.        Right eye: No discharge.         Left eye: No discharge.      Extraocular Movements: Extraocular movements intact.      Conjunctiva/sclera: Conjunctivae  normal.   Pulmonary:      Effort: Pulmonary effort is normal. No respiratory distress.      Breath sounds: No stridor.   Abdominal:      General: Abdomen is flat.      Palpations: Abdomen is soft.      Comments: Laparoscopic incisions c/d/I  2 RUQ drains, one with bloody/serosang output, the other with serous/serousang output   Skin:     General: Skin is dry.      Coloration: Skin is not jaundiced or pale.      Findings: No erythema.   Neurological:      General: No focal deficit present.      Mental Status: He is alert and oriented to person, place, and time. Mental status is at baseline.   Psychiatric:         Mood and Affect: Mood normal.         Behavior: Behavior normal.         Thought Content: Thought content normal.         Judgment: Judgment normal.          Significant Labs: CBC:   Recent Labs   Lab 02/11/24  0608   WBC 10.69   HGB 9.1*   HCT 29.0*        CMP:   Recent Labs   Lab 02/11/24  0608      K 3.3*      CO2 25   *   BUN 23   CREATININE 1.7*   CALCIUM 8.5*   PROT 6.3   ALBUMIN 1.9*   BILITOT 0.2   ALKPHOS 75   AST 14   ALT 8*   ANIONGAP 6*     Microbiology Results (last 7 days)       Procedure Component Value Units Date/Time    AFB Culture & Smear [[id number]] Collected: 02/09/24 0914    Order Status: Completed Specimen: Body Fluid from Abdomen Updated: 02/12/24 1416     AFB Culture & Smear Culture in progress     AFB CULTURE STAIN No acid fast bacilli seen.    Aerobic culture [[id number]] Collected: 02/09/24 0914    Order Status: Completed Specimen: Abscess from Abdomen Updated: 02/12/24 0750     Aerobic Bacterial Culture No growth    Culture, Anaerobe [[id number]] Collected: 02/09/24 0914    Order Status: Completed Specimen: Body Fluid from Abdomen Updated: 02/10/24 0728     Anaerobic Culture Culture in progress    Gram stain [[id number]] Collected: 02/09/24 0914    Order Status: Completed Specimen: Body Fluid from Abdomen Updated: 02/09/24 1844     Gram Stain Result  Moderate WBC's      No organisms seen    Fungus culture [[id number]] Collected: 02/09/24 0914    Order Status: Sent Specimen: Body Fluid from Abdomen Updated: 02/09/24 1139            Significant Imaging: I have reviewed all pertinent imaging results/findings within the past 24 hours.

## 2024-02-12 NOTE — PT/OT/SLP EVAL
Physical Therapy Co-Evaluation and Treatment    OT present for coeval due to pt's multiple medical comorbidities and functional/cognition deficits requiring two skilled therapists to appropriately progress pt's musculoskeletal strength, neuromuscular control, and endurance while taking into consideration medical acuity and pt safety.    Patient Name:  Seymour Velazquez   MRN:  [id number]    Recommendations:     Discharge Recommendations: Moderate Intensity Therapy   Discharge Equipment Recommendations: hospital bed, wheelchair, lift device   Barriers to discharge: None    Assessment:     Seymour Velazquez is a 68 y.o. male admitted with a medical diagnosis of Common bile duct (CBD) obstruction.  He presents with the following impairments/functional limitations: weakness, impaired endurance, impaired balance, impaired self care skills, impaired functional mobility, decreased lower extremity function, decreased upper extremity function, decreased ROM, impaired joint extensibility, impaired coordination, decreased safety awareness, impaired cognition     Pt receptive and tolerated PT co-eval with OT fairly. Pt has past history of CVA with R residual weakness and muscle length impairments. Pt needing total A of 2 people to complete bed mobility and sit EOB this session. Pt presented with RLE contracture at knee and BLE weakness. Stand not attempted due to contracture and poor sitting balance. Patient currently demonstrates a need for moderate intensity therapy on a daily basis post acute secondary to a decline in functional status due to surgical procedure.    Rehab Prognosis: Fair; patient would benefit from acute skilled PT services to address these deficits and reach maximum level of function.    Recent Surgery: Procedure(s) (LRB):  ERCP (ENDOSCOPIC RETROGRADE CHOLANGIOPANCREATOGRAPHY) (N/A) 6 Days Post-Op    Plan:     During this hospitalization, patient to be seen 3 x/week to address the identified rehab  "impairments via gait training, therapeutic activities, therapeutic exercises, neuromuscular re-education and progress toward the following goals:    Plan of Care Expires:  03/13/24    Subjective     Chief Complaint: none reported  Patient/Family Comments/goals: "I'm slipping"  Pain/Comfort:  Pain Rating 1: 0/10    Patients cultural, spiritual, Methodist conflicts given the current situation: no    Patient History: Pt poor historian and PT unable to retrieve information from chart due to discrepancies     Living Environment: Pt reports he lives with sister and brother in 2nd floor apartment with [address]. Bathroom: walk-in shower   Prior Level of Function: Pt reports he was IND  DME owned: none  Caregiver Assistance: unknown at this time    Objective:     Communicated with RN prior to session.  Patient found HOB elevated with peripheral IV, PEG Tube, pacheco catheter, SCD  upon PT entry to room.    General Precautions: Standard, fall, aspiration  Orthopedic Precautions:N/A   Braces: N/A  Respiratory Status: Room air    Exams:  Sensation:    RLE ROM: Deficits: Pt presents with R knee contracture, unable to extend past 90 degrees  RLE Strength: grossly 2/5  LLE ROM: WFL  LLE Strength: hip 2/5, knee and ankle grossly 3/5    Functional Mobility:    Bed Mobility:   Supine > Sit: total assistance and of 2 persons  Sit > Supine: total assistance and of 2 persons  Scooting: total assistance and of 2 persons    Transfers:   Not performed due to BLE weakness, R knee contracture and poor sitting balance    Balance:   Sitting balance: POOR: N/A  Pt sat EOB ~ 12 mins needing total A due to posterior lean  PT/OT assisted with anterior lean to facility trunk control       AM-PAC 6 CLICK MOBILITY  Total Score:8       Treatment & Education:  Pt educated on tip to reduce fall risk and safety with mobility and using call button for assistance from nursing staff with Bed mobility.  All questions answered within the scope of " PT.  White board updated accordingly.      Patient left HOB elevated with all lines intact, call button in reach, and RN present.    GOALS:   Multidisciplinary Problems       Physical Therapy Goals          Problem: Physical Therapy    Goal Priority Disciplines Outcome Goal Variances Interventions   Physical Therapy Goal     PT, PT/OT Ongoing, Progressing     Description: Goals to be met by: 3/13/24     Patient will increase functional independence with mobility by performin. Supine to sit with Moderate Assistance  2. Sit to supine with Moderate Assistance  3. Bed to chair transfer with Moderate Assistance using Slideboard  5. Sitting at edge of bed x10 minutes with Minimal Assistance                         History:     Past Medical History:   Diagnosis Date    CVA (cerebral vascular accident)     DM2 (diabetes mellitus, type 2)     Dysphagia     HLD (hyperlipidemia)     HTN (hypertension)        Past Surgical History:   Procedure Laterality Date    ABDOMINAL SURGERY      ERCP N/A 2024    Procedure: ERCP (ENDOSCOPIC RETROGRADE CHOLANGIOPANCREATOGRAPHY);  Surgeon: Jeff Stearns MD;  Location: [address];  Service: Endoscopy;  Laterality: N/A;    INSERTION, PEG TUBE         Time Tracking:     PT Received On: 24  PT Start Time: 941     PT Stop Time: 1010  PT Total Time (min): 29 min     Billable Minutes: Evaluation 15 and Neuromuscular Re-education 14      2024

## 2024-02-13 PROBLEM — R33.9 URINARY RETENTION: Status: ACTIVE | Noted: 2024-02-13

## 2024-02-13 LAB
ALBUMIN SERPL BCP-MCNC: 1.9 G/DL (ref 3.5–5.2)
ALP SERPL-CCNC: 67 U/L (ref 55–135)
ALT SERPL W/O P-5'-P-CCNC: 10 U/L (ref 10–44)
ANION GAP SERPL CALC-SCNC: 10 MMOL/L (ref 8–16)
AST SERPL-CCNC: 28 U/L (ref 10–40)
BASOPHILS # BLD AUTO: 0.09 K/UL (ref 0–0.2)
BASOPHILS NFR BLD: 1.1 % (ref 0–1.9)
BILIRUB SERPL-MCNC: 0.2 MG/DL (ref 0.1–1)
BUN SERPL-MCNC: 16 MG/DL (ref 8–23)
CALCIUM SERPL-MCNC: 8.1 MG/DL (ref 8.7–10.5)
CHLORIDE SERPL-SCNC: 104 MMOL/L (ref 95–110)
CO2 SERPL-SCNC: 23 MMOL/L (ref 23–29)
CREAT SERPL-MCNC: 0.7 MG/DL (ref 0.5–1.4)
DIFFERENTIAL METHOD BLD: ABNORMAL
EOSINOPHIL # BLD AUTO: 0.4 K/UL (ref 0–0.5)
EOSINOPHIL NFR BLD: 5.3 % (ref 0–8)
ERYTHROCYTE [DISTWIDTH] IN BLOOD BY AUTOMATED COUNT: 13.8 % (ref 11.5–14.5)
EST. GFR  (NO RACE VARIABLE): >60 ML/MIN/1.73 M^2
GLUCOSE SERPL-MCNC: 101 MG/DL (ref 70–110)
HCT VFR BLD AUTO: 27.1 % (ref 40–54)
HGB BLD-MCNC: 8.5 G/DL (ref 14–18)
IMM GRANULOCYTES # BLD AUTO: 0.06 K/UL (ref 0–0.04)
IMM GRANULOCYTES NFR BLD AUTO: 0.7 % (ref 0–0.5)
LYMPHOCYTES # BLD AUTO: 1.8 K/UL (ref 1–4.8)
LYMPHOCYTES NFR BLD: 21.8 % (ref 18–48)
MAGNESIUM SERPL-MCNC: 1.2 MG/DL (ref 1.6–2.6)
MCH RBC QN AUTO: 29.2 PG (ref 27–31)
MCHC RBC AUTO-ENTMCNC: 31.4 G/DL (ref 32–36)
MCV RBC AUTO: 93 FL (ref 82–98)
MONOCYTES # BLD AUTO: 0.8 K/UL (ref 0.3–1)
MONOCYTES NFR BLD: 9.7 % (ref 4–15)
NEUTROPHILS # BLD AUTO: 5.1 K/UL (ref 1.8–7.7)
NEUTROPHILS NFR BLD: 61.4 % (ref 38–73)
NRBC BLD-RTO: 0 /100 WBC
PHOSPHATE SERPL-MCNC: 2.8 MG/DL (ref 2.7–4.5)
PLATELET # BLD AUTO: 374 K/UL (ref 150–450)
PMV BLD AUTO: 9.5 FL (ref 9.2–12.9)
POCT GLUCOSE: 118 MG/DL (ref 70–110)
POCT GLUCOSE: 135 MG/DL (ref 70–110)
POCT GLUCOSE: 137 MG/DL (ref 70–110)
POTASSIUM SERPL-SCNC: 3.6 MMOL/L (ref 3.5–5.1)
PROT SERPL-MCNC: 6.2 G/DL (ref 6–8.4)
RBC # BLD AUTO: 2.91 M/UL (ref 4.6–6.2)
SODIUM SERPL-SCNC: 137 MMOL/L (ref 136–145)
WBC # BLD AUTO: 8.34 K/UL (ref 3.9–12.7)

## 2024-02-13 PROCEDURE — 21400001 HC TELEMETRY ROOM

## 2024-02-13 PROCEDURE — 80053 COMPREHEN METABOLIC PANEL: CPT

## 2024-02-13 PROCEDURE — 63600175 PHARM REV CODE 636 W HCPCS

## 2024-02-13 PROCEDURE — 63600175 PHARM REV CODE 636 W HCPCS: Performed by: PHYSICIAN ASSISTANT

## 2024-02-13 PROCEDURE — 25000003 PHARM REV CODE 250: Performed by: INTERNAL MEDICINE

## 2024-02-13 PROCEDURE — 36415 COLL VENOUS BLD VENIPUNCTURE: CPT

## 2024-02-13 PROCEDURE — A4216 STERILE WATER/SALINE, 10 ML: HCPCS | Performed by: INTERNAL MEDICINE

## 2024-02-13 PROCEDURE — 85025 COMPLETE CBC W/AUTO DIFF WBC: CPT

## 2024-02-13 PROCEDURE — 25000003 PHARM REV CODE 250

## 2024-02-13 PROCEDURE — 84100 ASSAY OF PHOSPHORUS: CPT

## 2024-02-13 PROCEDURE — 83735 ASSAY OF MAGNESIUM: CPT

## 2024-02-13 PROCEDURE — 63600175 PHARM REV CODE 636 W HCPCS: Performed by: HOSPITALIST

## 2024-02-13 PROCEDURE — 25000003 PHARM REV CODE 250: Performed by: PHYSICIAN ASSISTANT

## 2024-02-13 PROCEDURE — 63600175 PHARM REV CODE 636 W HCPCS: Mod: JZ,JG | Performed by: INTERNAL MEDICINE

## 2024-02-13 RX ORDER — MAGNESIUM SULFATE HEPTAHYDRATE 40 MG/ML
2 INJECTION, SOLUTION INTRAVENOUS
Status: DISCONTINUED | OUTPATIENT
Start: 2024-02-13 | End: 2024-02-13

## 2024-02-13 RX ORDER — FAMOTIDINE 20 MG/1
40 TABLET, FILM COATED ORAL DAILY
Status: DISCONTINUED | OUTPATIENT
Start: 2024-02-14 | End: 2024-02-23 | Stop reason: HOSPADM

## 2024-02-13 RX ORDER — SODIUM,POTASSIUM PHOSPHATES 280-250MG
1 POWDER IN PACKET (EA) ORAL ONCE
Status: COMPLETED | OUTPATIENT
Start: 2024-02-13 | End: 2024-02-13

## 2024-02-13 RX ORDER — SODIUM CHLORIDE, SODIUM LACTATE, POTASSIUM CHLORIDE, CALCIUM CHLORIDE 600; 310; 30; 20 MG/100ML; MG/100ML; MG/100ML; MG/100ML
INJECTION, SOLUTION INTRAVENOUS CONTINUOUS
Status: DISCONTINUED | OUTPATIENT
Start: 2024-02-13 | End: 2024-02-13

## 2024-02-13 RX ORDER — MAGNESIUM SULFATE HEPTAHYDRATE 40 MG/ML
2 INJECTION, SOLUTION INTRAVENOUS
Status: COMPLETED | OUTPATIENT
Start: 2024-02-13 | End: 2024-02-13

## 2024-02-13 RX ORDER — MUPIROCIN 20 MG/G
OINTMENT TOPICAL 2 TIMES DAILY
Status: DISPENSED | OUTPATIENT
Start: 2024-02-13 | End: 2024-02-18

## 2024-02-13 RX ADMIN — METRONIDAZOLE 500 MG: 500 INJECTION, SOLUTION INTRAVENOUS at 04:02

## 2024-02-13 RX ADMIN — METOCLOPRAMIDE HYDROCHLORIDE 5 MG: 10 INJECTION, SOLUTION INTRAMUSCULAR; INTRAVENOUS at 09:02

## 2024-02-13 RX ADMIN — METRONIDAZOLE 500 MG: 500 INJECTION, SOLUTION INTRAVENOUS at 07:02

## 2024-02-13 RX ADMIN — METOCLOPRAMIDE HYDROCHLORIDE 5 MG: 10 INJECTION, SOLUTION INTRAMUSCULAR; INTRAVENOUS at 02:02

## 2024-02-13 RX ADMIN — BUSPIRONE HYDROCHLORIDE 7.5 MG: 5 TABLET ORAL at 08:02

## 2024-02-13 RX ADMIN — Medication 10 ML: at 05:02

## 2024-02-13 RX ADMIN — ENOXAPARIN SODIUM 40 MG: 40 INJECTION SUBCUTANEOUS at 05:02

## 2024-02-13 RX ADMIN — POTASSIUM BICARBONATE 40 MEQ: 391 TABLET, EFFERVESCENT ORAL at 06:02

## 2024-02-13 RX ADMIN — POTASSIUM & SODIUM PHOSPHATES POWDER PACK 280-160-250 MG 1 PACKET: 280-160-250 PACK at 06:02

## 2024-02-13 RX ADMIN — FAMOTIDINE 20 MG: 20 TABLET, FILM COATED ORAL at 08:02

## 2024-02-13 RX ADMIN — SENNOSIDES 8.6 MG: 8.6 TABLET, FILM COATED ORAL at 08:02

## 2024-02-13 RX ADMIN — METOCLOPRAMIDE HYDROCHLORIDE 5 MG: 10 INJECTION, SOLUTION INTRAMUSCULAR; INTRAVENOUS at 05:02

## 2024-02-13 RX ADMIN — MAGNESIUM SULFATE HEPTAHYDRATE 2 G: 40 INJECTION, SOLUTION INTRAVENOUS at 08:02

## 2024-02-13 RX ADMIN — ONDANSETRON 8 MG: 2 INJECTION INTRAMUSCULAR; INTRAVENOUS at 07:02

## 2024-02-13 RX ADMIN — CEFTRIAXONE 2 G: 2 INJECTION, POWDER, FOR SOLUTION INTRAMUSCULAR; INTRAVENOUS at 02:02

## 2024-02-13 RX ADMIN — ATORVASTATIN CALCIUM 80 MG: 40 TABLET, FILM COATED ORAL at 09:02

## 2024-02-13 RX ADMIN — ASPIRIN 81 MG CHEWABLE TABLET 81 MG: 81 TABLET CHEWABLE at 08:02

## 2024-02-13 RX ADMIN — MUPIROCIN: 20 OINTMENT TOPICAL at 02:02

## 2024-02-13 RX ADMIN — AMLODIPINE BESYLATE 5 MG: 5 TABLET ORAL at 08:02

## 2024-02-13 RX ADMIN — MAGNESIUM SULFATE HEPTAHYDRATE 2 G: 40 INJECTION, SOLUTION INTRAVENOUS at 06:02

## 2024-02-13 RX ADMIN — Medication 10 ML: at 06:02

## 2024-02-13 RX ADMIN — SERTRALINE HYDROCHLORIDE 25 MG: 25 TABLET ORAL at 08:02

## 2024-02-13 RX ADMIN — METRONIDAZOLE 500 MG: 500 INJECTION, SOLUTION INTRAVENOUS at 11:02

## 2024-02-13 RX ADMIN — Medication 10 ML: at 02:02

## 2024-02-13 NOTE — PLAN OF CARE
Problem: Diabetes Comorbidity  Goal: Blood Glucose Level Within Targeted Range  Outcome: Ongoing, Progressing     Problem: Pain Acute  Goal: Acceptable Pain Control and Functional Ability  Outcome: Ongoing, Progressing     Problem: Skin Injury Risk Increased  Goal: Skin Health and Integrity  Outcome: Ongoing, Progressing     Problem: Adult Inpatient Plan of Care  Goal: Plan of Care Review  Outcome: Ongoing, Progressing  Goal: Patient-Specific Goal (Individualized)  Outcome: Ongoing, Progressing  Goal: Absence of Hospital-Acquired Illness or Injury  Outcome: Ongoing, Progressing  Goal: Optimal Comfort and Wellbeing  Outcome: Ongoing, Progressing  Goal: Readiness for Transition of Care  Outcome: Ongoing, Progressing     Problem: Infection  Goal: Absence of Infection Signs and Symptoms  Outcome: Ongoing, Progressing   He has had a good day.  Uneventful at this time. He had a large bm.  He refused to eat the ordered diet.  No sign of distress noted and safety precautions remain in place.

## 2024-02-13 NOTE — PROGRESS NOTES
VSS. L midline intact. Derrell sips of water. Glucerna feeding inf well-- no residual. Rosario in place. R UQ & R LQ Leo's to BS. Telemetry in [facility] sites X3 with rag & bone. Inc of large BM last night. SCD on. Repositioned in bed. Slept well. Pleasant.

## 2024-02-13 NOTE — ASSESSMENT & PLAN NOTE
68M w/ recent CVA and residual dysphagia s/p PEG tube presents from OSH for retained gallstone in ampulla, s/p lap cholecystectomy in which the gallbladder was found necrotic and friable. His only symptom is abdominal pain. Labs show leukocytosis. He is transferred for AES eval and possible ERCP.     -MRCP[02/06/24] noted fluid collection in the gallbladder fossa, concerning for  abscess vs. biloma. Also noted choledocholithiasis with gallstones posterior to the liver.  -AES performed ERCP 02/06/24 with removal of choledocolithiasis by biliary sphincterotomy and balloon extraction.   -IR attempt at drainage of fluid collection [2/7/2024] unsuccessful 2/2 episode of N/V with associated tachycardia and hypertension  -IR reattempt at fluid drainage and drain placement [2/9/24] successful without complication. Drain in place with serosanguinous fluid.  -General Surgery consult noted no indication for surgical intervention at the time  -Ceftriaxone/Metronidazole per ID recs. Following cultures from aspiration  -Fluid studies and cultures with no evidence of bacterial growth thus far  -Scheduling reglan for N/V with additional antiemetics PRN  -CTAP once drains put out less than 10mL for 2 days. ID will provide recommendations once CTAP obtained.

## 2024-02-13 NOTE — PROGRESS NOTES
[facility] perfecto - Surgery  [facility] Medicine  Progress Note    Patient Name: Seymour Velazquez  MRN: [id number]  Patient Class: IP- Inpatient   Admission Date: 2/4/2024  Length of Stay: 9 days  Attending Physician: Delfina Quesada MD  Primary Care Provider: Eliazar Rosas MD        Subjective:     Principal Problem:Common bile duct (CBD) obstruction        HPI:  68M w/PMH stroke with G-tube placement, hypertension, diabetes, hyperlipidemia, recent cholecystitis, and dysphagia presents as transfer from OSH for AES eval. He initially presented from NH with leukocytosis w/o specific symptoms, imaging showed cholecystitis. Underwent lap irlanda 2/1, the operative report noted the gallbladder was necrotic and fell apart. The back wall of the gallbladder had an abscess that was perforated. There were spilled stones and purulent bile. A cholangiogram showed patent common duct with single stone at the ampulla that appears too large to pass spontaneously. Patient is transferred to C for possible ERCP.     On arrival, patient reports some abdominal pain, denies N/V/D. He is AFVSS. Admitted to  for further management.     Overview/Hospital Course:  Patient admitted to Hospital Medicine service for medical management and evaluation of suspected CBD obstruction following complicated laparoscopic cholecystectomy. AES was consulted on admission with recommendation of MRCP. MRCP w/ and w/o contrast currently pending. ID was consulted with continuation of Merrem from outside hospital. Merrem de-escalated to Rocephin/Flagyl per ID recs. List of home medications was obtained from home facility and were continued as appropriate. MRCP[02/06/24] noted fluid collection in the gallbladder fossa, concerning for  abscess vs. biloma. Also noted choledocholithiasis with gallstones posterior to the liver. AES performed ERCP 02/06/24 with removal of choledocolithiasis  by biliary sphincterotomy and balloon extraction. General Surgery consult  noted no indication surgical intervention at the time, but can consider IR drainage of fluid collection if clinical status worsens. Intermittent episodes of N/V with associated tachycardia throughout hospitalization, without acute abdomen. Unable to complete IR drainage of fluid collection on 2/7 2/2 N/V. Scheduling antiemetics for better control. Repeat attempt at fluid drainage and drain placement with IR successful without complication morning of 2/9. Slowly evolving AURORA was not responsive to increased IVF resuscitation; urine studies inconsistent with pre-renal etiology. Consulted Nephrology for further assistance. Patient had a retroperitoneal ultrasound showing mild bilateral hydronephrosis and a distended bladder. Pacheco catheter was placed on 2/11 and patient put out almost 1L with catheter insertion. SLP evaluated patient and he was started on pleasure feeds in addition to his tube feeds.    Interval History: Patient noted to have two runs of Vtac overnight but remained asymptomatic. On labs patient was noted to have low Mg at 1.2. his Mg, K, and P were replenished. He has had significant urine output since pacheco placement. Nephrology has signed off and recommends holding off on matching fluids unless patient appears to be dehydrated. Both R quadrant drains are still putting out more than 10mL. CMP shows resolved ARUORA. Patient reports no pain and his tube feeds continue to run at goal.     Review of Systems  Objective:     Vital Signs (Most Recent):  Temp: 98.8 °F (37.1 °C) (02/13/24 1146)  Pulse: 92 (02/13/24 1154)  Resp: 18 (02/13/24 1146)  BP: 123/60 (02/13/24 1146)  SpO2: 97 % (02/13/24 1146) Vital Signs (24h Range):  Temp:  [97.4 °F (36.3 °C)-98.8 °F (37.1 °C)] 98.8 °F (37.1 °C)  Pulse:  [] 92  Resp:  [16-18] 18  SpO2:  [96 %-99 %] 97 %  BP: (113-135)/(60-74) 123/60     Weight: 79.6 kg (175 lb 8 oz)  Body mass index is 22.53 kg/m².    Intake/Output Summary (Last 24 hours) at 2/13/2024 1449  Last  data filed at 2/12/2024 2210  Gross per 24 hour   Intake 1998 ml   Output 2218 ml   Net -220 ml         Physical Exam  Vitals and nursing note reviewed.   Constitutional:       General: He is not in acute distress.     Appearance: Normal appearance. He is not ill-appearing.   HENT:      Head: Normocephalic and atraumatic.      Mouth/Throat:      Mouth: Mucous membranes are moist.   Eyes:      General: No scleral icterus.  Cardiovascular:      Rate and Rhythm: Normal rate and regular rhythm.      Heart sounds: Normal heart sounds. No murmur heard.  Pulmonary:      Effort: Pulmonary effort is normal. No respiratory distress.      Breath sounds: Normal breath sounds.   Abdominal:      General: Bowel sounds are normal. There is no distension.      Palpations: Abdomen is soft.      Tenderness: There is no abdominal tenderness. There is no guarding or rebound.      Comments: Both drains in place in RUQ with serosanguinous fluid. Incision bandaged and clean.  PEG in place in the LUQ   Genitourinary:     Comments: Rosario catheter in place draining light yellow urine, no cloudiness noted  Musculoskeletal:      Right lower leg: No edema.      Left lower leg: No edema.   Skin:     General: Skin is warm and dry.      Coloration: Skin is not jaundiced.   Neurological:      Mental Status: Mental status is at baseline.   Psychiatric:         Mood and Affect: Mood normal.         Behavior: Behavior normal.             Significant Labs: All pertinent labs within the past 24 hours have been reviewed.  CBC:   Recent Labs   Lab 02/13/24 0226   WBC 8.34   HGB 8.5*   HCT 27.1*        CMP:   Recent Labs   Lab 02/13/24 0226      K 3.6      CO2 23      BUN 16   CREATININE 0.7   CALCIUM 8.1*   PROT 6.2   ALBUMIN 1.9*   BILITOT 0.2   ALKPHOS 67   AST 28   ALT 10   ANIONGAP 10       Significant Imaging: I have reviewed all pertinent imaging results/findings within the past 24 hours.    Assessment/Plan:      * Common  bile duct (CBD) obstruction  68M w/ recent CVA and residual dysphagia s/p PEG tube presents from OSH for retained gallstone in ampulla, s/p lap cholecystectomy in which the gallbladder was found necrotic and friable. His only symptom is abdominal pain. Labs show leukocytosis. He is transferred for AES eval and possible ERCP.     -MRCP[02/06/24] noted fluid collection in the gallbladder fossa, concerning for  abscess vs. biloma. Also noted choledocholithiasis with gallstones posterior to the liver.  -AES performed ERCP 02/06/24 with removal of choledocolithiasis by biliary sphincterotomy and balloon extraction.   -IR attempt at drainage of fluid collection [2/7/2024] unsuccessful 2/2 episode of N/V with associated tachycardia and hypertension  -IR reattempt at fluid drainage and drain placement [2/9/24] successful without complication. Drain in place with serosanguinous fluid.  -General Surgery consult noted no indication for surgical intervention at the time  -Ceftriaxone/Metronidazole per ID recs. Following cultures from aspiration  -Fluid studies and cultures with no evidence of bacterial growth thus far  -Scheduling reglan for N/V with additional antiemetics PRN  -CTAP once drains put out less than 10mL for 2 days. ID will provide recommendations once CTAP obtained.       Urinary retention  Patient found to have mild hydronephrosis and bladder distension on retroperitoneal ultrasound. Pacheco catheter was placed and patient had significant urine output and resolution of AURORA.    Pacheco catheter placed  Will need voiding trial or discharge with pacheco  Outpatient urology follow up       AURORA (acute kidney injury)  RESOLVED    Patient with acute kidney injury/acute renal failure likely due to pre-renal azotemia due to dehydration AURORA is currently worsening. Baseline creatinine  0.8  - Labs reviewed- Renal function/electrolytes with Estimated Creatinine Clearance: 113.7 mL/min (based on SCr of 0.7 mg/dL). according to  "latest data. Monitor urine output and serial BMP and adjust therapy as needed. Avoid nephrotoxins and renally dose meds for GFR listed above.    Patient with slow uptrend in sCr and jump from 1.2 on 2/9 to 1.6 on 2/10. Patient received Vancomycin intra-op 2/9 and a contrasted study during this hospitalization. Consulting Nephrology for concern of intrinsic injury. Urine studies 2/9: U-Na 60, U-Urea 312, U-Creatinine 57.    Recent Labs     02/11/24  0608 02/13/24  0226   BUN 23 16   CREATININE 1.7* 0.7       Renal U/S noted 02/11/24: "Mild bilateral hydronephrosis." "Elevated segmental artery resistive indices bilaterally, a nonspecific sign that can be seen with chronic renal disease or obstruction". "Urinary bladder is significantly distended with a small amount of mobile intraluminal debris"      Plan  -Bladder scan PRN.  - Rosario placed 2/11 with 1L output with insertion  - Trending daily CMP (refused labs on 2/12)  - Nephrology consulted, appreciate recs  - Avoid nephrotoxic agents, NSAIDS, renally dose meds    Tachycardia  Patient noted to have sinus tachycardia up to HR 140s in the setting of acute episodes of nausea and vomiting. Likely appropriate response to stressor.    -CTM for hemodynamic instability  -EKG PRN for chest pain or hemodynamic instability    On tube feeding diet  Hx of PEG  tube placement s/p CVA. Tolerated slow basal rate feeds well thus far following fluid drainage. On bolus feeds prior to admission    PLAN:  -Nutrition consulted for tube feed recs. Currently at goal of 45 mL/hour   -Pleasure feeds of pureed foods and sips of thin liquids okay per SLP      Abdominal fluid collection  See "Common bile duct (CBD) obstruction " A&P        Choledocholithiasis  See "Common bile duct (CBD) obstruction " A&P        Bilateral pleural effusion  Patient found to have small pleural effusion on imaging [MRCP(02/06/24)"Small right-sided pleural effusion.  Trace left-sided pleural effusion"]. Most likely " "etiology includes  limited mobility due to recent history of hospitalization for cholecystectomy . Management to include  monitoring at this time      Normocytic anemia  -Patient's with Normocytic anemia..   -Hemoglobin stable.   -Etiology likely due to chronic disease .  -Current CBC reviewed-    Recent Labs   Lab 02/09/24  0514 02/10/24  0426 02/11/24  0608   HGB 9.5* 9.5* 9.1*           Component Value Date/Time    MCV 94 02/11/2024 0608    RDW 13.7 02/11/2024 0608    IRON 12 (L) 02/07/2024 0223    TIBC 195 (L) 02/07/2024 0223    FERRITIN 1,256 (H) 02/07/2024 0223         PLAN  - Monitor serial CBC and transfuse if patient becomes hemodynamically unstable, symptomatic or H/H drops below 7/21.  - Etiology consistent with anemia of chronic disease. Will CTM.    Cholecystitis  See "Common bile duct (CBD) obstruction " A&P        CVA (cerebral vascular accident)  Patient is s/p CVA with residual dysphagia and weakness. He is s/p PEG tube and receives bolus tube feedings.         Type 2 diabetes mellitus  Patient's FSGs are controlled on current medication regimen.  Last A1c reviewed-   Lab Results   Component Value Date    HGBA1C 4.8 02/05/2024     Most recent fingerstick glucose reviewed-   Recent Labs   Lab 02/11/24  1541 02/12/24  0813   POCTGLUCOSE 137* 111*       Current correctional scale  Low  Maintain anti-hyperglycemic dose as follows-   Antihyperglycemics (From admission, onward)      Start     Stop Route Frequency Ordered    02/04/24 2059  insulin aspart U-100 pen 0-5 Units         -- SubQ Before meals & nightly PRN 02/04/24 2000          Hold Oral hypoglycemics while patient is in the hospital.     Patient on SSI at NH.    -LDSSI  -Glucerna for tube feeds    Primary hypertension  Patient is normotensive on arrival, per chart he is not currently on any antihypertensives.     Will utilize p.r.n. blood pressure medication only if patient's blood pressure greater than 180/110 and he develops symptoms such as " worsening chest pain or shortness of breath.    -amlodipine 5mg  -Holding home lisinopril in the setting of AURORA  -Will add PRN hydralazine for SBP>180 and symptomatic  -will consider uptitration of home regimen if pressures remain uncontrolled        VTE Risk Mitigation (From admission, onward)           Ordered     enoxaparin injection 40 mg  Every 24 hours         02/09/24 1408     IP VTE HIGH RISK PATIENT  Once         02/05/24 1410     Place sequential compression device  Until discontinued         02/04/24 2000                    Discharge Planning   PEYTON: 2/14/2024     Code Status: Full Code   Is the patient medically ready for discharge?: Yes    Reason for patient still in hospital (select all that apply): Treatment  Discharge Plan A: Home with family                  David Poe MD  Department of Hospital Medicine   [facility] - Surgery

## 2024-02-13 NOTE — PLAN OF CARE
Problem: Diabetes Comorbidity  Goal: Blood Glucose Level Within Targeted Range  Outcome: Ongoing, Progressing     Problem: Pain Acute  Goal: Acceptable Pain Control and Functional Ability  Outcome: Ongoing, Progressing     Problem: Skin Injury Risk Increased  Goal: Skin Health and Integrity  Outcome: Ongoing, Progressing     Problem: Adult Inpatient Plan of Care  Goal: Plan of Care Review  Outcome: Ongoing, Progressing  Goal: Patient-Specific Goal (Individualized)  Outcome: Ongoing, Progressing  Goal: Absence of Hospital-Acquired Illness or Injury  Outcome: Ongoing, Progressing  Goal: Optimal Comfort and Wellbeing  Outcome: Ongoing, Progressing  Goal: Readiness for Transition of Care  Outcome: Ongoing, Progressing     Problem: Infection  Goal: Absence of Infection Signs and Symptoms  Outcome: Ongoing, Progressing     Problem: Bleeding (Cholecystectomy)  Goal: Absence of Bleeding  Outcome: Ongoing, Progressing     Problem: Bowel Motility Impaired (Cholecystectomy)  Goal: Effective Bowel Elimination  Outcome: Ongoing, Progressing     Problem: Fluid and Electrolyte Imbalance (Cholecystectomy)  Goal: Fluid and Electrolyte Balance  Outcome: Ongoing, Progressing     Problem: Infection (Cholecystectomy)  Goal: Absence of Infection Signs and Symptoms  Outcome: Ongoing, Progressing     Problem: Ongoing Anesthesia Effects (Cholecystectomy)  Goal: Anesthesia/Sedation Recovery  Outcome: Ongoing, Progressing     Problem: Pain (Cholecystectomy)  Goal: Acceptable Pain Control  Outcome: Ongoing, Progressing     Problem: Postoperative Nausea and Vomiting (Cholecystectomy)  Goal: Nausea and Vomiting Relief  Outcome: Ongoing, Progressing     Problem: Postoperative Urinary Retention (Cholecystectomy)  Goal: Effective Urinary Elimination  Outcome: Ongoing, Progressing     Problem: Respiratory Compromise (Cholecystectomy)  Goal: Effective Oxygenation and Ventilation  Outcome: Ongoing, Progressing

## 2024-02-13 NOTE — PLAN OF CARE
Chart reviewed, no significant clinical change  Follow drain output  Continue ceftriaxone/flagyl  Timing of repeat CT TBD  ID will follow    Ifrah Whiting DO  Transplant Infectious Disease

## 2024-02-13 NOTE — PLAN OF CARE
Nephrology Chart Review    Intake/Output Summary (Last 24 hours) at 2/13/2024 0734  Last data filed at 2/12/2024 2210  Gross per 24 hour   Intake 1998 ml   Output 3118 ml   Net -1120 ml       Vitals:    02/12/24 2310 02/13/24 0054 02/13/24 0300 02/13/24 0512   BP:  113/61  135/71   BP Location:  Right arm  Right arm   Patient Position:  Lying  Lying   Pulse: 100 107 98 99   Resp:  16  16   Temp:  97.4 °F (36.3 °C)  97.9 °F (36.6 °C)   TempSrc:  Oral  Oral   SpO2:  97%  96%   Weight:       Height:           Recent Labs   Lab 02/10/24  0426 02/11/24  0608 02/13/24  0226    138 137   K 3.5 3.3* 3.6    107 104   CO2 21* 25 23   BUN 18 23 16   CREATININE 1.6* 1.7* 0.7   CALCIUM 8.7 8.5* 8.1*   PHOS 3.3 2.7 2.8     A1c - 02/05/2024 4.8      AURORA resolved s/p Rosario catheter placement with creatinine back to baseline. No need for IVFs. Will need voiding trial or discharge with Rosario. Needs Urology follow-up on discharge. No other related issues identified. Please call nephrology as needed. We will sign-off at this time.    Kwadwo Ríos MD  Nephrology

## 2024-02-13 NOTE — SUBJECTIVE & OBJECTIVE
Interval History: Patient noted to have two runs of Vtac overnight but remained asymptomatic. On labs patient was noted to have low Mg at 1.2. his Mg, K, and P were replenished. He has had significant urine output since pacheco placement. Nephrology has signed off and recommends holding off on matching fluids unless patient appears to be dehydrated. Both R quadrant drains are still putting out more than 10mL. CMP shows resolved AURORA. Patient reports no pain and his tube feeds continue to run at goal.     Review of Systems  Objective:     Vital Signs (Most Recent):  Temp: 98.8 °F (37.1 °C) (02/13/24 1146)  Pulse: 92 (02/13/24 1154)  Resp: 18 (02/13/24 1146)  BP: 123/60 (02/13/24 1146)  SpO2: 97 % (02/13/24 1146) Vital Signs (24h Range):  Temp:  [97.4 °F (36.3 °C)-98.8 °F (37.1 °C)] 98.8 °F (37.1 °C)  Pulse:  [] 92  Resp:  [16-18] 18  SpO2:  [96 %-99 %] 97 %  BP: (113-135)/(60-74) 123/60     Weight: 79.6 kg (175 lb 8 oz)  Body mass index is 22.53 kg/m².    Intake/Output Summary (Last 24 hours) at 2/13/2024 1449  Last data filed at 2/12/2024 2210  Gross per 24 hour   Intake 1998 ml   Output 2218 ml   Net -220 ml         Physical Exam  Vitals and nursing note reviewed.   Constitutional:       General: He is not in acute distress.     Appearance: Normal appearance. He is not ill-appearing.   HENT:      Head: Normocephalic and atraumatic.      Mouth/Throat:      Mouth: Mucous membranes are moist.   Eyes:      General: No scleral icterus.  Cardiovascular:      Rate and Rhythm: Normal rate and regular rhythm.      Heart sounds: Normal heart sounds. No murmur heard.  Pulmonary:      Effort: Pulmonary effort is normal. No respiratory distress.      Breath sounds: Normal breath sounds.   Abdominal:      General: Bowel sounds are normal. There is no distension.      Palpations: Abdomen is soft.      Tenderness: There is no abdominal tenderness. There is no guarding or rebound.      Comments: Both drains in place in RUQ with  serosanguinous fluid. Incision bandaged and clean.  PEG in place in the LUQ   Genitourinary:     Comments: Rosario catheter in place draining light yellow urine, no cloudiness noted  Musculoskeletal:      Right lower leg: No edema.      Left lower leg: No edema.   Skin:     General: Skin is warm and dry.      Coloration: Skin is not jaundiced.   Neurological:      Mental Status: Mental status is at baseline.   Psychiatric:         Mood and Affect: Mood normal.         Behavior: Behavior normal.             Significant Labs: All pertinent labs within the past 24 hours have been reviewed.  CBC:   Recent Labs   Lab 02/13/24 0226   WBC 8.34   HGB 8.5*   HCT 27.1*        CMP:   Recent Labs   Lab 02/13/24 0226      K 3.6      CO2 23      BUN 16   CREATININE 0.7   CALCIUM 8.1*   PROT 6.2   ALBUMIN 1.9*   BILITOT 0.2   ALKPHOS 67   AST 28   ALT 10   ANIONGAP 10       Significant Imaging: I have reviewed all pertinent imaging results/findings within the past 24 hours.

## 2024-02-13 NOTE — PROGRESS NOTES
Pharmacist Renal Dose Adjustment Note    Seymour Velazquez is a 68 y.o. male being treated with the medication famotidine    Patient Data:    Vital Signs (Most Recent):  Temp: 98.8 °F (37.1 °C) (02/13/24 1146)  Pulse: 92 (02/13/24 1154)  Resp: 18 (02/13/24 1146)  BP: 123/60 (02/13/24 1146)  SpO2: 97 % (02/13/24 1146) Vital Signs (72h Range):  Temp:  [97.1 °F (36.2 °C)-98.8 °F (37.1 °C)]   Pulse:  []   Resp:  [16-20]   BP: (113-158)/(59-91)   SpO2:  [93 %-100 %]      Recent Labs   Lab 02/10/24  0426 02/11/24  0608 02/13/24  0226   CREATININE 1.6* 1.7* 0.7     Serum creatinine: 0.7 mg/dL 02/13/24 0226  Estimated creatinine clearance: 113.7 mL/min    Medication:famotidine 20 mg PO daily will be changed to famotidine 40 mg daily    Pharmacist's Name: Fernanda Zamorano, PharmD  PGY-2 Oncology Pharmacy Resident  Spectra link: [id number]

## 2024-02-13 NOTE — ASSESSMENT & PLAN NOTE
Patient found to have mild hydronephrosis and bladder distension on retroperitoneal ultrasound. Pacheco catheter was placed and patient had significant urine output and resolution of AURORA.    Pacheco catheter placed  Will need voiding trial or discharge with pacheco  Outpatient urology follow up

## 2024-02-13 NOTE — ASSESSMENT & PLAN NOTE
"RESOLVED    Patient with acute kidney injury/acute renal failure likely due to pre-renal azotemia due to dehydration AURORA is currently worsening. Baseline creatinine  0.8  - Labs reviewed- Renal function/electrolytes with Estimated Creatinine Clearance: 113.7 mL/min (based on SCr of 0.7 mg/dL). according to latest data. Monitor urine output and serial BMP and adjust therapy as needed. Avoid nephrotoxins and renally dose meds for GFR listed above.    Patient with slow uptrend in sCr and jump from 1.2 on 2/9 to 1.6 on 2/10. Patient received Vancomycin intra-op 2/9 and a contrasted study during this hospitalization. Consulting Nephrology for concern of intrinsic injury. Urine studies 2/9: U-Na 60, U-Urea 312, U-Creatinine 57.    Recent Labs     02/11/24  0608 02/13/24  0226   BUN 23 16   CREATININE 1.7* 0.7       Renal U/S noted 02/11/24: "Mild bilateral hydronephrosis." "Elevated segmental artery resistive indices bilaterally, a nonspecific sign that can be seen with chronic renal disease or obstruction". "Urinary bladder is significantly distended with a small amount of mobile intraluminal debris"      Plan  -Bladder scan PRN.  - Rosario placed 2/11 with 1L output with insertion  - Trending daily CMP (refused labs on 2/12)  - Nephrology consulted, appreciate recs  - Avoid nephrotoxic agents, NSAIDS, renally dose meds  "

## 2024-02-13 NOTE — PROGRESS NOTES
Dr. Poe notified of patient's runs of VT on Telemetry-- 7 beat, then 2 normal beats, then an 8 beat run. Pt was sleeping and asymptomatic throughout episode.

## 2024-02-14 LAB
POCT GLUCOSE: 116 MG/DL (ref 70–110)
POCT GLUCOSE: 118 MG/DL (ref 70–110)
POCT GLUCOSE: 120 MG/DL (ref 70–110)
POCT GLUCOSE: 99 MG/DL (ref 70–110)

## 2024-02-14 PROCEDURE — 25000003 PHARM REV CODE 250

## 2024-02-14 PROCEDURE — 21400001 HC TELEMETRY ROOM

## 2024-02-14 PROCEDURE — 99233 SBSQ HOSP IP/OBS HIGH 50: CPT | Mod: ,,, | Performed by: INTERNAL MEDICINE

## 2024-02-14 PROCEDURE — 92526 ORAL FUNCTION THERAPY: CPT

## 2024-02-14 PROCEDURE — 97112 NEUROMUSCULAR REEDUCATION: CPT

## 2024-02-14 PROCEDURE — 63600175 PHARM REV CODE 636 W HCPCS

## 2024-02-14 PROCEDURE — 97110 THERAPEUTIC EXERCISES: CPT

## 2024-02-14 PROCEDURE — 25000003 PHARM REV CODE 250: Performed by: INTERNAL MEDICINE

## 2024-02-14 PROCEDURE — 97530 THERAPEUTIC ACTIVITIES: CPT

## 2024-02-14 PROCEDURE — 63600175 PHARM REV CODE 636 W HCPCS: Mod: JZ,JG | Performed by: INTERNAL MEDICINE

## 2024-02-14 PROCEDURE — 25000003 PHARM REV CODE 250: Performed by: PHYSICIAN ASSISTANT

## 2024-02-14 PROCEDURE — A4216 STERILE WATER/SALINE, 10 ML: HCPCS | Performed by: INTERNAL MEDICINE

## 2024-02-14 PROCEDURE — 63600175 PHARM REV CODE 636 W HCPCS: Performed by: PHYSICIAN ASSISTANT

## 2024-02-14 RX ORDER — GUAIFENESIN 100 MG/5ML
200 SOLUTION ORAL EVERY 4 HOURS PRN
Status: DISCONTINUED | OUTPATIENT
Start: 2024-02-14 | End: 2024-02-17

## 2024-02-14 RX ADMIN — AMLODIPINE BESYLATE 5 MG: 5 TABLET ORAL at 09:02

## 2024-02-14 RX ADMIN — ATORVASTATIN CALCIUM 80 MG: 40 TABLET, FILM COATED ORAL at 09:02

## 2024-02-14 RX ADMIN — MUPIROCIN: 20 OINTMENT TOPICAL at 09:02

## 2024-02-14 RX ADMIN — METRONIDAZOLE 500 MG: 500 INJECTION, SOLUTION INTRAVENOUS at 03:02

## 2024-02-14 RX ADMIN — GUAIFENESIN 200 MG: 200 SOLUTION ORAL at 11:02

## 2024-02-14 RX ADMIN — Medication 10 ML: at 12:02

## 2024-02-14 RX ADMIN — MORPHINE SULFATE 2 MG: 2 INJECTION, SOLUTION INTRAMUSCULAR; INTRAVENOUS at 02:02

## 2024-02-14 RX ADMIN — METOCLOPRAMIDE HYDROCHLORIDE 5 MG: 10 INJECTION, SOLUTION INTRAMUSCULAR; INTRAVENOUS at 02:02

## 2024-02-14 RX ADMIN — FAMOTIDINE 40 MG: 20 TABLET, FILM COATED ORAL at 09:02

## 2024-02-14 RX ADMIN — Medication 10 ML: at 05:02

## 2024-02-14 RX ADMIN — METRONIDAZOLE 500 MG: 500 INJECTION, SOLUTION INTRAVENOUS at 06:02

## 2024-02-14 RX ADMIN — BUSPIRONE HYDROCHLORIDE 7.5 MG: 5 TABLET ORAL at 09:02

## 2024-02-14 RX ADMIN — ASPIRIN 81 MG CHEWABLE TABLET 81 MG: 81 TABLET CHEWABLE at 09:02

## 2024-02-14 RX ADMIN — ENOXAPARIN SODIUM 40 MG: 40 INJECTION SUBCUTANEOUS at 06:02

## 2024-02-14 RX ADMIN — METOCLOPRAMIDE HYDROCHLORIDE 5 MG: 10 INJECTION, SOLUTION INTRAMUSCULAR; INTRAVENOUS at 09:02

## 2024-02-14 RX ADMIN — SERTRALINE HYDROCHLORIDE 25 MG: 25 TABLET ORAL at 09:02

## 2024-02-14 RX ADMIN — Medication 10 ML: at 09:02

## 2024-02-14 RX ADMIN — METRONIDAZOLE 500 MG: 500 INJECTION, SOLUTION INTRAVENOUS at 11:02

## 2024-02-14 RX ADMIN — CEFTRIAXONE 2 G: 2 INJECTION, POWDER, FOR SOLUTION INTRAMUSCULAR; INTRAVENOUS at 02:02

## 2024-02-14 RX ADMIN — Medication 10 ML: at 11:02

## 2024-02-14 RX ADMIN — METOCLOPRAMIDE HYDROCHLORIDE 5 MG: 10 INJECTION, SOLUTION INTRAMUSCULAR; INTRAVENOUS at 05:02

## 2024-02-14 NOTE — ASSESSMENT & PLAN NOTE
Hx of PEG  tube placement s/p CVA. Tolerated slow basal rate feeds well thus far following fluid drainage. On bolus feeds prior to admission    PLAN:  -Nutrition consulted for tube feed recs. Currently at goal of 45 mL/hour. Bolus recommendations received.    -Pleasure feeds of pureed foods and sips of thin liquids okay per SLP  -PEG occulded and exchanged by GI 2/14.   Care Instructions  - Flushes: flush PEG daily with 60 ml water  - Antibiotic ointment to site, clean site with soap and water daily and dry thoroughly and clean site daily with half-strength hydrogen peroxide for three days, then soap and water daily.  - Dressing: change dressing on top of bumper daily

## 2024-02-14 NOTE — PT/OT/SLP PROGRESS
Occupational Therapy   Co-Treatment  Co-treatment performed due to patient's multiple deficits requiring two skilled therapists to appropriately and safely assess patient's strength and endurance while facilitating functional tasks in addition to accommodating for patient's activity tolerance.        Name: Seymoru Velazquez  MRN: [id number]  Admitting Diagnosis:  Common bile duct (CBD) obstruction  8 Days Post-Op    Recommendations:     Discharge Recommendations: Moderate Intensity Therapy  Discharge Equipment Recommendations:  wheelchair, lift device, hospital bed  Barriers to discharge:  Inaccessible home environment    Assessment:     Seymour Velazquez is a 68 y.o. male with a medical diagnosis of Common bile duct (CBD) obstruction.  He presents with the following performance deficits affecting function are weakness, impaired endurance, impaired self care skills, impaired functional mobility, impaired balance, decreased upper extremity function, decreased lower extremity function, impaired joint extensibility. Pt agreeable to therapy and tolerated fairly. Pt remains significantly limited in ADLs, functional mobility, and functional transfers and is currently not performing tasks at OF. Pt would continue to benefit from skilled OT services to maximize functional independence with ADLs and functional mobility, reduce caregiver burden, and facilitate safe discharge in the least restrictive environment.     Rehab Prognosis:  Fair; patient would benefit from acute skilled OT services to address these deficits and reach maximum level of function.       Plan:     Patient to be seen 4 x/week to address the above listed problems via self-care/home management, therapeutic activities, therapeutic exercises  Plan of Care Expires: 03/13/24  Plan of Care Reviewed with: patient    Subjective     Chief Complaint: none reported  Patient/Family Comments/goals: pt agreeable to OT  Pain/Comfort:  Pain Rating 1:  0/10    Objective:     Communicated with: RN prior to session.  Patient found HOB elevated with PEG Tube, pacheco catheter, SCD upon OT entry to room.    General Precautions: Standard, fall, aspiration    Orthopedic Precautions:N/A  Braces: N/A  Respiratory Status: Room air     Occupational Performance:     Bed Mobility:    Patient completed Scooting/Bridging with total assistance and 2 persons  Patient completed Supine to Sit with total assistance and 2 persons  Patient completed Sit to Supine with total assistance and 2 persons     Functional Mobility/Transfers:  Pt is unsafe to transfer due to significant UE/LE & core weakness    Activities of Daily Living:  Lower Body Dressing: total assistance to don/dof socks    Therapeutic exercises: pt completed the following exercises to improve/maintain UE/UB ROM, strength & activity tolerance which are essential for participation/independence with ADLs & functional mobility/transfers  - R AROM shoulder flexion x10  - R AROM elbow flexion/extension x10  - L AAROM shoulder flexion x10  - L AAROM elbow flexion/extension x10  - Modified abdominal crunches (forward/backward), (left->center) (Right->center) x10 each way    [facility] 6 Click ADL: 8    Treatment & Education:  -Education on importance of OOB activity/UB/UE exercises to improve overall activity tolerance and promote recovery  -Pt educated to call for assistance and to transfer with hospital staff only  -Provided education regarding role of OT & POC  with pt  verbalizing understanding.  Pt had no further questions & when asked whether there were any concerns pt reported none.     Patient left HOB elevated with all lines intact, call button in reach, and RN notified    GOALS:   Multidisciplinary Problems       Occupational Therapy Goals          Problem: Occupational Therapy    Goal Priority Disciplines Outcome Interventions   Occupational Therapy Goal     OT, PT/OT Ongoing, Progressing    Description: Goals to be met by:  3/13/24     Patient will increase functional independence with ADLs by performing:    Grooming while seated with Moderate Assistance.  Toileting from bedside commode with Moderate Assistance for hygiene and clothing management.   Sitting at edge of bed 10 minutes with Moderate Assistance.  Rolling to Bilateral with Moderate Assistance.   Supine to sit with Moderate Assistance.  Stand pivot transfers with Maximum Assistance.  Toilet transfer to bedside commode with Moderate Assistance.                         Time Tracking:     OT Date of Treatment: 02/14/24  OT Start Time: 1030  OT Stop Time: 1059  OT Total Time (min): 29 min    Billable Minutes:Therapeutic Exercise 14  Neuromuscular Re-education 15    OT/SALEEM: OT          2/14/2024

## 2024-02-14 NOTE — PT/OT/SLP PROGRESS
Physical Therapy Co-Treatment    OT present for cotreat due to pt's multiple medical comorbidities and functional/cognition deficits requiring two skilled therapists to appropriately progress pt's musculoskeletal strength, neuromuscular control, and endurance while taking into consideration medical acuity and pt safety.    Patient Name:  Seymour Velazquez   MRN:  [id number]    Recommendations:     Discharge Recommendations: Moderate Intensity Therapy  Discharge Equipment Recommendations: wheelchair, lift device, hospital bed  Barriers to discharge: None    Assessment:     Seymour Velazquez is a 68 y.o. male admitted with a medical diagnosis of Common bile duct (CBD) obstruction.  He presents with the following impairments/functional limitations: weakness, impaired endurance, impaired functional mobility, impaired self care skills, impaired balance, gait instability, decreased lower extremity function, decreased upper extremity function, impaired coordination, impaired joint extensibility     Pt receptive and tolerated PT co-treatment with OT well. Pt sat EOB ~ 20 mins and able to work on trunk control and posture while performing BLE exercises with assistance. PT performed PROM to RLE and AAROM to LLE while pt seated EOB. Patient continues to demonstrate the need for moderate intensity therapy on a daily basis post acute exhibited by decreased independence with self-care and functional mobility    Rehab Prognosis: Good; patient would benefit from acute skilled PT services to address these deficits and reach maximum level of function.    Recent Surgery: Procedure(s) (LRB):  ERCP (ENDOSCOPIC RETROGRADE CHOLANGIOPANCREATOGRAPHY) (N/A) 8 Days Post-Op    Plan:     During this hospitalization, patient to be seen 3 x/week to address the identified rehab impairments via gait training, therapeutic activities, therapeutic exercises, neuromuscular re-education and progress toward the following goals:    Plan of Care  "Expires:  03/13/24    Subjective     Chief Complaint: none reported  Patient/Family Comments/goals: "Can I have a sip of water"  Pain/Comfort:  Pain Rating 1: 0/10      Objective:     Communicated with RN prior to session.  Patient found HOB elevated with PEG Tube, pacheco catheter, SCD upon PT entry to room.     General Precautions: Standard, fall, aspiration  Orthopedic Precautions: N/A  Braces: N/A  Respiratory Status: Room air     Functional Mobility:    Bed Mobility:   Supine > Sit: total assistance and of 2 persons  Sit > Supine: total assistance and of 2 persons  Scooting: Total assistance and of 2 persons     Transfers:   Not performed due to poor sitting balance, RLE contracture and weakness    Balance:   Sitting balance: POOR: N/A  Pt sat EOB ~ 20 mins  Pt needed max A to mod A to maintain balance due to posterior lean  PT performed RLE PROM and LLE AAROM with pt       AM-PAC 6 CLICK MOBILITY  Turning over in bed (including adjusting bedclothes, sheets and blankets)?: 2  Sitting down on and standing up from a chair with arms (e.g., wheelchair, bedside commode, etc.): 1  Moving from lying on back to sitting on the side of the bed?: 2  Moving to and from a bed to a chair (including a wheelchair)?: 1  Need to walk in hospital room?: 1  Climbing 3-5 steps with a railing?: 1  Basic Mobility Total Score: 8       Treatment & Education:  PT performed PROM to RLE including knee flex/ext, hip flexion, and ankle DF/PF for 1 set of 12 resps each. Pt performed AAROM with LLE including marches, LAQs, HS curls, and hip add/abd for 1 set of 10 reps each.    Pt educated on tip to reduce fall risk and safety with mobility and using call button for assistance from nursing staff with bed mobility.  All questions answered within the scope of PT.  White board updated accordingly.      Patient left HOB elevated with all lines intact and call button in reach..    GOALS:   Multidisciplinary Problems       Physical Therapy Goals  "         Problem: Physical Therapy    Goal Priority Disciplines Outcome Goal Variances Interventions   Physical Therapy Goal     PT, PT/OT Ongoing, Progressing     Description: Goals to be met by: 3/13/24     Patient will increase functional independence with mobility by performin. Supine to sit with Moderate Assistance  2. Sit to supine with Moderate Assistance  3. Bed to chair transfer with Moderate Assistance using Slideboard  5. Sitting at edge of bed x10 minutes with Minimal Assistance                         Time Tracking:     PT Received On: 24  PT Start Time: 1033     PT Stop Time: 1059  PT Total Time (min): 26 min     Billable Minutes: Therapeutic Activity 14 and Therapeutic Exercise 12    Treatment Type: Treatment  PT/PTA: PT           2024

## 2024-02-14 NOTE — PLAN OF CARE
Problem: Diabetes Comorbidity  Goal: Blood Glucose Level Within Targeted Range  Outcome: Ongoing, Progressing     Problem: Pain Acute  Goal: Acceptable Pain Control and Functional Ability  Outcome: Ongoing, Progressing     Problem: Skin Injury Risk Increased  Goal: Skin Health and Integrity  Outcome: Ongoing, Progressing     Problem: Adult Inpatient Plan of Care  Goal: Plan of Care Review  Outcome: Ongoing, Progressing  Goal: Patient-Specific Goal (Individualized)  Outcome: Ongoing, Progressing  Goal: Absence of Hospital-Acquired Illness or Injury  Outcome: Ongoing, Progressing  Goal: Optimal Comfort and Wellbeing  Outcome: Ongoing, Progressing  Goal: Readiness for Transition of Care  Outcome: Ongoing, Progressing     Problem: Infection  Goal: Absence of Infection Signs and Symptoms  Outcome: Ongoing, Progressing     Problem: Bleeding (Cholecystectomy)  Goal: Absence of Bleeding  Outcome: Ongoing, Progressing     Problem: Bowel Motility Impaired (Cholecystectomy)  Goal: Effective Bowel Elimination  Outcome: Ongoing, Progressing     Problem: Fluid and Electrolyte Imbalance (Cholecystectomy)  Goal: Fluid and Electrolyte Balance  Outcome: Ongoing, Progressing     Problem: Infection (Cholecystectomy)  Goal: Absence of Infection Signs and Symptoms  Outcome: Ongoing, Progressing     Problem: Ongoing Anesthesia Effects (Cholecystectomy)  Goal: Anesthesia/Sedation Recovery  Outcome: Ongoing, Progressing     Problem: Pain (Cholecystectomy)  Goal: Acceptable Pain Control  Outcome: Ongoing, Progressing     Problem: Postoperative Nausea and Vomiting (Cholecystectomy)  Goal: Nausea and Vomiting Relief  Outcome: Ongoing, Progressing     Problem: Postoperative Urinary Retention (Cholecystectomy)  Goal: Effective Urinary Elimination  Outcome: Ongoing, Progressing     Problem: Respiratory Compromise (Cholecystectomy)  Goal: Effective Oxygenation and Ventilation  Outcome: Ongoing, Progressing   He has been more alert today and  talkative.  No sign of distress noted and safety precautions reman in place.

## 2024-02-14 NOTE — NURSING
Nurses Note -- 4 Eyes      2/14/2024   4:24 PM      Skin assessed during: Daily Assessment      [] No Altered Skin Integrity Present    []Prevention Measures Documented      [] Yes- Altered Skin Integrity Present or Discovered   [] LDA Added if Not in Epic (Describe Wound)   [x] New Altered Skin Integrity was Present on Admit and Documented in LDA   [] Wound Image Taken    Wound Care Consulted? No    Attending Nurse:  Vanesa Buitrago LPN    Second RN/Staff Member:  Ade Gaviria LPN

## 2024-02-14 NOTE — SUBJECTIVE & OBJECTIVE
Interval History: Overnight patient multiple episodes of pasty stools and was noted to have irritation of the perineum that was coated with barrier cream. Patient refused labs this morning. Vital signs were stable overnight and patient remained afebrile. Drain output still greater than 10mL. PEG became occluded this morning and GI exchanged it. Patient complaining of cough and requested cough syrup.     Review of Systems  Objective:     Vital Signs (Most Recent):  Temp: 99.1 °F (37.3 °C) (02/14/24 0741)  Pulse: 86 (02/14/24 1112)  Resp: 18 (02/14/24 0741)  BP: 139/75 (02/14/24 0741)  SpO2: 97 % (02/14/24 0741) Vital Signs (24h Range):  Temp:  [97.9 °F (36.6 °C)-99.1 °F (37.3 °C)] 99.1 °F (37.3 °C)  Pulse:  [] 86  Resp:  [16-18] 18  SpO2:  [97 %-100 %] 97 %  BP: (114-139)/(60-75) 139/75     Weight: 79.6 kg (175 lb 8 oz)  Body mass index is 22.53 kg/m².    Intake/Output Summary (Last 24 hours) at 2/14/2024 1120  Last data filed at 2/13/2024 2140  Gross per 24 hour   Intake 2504.15 ml   Output 3266 ml   Net -761.85 ml         Physical Exam  Vitals and nursing note reviewed.   Constitutional:       General: He is not in acute distress.     Appearance: Normal appearance. He is not ill-appearing.      Comments: More talkative and responsive this morning   HENT:      Head: Normocephalic and atraumatic.      Mouth/Throat:      Mouth: Mucous membranes are moist.   Eyes:      General: No scleral icterus.  Cardiovascular:      Rate and Rhythm: Normal rate and regular rhythm.      Heart sounds: Normal heart sounds. No murmur heard.  Pulmonary:      Effort: Pulmonary effort is normal. No respiratory distress.      Breath sounds: Normal breath sounds.      Comments: Dry cough  Abdominal:      General: Bowel sounds are normal. There is no distension.      Palpations: Abdomen is soft.      Tenderness: There is no abdominal tenderness. There is no guarding or rebound.      Comments: Both drains in place in RUQ with minimal  serosanguinous fluid. Incision bandaged and clean.  PEG in place in the LUQ   Genitourinary:     Comments: Rosario catheter in place draining light yellow urine, no cloudiness noted  Musculoskeletal:      Right lower leg: No edema.      Left lower leg: No edema.   Skin:     General: Skin is warm and dry.      Coloration: Skin is not jaundiced.   Neurological:      Mental Status: Mental status is at baseline.   Psychiatric:         Mood and Affect: Mood normal.         Behavior: Behavior normal.             Significant Labs: All pertinent labs within the past 24 hours have been reviewed.    Significant Imaging: I have reviewed all pertinent imaging results/findings within the past 24 hours.

## 2024-02-14 NOTE — PROGRESS NOTES
VSS. L midline intact. Derrell sips of water. Glucerna inf via GT. Zofran given X1 last evening. Telemetry in SR. Lap sites X3--steri strips. R UQ & R LQ PATEL's to BS. Marlene intact. SCD on. Barrier cream applied to excoriated trisha are due to several liquid stool incontinence. Repositioned. Denies pain. Slept well. Pleasant.

## 2024-02-14 NOTE — TREATMENT PLAN
Received message that PEG tube was clogged & did not open up with cola/warm water.     Placed in 10/2022 [facility] - GASTROENTEROLOGY by Dre Sorenson MD.     On inspection, it was a balloon PEG. Removed & replaced with 20 Fr balloon G-tube. Please obtain X ray tube study before using to ensure it is in the stomach.       Care Instructions  - Flushes: flush PEG daily with 60 ml water  - Antibiotic ointment to site, clean site with soap and water daily and dry thoroughly and clean site daily with half-strength hydrogen peroxide for three days, then soap and water daily.  - Dressing: change dressing on top of bumper daily    Iván Torrez MD  Gastroenterology Fellow

## 2024-02-14 NOTE — ASSESSMENT & PLAN NOTE
68M with hx CVA, dysphagia, PEG tube, HTN, transferred from [facility] after presenting with acute cholecystitis on 1/30, lap [place] on 2/1 c/b perforated necrotic gallbladder with stone spillage and obstructing CBD stone. He was treated with meropenem prior to transfer. MRCP noted complex collection within gallbladder fossa and gallstones posterior to the liver. He underwent ERCP on 2/6 w/ complete removal of obstructing stones. He underwent IR drain placement on 2/9 with negative cultures, however cell count appears infected w/ 20k WBC  and 99% segs. Cultures from [facility] (blood and urine) are negative.    Recommendations:   - continue ceftriaxone and flagyl  - flush drains daily as per IR recs, follow drain output  - Would plan to obtain follow up CT A/P next week to reassess fluid collection  - ultimate abx course will depend on resolution of intra-abdominal abscess

## 2024-02-14 NOTE — PROGRESS NOTES
[facility] perfecto - Surgery  [facility] Medicine  Progress Note    Patient Name: Seymour Velazquez  MRN: [id number]  Patient Class: IP- Inpatient   Admission Date: 2/4/2024  Length of Stay: 10 days  Attending Physician: Delfina Quesada MD  Primary Care Provider: Eliazar Rosas MD        Subjective:     Principal Problem:Common bile duct (CBD) obstruction        HPI:  68M w/PMH stroke with G-tube placement, hypertension, diabetes, hyperlipidemia, recent cholecystitis, and dysphagia presents as transfer from OSH for AES eval. He initially presented from NH with leukocytosis w/o specific symptoms, imaging showed cholecystitis. Underwent lap irlanda 2/1, the operative report noted the gallbladder was necrotic and fell apart. The back wall of the gallbladder had an abscess that was perforated. There were spilled stones and purulent bile. A cholangiogram showed patent common duct with single stone at the ampulla that appears too large to pass spontaneously. Patient is transferred to C for possible ERCP.     On arrival, patient reports some abdominal pain, denies N/V/D. He is AFVSS. Admitted to  for further management.     Overview/Hospital Course:  Patient admitted to Hospital Medicine service for medical management and evaluation of suspected CBD obstruction following complicated laparoscopic cholecystectomy. AES was consulted on admission with recommendation of MRCP. MRCP w/ and w/o contrast currently pending. ID was consulted with continuation of Merrem from outside hospital. Merrem de-escalated to Rocephin/Flagyl per ID recs. List of home medications was obtained from home facility and were continued as appropriate. MRCP[02/06/24] noted fluid collection in the gallbladder fossa, concerning for  abscess vs. biloma. Also noted choledocholithiasis with gallstones posterior to the liver. AES performed ERCP 02/06/24 with removal of choledocolithiasis  by biliary sphincterotomy and balloon extraction. General Surgery consult  noted no indication surgical intervention at the time, but can consider IR drainage of fluid collection if clinical status worsens. Intermittent episodes of N/V with associated tachycardia throughout hospitalization, without acute abdomen. Unable to complete IR drainage of fluid collection on 2/7 2/2 N/V. Scheduling antiemetics for better control. Repeat attempt at fluid drainage and drain placement with IR successful without complication morning of 2/9. Slowly evolving AURORA was not responsive to increased IVF resuscitation; urine studies inconsistent with pre-renal etiology. Consulted Nephrology for further assistance. Patient had a retroperitoneal ultrasound showing mild bilateral hydronephrosis and a distended bladder. Rosario catheter was placed on 2/11 and patient put out almost 1L with catheter insertion. SLP evaluated patient and he was started on pleasure feeds in addition to his tube feeds.    Interval History: Overnight patient multiple episodes of pasty stools and was noted to have irritation of the perineum that was coated with barrier cream. Patient refused labs this morning. Vital signs were stable overnight and patient remained afebrile. Drain output still greater than 10mL. PEG became occluded this morning and GI exchanged it. Patient complaining of cough and requested cough syrup.     Review of Systems  Objective:     Vital Signs (Most Recent):  Temp: 99.1 °F (37.3 °C) (02/14/24 0741)  Pulse: 86 (02/14/24 1112)  Resp: 18 (02/14/24 0741)  BP: 139/75 (02/14/24 0741)  SpO2: 97 % (02/14/24 0741) Vital Signs (24h Range):  Temp:  [97.9 °F (36.6 °C)-99.1 °F (37.3 °C)] 99.1 °F (37.3 °C)  Pulse:  [] 86  Resp:  [16-18] 18  SpO2:  [97 %-100 %] 97 %  BP: (114-139)/(60-75) 139/75     Weight: 79.6 kg (175 lb 8 oz)  Body mass index is 22.53 kg/m².    Intake/Output Summary (Last 24 hours) at 2/14/2024 1120  Last data filed at 2/13/2024 2140  Gross per 24 hour   Intake 2504.15 ml   Output 3266 ml   Net -761.85 ml          Physical Exam  Vitals and nursing note reviewed.   Constitutional:       General: He is not in acute distress.     Appearance: Normal appearance. He is not ill-appearing.      Comments: More talkative and responsive this morning   HENT:      Head: Normocephalic and atraumatic.      Mouth/Throat:      Mouth: Mucous membranes are moist.   Eyes:      General: No scleral icterus.  Cardiovascular:      Rate and Rhythm: Normal rate and regular rhythm.      Heart sounds: Normal heart sounds. No murmur heard.  Pulmonary:      Effort: Pulmonary effort is normal. No respiratory distress.      Breath sounds: Normal breath sounds.      Comments: Dry cough  Abdominal:      General: Bowel sounds are normal. There is no distension.      Palpations: Abdomen is soft.      Tenderness: There is no abdominal tenderness. There is no guarding or rebound.      Comments: Both drains in place in RUQ with minimal serosanguinous fluid. Incision bandaged and clean.  PEG in place in the LUQ   Genitourinary:     Comments: Rosario catheter in place draining light yellow urine, no cloudiness noted  Musculoskeletal:      Right lower leg: No edema.      Left lower leg: No edema.   Skin:     General: Skin is warm and dry.      Coloration: Skin is not jaundiced.   Neurological:      Mental Status: Mental status is at baseline.   Psychiatric:         Mood and Affect: Mood normal.         Behavior: Behavior normal.             Significant Labs: All pertinent labs within the past 24 hours have been reviewed.    Significant Imaging: I have reviewed all pertinent imaging results/findings within the past 24 hours.    Assessment/Plan:      * Common bile duct (CBD) obstruction  68M w/ recent CVA and residual dysphagia s/p PEG tube presents from OSH for retained gallstone in ampulla, s/p lap cholecystectomy in which the gallbladder was found necrotic and friable. His only symptom is abdominal pain. Labs show leukocytosis. He is transferred for AES eval  and possible ERCP.     -MRCP[02/06/24] noted fluid collection in the gallbladder fossa, concerning for  abscess vs. biloma. Also noted choledocholithiasis with gallstones posterior to the liver.  -AES performed ERCP 02/06/24 with removal of choledocolithiasis by biliary sphincterotomy and balloon extraction.   -IR attempt at drainage of fluid collection [2/7/2024] unsuccessful 2/2 episode of N/V with associated tachycardia and hypertension  -IR reattempt at fluid drainage and drain placement [2/9/24] successful without complication. Drain in place with serosanguinous fluid.  -General Surgery consult noted no indication for surgical intervention at the time  -Ceftriaxone/Metronidazole per ID recs. Following cultures from aspiration  -Fluid studies and cultures with no evidence of bacterial growth thus far  -Scheduling reglan for N/V with additional antiemetics PRN  -CTAP once drains put out less than 10mL for 2 days. ID will provide recommendations once CTAP obtained.       Urinary retention  Patient found to have mild hydronephrosis and bladder distension on retroperitoneal ultrasound. Pacheco catheter was placed and patient had significant urine output and resolution of AURORA.    Pacheco catheter placed  Will need voiding trial or discharge with pacheco  Outpatient urology follow up       AURORA (acute kidney injury)  RESOLVED    Patient with acute kidney injury/acute renal failure likely due to pre-renal azotemia due to dehydration AURORA is currently worsening. Baseline creatinine  0.8  - Labs reviewed- Renal function/electrolytes with Estimated Creatinine Clearance: 113.7 mL/min (based on SCr of 0.7 mg/dL). according to latest data. Monitor urine output and serial BMP and adjust therapy as needed. Avoid nephrotoxins and renally dose meds for GFR listed above.    Patient with slow uptrend in sCr and jump from 1.2 on 2/9 to 1.6 on 2/10. Patient received Vancomycin intra-op 2/9 and a contrasted study during this hospitalization.  "Consulting Nephrology for concern of intrinsic injury. Urine studies 2/9: U-Na 60, U-Urea 312, U-Creatinine 57.    Recent Labs     02/13/24  0226   BUN 16   CREATININE 0.7       Renal U/S noted 02/11/24: "Mild bilateral hydronephrosis." "Elevated segmental artery resistive indices bilaterally, a nonspecific sign that can be seen with chronic renal disease or obstruction". "Urinary bladder is significantly distended with a small amount of mobile intraluminal debris"      Plan  -Bladder scan PRN.  - Rosario placed 2/11 with 1L output with insertion  - Trending daily CMP (refused labs on 2/12)  - Nephrology consulted, appreciate recs  - Avoid nephrotoxic agents, NSAIDS, renally dose meds    Tachycardia  Patient noted to have sinus tachycardia up to HR 140s in the setting of acute episodes of nausea and vomiting. Likely appropriate response to stressor.    -CTM for hemodynamic instability  -EKG PRN for chest pain or hemodynamic instability    On tube feeding diet  Hx of PEG  tube placement s/p CVA. Tolerated slow basal rate feeds well thus far following fluid drainage. On bolus feeds prior to admission    PLAN:  -Nutrition consulted for tube feed recs. Currently at goal of 45 mL/hour. Bolus recommendations received.    -Pleasure feeds of pureed foods and sips of thin liquids okay per SLP  -PEG occulded and exchanged by GI 2/14.   Care Instructions  - Flushes: flush PEG daily with 60 ml water  - Antibiotic ointment to site, clean site with soap and water daily and dry thoroughly and clean site daily with half-strength hydrogen peroxide for three days, then soap and water daily.  - Dressing: change dressing on top of bumper daily    Abdominal fluid collection  See "Common bile duct (CBD) obstruction " A&P        Choledocholithiasis  See "Common bile duct (CBD) obstruction " A&P        Bilateral pleural effusion  Patient found to have small pleural effusion on imaging [MRCP(02/06/24)"Small right-sided pleural effusion.  " "Trace left-sided pleural effusion"]. Most likely etiology includes  limited mobility due to recent history of hospitalization for cholecystectomy . Management to include  monitoring at this time      Normocytic anemia  -Patient's with Normocytic anemia..   -Hemoglobin stable.   -Etiology likely due to chronic disease .  -Current CBC reviewed-    Recent Labs   Lab 02/09/24  0514 02/10/24  0426 02/11/24  0608   HGB 9.5* 9.5* 9.1*           Component Value Date/Time    MCV 94 02/11/2024 0608    RDW 13.7 02/11/2024 0608    IRON 12 (L) 02/07/2024 0223    TIBC 195 (L) 02/07/2024 0223    FERRITIN 1,256 (H) 02/07/2024 0223         PLAN  - Monitor serial CBC and transfuse if patient becomes hemodynamically unstable, symptomatic or H/H drops below 7/21.  - Etiology consistent with anemia of chronic disease. Will CTM.    Cholecystitis  See "Common bile duct (CBD) obstruction " A&P        CVA (cerebral vascular accident)  Patient is s/p CVA with residual dysphagia and weakness. He is s/p PEG tube and receives bolus tube feedings.         Type 2 diabetes mellitus  Patient's FSGs are controlled on current medication regimen.  Last A1c reviewed-   Lab Results   Component Value Date    HGBA1C 4.8 02/05/2024     Most recent fingerstick glucose reviewed-   Recent Labs   Lab 02/11/24  1541 02/12/24  0813   POCTGLUCOSE 137* 111*       Current correctional scale  Low  Maintain anti-hyperglycemic dose as follows-   Antihyperglycemics (From admission, onward)      Start     Stop Route Frequency Ordered    02/04/24 2059  insulin aspart U-100 pen 0-5 Units         -- SubQ Before meals & nightly PRN 02/04/24 2000          Hold Oral hypoglycemics while patient is in the hospital.     Patient on SSI at NH.    -LDSSI  -Glucerna for tube feeds    Primary hypertension  Patient is normotensive on arrival, per chart he is not currently on any antihypertensives.     Will utilize p.r.n. blood pressure medication only if patient's blood pressure " greater than 180/110 and he develops symptoms such as worsening chest pain or shortness of breath.    -amlodipine 5mg  -Holding home lisinopril in the setting of AURORA  -Will add PRN hydralazine for SBP>180 and symptomatic  -will consider uptitration of home regimen if pressures remain uncontrolled        VTE Risk Mitigation (From admission, onward)           Ordered     enoxaparin injection 40 mg  Every 24 hours         02/09/24 1408     IP VTE HIGH RISK PATIENT  Once         02/05/24 1410     Place sequential compression device  Until discontinued         02/04/24 2000                    Discharge Planning   PEYTON: 2/14/2024     Code Status: Full Code   Is the patient medically ready for discharge?: No    Reason for patient still in hospital (select all that apply): Treatment  Discharge Plan A: Home with family                  David Poe MD  Department of Hospital Medicine   [facility] - Surgery

## 2024-02-14 NOTE — ASSESSMENT & PLAN NOTE
"RESOLVED    Patient with acute kidney injury/acute renal failure likely due to pre-renal azotemia due to dehydration AURORA is currently worsening. Baseline creatinine  0.8  - Labs reviewed- Renal function/electrolytes with Estimated Creatinine Clearance: 113.7 mL/min (based on SCr of 0.7 mg/dL). according to latest data. Monitor urine output and serial BMP and adjust therapy as needed. Avoid nephrotoxins and renally dose meds for GFR listed above.    Patient with slow uptrend in sCr and jump from 1.2 on 2/9 to 1.6 on 2/10. Patient received Vancomycin intra-op 2/9 and a contrasted study during this hospitalization. Consulting Nephrology for concern of intrinsic injury. Urine studies 2/9: U-Na 60, U-Urea 312, U-Creatinine 57.    Recent Labs     02/13/24  0226   BUN 16   CREATININE 0.7       Renal U/S noted 02/11/24: "Mild bilateral hydronephrosis." "Elevated segmental artery resistive indices bilaterally, a nonspecific sign that can be seen with chronic renal disease or obstruction". "Urinary bladder is significantly distended with a small amount of mobile intraluminal debris"      Plan  -Bladder scan PRN.  - Rosario placed 2/11 with 1L output with insertion  - Trending daily CMP (refused labs on 2/12)  - Nephrology consulted, appreciate recs  - Avoid nephrotoxic agents, NSAIDS, renally dose meds  "

## 2024-02-14 NOTE — SUBJECTIVE & OBJECTIVE
Interval History: no events overnight, denies pain, drain output reviewed    Review of Systems   All other systems reviewed and are negative.    Objective:     Vital Signs (Most Recent):  Temp: 98 °F (36.7 °C) (02/14/24 1136)  Pulse: 83 (02/14/24 1505)  Resp: 18 (02/14/24 1410)  BP: (!) 141/68 (02/14/24 1136)  SpO2: 96 % (02/14/24 1136) Vital Signs (24h Range):  Temp:  [97.9 °F (36.6 °C)-99.1 °F (37.3 °C)] 98 °F (36.7 °C)  Pulse:  [] 83  Resp:  [16-18] 18  SpO2:  [96 %-100 %] 96 %  BP: (114-141)/(60-75) 141/68     Weight: 79.6 kg (175 lb 8 oz)  Body mass index is 22.53 kg/m².    Estimated Creatinine Clearance: 113.7 mL/min (based on SCr of 0.7 mg/dL).     Physical Exam  Constitutional:       General: He is not in acute distress.     Appearance: He is well-developed. He is not ill-appearing or diaphoretic.   HENT:      Head: Normocephalic and atraumatic.      Right Ear: External ear normal.      Left Ear: External ear normal.      Nose: Nose normal.   Eyes:      General: No scleral icterus.        Right eye: No discharge.         Left eye: No discharge.      Extraocular Movements: Extraocular movements intact.      Conjunctiva/sclera: Conjunctivae normal.   Pulmonary:      Effort: Pulmonary effort is normal. No respiratory distress.      Breath sounds: No stridor.   Abdominal:      General: Abdomen is flat.      Palpations: Abdomen is soft.      Comments: Laparoscopic incisions c/d/I  2 RUQ drains, one with bloody/serosang output, the other with serous/serousang output   Skin:     General: Skin is dry.      Coloration: Skin is not jaundiced or pale.      Findings: No erythema.   Neurological:      General: No focal deficit present.      Mental Status: He is alert and oriented to person, place, and time. Mental status is at baseline.   Psychiatric:         Mood and Affect: Mood normal.         Behavior: Behavior normal.         Thought Content: Thought content normal.         Judgment: Judgment normal.           Significant Labs: CBC:   Recent Labs   Lab 02/13/24 0226   WBC 8.34   HGB 8.5*   HCT 27.1*          CMP:   Recent Labs   Lab 02/13/24 0226      K 3.6      CO2 23      BUN 16   CREATININE 0.7   CALCIUM 8.1*   PROT 6.2   ALBUMIN 1.9*   BILITOT 0.2   ALKPHOS 67   AST 28   ALT 10   ANIONGAP 10       Microbiology Results (last 7 days)       Procedure Component Value Units Date/Time    Fungus culture [[id number]] Collected: 02/09/24 0914    Order Status: Completed Specimen: Body Fluid from Abdomen Updated: 02/14/24 0545     Fungus (Mycology) Culture Culture in progress    Culture, Anaerobe [[id number]] Collected: 02/09/24 0914    Order Status: Completed Specimen: Body Fluid from Abdomen Updated: 02/13/24 1117     Anaerobic Culture Culture in progress    AFB Culture & Smear [[id number]] Collected: 02/09/24 0914    Order Status: Completed Specimen: Body Fluid from Abdomen Updated: 02/12/24 1416     AFB Culture & Smear Culture in progress     AFB CULTURE STAIN No acid fast bacilli seen.    Aerobic culture [[id number]] Collected: 02/09/24 0914    Order Status: Completed Specimen: Abscess from Abdomen Updated: 02/12/24 0750     Aerobic Bacterial Culture No growth    Gram stain [[id number]] Collected: 02/09/24 0914    Order Status: Completed Specimen: Body Fluid from Abdomen Updated: 02/09/24 1844     Gram Stain Result Moderate WBC's      No organisms seen            Significant Imaging: I have reviewed all pertinent imaging results/findings within the past 24 hours.

## 2024-02-14 NOTE — PT/OT/SLP PROGRESS
"Speech Language Pathology Treatment    Patient Name:  Seymour Velazquez   MRN:  [id number]  Admitting Diagnosis: Common bile duct (CBD) obstruction    Recommendations:                 General Recommendations:  Follow-up not indicated  Diet recommendations:  Minced & Moist Diet - IDDSI Level 5, Liquid Diet Level: Thin   Aspiration Precautions: Continue alternate means of nutrition, HOB to 90 degrees, Meds crushed in puree, and Standard aspiration precautions   General Precautions: Standard, aspiration  Communication strategies:  none    Assessment:     Seymour Velazquez is a 68 y.o. male with an SLP diagnosis of Dysphagia.  He presents with no overt s/s of aspiration.    Subjective     'I used to eat rice, gravy and beef" per pt  Patient goals: home     Pain/Comfort:  Pain Rating 1: 0/10  Pain Rating Post-Intervention 1: 0/10    Respiratory Status: Room air    Objective:     Has the patient been evaluated by SLP for swallowing?   No  Keep patient NPO? No (pleasure feeds)   Current Respiratory Status:        Pt. Seen at bedside and hob was raised to 90 degree angle.  Pt. Endorsed poor appetite but stated that he did eat soft foods pta.  Speech was intelligible in conversation and vocal quality and intensity were wfl.  Pt. Agreed to take 4 sips of water via cup with a straw but declined food.  He reported poor appetite and not liking the "looks" of the puree diet.  Discussed advancing diet to moist and minced for pleasure with thin liquids.  Safe swallow strategies and aspiration precautions reviewed.       Goals:   Multidisciplinary Problems       SLP Goals       Not on file              Multidisciplinary Problems (Resolved)          Problem: SLP    Goal Priority Disciplines Outcome   SLP Goal   (Resolved)     SLP Met   Description: Speech Language Pathology Goals  Goals expected to be met by 2/17:  1. Pt will tolerate thin liquids and purees for pleasure without s/s of aspiration while following aspiration " precautions.   2. Pt will participate in ongoing swallowing assessment to determine if MBSS and/or advanced textures are appropriate.                                Plan:     Patient to be seen:  4 x/week   Plan of Care expires:  03/11/24  Plan of Care reviewed with:  patient   SLP Follow-Up:  No       Discharge recommendations:  No Therapy Indicated   Barriers to Discharge:  None    Time Tracking:     SLP Treatment Date:   02/14/24  Speech Start Time:  1210  Speech Stop Time:  1222     Speech Total Time (min):  12 min  Billable Minutes: Treatment Swallowing Dysfunction 12    02/14/2024

## 2024-02-14 NOTE — PROGRESS NOTES
MD secure chat received regarding Bolus recommendations for pt,  please find updated recommendations provided below.    Recommendations  1. EN Recommendation- Rec'd continuing Glucerna 1.5 @ 45 mL/hr x 24 hours to provide 1620 kcal, 89 g PRO and 820 mL fluid. - Monitor s/s of intolerance such as abdominal pain/distension/nausea/vomiting.    2.Continue Pureed diet-thin liquids- pleasure feeds     3. If Bolus recommendations warranted:   - Glucerna 1.5 @ 5 cans/day to provide 1780 kcals, 98 g of protein, and 900 ml of fluid.    -If other alternative formula warranted for insurance purposes:     -Isosource 1.5 @ 5 cans/day to provide 1875 kcals, 85 g of protein, and 955 ml of fluid.     3. If TPN warranted recommend 210 g D + 74 g AA + SMOF lipids to provide 1510 kcal, 74 g PRO (GIR 1.83)     4. RD to monitor and follow-up.     Goals: Meet % EEN/EPN by follow-up date.  Nutrition Goal Status: continues   Communication of RD Recs: other (comment) (POC)/MD secure chat

## 2024-02-14 NOTE — PROGRESS NOTES
[facility] - Surgery  Infectious Disease  Progress Note    Patient Name: Seymour Velazquez  MRN: [id number]  Admission Date: 2/4/2024  Length of Stay: 10 days  Attending Physician: Delfina Quesada MD  Primary Care Provider: Eliazar Rosas MD    Isolation Status: No active isolations  Assessment/Plan:      GI  Cholecystitis  68M with hx CVA, dysphagia, PEG tube, HTN, transferred from [facility] after presenting with acute cholecystitis on 1/30, lap [place] on 2/1 c/b perforated necrotic gallbladder with stone spillage and obstructing CBD stone. He was treated with meropenem prior to transfer. MRCP noted complex collection within gallbladder fossa and gallstones posterior to the liver. He underwent ERCP on 2/6 w/ complete removal of obstructing stones. He underwent IR drain placement on 2/9 with negative cultures, however cell count appears infected w/ 20k WBC  and 99% segs. Cultures from [facility] (blood and urine) are negative.    Recommendations:   - continue ceftriaxone and flagyl  - flush drains daily as per IR recs, follow drain output  - Would plan to obtain follow up CT A/P next week to reassess fluid collection  - ultimate abx course will depend on resolution of intra-abdominal abscess    Anticipated Disposition: TBD    Thank you for your consult. I will follow-up with patient. Please contact us if you have any additional questions.    Ifrah Whiting DO  Infectious Disease    Time: 50 minutes   50% of time spent on face-to-face counseling and coordination of care. Counseling included review of test results, diagnosis, and treatment plan with patient and/or family.      Subjective:     Principal Problem:Common bile duct (CBD) obstruction    HPI: Mr Velazquez is a 68 year old male with history of CVA with dysphagia s/p PEG tube, HTN, HLD, DMII presents from OSH for AES eval of possible retained stone after cholecystectomy 02/01.    Patient initially presented from NH with leukocytosis  w/o specific symptoms, imaging showed cholecystitis. He underwent lap [place] 2/1, the operative report noted the gallbladder was necrotic and fell apart. The back wall of the gallbladder had an abscess that was perforated. There were spilled stones and purulent bile. A cholangiogram showed patent common duct with single stone at the ampulla that appears too large to pass spontaneously. Patient transferred to [facility] for possible ERCP.  While at OSH patient was treated with empiric Meropenem. On arrival, patient reports mild abdominal pain, denies N/V/D. He is AFVSS.  No leukocytosis. LFTS WNL. ID consulted for recommendations.  Patient reports being told he had a PCN allergy as a child. Cannot recall specific reaction. Gi following. Ordered MRCP.  Interval History: no events overnight, denies pain, drain output reviewed    Review of Systems   All other systems reviewed and are negative.    Objective:     Vital Signs (Most Recent):  Temp: 98 °F (36.7 °C) (02/14/24 1136)  Pulse: 83 (02/14/24 1505)  Resp: 18 (02/14/24 1410)  BP: (!) 141/68 (02/14/24 1136)  SpO2: 96 % (02/14/24 1136) Vital Signs (24h Range):  Temp:  [97.9 °F (36.6 °C)-99.1 °F (37.3 °C)] 98 °F (36.7 °C)  Pulse:  [] 83  Resp:  [16-18] 18  SpO2:  [96 %-100 %] 96 %  BP: (114-141)/(60-75) 141/68     Weight: 79.6 kg (175 lb 8 oz)  Body mass index is 22.53 kg/m².    Estimated Creatinine Clearance: 113.7 mL/min (based on SCr of 0.7 mg/dL).     Physical Exam  Constitutional:       General: He is not in acute distress.     Appearance: He is well-developed. He is not ill-appearing or diaphoretic.   HENT:      Head: Normocephalic and atraumatic.      Right Ear: External ear normal.      Left Ear: External ear normal.      Nose: Nose normal.   Eyes:      General: No scleral icterus.        Right eye: No discharge.         Left eye: No discharge.      Extraocular Movements: Extraocular movements intact.      Conjunctiva/sclera: Conjunctivae normal.   Pulmonary:       Effort: Pulmonary effort is normal. No respiratory distress.      Breath sounds: No stridor.   Abdominal:      General: Abdomen is flat.      Palpations: Abdomen is soft.      Comments: Laparoscopic incisions c/d/I  2 RUQ drains, one with bloody/serosang output, the other with serous/serousang output   Skin:     General: Skin is dry.      Coloration: Skin is not jaundiced or pale.      Findings: No erythema.   Neurological:      General: No focal deficit present.      Mental Status: He is alert and oriented to person, place, and time. Mental status is at baseline.   Psychiatric:         Mood and Affect: Mood normal.         Behavior: Behavior normal.         Thought Content: Thought content normal.         Judgment: Judgment normal.          Significant Labs: CBC:   Recent Labs   Lab 02/13/24 0226   WBC 8.34   HGB 8.5*   HCT 27.1*          CMP:   Recent Labs   Lab 02/13/24 0226      K 3.6      CO2 23      BUN 16   CREATININE 0.7   CALCIUM 8.1*   PROT 6.2   ALBUMIN 1.9*   BILITOT 0.2   ALKPHOS 67   AST 28   ALT 10   ANIONGAP 10       Microbiology Results (last 7 days)       Procedure Component Value Units Date/Time    Fungus culture [[id number]] Collected: 02/09/24 0914    Order Status: Completed Specimen: Body Fluid from Abdomen Updated: 02/14/24 0545     Fungus (Mycology) Culture Culture in progress    Culture, Anaerobe [[id number]] Collected: 02/09/24 0914    Order Status: Completed Specimen: Body Fluid from Abdomen Updated: 02/13/24 1117     Anaerobic Culture Culture in progress    AFB Culture & Smear [[id number]] Collected: 02/09/24 0914    Order Status: Completed Specimen: Body Fluid from Abdomen Updated: 02/12/24 1416     AFB Culture & Smear Culture in progress     AFB CULTURE STAIN No acid fast bacilli seen.    Aerobic culture [[id number]] Collected: 02/09/24 0914    Order Status: Completed Specimen: Abscess from Abdomen Updated: 02/12/24 0750     Aerobic Bacterial Culture No  growth    Gram stain [[id number]] Collected: 02/09/24 0914    Order Status: Completed Specimen: Body Fluid from Abdomen Updated: 02/09/24 1844     Gram Stain Result Moderate WBC's      No organisms seen            Significant Imaging: I have reviewed all pertinent imaging results/findings within the past 24 hours.

## 2024-02-14 NOTE — PLAN OF CARE
Spoke with Patient daughter Evelin Hoff [phone] regarding patient discharge disposition. She confirms patient current with [facility] Phone: [phone]   and plan id for patient to return post hospitalization.

## 2024-02-14 NOTE — PLAN OF CARE
Recommend Moist and minced diet with thin liquids for pleasure feeds.  Problem: SLP  Goal: SLP Goal  Description: Speech Language Pathology Goals  Goals expected to be met by 2/17:  1. Pt will tolerate thin liquids and purees for pleasure without s/s of aspiration while following aspiration precautions.   : Met

## 2024-02-15 PROBLEM — K94.23 GASTROSTOMY TUBE DYSFUNCTION: Status: ACTIVE | Noted: 2024-02-15

## 2024-02-15 PROBLEM — I50.32 CHRONIC DIASTOLIC HEART FAILURE: Status: ACTIVE | Noted: 2024-02-15

## 2024-02-15 LAB
ABO + RH BLD: NORMAL
ALBUMIN SERPL BCP-MCNC: 2.2 G/DL (ref 3.5–5.2)
ALLENS TEST: ABNORMAL
ALLENS TEST: NORMAL
ALP SERPL-CCNC: 53 U/L (ref 55–135)
ALT SERPL W/O P-5'-P-CCNC: 10 U/L (ref 10–44)
ANION GAP SERPL CALC-SCNC: 10 MMOL/L (ref 8–16)
ANISOCYTOSIS BLD QL SMEAR: SLIGHT
ANISOCYTOSIS BLD QL SMEAR: SLIGHT
AST SERPL-CCNC: 14 U/L (ref 10–40)
BACTERIA SPEC ANAEROBE CULT: NORMAL
BASOPHILS # BLD AUTO: 0.11 K/UL (ref 0–0.2)
BASOPHILS # BLD AUTO: 0.11 K/UL (ref 0–0.2)
BASOPHILS # BLD AUTO: 0.13 K/UL (ref 0–0.2)
BASOPHILS NFR BLD: 0.5 % (ref 0–1.9)
BASOPHILS NFR BLD: 0.5 % (ref 0–1.9)
BASOPHILS NFR BLD: 0.7 % (ref 0–1.9)
BILIRUB SERPL-MCNC: 0.3 MG/DL (ref 0.1–1)
BLD GP AB SCN CELLS X3 SERPL QL: NORMAL
BUN SERPL-MCNC: 36 MG/DL (ref 8–23)
CALCIUM SERPL-MCNC: 8.4 MG/DL (ref 8.7–10.5)
CHLORIDE SERPL-SCNC: 106 MMOL/L (ref 95–110)
CO2 SERPL-SCNC: 23 MMOL/L (ref 23–29)
CREAT SERPL-MCNC: 0.8 MG/DL (ref 0.5–1.4)
DELSYS: ABNORMAL
DELSYS: NORMAL
DIFFERENTIAL METHOD BLD: ABNORMAL
EOSINOPHIL # BLD AUTO: 0 K/UL (ref 0–0.5)
EOSINOPHIL NFR BLD: 0 % (ref 0–8)
EOSINOPHIL NFR BLD: 0 % (ref 0–8)
EOSINOPHIL NFR BLD: 0.1 % (ref 0–8)
ERYTHROCYTE [DISTWIDTH] IN BLOOD BY AUTOMATED COUNT: 14.9 % (ref 11.5–14.5)
ERYTHROCYTE [DISTWIDTH] IN BLOOD BY AUTOMATED COUNT: 15.4 % (ref 11.5–14.5)
ERYTHROCYTE [DISTWIDTH] IN BLOOD BY AUTOMATED COUNT: 15.4 % (ref 11.5–14.5)
EST. GFR  (NO RACE VARIABLE): >60 ML/MIN/1.73 M^2
FLOW: 6
FLOW: 6
GIANT PLATELETS BLD QL SMEAR: PRESENT
GIANT PLATELETS BLD QL SMEAR: PRESENT
GLUCOSE SERPL-MCNC: 170 MG/DL (ref 70–110)
HCO3 UR-SCNC: 26 MMOL/L (ref 24–28)
HCT VFR BLD AUTO: 22.1 % (ref 40–54)
HCT VFR BLD AUTO: 22.1 % (ref 40–54)
HCT VFR BLD AUTO: 22.2 % (ref 40–54)
HCT VFR BLD CALC: 21 %PCV (ref 36–54)
HGB BLD-MCNC: 7 G/DL (ref 14–18)
HGB BLD-MCNC: 7 G/DL (ref 14–18)
HGB BLD-MCNC: 7.1 G/DL (ref 14–18)
IMM GRANULOCYTES # BLD AUTO: 0.29 K/UL (ref 0–0.04)
IMM GRANULOCYTES # BLD AUTO: 0.29 K/UL (ref 0–0.04)
IMM GRANULOCYTES # BLD AUTO: 0.34 K/UL (ref 0–0.04)
IMM GRANULOCYTES NFR BLD AUTO: 1.3 % (ref 0–0.5)
IMM GRANULOCYTES NFR BLD AUTO: 1.3 % (ref 0–0.5)
IMM GRANULOCYTES NFR BLD AUTO: 1.9 % (ref 0–0.5)
LACTATE SERPL-SCNC: 1.1 MMOL/L (ref 0.5–2.2)
LDH SERPL L TO P-CCNC: 1.18 MMOL/L (ref 0.36–1.25)
LYMPHOCYTES # BLD AUTO: 1.8 K/UL (ref 1–4.8)
LYMPHOCYTES # BLD AUTO: 2.8 K/UL (ref 1–4.8)
LYMPHOCYTES # BLD AUTO: 2.8 K/UL (ref 1–4.8)
LYMPHOCYTES NFR BLD: 12.3 % (ref 18–48)
LYMPHOCYTES NFR BLD: 12.3 % (ref 18–48)
LYMPHOCYTES NFR BLD: 9.5 % (ref 18–48)
MAGNESIUM SERPL-MCNC: 1.3 MG/DL (ref 1.6–2.6)
MCH RBC QN AUTO: 29.4 PG (ref 27–31)
MCH RBC QN AUTO: 29.4 PG (ref 27–31)
MCH RBC QN AUTO: 30.1 PG (ref 27–31)
MCHC RBC AUTO-ENTMCNC: 31.7 G/DL (ref 32–36)
MCHC RBC AUTO-ENTMCNC: 31.7 G/DL (ref 32–36)
MCHC RBC AUTO-ENTMCNC: 32 G/DL (ref 32–36)
MCV RBC AUTO: 93 FL (ref 82–98)
MCV RBC AUTO: 93 FL (ref 82–98)
MCV RBC AUTO: 94 FL (ref 82–98)
MODE: ABNORMAL
MODE: NORMAL
MONOCYTES # BLD AUTO: 1.3 K/UL (ref 0.3–1)
MONOCYTES # BLD AUTO: 2.5 K/UL (ref 0.3–1)
MONOCYTES # BLD AUTO: 2.5 K/UL (ref 0.3–1)
MONOCYTES NFR BLD: 11 % (ref 4–15)
MONOCYTES NFR BLD: 11 % (ref 4–15)
MONOCYTES NFR BLD: 7 % (ref 4–15)
NEUTROPHILS # BLD AUTO: 14.8 K/UL (ref 1.8–7.7)
NEUTROPHILS # BLD AUTO: 17 K/UL (ref 1.8–7.7)
NEUTROPHILS # BLD AUTO: 17 K/UL (ref 1.8–7.7)
NEUTROPHILS NFR BLD: 74.9 % (ref 38–73)
NEUTROPHILS NFR BLD: 74.9 % (ref 38–73)
NEUTROPHILS NFR BLD: 80.8 % (ref 38–73)
NRBC BLD-RTO: 0 /100 WBC
OVALOCYTES BLD QL SMEAR: ABNORMAL
OVALOCYTES BLD QL SMEAR: ABNORMAL
PCO2 BLDA: 30.7 MMHG (ref 35–45)
PH SMN: 7.54 [PH] (ref 7.35–7.45)
PHOSPHATE SERPL-MCNC: 2.9 MG/DL (ref 2.7–4.5)
PLATELET # BLD AUTO: 579 K/UL (ref 150–450)
PLATELET # BLD AUTO: 625 K/UL (ref 150–450)
PLATELET # BLD AUTO: 625 K/UL (ref 150–450)
PLATELET BLD QL SMEAR: ABNORMAL
PLATELET BLD QL SMEAR: ABNORMAL
PMV BLD AUTO: 8.5 FL (ref 9.2–12.9)
PMV BLD AUTO: 8.5 FL (ref 9.2–12.9)
PMV BLD AUTO: 8.8 FL (ref 9.2–12.9)
PO2 BLDA: 49 MMHG (ref 80–100)
POC BE: 3 MMOL/L
POC IONIZED CALCIUM: 1.19 MMOL/L (ref 1.06–1.42)
POC SATURATED O2: 89 % (ref 95–100)
POC TCO2: 27 MMOL/L (ref 23–27)
POCT GLUCOSE: 100 MG/DL (ref 70–110)
POCT GLUCOSE: 183 MG/DL (ref 70–110)
POCT GLUCOSE: 192 MG/DL (ref 70–110)
POCT GLUCOSE: 94 MG/DL (ref 70–110)
POIKILOCYTOSIS BLD QL SMEAR: SLIGHT
POIKILOCYTOSIS BLD QL SMEAR: SLIGHT
POTASSIUM BLD-SCNC: 3 MMOL/L (ref 3.5–5.1)
POTASSIUM SERPL-SCNC: 3.7 MMOL/L (ref 3.5–5.1)
PROT SERPL-MCNC: 6.2 G/DL (ref 6–8.4)
RBC # BLD AUTO: 2.36 M/UL (ref 4.6–6.2)
RBC # BLD AUTO: 2.38 M/UL (ref 4.6–6.2)
RBC # BLD AUTO: 2.38 M/UL (ref 4.6–6.2)
SAMPLE: ABNORMAL
SAMPLE: NORMAL
SITE: ABNORMAL
SITE: NORMAL
SODIUM BLD-SCNC: 142 MMOL/L (ref 136–145)
SODIUM SERPL-SCNC: 139 MMOL/L (ref 136–145)
SPECIMEN OUTDATE: NORMAL
SPHEROCYTES BLD QL SMEAR: ABNORMAL
SPHEROCYTES BLD QL SMEAR: ABNORMAL
TOXIC GRANULES BLD QL SMEAR: PRESENT
TOXIC GRANULES BLD QL SMEAR: PRESENT
WBC # BLD AUTO: 18.36 K/UL (ref 3.9–12.7)
WBC # BLD AUTO: 22.66 K/UL (ref 3.9–12.7)
WBC # BLD AUTO: 22.66 K/UL (ref 3.9–12.7)

## 2024-02-15 PROCEDURE — 83605 ASSAY OF LACTIC ACID: CPT

## 2024-02-15 PROCEDURE — 84100 ASSAY OF PHOSPHORUS: CPT | Performed by: HOSPITALIST

## 2024-02-15 PROCEDURE — 86901 BLOOD TYPING SEROLOGIC RH(D): CPT

## 2024-02-15 PROCEDURE — 36415 COLL VENOUS BLD VENIPUNCTURE: CPT | Mod: XB | Performed by: HOSPITALIST

## 2024-02-15 PROCEDURE — 25000003 PHARM REV CODE 250

## 2024-02-15 PROCEDURE — 63600175 PHARM REV CODE 636 W HCPCS: Performed by: STUDENT IN AN ORGANIZED HEALTH CARE EDUCATION/TRAINING PROGRAM

## 2024-02-15 PROCEDURE — 36600 WITHDRAWAL OF ARTERIAL BLOOD: CPT

## 2024-02-15 PROCEDURE — 63600175 PHARM REV CODE 636 W HCPCS: Performed by: PHYSICIAN ASSISTANT

## 2024-02-15 PROCEDURE — 25000003 PHARM REV CODE 250: Performed by: PHYSICIAN ASSISTANT

## 2024-02-15 PROCEDURE — 84295 ASSAY OF SERUM SODIUM: CPT

## 2024-02-15 PROCEDURE — 99900035 HC TECH TIME PER 15 MIN (STAT)

## 2024-02-15 PROCEDURE — 82330 ASSAY OF CALCIUM: CPT

## 2024-02-15 PROCEDURE — 63600175 PHARM REV CODE 636 W HCPCS

## 2024-02-15 PROCEDURE — 87040 BLOOD CULTURE FOR BACTERIA: CPT

## 2024-02-15 PROCEDURE — 21400001 HC TELEMETRY ROOM

## 2024-02-15 PROCEDURE — 97110 THERAPEUTIC EXERCISES: CPT | Mod: CQ

## 2024-02-15 PROCEDURE — 80053 COMPREHEN METABOLIC PANEL: CPT | Performed by: HOSPITALIST

## 2024-02-15 PROCEDURE — 97530 THERAPEUTIC ACTIVITIES: CPT

## 2024-02-15 PROCEDURE — 83735 ASSAY OF MAGNESIUM: CPT | Performed by: HOSPITALIST

## 2024-02-15 PROCEDURE — 63600175 PHARM REV CODE 636 W HCPCS: Performed by: HOSPITALIST

## 2024-02-15 PROCEDURE — 84132 ASSAY OF SERUM POTASSIUM: CPT

## 2024-02-15 PROCEDURE — 85014 HEMATOCRIT: CPT

## 2024-02-15 PROCEDURE — 85025 COMPLETE CBC W/AUTO DIFF WBC: CPT | Performed by: HOSPITALIST

## 2024-02-15 PROCEDURE — 63600175 PHARM REV CODE 636 W HCPCS: Mod: JZ,JG | Performed by: INTERNAL MEDICINE

## 2024-02-15 PROCEDURE — 97112 NEUROMUSCULAR REEDUCATION: CPT | Mod: CQ

## 2024-02-15 PROCEDURE — 82803 BLOOD GASES ANY COMBINATION: CPT

## 2024-02-15 PROCEDURE — 93010 ELECTROCARDIOGRAM REPORT: CPT | Mod: ,,, | Performed by: INTERNAL MEDICINE

## 2024-02-15 PROCEDURE — 25000003 PHARM REV CODE 250: Performed by: INTERNAL MEDICINE

## 2024-02-15 PROCEDURE — A4216 STERILE WATER/SALINE, 10 ML: HCPCS | Performed by: INTERNAL MEDICINE

## 2024-02-15 PROCEDURE — 93005 ELECTROCARDIOGRAM TRACING: CPT

## 2024-02-15 RX ORDER — SODIUM CHLORIDE, SODIUM LACTATE, POTASSIUM CHLORIDE, CALCIUM CHLORIDE 600; 310; 30; 20 MG/100ML; MG/100ML; MG/100ML; MG/100ML
INJECTION, SOLUTION INTRAVENOUS CONTINUOUS
Status: DISCONTINUED | OUTPATIENT
Start: 2024-02-15 | End: 2024-02-15

## 2024-02-15 RX ORDER — MAGNESIUM SULFATE HEPTAHYDRATE 40 MG/ML
2 INJECTION, SOLUTION INTRAVENOUS
Status: COMPLETED | OUTPATIENT
Start: 2024-02-15 | End: 2024-02-15

## 2024-02-15 RX ADMIN — METOCLOPRAMIDE HYDROCHLORIDE 5 MG: 10 INJECTION, SOLUTION INTRAMUSCULAR; INTRAVENOUS at 02:02

## 2024-02-15 RX ADMIN — ONDANSETRON 8 MG: 2 INJECTION INTRAMUSCULAR; INTRAVENOUS at 04:02

## 2024-02-15 RX ADMIN — Medication 10 ML: at 05:02

## 2024-02-15 RX ADMIN — METRONIDAZOLE 500 MG: 500 INJECTION, SOLUTION INTRAVENOUS at 11:02

## 2024-02-15 RX ADMIN — Medication 10 ML: at 12:02

## 2024-02-15 RX ADMIN — GUAIFENESIN 200 MG: 200 SOLUTION ORAL at 05:02

## 2024-02-15 RX ADMIN — PROCHLORPERAZINE EDISYLATE 5 MG: 5 INJECTION INTRAMUSCULAR; INTRAVENOUS at 08:02

## 2024-02-15 RX ADMIN — Medication 10 ML: at 06:02

## 2024-02-15 RX ADMIN — CEFTRIAXONE 2 G: 2 INJECTION, POWDER, FOR SOLUTION INTRAMUSCULAR; INTRAVENOUS at 02:02

## 2024-02-15 RX ADMIN — ONDANSETRON 8 MG: 2 INJECTION INTRAMUSCULAR; INTRAVENOUS at 12:02

## 2024-02-15 RX ADMIN — MAGNESIUM SULFATE HEPTAHYDRATE 2 G: 40 INJECTION, SOLUTION INTRAVENOUS at 08:02

## 2024-02-15 RX ADMIN — ONDANSETRON 8 MG: 2 INJECTION INTRAMUSCULAR; INTRAVENOUS at 05:02

## 2024-02-15 RX ADMIN — METOCLOPRAMIDE HYDROCHLORIDE 5 MG: 10 INJECTION, SOLUTION INTRAMUSCULAR; INTRAVENOUS at 09:02

## 2024-02-15 RX ADMIN — MUPIROCIN: 20 OINTMENT TOPICAL at 09:02

## 2024-02-15 RX ADMIN — METRONIDAZOLE 500 MG: 500 INJECTION, SOLUTION INTRAVENOUS at 04:02

## 2024-02-15 RX ADMIN — MAGNESIUM SULFATE HEPTAHYDRATE 2 G: 40 INJECTION, SOLUTION INTRAVENOUS at 06:02

## 2024-02-15 RX ADMIN — PROCHLORPERAZINE EDISYLATE 5 MG: 5 INJECTION INTRAMUSCULAR; INTRAVENOUS at 09:02

## 2024-02-15 RX ADMIN — SODIUM CHLORIDE, SODIUM LACTATE, POTASSIUM CHLORIDE, AND CALCIUM CHLORIDE 1000 ML: .6; .31; .03; .02 INJECTION, SOLUTION INTRAVENOUS at 02:02

## 2024-02-15 RX ADMIN — SODIUM CHLORIDE, SODIUM LACTATE, POTASSIUM CHLORIDE, AND CALCIUM CHLORIDE: 600; 310; 30; 20 INJECTION, SOLUTION INTRAVENOUS at 12:02

## 2024-02-15 RX ADMIN — ENOXAPARIN SODIUM 40 MG: 40 INJECTION SUBCUTANEOUS at 05:02

## 2024-02-15 RX ADMIN — METOCLOPRAMIDE HYDROCHLORIDE 5 MG: 10 INJECTION, SOLUTION INTRAMUSCULAR; INTRAVENOUS at 05:02

## 2024-02-15 RX ADMIN — SODIUM CHLORIDE, SODIUM LACTATE, POTASSIUM CHLORIDE, AND CALCIUM CHLORIDE 1000 ML: .6; .31; .03; .02 INJECTION, SOLUTION INTRAVENOUS at 09:02

## 2024-02-15 NOTE — PT/OT/SLP PROGRESS
Physical Therapy Treatment    Patient Name:  Seymour Velazquez   MRN:  [id number]    Recommendations:     Discharge Recommendations: Moderate Intensity Therapy  Discharge Equipment Recommendations: wheelchair, lift device, hospital bed  Barriers to discharge: Decreased caregiver support    Assessment:     Seymour Velazquez is a 68 y.o. male admitted with a medical diagnosis of Common bile duct (CBD) obstruction.  He presents with the following impairments/functional limitations: weakness, impaired endurance, impaired self care skills, impaired functional mobility, impaired balance, impaired cognition, decreased upper extremity function, decreased lower extremity function, decreased safety awareness, abnormal tone, decreased ROM, impaired skin.    Rehab Prognosis: Fair; patient would benefit from acute skilled PT services to address these deficits and reach maximum level of function.    Recent Surgery: Procedure(s) (LRB):  ERCP (ENDOSCOPIC RETROGRADE CHOLANGIOPANCREATOGRAPHY) (N/A) 9 Days Post-Op    Plan:     During this hospitalization, patient to be seen 3 x/week to address the identified rehab impairments via gait training, therapeutic activities, therapeutic exercises, neuromuscular re-education and progress toward the following goals:    Plan of Care Expires:  03/13/24    Subjective     Chief Complaint: pt reported stomach pain/discomfort   Patient/Family Comments/goals: none verbalized  Pain/Comfort:  Pain Rating 1: 8/10  Location 1: abdomen  Pain Addressed 1: Reposition, Distraction      Objective:     Communicated with RN prior to session.  Patient found HOB elevated with pacheco catheter, PEG Tube, peripheral IV upon PT entry to room.     General Precautions: Standard, fall, aspiration  Orthopedic Precautions: N/A  Braces: N/A  Respiratory Status: Room air     Functional Mobility:  Bed Mobility:     Supine to Sit: total assistance and of 2 persons  Sit to Supine: total assistance and of 2 persons  Balance:  sitting EOB performing therex total A of 2 persons       AM-PAC 6 CLICK MOBILITY  Turning over in bed (including adjusting bedclothes, sheets and blankets)?: 2  Sitting down on and standing up from a chair with arms (e.g., wheelchair, bedside commode, etc.): 1  Moving from lying on back to sitting on the side of the bed?: 2  Moving to and from a bed to a chair (including a wheelchair)?: 1  Need to walk in hospital room?: 1  Climbing 3-5 steps with a railing?: 1  Basic Mobility Total Score: 8       Treatment & Education:  Pt sat EOB performing trunk flexion and side bending with total A with occasional engagement of abdominal mms. Pt soiled of bowel and returned to supine.   Bedside table in front of patient and area set up for function, convenience, and safety. RN aware of patient's mobility needs and status. Questions/concerns addressed within PTA scope of practice; patient  with no further questions. Time was provided for active listening, discussion of health disposition, and discussion of safe discharge.  OT present for cotreat due to pt's multiple medical comorbidities and functional/cognition deficits requiring two skilled therapists to appropriately progress pt's musculoskeletal strength, neuromuscular control, and endurance while taking into consideration medical acuity and pt safety.    Patient left HOB elevated with all lines intact, call button in reach, and RN notified..    GOALS:   Multidisciplinary Problems       Physical Therapy Goals          Problem: Physical Therapy    Goal Priority Disciplines Outcome Goal Variances Interventions   Physical Therapy Goal     PT, PT/OT Ongoing, Progressing     Description: Goals to be met by: 3/13/24     Patient will increase functional independence with mobility by performin. Supine to sit with Moderate Assistance  2. Sit to supine with Moderate Assistance  3. Bed to chair transfer with Moderate Assistance using Slideboard  5. Sitting at edge of bed x10  minutes with Minimal Assistance                         Time Tracking:     PT Received On: 02/15/24  PT Start Time: 0934     PT Stop Time: 0957  PT Total Time (min): 23 min     Billable Minutes: Therapeutic Exercise 10 and Neuromuscular Re-education 13    Treatment Type: Treatment  PT/PTA: PTA     Number of PTA visits since last PT visit: 1     02/15/2024

## 2024-02-15 NOTE — PROGRESS NOTES
[facility] - Surgery  [facility] Medicine  Progress Note    Patient Name: Seymour Velazquez  MRN: [id number]  Patient Class: IP- Inpatient   Admission Date: 2/4/2024  Length of Stay: 11 days  Attending Physician: Bhumi Joshua*  Primary Care Provider: Eliazar Rosas MD        Subjective:     Principal Problem:Common bile duct (CBD) obstruction        HPI:  68M w/PMH stroke with G-tube placement, hypertension, diabetes, hyperlipidemia, recent cholecystitis, and dysphagia presents as transfer from OSH for AES eval. He initially presented from NH with leukocytosis w/o specific symptoms, imaging showed cholecystitis. Underwent lap irlanda 2/1, the operative report noted the gallbladder was necrotic and fell apart. The back wall of the gallbladder had an abscess that was perforated. There were spilled stones and purulent bile. A cholangiogram showed patent common duct with single stone at the ampulla that appears too large to pass spontaneously. Patient is transferred to C for possible ERCP.     On arrival, patient reports some abdominal pain, denies N/V/D. He is AFVSS. Admitted to  for further management.     Overview/Hospital Course:  Patient admitted to Hospital Medicine service for medical management and evaluation of suspected CBD obstruction following complicated laparoscopic cholecystectomy. AES was consulted on admission with recommendation of MRCP. MRCP w/ and w/o contrast currently pending. ID was consulted with continuation of Merrem from outside hospital. Merrem de-escalated to Rocephin/Flagyl per ID recs. List of home medications was obtained from home facility and were continued as appropriate. MRCP[02/06/24] noted fluid collection in the gallbladder fossa, concerning for  abscess vs. biloma. Also noted choledocholithiasis with gallstones posterior to the liver. AES performed ERCP 02/06/24 with removal of choledocolithiasis  by biliary sphincterotomy and balloon extraction. General Surgery  consult noted no indication surgical intervention at the time, but can consider IR drainage of fluid collection if clinical status worsens. Intermittent episodes of N/V with associated tachycardia throughout hospitalization, without acute abdomen. Unable to complete IR drainage of fluid collection on 2/7 2/2 N/V. Scheduling antiemetics for better control. Repeat attempt at fluid drainage and drain placement with IR successful without complication morning of 2/9. Slowly evolving AURORA was not responsive to increased IVF resuscitation; urine studies inconsistent with pre-renal etiology. Consulted Nephrology for further assistance. Patient had a retroperitoneal ultrasound showing mild bilateral hydronephrosis and a distended bladder. Rosario catheter was placed on 2/11 and patient put out almost 1L with catheter insertion. SLP evaluated patient and he was started on pleasure feeds in addition to his tube feeds.    Interval History:  No acute events reported overnight.  Patient has had a temperature max of 99.1 F. he had some intermittent tachycardia that started earlier this morning in the low 100s up to 122, that has persisted throughout the day despite despite receiving 1 L bolus.  He was resumed on tube feeds this morning after his abdominal x-ray was read by Radiology.  His right upper quadrant drain is now putting out less than 10 mL per shift, but the right lower quadrant drain still putting out 35 mL.  Patient refused labs this morning.  Patient reports feeling okay this morning apart from some mild nausea.  He has some mild tenderness around the PEG site as it was replaced yesterday.     Review of Systems  Objective:     Vital Signs (Most Recent):  Temp: 98.8 °F (37.1 °C) (02/15/24 1200)  Pulse: (!) 122 (02/15/24 1331)  Resp: 18 (02/15/24 1331)  BP: 99/61 (02/15/24 1331)  SpO2: 99 % (02/15/24 1200) Vital Signs (24h Range):  Temp:  [98 °F (36.7 °C)-98.8 °F (37.1 °C)] 98.8 °F (37.1 °C)  Pulse:  [] 122  Resp:   [18-20] 18  SpO2:  [95 %-99 %] 99 %  BP: ()/(50-73) 99/61     Weight: 79.6 kg (175 lb 8 oz)  Body mass index is 22.53 kg/m².    Intake/Output Summary (Last 24 hours) at 2/15/2024 1420  Last data filed at 2/15/2024 0529  Gross per 24 hour   Intake --   Output 840 ml   Net -840 ml         Physical Exam  Vitals and nursing note reviewed.   Constitutional:       General: He is not in acute distress.     Appearance: Normal appearance. He is not ill-appearing.      Comments: More talkative and responsive this morning   HENT:      Head: Normocephalic and atraumatic.      Mouth/Throat:      Mouth: Mucous membranes are moist.   Eyes:      General: No scleral icterus.  Cardiovascular:      Rate and Rhythm: Normal rate and regular rhythm.      Heart sounds: Normal heart sounds. No murmur heard.  Pulmonary:      Effort: Pulmonary effort is normal. No respiratory distress.      Breath sounds: Normal breath sounds.      Comments: Dry cough  Abdominal:      General: Bowel sounds are normal. There is no distension.      Palpations: Abdomen is soft.      Tenderness: There is abdominal tenderness (mild tenderness around PEG site). There is no guarding or rebound.      Comments: Both drains in place in RUQ with minimal serosanguinous fluid. Incision bandaged and clean.  PEG in place in the LUQ   Genitourinary:     Comments: Rosario catheter in place draining light yellow urine, no cloudiness noted  Musculoskeletal:      Right lower leg: No edema.      Left lower leg: No edema.   Skin:     General: Skin is warm and dry.      Coloration: Skin is not jaundiced.   Neurological:      Mental Status: Mental status is at baseline.   Psychiatric:         Mood and Affect: Mood normal.         Behavior: Behavior normal.             Significant Labs: All pertinent labs within the past 24 hours have been reviewed.    Significant Imaging: I have reviewed all pertinent imaging results/findings within the past 24 hours.    Assessment/Plan:      *  Common bile duct (CBD) obstruction  68M w/ recent CVA and residual dysphagia s/p PEG tube presents from OSH for retained gallstone in ampulla, s/p lap cholecystectomy in which the gallbladder was found necrotic and friable. His only symptom is abdominal pain. Labs show leukocytosis. He is transferred for AES eval and possible ERCP.     -MRCP[02/06/24] noted fluid collection in the gallbladder fossa, concerning for  abscess vs. biloma. Also noted choledocholithiasis with gallstones posterior to the liver.  -AES performed ERCP 02/06/24 with removal of choledocolithiasis by biliary sphincterotomy and balloon extraction.   -IR attempt at drainage of fluid collection [2/7/2024] unsuccessful 2/2 episode of N/V with associated tachycardia and hypertension  -IR reattempt at fluid drainage and drain placement [2/9/24] successful without complication. Drain in place with serosanguinous fluid.  -General Surgery consult noted no indication for surgical intervention at the time  -Ceftriaxone/Metronidazole per ID recs. Following cultures from aspiration  -Fluid studies and cultures with no evidence of bacterial growth thus far  -Scheduling reglan for N/V with additional antiemetics PRN  -CTAP once drains put out less than 10mL for 2 days. ID will provide recommendations once CTAP obtained.       Chronic diastolic heart failure  Last echo 2022            Gastrostomy tube dysfunction  Patient's PEG tube became occluded on 2/14 and was replaced by GI team. Radiology confirmed PEG placement by abdominal x-ray.              Urinary retention  Patient found to have mild hydronephrosis and bladder distension on retroperitoneal ultrasound. Pacheco catheter was placed and patient had significant urine output and resolution of AURORA.    Pacheco catheter placed  Will need voiding trial or discharge with pacheco  Outpatient urology follow up       AURORA (acute kidney injury)  RESOLVED    Patient with acute kidney injury/acute renal failure likely due  "to pre-renal azotemia due to dehydration AURORA is currently worsening. Baseline creatinine  0.8  - Labs reviewed- Renal function/electrolytes with Estimated Creatinine Clearance: 113.7 mL/min (based on SCr of 0.7 mg/dL). according to latest data. Monitor urine output and serial BMP and adjust therapy as needed. Avoid nephrotoxins and renally dose meds for GFR listed above.    Patient with slow uptrend in sCr and jump from 1.2 on 2/9 to 1.6 on 2/10. Patient received Vancomycin intra-op 2/9 and a contrasted study during this hospitalization. Consulting Nephrology for concern of intrinsic injury. Urine studies 2/9: U-Na 60, U-Urea 312, U-Creatinine 57.    Recent Labs     02/13/24  0226   BUN 16   CREATININE 0.7       Renal U/S noted 02/11/24: "Mild bilateral hydronephrosis." "Elevated segmental artery resistive indices bilaterally, a nonspecific sign that can be seen with chronic renal disease or obstruction". "Urinary bladder is significantly distended with a small amount of mobile intraluminal debris"      Plan  -Bladder scan PRN.  - Rosario placed 2/11 with 1L output with insertion  - Trending daily CMP (refused labs on 2/12)  - Nephrology consulted, appreciate recs  - Avoid nephrotoxic agents, NSAIDS, renally dose meds    Tachycardia  Patient noted to have sinus tachycardia up to HR 140s in the setting of acute episodes of nausea and vomiting on 2/7/24. On 2/15/24 patient noted to be having tachycardia. Concern for possible new infection vs dehydration as his feeds were held pending PEG placement confirmation.    - 2L fluid bolus  - Resume tube feeds  - PRN antiemetics  - POCT glucose ordered      On tube feeding diet  Hx of PEG  tube placement s/p CVA. Tolerated slow basal rate feeds well thus far following fluid drainage. On bolus feeds prior to admission    PLAN:  -Nutrition consulted for tube feed recs. Currently at goal of 45 mL/hour. Bolus recommendations received.    -Pleasure feeds of pureed foods and sips of " "thin liquids okay per SLP  -PEG occulded and exchanged by GI 2/14.   Care Instructions  - Flushes: flush PEG daily with 60 ml water  - Antibiotic ointment to site, clean site with soap and water daily and dry thoroughly and clean site daily with half-strength hydrogen peroxide for three days, then soap and water daily.  - Dressing: change dressing on top of bumper daily    Abdominal fluid collection  See "Common bile duct (CBD) obstruction " A&P        Choledocholithiasis  See "Common bile duct (CBD) obstruction " A&P        Bilateral pleural effusion  Patient found to have small pleural effusion on imaging [MRCP(02/06/24)"Small right-sided pleural effusion.  Trace left-sided pleural effusion"]. Most likely etiology includes  limited mobility due to recent history of hospitalization for cholecystectomy . Management to include  monitoring at this time      Normocytic anemia  -Patient's with Normocytic anemia..   -Hemoglobin stable.   -Etiology likely due to chronic disease .  -Current CBC reviewed-    Recent Labs   Lab 02/09/24  0514 02/10/24  0426 02/11/24  0608   HGB 9.5* 9.5* 9.1*           Component Value Date/Time    MCV 94 02/11/2024 0608    RDW 13.7 02/11/2024 0608    IRON 12 (L) 02/07/2024 0223    TIBC 195 (L) 02/07/2024 0223    FERRITIN 1,256 (H) 02/07/2024 0223         PLAN  - Monitor serial CBC and transfuse if patient becomes hemodynamically unstable, symptomatic or H/H drops below 7/21.  - Etiology consistent with anemia of chronic disease. Will CTM.    Cholecystitis  See "Common bile duct (CBD) obstruction " A&P        CVA (cerebral vascular accident)  Patient is s/p CVA with residual dysphagia and weakness. He is s/p PEG tube and receives bolus tube feedings.         Type 2 diabetes mellitus  Patient's FSGs are controlled on current medication regimen.  Last A1c reviewed-   Lab Results   Component Value Date    HGBA1C 4.8 02/05/2024     Most recent fingerstick glucose reviewed-   Recent Labs   Lab " 02/11/24  1541 02/12/24  0813   POCTGLUCOSE 137* 111*       Current correctional scale  Low  Maintain anti-hyperglycemic dose as follows-   Antihyperglycemics (From admission, onward)      Start     Stop Route Frequency Ordered    02/04/24 2059  insulin aspart U-100 pen 0-5 Units         -- SubQ Before meals & nightly PRN 02/04/24 2000          Hold Oral hypoglycemics while patient is in the hospital.     Patient on SSI at NH.    -LDSSI  -Glucerna for tube feeds    Primary hypertension  Patient is normotensive on arrival, per chart he is not currently on any antihypertensives.     Will utilize p.r.n. blood pressure medication only if patient's blood pressure greater than 180/110 and he develops symptoms such as worsening chest pain or shortness of breath.    -amlodipine 5mg. Hold for hypotension.  -Holding home lisinopril in the setting of AURORA during admission  -Will add PRN hydralazine for SBP>180 and symptomatic  -will consider uptitration of home regimen if pressures remain uncontrolled        VTE Risk Mitigation (From admission, onward)           Ordered     enoxaparin injection 40 mg  Every 24 hours         02/09/24 1408     IP VTE HIGH RISK PATIENT  Once         02/05/24 1410     Place sequential compression device  Until discontinued         02/04/24 2000                    Discharge Planning   PEYTON: 2/19/2024     Code Status: Full Code   Is the patient medically ready for discharge?: No    Reason for patient still in hospital (select all that apply): Treatment  Discharge Plan A: Home with family                  David Poe MD  Department of Hospital Medicine   [facility] - Surgery

## 2024-02-15 NOTE — PROGRESS NOTES
Pharmacokinetic Initial Assessment: IV Vancomycin    Assessment/Plan:    Initiate intravenous vancomycin with loading dose of 1500 mg once followed by a maintenance dose of vancomycin 1250 mg IV every 12 hours  Desired empiric serum trough concentration is 10 to 20 mcg/mL  Draw vancomycin trough level 60 min prior to fourth dose on 2/17 at approximately 0400  Pharmacy will continue to follow and monitor vancomycin.      Please contact pharmacy at extension [phone] with any questions regarding this assessment.     Thank you for the consult,   Meera Kasper       Patient brief summary:  Seymour Velazquez is a 68 y.o. male initiated on antimicrobial therapy with IV Vancomycin for treatment of suspected sepsis    Drug Allergies:   Review of patient's allergies indicates:   Allergen Reactions    Pcn [penicillins]      Tolerated cefazolin 10/2022. -- Patient reports being told he had a PCN allergy as a child. Cannot recall specific reaction.       Actual Body Weight:   79.6 kg     Renal Function:   Estimated Creatinine Clearance: 99.5 mL/min (based on SCr of 0.8 mg/dL).,     Dialysis Method (if applicable):  N/A    CBC (last 72 hours):  Recent Labs   Lab Result Units 02/13/24  0226 02/15/24  1411   WBC K/uL 8.34 18.36*   Hemoglobin g/dL 8.5* 7.1*   Hematocrit % 27.1* 22.2*   Platelets K/uL 374 579*   Gran % % 61.4 80.8*   Lymph % % 21.8 9.5*   Mono % % 9.7 7.0   Eosinophil % % 5.3 0.1   Basophil % % 1.1 0.7   Differential Method  Automated Automated       Metabolic Panel (last 72 hours):  Recent Labs   Lab Result Units 02/13/24  0226 02/15/24  1411   Sodium mmol/L 137 139   Potassium mmol/L 3.6 3.7   Chloride mmol/L 104 106   CO2 mmol/L 23 23   Glucose mg/dL 101 170*   BUN mg/dL 16 36*   Creatinine mg/dL 0.7 0.8   Albumin g/dL 1.9* 2.2*   Total Bilirubin mg/dL 0.2 0.3   Alkaline Phosphatase U/L 67 53*   AST U/L 28 14   ALT U/L 10 10   Magnesium mg/dL 1.2* 1.3*   Phosphorus mg/dL 2.8 2.9       Drug levels (last 3  "results):  No results for input(s): "VANCOMYCINRA", "VANCORANDOM", "VANCOMYCINPE", "VANCOPEAK", "VANCOMYCINTR", "VANCOTROUGH" in the last 72 hours.    Microbiologic Results:  Microbiology Results (last 7 days)       Procedure Component Value Units Date/Time    Blood culture [[id number]]     Order Status: No result Specimen: Blood     Blood culture [[id number]]     Order Status: No result Specimen: Blood     Clostridium difficile EIA [[id number]]     Order Status: No result Specimen: Stool     Culture, Anaerobe [[id number]] Collected: 02/09/24 0914    Order Status: Completed Specimen: Body Fluid from Abdomen Updated: 02/15/24 1122     Anaerobic Culture No anaerobes isolated    Fungus culture [[id number]] Collected: 02/09/24 0914    Order Status: Completed Specimen: Body Fluid from Abdomen Updated: 02/14/24 0545     Fungus (Mycology) Culture Culture in progress    AFB Culture & Smear [[id number]] Collected: 02/09/24 0914    Order Status: Completed Specimen: Body Fluid from Abdomen Updated: 02/12/24 1416     AFB Culture & Smear Culture in progress     AFB CULTURE STAIN No acid fast bacilli seen.    Aerobic culture [[id number]] Collected: 02/09/24 0914    Order Status: Completed Specimen: Abscess from Abdomen Updated: 02/12/24 0750     Aerobic Bacterial Culture No growth    Gram stain [[id number]] Collected: 02/09/24 0914    Order Status: Completed Specimen: Body Fluid from Abdomen Updated: 02/09/24 1844     Gram Stain Result Moderate WBC's      No organisms seen            "

## 2024-02-15 NOTE — SUBJECTIVE & OBJECTIVE
Interval History:  No acute events reported overnight.  Patient has had a temperature max of 99.1 F. he had some intermittent tachycardia that started earlier this morning in the low 100s up to 122, that has persisted throughout the day despite despite receiving 1 L bolus.  He was resumed on tube feeds this morning after his abdominal x-ray was read by Radiology.  His right upper quadrant drain is now putting out less than 10 mL per shift, but the right lower quadrant drain still putting out 35 mL.  Patient refused labs this morning.  Patient reports feeling okay this morning apart from some mild nausea.  He has some mild tenderness around the PEG site as it was replaced yesterday.     Review of Systems  Objective:     Vital Signs (Most Recent):  Temp: 98.8 °F (37.1 °C) (02/15/24 1200)  Pulse: (!) 122 (02/15/24 1331)  Resp: 18 (02/15/24 1331)  BP: 99/61 (02/15/24 1331)  SpO2: 99 % (02/15/24 1200) Vital Signs (24h Range):  Temp:  [98 °F (36.7 °C)-98.8 °F (37.1 °C)] 98.8 °F (37.1 °C)  Pulse:  [] 122  Resp:  [18-20] 18  SpO2:  [95 %-99 %] 99 %  BP: ()/(50-73) 99/61     Weight: 79.6 kg (175 lb 8 oz)  Body mass index is 22.53 kg/m².    Intake/Output Summary (Last 24 hours) at 2/15/2024 1420  Last data filed at 2/15/2024 0529  Gross per 24 hour   Intake --   Output 840 ml   Net -840 ml         Physical Exam  Vitals and nursing note reviewed.   Constitutional:       General: He is not in acute distress.     Appearance: Normal appearance. He is not ill-appearing.      Comments: More talkative and responsive this morning   HENT:      Head: Normocephalic and atraumatic.      Mouth/Throat:      Mouth: Mucous membranes are moist.   Eyes:      General: No scleral icterus.  Cardiovascular:      Rate and Rhythm: Normal rate and regular rhythm.      Heart sounds: Normal heart sounds. No murmur heard.  Pulmonary:      Effort: Pulmonary effort is normal. No respiratory distress.      Breath sounds: Normal breath sounds.       Comments: Dry cough  Abdominal:      General: Bowel sounds are normal. There is no distension.      Palpations: Abdomen is soft.      Tenderness: There is abdominal tenderness (mild tenderness around PEG site). There is no guarding or rebound.      Comments: Both drains in place in RUQ with minimal serosanguinous fluid. Incision bandaged and clean.  PEG in place in the LUQ   Genitourinary:     Comments: Rosario catheter in place draining light yellow urine, no cloudiness noted  Musculoskeletal:      Right lower leg: No edema.      Left lower leg: No edema.   Skin:     General: Skin is warm and dry.      Coloration: Skin is not jaundiced.   Neurological:      Mental Status: Mental status is at baseline.   Psychiatric:         Mood and Affect: Mood normal.         Behavior: Behavior normal.             Significant Labs: All pertinent labs within the past 24 hours have been reviewed.    Significant Imaging: I have reviewed all pertinent imaging results/findings within the past 24 hours.

## 2024-02-15 NOTE — CLINICAL REVIEW
"RAPID RESPONSE NURSE CHART REVIEW        Chart Reviewed: 02/15/2024, 2:07 PM    MRN: [id number]  Bed: 557/557 A    Dx: Common bile duct (CBD) obstruction    Seymour Velazquez has a past medical history of CVA (cerebral vascular accident), DM2 (diabetes mellitus, type 2), Dysphagia, HLD (hyperlipidemia), and HTN (hypertension).    Last VS: BP 99/61   Pulse (!) 122   Temp 98.8 °F (37.1 °C) (Oral)   Resp 18   Ht 6' 2" (1.88 m)   Wt 79.6 kg (175 lb 8 oz)   SpO2 99%   BMI 22.53 kg/m²     24H Vital Sign Range:  Temp:  [98 °F (36.7 °C)-98.8 °F (37.1 °C)]   Pulse:  []   Resp:  [18-20]   BP: ()/(50-73)   SpO2:  [95 %-99 %]     Level of Consciousness (AVPU): alert    Recent Labs     02/13/24  0226   WBC 8.34   HGB 8.5*   HCT 27.1*          Recent Labs     02/13/24  0226      K 3.6      CO2 23   BUN 16   CREATININE 0.7      PHOS 2.8   MG 1.2*        OXYGEN:  Flow (L/min): 2  Oxygen Concentration (%): 100       MEWS score: 5    Bedside nurseVanesa  contacted for tachycardia/soft BP. Reports MD team aware, pt on continuous telemetry, labs pending. No additional concerns verbalized at this time. Instructed to call [phone] for further concerns or assistance.    Abida Rdz RN        "

## 2024-02-15 NOTE — ASSESSMENT & PLAN NOTE
Patient noted to have sinus tachycardia up to HR 140s in the setting of acute episodes of nausea and vomiting on 2/7/24. On 2/15/24 patient noted to be having tachycardia. Concern for possible new infection vs dehydration as his feeds were held pending PEG placement confirmation.    - 2L fluid bolus  - Resume tube feeds  - PRN antiemetics  - POCT glucose ordered

## 2024-02-15 NOTE — ASSESSMENT & PLAN NOTE
Patient is normotensive on arrival, per chart he is not currently on any antihypertensives.     Will utilize p.r.n. blood pressure medication only if patient's blood pressure greater than 180/110 and he develops symptoms such as worsening chest pain or shortness of breath.    -amlodipine 5mg. Hold for hypotension.  -Holding home lisinopril in the setting of AURORA during admission  -Will add PRN hydralazine for SBP>180 and symptomatic  -will consider uptitration of home regimen if pressures remain uncontrolled

## 2024-02-15 NOTE — ASSESSMENT & PLAN NOTE
Patient's PEG tube became occluded on 2/14 and was replaced by GI team. Radiology confirmed PEG placement by abdominal x-ray.

## 2024-02-15 NOTE — PLAN OF CARE
Problem: Occupational Therapy  Goal: Occupational Therapy Goal  Description: Goals to be met by: 3/13/24     Patient will increase functional independence with ADLs by performing:    Grooming while seated with Moderate Assistance.  Toileting from bedside commode with Moderate Assistance for hygiene and clothing management.   Sitting at edge of bed 10 minutes with Moderate Assistance.  Rolling to Bilateral with Moderate Assistance.   Supine to sit with Moderate Assistance.  Stand pivot transfers with Maximum Assistance.  Toilet transfer to bedside commode with Moderate Assistance.    Frequency decreased from 4x/wk to 3x/wk given pt's ax tolerance.  Barb Castillo OT  2/15/2024   Outcome: Ongoing, Progressing

## 2024-02-15 NOTE — PT/OT/SLP PROGRESS
"Occupational Therapy   Co-Treatment    Name: Seymour Velazquez  MRN: [id number]  Admitting Diagnosis:  Common bile duct (CBD) obstruction  9 Days Post-Op    Recommendations:     Discharge Recommendations: Moderate Intensity Therapy  Discharge Equipment Recommendations:  wheelchair, lift device, hospital bed  Barriers to discharge:       Assessment:     Seymour Velazquez is a 68 y.o. male with a medical diagnosis of Common bile duct (CBD) obstruction.  He presents with impaired ADL and mobility performance deficits. Pt found upright in bed and agreeable for therapy. Pt withdrawn/flat affect minimally interactive today. Pt session focused on core and trunk strengthening at bedside with pt demonstrating significant need for A upright with poor trunk engagement despite cues.  At this time, pt is a high fall risk and not at their baseline. Prior to hospitalizations, pt was mod I for ADLs/mobility.    Performance deficits affecting function are weakness, impaired self care skills, impaired functional mobility, impaired endurance, gait instability, decreased coordination, decreased upper extremity function, impaired balance, decreased safety awareness, impaired skin, decreased ROM, impaired fine motor, decreased lower extremity function, impaired cognition.     Rehab Prognosis:  Good; patient would benefit from acute skilled OT services to address these deficits and reach maximum level of function.       Plan:     Patient to be seen 4 x/week to address the above listed problems via self-care/home management, therapeutic activities, therapeutic exercises, neuromuscular re-education  Plan of Care Expires: 03/13/24  Plan of Care Reviewed with: patient    Subjective     Chief Complaint: "I'm pooping"  Patient/Family Comments/goals: none voiced   Pain/Comfort:  Pain Rating 1: 8/10  Location - Orientation 1: generalized  Location 1: abdomen  Pain Addressed 1: Reposition, Distraction, Cessation of Activity    Objective: "   Co-treatment with PT for maximal pt participation, safety, and activity tolerance     Communicated with: RN prior to session.  Patient found HOB elevated with PEG Tube, peripheral IV, pacheco catheter upon OT entry to room.    General Precautions: Standard, fall, aspiration    Orthopedic Precautions:N/A  Braces: N/A  Respiratory Status: Room air     Occupational Performance:     Bed Mobility:    Patient completed Rolling/Turning to Left with  total assistance and 2 persons  Patient completed Scooting/Bridging with total assistance and 2 persons  Patient completed Supine to Sit with total assistance and 2 persons  Patient completed Sit to Supine with total assistance and 2 persons     Functional Mobility/Transfers:  Functional Mobility: Pt sat EOB ~10 minutes today with total A despite max cues to fix self to upright.   Pt completed modified situps (1x5) and cues for cervical flex/extension to correct overall poor posturing. Pt completed weight shifting R/L x2 each direction.    Activities of Daily Living:  Toileting: alerted PCT and RN of pt's episode of large watery stool.      [facility] 6 Click ADL: 8    Treatment & Education:  Pt educated on role of occupational therapy, POC, and safety during ADLs and functional mobility. Pt and OT discussed importance of safe, continued mobility to optimize daily living skills. Pt verbalized understanding.     White board updated during session. Pt given instruction to call for medical staff/nurse for assistance.       Patient left HOB elevated with all lines intact, call button in reach, and RN and PCT notified    GOALS:   Multidisciplinary Problems       Occupational Therapy Goals          Problem: Occupational Therapy    Goal Priority Disciplines Outcome Interventions   Occupational Therapy Goal     OT, PT/OT Ongoing, Progressing    Description: Goals to be met by: 3/13/24     Patient will increase functional independence with ADLs by performing:    Grooming while seated with  Moderate Assistance.  Toileting from bedside commode with Moderate Assistance for hygiene and clothing management.   Sitting at edge of bed 10 minutes with Moderate Assistance.  Rolling to Bilateral with Moderate Assistance.   Supine to sit with Moderate Assistance.  Stand pivot transfers with Maximum Assistance.  Toilet transfer to bedside commode with Moderate Assistance.                         Time Tracking:     OT Date of Treatment: 02/15/24  OT Start Time: 0934  OT Stop Time: 0957  OT Total Time (min): 23 min    Billable Minutes:Therapeutic Activity 23 min    OT/SALEEM: OT          2/15/2024

## 2024-02-16 PROBLEM — R19.7 DIARRHEA OF PRESUMED INFECTIOUS ORIGIN: Status: ACTIVE | Noted: 2024-02-16

## 2024-02-16 PROBLEM — J98.11 ATELECTASIS: Status: ACTIVE | Noted: 2024-02-16

## 2024-02-16 PROBLEM — K20.90 ESOPHAGITIS: Status: ACTIVE | Noted: 2024-02-16

## 2024-02-16 PROBLEM — N17.9 AKI (ACUTE KIDNEY INJURY): Status: RESOLVED | Noted: 2024-02-08 | Resolved: 2024-02-16

## 2024-02-16 PROBLEM — D62 ACUTE BLOOD LOSS ANEMIA: Status: ACTIVE | Noted: 2024-02-16

## 2024-02-16 PROBLEM — D75.839 THROMBOCYTOSIS: Status: ACTIVE | Noted: 2024-02-16

## 2024-02-16 PROBLEM — E87.6 HYPOKALEMIA: Status: ACTIVE | Noted: 2024-02-16

## 2024-02-16 PROBLEM — J96.01 ACUTE HYPOXEMIC RESPIRATORY FAILURE: Status: ACTIVE | Noted: 2024-02-16

## 2024-02-16 LAB
ALBUMIN SERPL BCP-MCNC: 2.3 G/DL (ref 3.5–5.2)
ALP SERPL-CCNC: 55 U/L (ref 55–135)
ALT SERPL W/O P-5'-P-CCNC: 10 U/L (ref 10–44)
ANION GAP SERPL CALC-SCNC: 10 MMOL/L (ref 8–16)
ANION GAP SERPL CALC-SCNC: 8 MMOL/L (ref 8–16)
ANISOCYTOSIS BLD QL SMEAR: SLIGHT
AST SERPL-CCNC: 13 U/L (ref 10–40)
BASOPHILS # BLD AUTO: 0.05 K/UL (ref 0–0.2)
BASOPHILS # BLD AUTO: 0.12 K/UL (ref 0–0.2)
BASOPHILS NFR BLD: 0.3 % (ref 0–1.9)
BASOPHILS NFR BLD: 0.4 % (ref 0–1.9)
BILIRUB SERPL-MCNC: 0.2 MG/DL (ref 0.1–1)
BLD PROD TYP BPU: NORMAL
BLOOD UNIT EXPIRATION DATE: NORMAL
BLOOD UNIT TYPE CODE: 5100
BLOOD UNIT TYPE: NORMAL
BUN SERPL-MCNC: 32 MG/DL (ref 8–23)
BUN SERPL-MCNC: 35 MG/DL (ref 8–23)
CALCIUM SERPL-MCNC: 8.4 MG/DL (ref 8.7–10.5)
CALCIUM SERPL-MCNC: 9 MG/DL (ref 8.7–10.5)
CHLORIDE SERPL-SCNC: 105 MMOL/L (ref 95–110)
CHLORIDE SERPL-SCNC: 107 MMOL/L (ref 95–110)
CO2 SERPL-SCNC: 24 MMOL/L (ref 23–29)
CO2 SERPL-SCNC: 29 MMOL/L (ref 23–29)
CODING SYSTEM: NORMAL
CREAT SERPL-MCNC: 0.8 MG/DL (ref 0.5–1.4)
CREAT SERPL-MCNC: 0.8 MG/DL (ref 0.5–1.4)
CROSSMATCH INTERPRETATION: NORMAL
DIFFERENTIAL METHOD BLD: ABNORMAL
DIFFERENTIAL METHOD BLD: ABNORMAL
DISPENSE STATUS: NORMAL
DOHLE BOD BLD QL SMEAR: PRESENT
EOSINOPHIL # BLD AUTO: 0 K/UL (ref 0–0.5)
EOSINOPHIL # BLD AUTO: 0 K/UL (ref 0–0.5)
EOSINOPHIL NFR BLD: 0 % (ref 0–8)
EOSINOPHIL NFR BLD: 0 % (ref 0–8)
ERYTHROCYTE [DISTWIDTH] IN BLOOD BY AUTOMATED COUNT: 15.9 % (ref 11.5–14.5)
ERYTHROCYTE [DISTWIDTH] IN BLOOD BY AUTOMATED COUNT: 16.4 % (ref 11.5–14.5)
EST. GFR  (NO RACE VARIABLE): >60 ML/MIN/1.73 M^2
EST. GFR  (NO RACE VARIABLE): >60 ML/MIN/1.73 M^2
GLUCOSE SERPL-MCNC: 181 MG/DL (ref 70–110)
GLUCOSE SERPL-MCNC: 193 MG/DL (ref 70–110)
HCT VFR BLD AUTO: 20.1 % (ref 40–54)
HCT VFR BLD AUTO: 22.2 % (ref 40–54)
HGB BLD-MCNC: 6.2 G/DL (ref 14–18)
HGB BLD-MCNC: 7.1 G/DL (ref 14–18)
IMM GRANULOCYTES # BLD AUTO: 0.25 K/UL (ref 0–0.04)
IMM GRANULOCYTES # BLD AUTO: 0.28 K/UL (ref 0–0.04)
IMM GRANULOCYTES NFR BLD AUTO: 1 % (ref 0–0.5)
IMM GRANULOCYTES NFR BLD AUTO: 1.3 % (ref 0–0.5)
LYMPHOCYTES # BLD AUTO: 1.9 K/UL (ref 1–4.8)
LYMPHOCYTES # BLD AUTO: 2.8 K/UL (ref 1–4.8)
LYMPHOCYTES NFR BLD: 10.4 % (ref 18–48)
LYMPHOCYTES NFR BLD: 9.9 % (ref 18–48)
MAGNESIUM SERPL-MCNC: 2.5 MG/DL (ref 1.6–2.6)
MCH RBC QN AUTO: 30 PG (ref 27–31)
MCH RBC QN AUTO: 30.2 PG (ref 27–31)
MCHC RBC AUTO-ENTMCNC: 30.8 G/DL (ref 32–36)
MCHC RBC AUTO-ENTMCNC: 32 G/DL (ref 32–36)
MCV RBC AUTO: 94 FL (ref 82–98)
MCV RBC AUTO: 98 FL (ref 82–98)
MONOCYTES # BLD AUTO: 1.4 K/UL (ref 0.3–1)
MONOCYTES # BLD AUTO: 2.3 K/UL (ref 0.3–1)
MONOCYTES NFR BLD: 7.2 % (ref 4–15)
MONOCYTES NFR BLD: 8.3 % (ref 4–15)
NEUTROPHILS # BLD AUTO: 15.8 K/UL (ref 1.8–7.7)
NEUTROPHILS # BLD AUTO: 21.7 K/UL (ref 1.8–7.7)
NEUTROPHILS NFR BLD: 79.9 % (ref 38–73)
NEUTROPHILS NFR BLD: 81.3 % (ref 38–73)
NRBC BLD-RTO: 0 /100 WBC
NRBC BLD-RTO: 0 /100 WBC
OHS QRS DURATION: 60 MS
OHS QRS DURATION: 70 MS
OHS QTC CALCULATION: 398 MS
OHS QTC CALCULATION: 600 MS
PHOSPHATE SERPL-MCNC: 3.3 MG/DL (ref 2.7–4.5)
PLATELET # BLD AUTO: 577 K/UL (ref 150–450)
PLATELET # BLD AUTO: 619 K/UL (ref 150–450)
PLATELET BLD QL SMEAR: ABNORMAL
PMV BLD AUTO: 8.8 FL (ref 9.2–12.9)
PMV BLD AUTO: 9 FL (ref 9.2–12.9)
POCT GLUCOSE: 189 MG/DL (ref 70–110)
POCT GLUCOSE: 195 MG/DL (ref 70–110)
POCT GLUCOSE: 216 MG/DL (ref 70–110)
POLYCHROMASIA BLD QL SMEAR: ABNORMAL
POTASSIUM SERPL-SCNC: 3 MMOL/L (ref 3.5–5.1)
POTASSIUM SERPL-SCNC: 3.2 MMOL/L (ref 3.5–5.1)
PROT SERPL-MCNC: 6.4 G/DL (ref 6–8.4)
RBC # BLD AUTO: 2.05 M/UL (ref 4.6–6.2)
RBC # BLD AUTO: 2.37 M/UL (ref 4.6–6.2)
SMUDGE CELLS BLD QL SMEAR: PRESENT
SODIUM SERPL-SCNC: 141 MMOL/L (ref 136–145)
SODIUM SERPL-SCNC: 142 MMOL/L (ref 136–145)
TOXIC GRANULES BLD QL SMEAR: PRESENT
TRANS ERYTHROCYTES VOL PATIENT: NORMAL ML
TROPONIN I SERPL DL<=0.01 NG/ML-MCNC: 0.01 NG/ML (ref 0–0.03)
WBC # BLD AUTO: 19.46 K/UL (ref 3.9–12.7)
WBC # BLD AUTO: 27.23 K/UL (ref 3.9–12.7)

## 2024-02-16 PROCEDURE — 93010 ELECTROCARDIOGRAM REPORT: CPT | Mod: ,,, | Performed by: INTERNAL MEDICINE

## 2024-02-16 PROCEDURE — 36415 COLL VENOUS BLD VENIPUNCTURE: CPT | Performed by: STUDENT IN AN ORGANIZED HEALTH CARE EDUCATION/TRAINING PROGRAM

## 2024-02-16 PROCEDURE — P9021 RED BLOOD CELLS UNIT: HCPCS

## 2024-02-16 PROCEDURE — 25000003 PHARM REV CODE 250

## 2024-02-16 PROCEDURE — 86920 COMPATIBILITY TEST SPIN: CPT

## 2024-02-16 PROCEDURE — 36415 COLL VENOUS BLD VENIPUNCTURE: CPT | Mod: XB | Performed by: HOSPITALIST

## 2024-02-16 PROCEDURE — 94761 N-INVAS EAR/PLS OXIMETRY MLT: CPT

## 2024-02-16 PROCEDURE — 36430 TRANSFUSION BLD/BLD COMPNT: CPT

## 2024-02-16 PROCEDURE — 85025 COMPLETE CBC W/AUTO DIFF WBC: CPT | Performed by: HOSPITALIST

## 2024-02-16 PROCEDURE — 63600175 PHARM REV CODE 636 W HCPCS: Performed by: INTERNAL MEDICINE

## 2024-02-16 PROCEDURE — 30233N1 TRANSFUSION OF NONAUTOLOGOUS RED BLOOD CELLS INTO PERIPHERAL VEIN, PERCUTANEOUS APPROACH: ICD-10-PCS | Performed by: HOSPITALIST

## 2024-02-16 PROCEDURE — 25500020 PHARM REV CODE 255: Performed by: HOSPITALIST

## 2024-02-16 PROCEDURE — A4216 STERILE WATER/SALINE, 10 ML: HCPCS | Performed by: INTERNAL MEDICINE

## 2024-02-16 PROCEDURE — 25000003 PHARM REV CODE 250: Performed by: HOSPITALIST

## 2024-02-16 PROCEDURE — 63600175 PHARM REV CODE 636 W HCPCS: Mod: JZ,JG | Performed by: INTERNAL MEDICINE

## 2024-02-16 PROCEDURE — 63600175 PHARM REV CODE 636 W HCPCS

## 2024-02-16 PROCEDURE — 25000003 PHARM REV CODE 250: Performed by: INTERNAL MEDICINE

## 2024-02-16 PROCEDURE — 84100 ASSAY OF PHOSPHORUS: CPT | Performed by: HOSPITALIST

## 2024-02-16 PROCEDURE — 63600175 PHARM REV CODE 636 W HCPCS: Performed by: HOSPITALIST

## 2024-02-16 PROCEDURE — 63600175 PHARM REV CODE 636 W HCPCS: Performed by: STUDENT IN AN ORGANIZED HEALTH CARE EDUCATION/TRAINING PROGRAM

## 2024-02-16 PROCEDURE — 25000003 PHARM REV CODE 250: Performed by: STUDENT IN AN ORGANIZED HEALTH CARE EDUCATION/TRAINING PROGRAM

## 2024-02-16 PROCEDURE — 84484 ASSAY OF TROPONIN QUANT: CPT | Performed by: HOSPITALIST

## 2024-02-16 PROCEDURE — 85025 COMPLETE CBC W/AUTO DIFF WBC: CPT | Mod: 91 | Performed by: STUDENT IN AN ORGANIZED HEALTH CARE EDUCATION/TRAINING PROGRAM

## 2024-02-16 PROCEDURE — 83735 ASSAY OF MAGNESIUM: CPT | Performed by: HOSPITALIST

## 2024-02-16 PROCEDURE — 93005 ELECTROCARDIOGRAM TRACING: CPT

## 2024-02-16 PROCEDURE — 27000207 HC ISOLATION

## 2024-02-16 PROCEDURE — 99233 SBSQ HOSP IP/OBS HIGH 50: CPT | Mod: ,,, | Performed by: INTERNAL MEDICINE

## 2024-02-16 PROCEDURE — 21400001 HC TELEMETRY ROOM

## 2024-02-16 PROCEDURE — 80053 COMPREHEN METABOLIC PANEL: CPT | Performed by: HOSPITALIST

## 2024-02-16 PROCEDURE — 80048 BASIC METABOLIC PNL TOTAL CA: CPT | Mod: XB | Performed by: HOSPITALIST

## 2024-02-16 RX ORDER — FLUCONAZOLE 2 MG/ML
400 INJECTION, SOLUTION INTRAVENOUS
Status: DISCONTINUED | OUTPATIENT
Start: 2024-02-16 | End: 2024-02-23 | Stop reason: HOSPADM

## 2024-02-16 RX ORDER — SODIUM CHLORIDE, SODIUM LACTATE, POTASSIUM CHLORIDE, CALCIUM CHLORIDE 600; 310; 30; 20 MG/100ML; MG/100ML; MG/100ML; MG/100ML
INJECTION, SOLUTION INTRAVENOUS CONTINUOUS
Status: ACTIVE | OUTPATIENT
Start: 2024-02-16 | End: 2024-02-17

## 2024-02-16 RX ORDER — HYDROCODONE BITARTRATE AND ACETAMINOPHEN 500; 5 MG/1; MG/1
TABLET ORAL
Status: DISCONTINUED | OUTPATIENT
Start: 2024-02-16 | End: 2024-02-19

## 2024-02-16 RX ORDER — POTASSIUM CHLORIDE 7.45 MG/ML
10 INJECTION INTRAVENOUS
Status: CANCELLED | OUTPATIENT
Start: 2024-02-16 | End: 2024-02-16

## 2024-02-16 RX ORDER — POTASSIUM CHLORIDE 7.45 MG/ML
10 INJECTION INTRAVENOUS
Status: COMPLETED | OUTPATIENT
Start: 2024-02-16 | End: 2024-02-16

## 2024-02-16 RX ADMIN — METOCLOPRAMIDE HYDROCHLORIDE 5 MG: 10 INJECTION, SOLUTION INTRAMUSCULAR; INTRAVENOUS at 05:02

## 2024-02-16 RX ADMIN — ATORVASTATIN CALCIUM 80 MG: 40 TABLET, FILM COATED ORAL at 09:02

## 2024-02-16 RX ADMIN — FLUCONAZOLE 400 MG: 2 INJECTION, SOLUTION INTRAVENOUS at 04:02

## 2024-02-16 RX ADMIN — POTASSIUM CHLORIDE 10 MEQ: 7.46 INJECTION, SOLUTION INTRAVENOUS at 12:02

## 2024-02-16 RX ADMIN — MUPIROCIN: 20 OINTMENT TOPICAL at 09:02

## 2024-02-16 RX ADMIN — POTASSIUM CHLORIDE 10 MEQ: 7.46 INJECTION, SOLUTION INTRAVENOUS at 02:02

## 2024-02-16 RX ADMIN — MUPIROCIN: 20 OINTMENT TOPICAL at 10:02

## 2024-02-16 RX ADMIN — ONDANSETRON 8 MG: 2 INJECTION INTRAMUSCULAR; INTRAVENOUS at 02:02

## 2024-02-16 RX ADMIN — Medication 10 ML: at 06:02

## 2024-02-16 RX ADMIN — Medication 10 ML: at 11:02

## 2024-02-16 RX ADMIN — VANCOMYCIN HYDROCHLORIDE 1250 MG: 1.25 INJECTION, POWDER, LYOPHILIZED, FOR SOLUTION INTRAVENOUS at 02:02

## 2024-02-16 RX ADMIN — MEROPENEM 1 G: 1 INJECTION INTRAVENOUS at 11:02

## 2024-02-16 RX ADMIN — METRONIDAZOLE 500 MG: 500 INJECTION, SOLUTION INTRAVENOUS at 10:02

## 2024-02-16 RX ADMIN — INSULIN ASPART 1 UNITS: 100 INJECTION, SOLUTION INTRAVENOUS; SUBCUTANEOUS at 01:02

## 2024-02-16 RX ADMIN — ONDANSETRON 8 MG: 2 INJECTION INTRAMUSCULAR; INTRAVENOUS at 12:02

## 2024-02-16 RX ADMIN — IOHEXOL 100 ML: 350 INJECTION, SOLUTION INTRAVENOUS at 12:02

## 2024-02-16 RX ADMIN — MORPHINE SULFATE 2 MG: 2 INJECTION, SOLUTION INTRAMUSCULAR; INTRAVENOUS at 01:02

## 2024-02-16 RX ADMIN — ENOXAPARIN SODIUM 40 MG: 40 INJECTION SUBCUTANEOUS at 05:02

## 2024-02-16 RX ADMIN — POTASSIUM BICARBONATE 60 MEQ: 391 TABLET, EFFERVESCENT ORAL at 09:02

## 2024-02-16 RX ADMIN — Medication 10 ML: at 05:02

## 2024-02-16 RX ADMIN — POTASSIUM CHLORIDE 10 MEQ: 7.46 INJECTION, SOLUTION INTRAVENOUS at 11:02

## 2024-02-16 RX ADMIN — POTASSIUM CHLORIDE 10 MEQ: 7.46 INJECTION, SOLUTION INTRAVENOUS at 03:02

## 2024-02-16 RX ADMIN — MEROPENEM 1 G: 1 INJECTION INTRAVENOUS at 06:02

## 2024-02-16 RX ADMIN — VANCOMYCIN HYDROCHLORIDE 1500 MG: 1.5 INJECTION, POWDER, LYOPHILIZED, FOR SOLUTION INTRAVENOUS at 12:02

## 2024-02-16 RX ADMIN — SODIUM CHLORIDE, POTASSIUM CHLORIDE, SODIUM LACTATE AND CALCIUM CHLORIDE: 600; 310; 30; 20 INJECTION, SOLUTION INTRAVENOUS at 04:02

## 2024-02-16 RX ADMIN — Medication 10 ML: at 12:02

## 2024-02-16 RX ADMIN — POTASSIUM CHLORIDE 10 MEQ: 7.46 INJECTION, SOLUTION INTRAVENOUS at 01:02

## 2024-02-16 NOTE — AI DETERIORATION ALERT
RAPID RESPONSE NURSE NOTE        Admit Date: 2024  LOS: 12  Code Status: Full Code   Date of Consult: 2024  : 1955  Age: 68 y.o.  Weight:   Wt Readings from Last 1 Encounters:   24 79.6 kg (175 lb 8 oz)     Sex: male  Race: White   Bed: [address] [place] [address] A:   MRN: [id number]  Time Rapid Response Team page Received:   Time Rapid Response Team at Bedside:   Time Rapid Response Team left Bedside:   Was the patient discharged from an ICU this admission? No   Was the patient discharged from a PACU within last 24 hours? No   Did the patient receive conscious sedation/general anesthesia in last 24 hours? No  Was the patient in the ED within the past 24 hours? No  Was the patient on NIPPV within the past 24 hours? No   Did this progress into an ARC or CPA: No  Attending Physician: Bhumi Joshua*  Primary Service: [facility] MED 3       SITUATION    Notified by charge RN via phone call.  Reason for alert: tachycardia, hypoxia  Called to evaluate the patient for Circulatory    BACKGROUND     Why is the patient in the hospital?: Common bile duct (CBD) obstruction    Patient has a past medical history of CVA (cerebral vascular accident), DM2 (diabetes mellitus, type 2), Dysphagia, HLD (hyperlipidemia), and HTN (hypertension).    Last Vitals:  Temp: 98.3 °F (36.8 °C) (15)  Pulse: 135 (15)  Resp: 18 (15)  BP: 125/67 (5)  SpO2: 100 % (15)    24 Hours Vitals Range:  Temp:  [98 °F (36.7 °C)-100 °F (37.8 °C)]   Pulse:  []   Resp:  [16-27]   BP: ()/(50-73)   SpO2:  [90 %-100 %]     Labs:  Recent Labs     246 02/15/24  1411 02/15/24  2147 02/15/24  215   WBC 8.34 18.36*  --  22.66*  22.66*   HGB 8.5* 7.1*  --  7.0*  7.0*   HCT 27.1* 22.2* 21* 22.1*  22.1*    579*  --  625*  625*       Recent Labs     24  0226 02/15/24  1411    139   K 3.6 3.7    106   CO2 23 23   BUN 16 36*   CREATININE 0.7 0.8    170*    PHOS 2.8 2.9   MG 1.2* 1.3*        Recent Labs     02/15/24  2147   PH 7.535*   PCO2 30.7*   PO2 49*   HCO3 26.0   POCSATURATED 89   BE 3*        ASSESSMENT    Physical Exam  Constitutional:       Appearance: He is ill-appearing.   Cardiovascular:      Rate and Rhythm: Regular rhythm. Tachycardia present.      Pulses: Normal pulses.   Pulmonary:      Effort: Tachypnea present.      Breath sounds: Examination of the right-upper field reveals rhonchi. Examination of the left-upper field reveals rhonchi. Rhonchi present.   Abdominal:      General: Abdomen is flat.      Palpations: Abdomen is soft.   Skin:     General: Skin is warm.      Coloration: Skin is pale.      Findings: Bruising present.   Neurological:      Mental Status: He is alert.       /59 (76)  RR 26, SpO2 91% on 15L % FiO2  Temp 99 axillary    Patient lying in bed, awake and alert though slow to respond. Tachypneic at rest with coarse, wet breath sounds particularly in upper fields. Per bedside RN, patient has been nauseated off and on all day, with at least one episode unwitnessed vomiting. PEG tube replaced at bedside earlier today.  2nd dose 2g IV mag infusing to PIV    INTERVENTIONS    The patient was seen for Respiratory problem. Staff concerns included tachypnea, oxygen saturation < 90% despite supplemental oxygen, increased WOB, and increased oxygen requirements. The following interventions were performed: supplemental oxygen, POCT arterial blood gas , portable chest x-ray, continuous pulse ox monitoring, continuous cardiac monitoring, and CBC, EKG.    EKG showed accelerated junctional rhythm, notable for QTc 600, rate 129    ABG w pH 7.53, CO2 30.7, PO2 49, K+ 3.0    Patient repositioned in bed, able to transition to bubble flow/high flow nasal cannula 8L, sats 98%    Made strict NPO d/t concern for nausea/vomiting and aspiration risk    RECOMMENDATIONS    We recommend: Transfuse PRBC if H/H has continued to  downtrend  Maintain continuous cardiac, SpO2 monitoring  Replace electrolytes as indicated  Continue to monitor VS closely  Strict NPO, aspiration precautions     PROVIDER ESCALATION    Orders received and case discussed with Dr. Naik with , Dr Villalba with [facility] .    Primary team arrival time: 2135    Disposition: Remain in room 557.    FOLLOW UP    bedside RNDameon  updated on plan of care. Instructed to call the Rapid Response NurseLIU RN at [phone] for additional questions or concerns.

## 2024-02-16 NOTE — PROGRESS NOTES
[facility] - [facility]  Infectious Disease  Progress Note    Patient Name: Seymour Velazquez  MRN: [id number]  Admission Date: 2/4/2024  Length of Stay: 12 days  Attending Physician: Bhumi Joshua*  Primary Care Provider: Eliazar Rosas MD    Isolation Status: Special Contact  Assessment/Plan:      GI  Cholecystitis  68M with hx CVA, dysphagia, PEG tube, HTN, transferred from [facility] after presenting with acute cholecystitis on 1/30, lap [place] on 2/1 c/b perforated necrotic gallbladder with stone spillage and obstructing CBD stone. He was treated with meropenem prior to transfer. MRCP noted complex collection within gallbladder fossa and gallstones posterior to the liver. He underwent ERCP on 2/6 w/ complete removal of obstructing stones. He underwent IR drain placement on 2/9 with negative cultures, however cell count appears infected w/ 20k WBC  and 99% segs. Cultures from [facility] (blood and urine) are negative. On ceftriaxone/flagyl, now w/ worsening leukocytosis to 27 over last 48h, diarrhea, concerns for aspiration w/ rapid response called overnight, vomiting this afternoon.    Recommendations:   - broaden abx to vanc and meropenem  - start fluconazole for oral thrush and CT w/ esophagitis  - can stop flagyl given no evidence of colitis on CT - resume if patient is unable to take PO vanc for empiric c. Diff coverage  - check c. Diff and continue treatment until resulted      All recommendations discussed w/ primary service    Anticipated Disposition: TBD    Thank you for your consult. I will follow-up with patient. Please contact us if you have any additional questions.    Ifrah Whiting DO  Infectious Disease    Time: 50 minutes   50% of time spent on face-to-face counseling and coordination of care. Counseling included review of test results, diagnosis, and treatment plan with patient and/or family.        Subjective:     Principal Problem:Common bile duct (CBD)  obstruction    HPI: Mr Velazquez is a 68 year old male with history of CVA with dysphagia s/p PEG tube, HTN, HLD, DMII presents from OSH for AES eval of possible retained stone after cholecystectomy 02/01.    Patient initially presented from NH with leukocytosis w/o specific symptoms, imaging showed cholecystitis. He underwent lap [place] 2/1, the operative report noted the gallbladder was necrotic and fell apart. The back wall of the gallbladder had an abscess that was perforated. There were spilled stones and purulent bile. A cholangiogram showed patent common duct with single stone at the ampulla that appears too large to pass spontaneously. Patient transferred to [facility] for possible ERCP.  While at OSH patient was treated with empiric Meropenem. On arrival, patient reports mild abdominal pain, denies N/V/D. He is AFVSS.  No leukocytosis. LFTS WNL. ID consulted for recommendations.  Patient reports being told he had a PCN allergy as a child. Cannot recall specific reaction. Gi following. Ordered MRCP.  Interval History: rapid response called overnight for worsening respiratory status requiring NRB, weaned off O2 this morning. WBC has increased significantly, now tachycardic. Per RN, pt continues to have diarrhea. CT C/A/P w/ some patchy pulmonary infiltrates c/f aspiration pneumonitis, stomach is distended w/ air-fluid, small foci of free air in RUQ likely related to perc drainage. Patient coughing on my evaluation, episode of vomiting requiring oral suctioning    Review of Systems   All other systems reviewed and are negative.    Objective:     Vital Signs (Most Recent):  Temp: 97.7 °F (36.5 °C) (02/16/24 1159)  Pulse: (!) 120 (02/16/24 1435)  Resp: 16 (02/16/24 1351)  BP: (!) 154/73 (02/16/24 1435)  SpO2: 98 % (02/16/24 1435) Vital Signs (24h Range):  Temp:  [97.7 °F (36.5 °C)-100 °F (37.8 °C)] 97.7 °F (36.5 °C)  Pulse:  [108-140] 120  Resp:  [16-27] 16  SpO2:  [90 %-100 %] 98 %  BP: ()/(55-89) 154/73     Weight:  79.6 kg (175 lb 8 oz)  Body mass index is 22.53 kg/m².    Estimated Creatinine Clearance: 99.5 mL/min (based on SCr of 0.8 mg/dL).     Physical Exam  Constitutional:       Appearance: He is well-developed. He is ill-appearing. He is not diaphoretic.   HENT:      Head: Normocephalic and atraumatic.      Right Ear: External ear normal.      Left Ear: External ear normal.      Nose: Nose normal.   Eyes:      General: No scleral icterus.        Right eye: No discharge.         Left eye: No discharge.      Extraocular Movements: Extraocular movements intact.      Conjunctiva/sclera: Conjunctivae normal.   Pulmonary:      Effort: Pulmonary effort is normal. No respiratory distress.      Breath sounds: No stridor.   Abdominal:      General: Abdomen is flat.      Palpations: Abdomen is soft.      Comments: Laparoscopic incisions c/d/i  2 RUQ drains w/ s/s drainage  No peritoneal signs   Skin:     General: Skin is dry.      Coloration: Skin is not jaundiced or pale.      Findings: No erythema.   Neurological:      Mental Status: He is alert. Mental status is at baseline.      Comments: Neurologic deficits from prior CVA   Psychiatric:         Mood and Affect: Mood normal.         Behavior: Behavior normal.         Thought Content: Thought content normal.         Judgment: Judgment normal.          Significant Labs: CBC:   Recent Labs   Lab 02/15/24  1411 02/15/24  2147 02/15/24  2157 02/16/24  0418   WBC 18.36*  --  22.66*  22.66* 27.23*   HGB 7.1*  --  7.0*  7.0* 7.1*   HCT 22.2* 21* 22.1*  22.1* 22.2*   *  --  625*  625* 619*     CMP:   Recent Labs   Lab 02/15/24  1411 02/16/24  0418    141   K 3.7 3.0*    107   CO2 23 24   * 193*   BUN 36* 35*   CREATININE 0.8 0.8   CALCIUM 8.4* 9.0   PROT 6.2 6.4   ALBUMIN 2.2* 2.3*   BILITOT 0.3 0.2   ALKPHOS 53* 55   AST 14 13   ALT 10 10   ANIONGAP 10 10     Microbiology Results (last 7 days)       Procedure Component Value Units Date/Time    Clostridium  difficile EIA [[id number]]     Order Status: No result Specimen: Stool     Clostridium difficile EIA [[id number]]     Order Status: Canceled Specimen: Stool     Blood culture [[id number]] Collected: 02/15/24 1830    Order Status: Completed Specimen: Blood from Peripheral, Hand, Right Updated: 02/16/24 0145     Blood Culture, Routine No Growth to date    Clostridium difficile EIA [[id number]] Collected: 02/15/24 1835    Order Status: Canceled Specimen: Stool     Blood culture [[id number]] Collected: 02/15/24 1830    Order Status: Canceled Specimen: Blood from Peripheral, Hand, Right     Culture, Anaerobe [[id number]] Collected: 02/09/24 0914    Order Status: Completed Specimen: Body Fluid from Abdomen Updated: 02/15/24 1122     Anaerobic Culture No anaerobes isolated    Fungus culture [[id number]] Collected: 02/09/24 0914    Order Status: Completed Specimen: Body Fluid from Abdomen Updated: 02/14/24 0545     Fungus (Mycology) Culture Culture in progress    AFB Culture & Smear [[id number]] Collected: 02/09/24 0914    Order Status: Completed Specimen: Body Fluid from Abdomen Updated: 02/12/24 1416     AFB Culture & Smear Culture in progress     AFB CULTURE STAIN No acid fast bacilli seen.    Aerobic culture [[id number]] Collected: 02/09/24 0914    Order Status: Completed Specimen: Abscess from Abdomen Updated: 02/12/24 0750     Aerobic Bacterial Culture No growth    Gram stain [[id number]] Collected: 02/09/24 0914    Order Status: Completed Specimen: Body Fluid from Abdomen Updated: 02/09/24 1844     Gram Stain Result Moderate WBC's      No organisms seen            Significant Imaging: I have reviewed all pertinent imaging results/findings within the past 24 hours.

## 2024-02-16 NOTE — TREATMENT PLAN
Received message that PEG tube was dislodged- time of dislodgement unknown.     Initially [facility] on 10[facility]2022 at [facility] by Dre Sorenson MD. This tube was replaced at bedside by Dr. Torrez on 2/14/24 due to PEG tube clogging which was refractory to cola/warm water lavage.     On inspection, it was a balloon PEG. Tract appears to have started to close and replacement of 20 Fr PEG tube was not possible. As such, a 16 Fr PEG balloon PEG tube was placed at bedside by myself.    Please obtain an X ray tube study before using to ensure it is in the stomach.     Care Instructions  - Flushes: flush PEG daily with 60 ml water  - Antibiotic ointment to site, clean site with soap and water daily and dry thoroughly and clean site daily with half-strength hydrogen peroxide for three days, then soap and water daily.  - Dressing: change dressing on top of bumper daily  - Consider placing an abdominal binder to prevent future dislodgement.     Kyleigh Moise, DO  Gastroenterology PGY-4

## 2024-02-16 NOTE — ASSESSMENT & PLAN NOTE
Current CBC reviewed-   Lab Results   Component Value Date    HGB 6.2 (L) 02/16/2024    HCT 20.1 (L) 02/16/2024       -Downtrending Hgb overnight 2/15-2/16, however patient declined blood transfusion overnight. Repeat CBC in afternoon 2/16/24 was Hgb 6.2, and patient was amenable to transfusion. No reported hematuria, hemoptysis, hematochezia or other source of bleeding.     PLAN:  -Consent obtained, transfusion pending   -Monitor serial CBC and transfuse if patient becomes hemodynamically unstable, symptomatic or H/H drops below 7/21.

## 2024-02-16 NOTE — PLAN OF CARE
[facility]  Discharge Reassessment    Primary Care Provider: Eliazar Rosas MD    Expected Discharge Date: 2/19/2024    Reassessment (most recent)       Discharge Reassessment - 02/16/24 0922          Discharge Reassessment    Assessment Type Discharge Planning Reassessment     Discharge Plan discussed with: Patient     Discharge Plan A Return to nursing home     Discharge Plan B Skilled Nursing Facility     DME Needed Upon Discharge  none     Transition of Care Barriers None     Why the patient remains in the hospital Requires continued medical care                   Patient to return to [facility] once medically stable.

## 2024-02-16 NOTE — NURSING
While rounding, pt consistently coughing and gurgling breath sound noted. Checked Oxygen Sat and 50s with RA compared to 97% with RA at the initial VS. Placed NC 6 L and still remained at 60s. Placed Non Rebreather mask and remained 90s. Called to rapid response team and paged to on call MD. Rapid response and MD at bedside.

## 2024-02-16 NOTE — ASSESSMENT & PLAN NOTE
68M with hx CVA, dysphagia, PEG tube, HTN, transferred from [facility] after presenting with acute cholecystitis on 1/30, lap [place] on 2/1 c/b perforated necrotic gallbladder with stone spillage and obstructing CBD stone. He was treated with meropenem prior to transfer. MRCP noted complex collection within gallbladder fossa and gallstones posterior to the liver. He underwent ERCP on 2/6 w/ complete removal of obstructing stones. He underwent IR drain placement on 2/9 with negative cultures, however cell count appears infected w/ 20k WBC  and 99% segs. Cultures from [facility] (blood and urine) are negative. On ceftriaxone/flagyl, now w/ worsening leukocytosis to 27 over last 48h, diarrhea, concerns for aspiration w/ rapid response called overnight, vomiting this afternoon.    Recommendations:   - broaden abx to vanc and meropenem  - start fluconazole for oral thrush and CT w/ esophagitis  - can stop flagyl given no evidence of colitis on CT - resume if patient is unable to take PO vanc for empiric c. Diff coverage  - check c. Diff and continue treatment until resulted

## 2024-02-16 NOTE — PROGRESS NOTES
[facility] perfect[facility] - Surgery  [facility] Medicine  Progress Note    Patient Name: Seymour Velazquez  MRN: [id number]  Patient Class: IP- Inpatient   Admission Date: 2/4/2024  Length of Stay: 12 days  Attending Physician: Bhumi Joshua*  Primary Care Provider: Eliazar Rosas MD        Subjective:     Principal Problem:Common bile duct (CBD) obstruction        HPI:  68M w/PMH stroke with G-tube placement, hypertension, diabetes, hyperlipidemia, recent cholecystitis, and dysphagia presents as transfer from OSH for AES eval. He initially presented from NH with leukocytosis w/o specific symptoms, imaging showed cholecystitis. Underwent lap irlanda 2/1, the operative report noted the gallbladder was necrotic and fell apart. The back wall of the gallbladder had an abscess that was perforated. There were spilled stones and purulent bile. A cholangiogram showed patent common duct with single stone at the ampulla that appears too large to pass spontaneously. Patient is transferred to C for possible ERCP.     On arrival, patient reports some abdominal pain, denies N/V/D. He is AFVSS. Admitted to  for further management.     Overview/Hospital Course:  Patient admitted to Hospital Medicine service for medical management and evaluation of suspected CBD obstruction following complicated laparoscopic cholecystectomy. AES was consulted on admission with recommendation of MRCP. MRCP w/ and w/o contrast currently pending. ID was consulted with continuation of Merrem from outside hospital. Merrem de-escalated to Rocephin/Flagyl per ID recs. List of home medications was obtained from home facility and were continued as appropriate. MRCP[02/06/24] noted fluid collection in the gallbladder fossa, concerning for  abscess vs. biloma. Also noted choledocholithiasis with gallstones posterior to the liver. AES performed ERCP 02/06/24 with removal of choledocolithiasis  by biliary sphincterotomy and balloon extraction. General Surgery  consult noted no indication surgical intervention at the time, but can consider IR drainage of fluid collection if clinical status worsens. Intermittent episodes of N/V with associated tachycardia throughout hospitalization, without acute abdomen. Unable to complete IR drainage of fluid collection on 2/7 2/2 N/V. Scheduling antiemetics for better control. Repeat attempt at fluid drainage and drain placement with IR successful without complication morning of 2/9. Slowly evolving AURORA was not responsive to increased IVF resuscitation; urine studies inconsistent with pre-renal etiology. Consulted Nephrology for further assistance. Patient had a retroperitoneal ultrasound showing mild bilateral hydronephrosis and a distended bladder. Rosario catheter was placed on 2/11 and patient put out almost 1L with catheter insertion. SLP evaluated patient and he was started on pleasure feeds in addition to his tube feeds. Increased nausea/vomiting caused PEG tube to dislodge, so replaced by GI and confirmed via imaging. Abdominal binder placed.  Increasing concerns for sepsis with worsening vitals/labs, so broadened antibiotics coverage with Meropenem and PO Vancomycin per ID recs. CT Chest/Abdomen/Pelvis with contrast[2/16] noted decreased size of gallbladder fossa fluid collection and no new focal fluid collections. Stomach  distended with air and fluid on CT imaging so PEG tube feedings held. 1u pRbc transfusing for concerns for symptomatic anemia.  Started fluconazole for oral thrush and CT w/  concerns for esophagitis     Interval History: Increasing tachycardia, nausea and vomiting yesterday evening, PEG tube dislodged, and replaced by GI, and placement confirmed via imaging. Downtrending Hgb overnight, however patient declined blood transfusion overnight. Repeat CBC this afternoon was Hgb 6.2, and patient now amenable to transfusion. No reported hematuria, hemoptysis, hematochezia or other source of bleeding. Consent obtained,  transfusion pending.  CXR nonacute. Antibiotics broadened per ID recs. CT C/A/P completed for evaluation.       Objective:     Vital Signs (Most Recent):  Temp: 97.7 °F (36.5 °C) (02/16/24 1159)  Pulse: 108 (02/16/24 1159)  Resp: 16 (02/16/24 1351)  BP: 124/89 (02/16/24 1159)  SpO2: 100 % (02/16/24 1159) Vital Signs (24h Range):  Temp:  [97.7 °F (36.5 °C)-100 °F (37.8 °C)] 97.7 °F (36.5 °C)  Pulse:  [108-140] 108  Resp:  [16-27] 16  SpO2:  [90 %-100 %] 100 %  BP: ()/(55-89) 124/89     Weight: 79.6 kg (175 lb 8 oz)  Body mass index is 22.53 kg/m².    Intake/Output Summary (Last 24 hours) at 2/16/2024 1421  Last data filed at 2/16/2024 0430  Gross per 24 hour   Intake 1359.87 ml   Output 1222.5 ml   Net 137.37 ml         Physical Exam  Vitals and nursing note reviewed.   Constitutional:       General: He is not in acute distress.     Appearance: He is well-developed. He is not ill-appearing or diaphoretic.   HENT:      Head: Normocephalic and atraumatic.      Nose: Nose normal.   Eyes:      General: No scleral icterus.        Right eye: No discharge.         Left eye: No discharge.   Cardiovascular:      Rate and Rhythm: Regular rhythm. Tachycardia present.   Pulmonary:      Effort: Pulmonary effort is normal. No respiratory distress.      Breath sounds: No stridor. No wheezing, rhonchi or rales.   Abdominal:      General: Abdomen is flat. There is no distension.      Palpations: Abdomen is soft.      Tenderness: There is abdominal tenderness (mild) in the epigastric area. There is no guarding or rebound.      Comments: -Laparoscopic incisions c/d/I  -2 RUQ drains present  -PEG tube present  -Abdominal binder in place   Musculoskeletal:      Right lower leg: No edema.      Left lower leg: No edema.   Skin:     General: Skin is dry.      Coloration: Skin is not jaundiced or pale.      Findings: No erythema.   Neurological:      Mental Status: He is alert. Mental status is at baseline.               Significant  Labs: All pertinent labs within the past 24 hours have been reviewed.  LABS:  Recent Labs   Lab 02/13/24  0226 02/15/24  1411 02/16/24  0418    139 141   K 3.6 3.7 3.0*    106 107   CO2 23 23 24   BUN 16 36* 35*   CREATININE 0.7 0.8 0.8    170* 193*   ANIONGAP 10 10 10     Recent Labs   Lab 02/13/24  0226 02/15/24  1411 02/16/24  0418   MG 1.2* 1.3* 2.5   PHOS 2.8 2.9 3.3     Recent Labs   Lab 02/13/24  0226 02/15/24  1411 02/16/24  0418   AST 28 14 13   ALT 10 10 10   ALKPHOS 67 53* 55   BILITOT 0.2 0.3 0.2   ALBUMIN 1.9* 2.2* 2.3*     POCT Glucose:   Recent Labs   Lab 02/15/24  1617 02/16/24  0104 02/16/24  0604   POCTGLUCOSE 183* 216* 195*    Recent Labs   Lab 02/15/24  1411 02/15/24  2147 02/15/24  2157 02/16/24  0418   WBC 18.36*  --  22.66*  22.66* 27.23*   HGB 7.1*  --  7.0*  7.0* 7.1*   HCT 22.2* 21* 22.1*  22.1* 22.2*   *  --  625*  625* 619*   GRAN 80.8*  14.8*  --  74.9*  74.9*  17.0*  17.0* 79.9*  21.7*        Micro  Blood Cultures  Lab Results   Component Value Date    LABBLOO No Growth to date 02/15/2024     Urine Cultures  Lab Results   Component Value Date    LABURIN 25,000-50,000 colonies/ml Enterococcus faecalis (A) 01/30/2023         Significant Imaging: I have reviewed all pertinent imaging results/findings within the past 24 hours.      Inpatient Medications:     Scheduled Meds:   aspirin  81 mg Per G Tube Daily    atorvastatin  80 mg Per G Tube QHS    busPIRone  7.5 mg Per G Tube Daily    enoxparin  40 mg Subcutaneous Q24H (prophylaxis, 1700)    famotidine  40 mg Per G Tube Daily    meropenem (MERREM) IVPB  1 g Intravenous Q8H    mupirocin   Nasal BID    potassium chloride  10 mEq Intravenous Q1H    sertraline  25 mg Per G Tube Daily    sodium chloride 0.9%  10 mL Intravenous Q6H    vancomycin (VANCOCIN) IV (PEDS and ADULTS)  15 mg/kg Intravenous Q12H    vancomycin  125 mg Oral Q6H     PRN Meds:0.9%  NaCl infusion (for blood administration), dextrose 10%,  dextrose 10%, glucagon (human recombinant), glucose, glucose, guaiFENesin 100 mg/5 ml, hydrALAZINE, insulin aspart U-100, melatonin, morphine, naloxone, ondansetron, prochlorperazine, sodium chloride 0.9%, Flushing PICC/Midline Protocol **AND** sodium chloride 0.9% **AND** sodium chloride 0.9%, Pharmacy to dose Vancomycin consult **AND** vancomycin - pharmacy to dose      Assessment/Plan:      * Common bile duct (CBD) obstruction  68M w/ recent CVA and residual dysphagia s/p PEG tube presents from OSH for retained gallstone in ampulla, s/p lap cholecystectomy in which the gallbladder was found necrotic and friable. His only symptom is abdominal pain. Labs show leukocytosis. He is transferred for AES eval and possible ERCP.     -MRCP[02/06/24] noted fluid collection in the gallbladder fossa, concerning for  abscess vs. biloma. Also noted choledocholithiasis with gallstones posterior to the liver.  -AES performed ERCP 02/06/24 with removal of choledocolithiasis by biliary sphincterotomy and balloon extraction.   -IR attempt at drainage of fluid collection [2/7/2024] unsuccessful 2/2 episode of N/V with associated tachycardia and hypertension  -IR reattempt at fluid drainage and drain placement [2/9/24] successful without complication. Drain in place with serosanguinous fluid.  -General Surgery consult noted no indication for surgical intervention at the time  -CT[2/16/24] with decreased size of gallbladder fossa fluid collection and no new focal fluid collections         -Meropenem/IV & PO Vanc per ID recs. Can switch PO vanc to Metronidazole if unable to tolerate PO.    -Following cultures  -Fluid studies and cultures with no evidence of bacterial growth thus far  -Antiemetics PRN  - can switch to Metronidazole if unable to tolerate PO.        Esophagitis  CT Chest/Abdomen/Pelvis with contrast[2/16] noted concerns for esophagitis      PLAN:  Per ID recs, started fluconazole for oral thrush and CT w/  concerns for  esophagitis        Diarrhea of presumed infectious origin  ID following for concerns of infectious diarrhea given leukocytosis on labs and reported diarrhea    Per ID recs, started on PO Vancomycin, can switch to Metronidazole if unable to tolerate PO.   Followup stool studies      Hypokalemia  Patient has hypokalemia which is Acute . Most recent potassium levels reviewed-   Lab Results   Component Value Date    K 3.0 (L) 02/16/2024   . Will continue potassium replacement per protocol and recheck repeat levels after replacement completed.     Thrombocytosis  Unclear etiology of acute thrombocytosis, possibly reactive secondary to infection    Recent Labs     02/15/24  1411 02/15/24  2157 02/16/24  0418   * 625*  625* 619*       Trend on CBC  VTE ppx        Acute blood loss anemia  Current CBC reviewed-   Lab Results   Component Value Date    HGB 6.2 (L) 02/16/2024    HCT 20.1 (L) 02/16/2024       -Downtrending Hgb overnight 2/15-2/16, however patient declined blood transfusion overnight. Repeat CBC in afternoon 2/16/24 was Hgb 6.2, and patient was amenable to transfusion. No reported hematuria, hemoptysis, hematochezia or other source of bleeding.     PLAN:  -Consent obtained, transfusion pending   -Monitor serial CBC and transfuse if patient becomes hemodynamically unstable, symptomatic or H/H drops below 7/21.    Acute hypoxemic respiratory failure  Patient with Hypoxic Respiratory failure which is Acute.  he is not on home oxygen. Supplemental oxygen was provided and noted- Oxygen Concentration (%):  [100] 100    .   Signs/symptoms of respiratory failure include- tachypnea. Contributing diagnoses includes - Aspiration and Pneumonia Labs and images were reviewed. Patient Has not had a recent ABG. Will treat underlying causes and adjust management of respiratory failure as follows-     On antibiotics for aspiration/pneumonia/sepsis coverage  CXR[2/15] nonacute  Wean supplemental oxygen as tolerated to  "target SaO2>90%    Chronic diastolic heart failure  Last echo 2022            Gastrostomy tube dysfunction  Patient's PEG tube became occluded on 2/14 dislodged on 2/16 and was replaced by GI team. Radiology confirmed PEG placement by abdominal x-ray.    See "On tube feeding diet" A&P    Urinary retention  Patient found to have mild hydronephrosis and bladder distension on retroperitoneal ultrasound. Pacheco catheter was placed and patient had significant urine output and resolution of AURORA.    Pacheco catheter placed  Will need voiding trial or discharge with pacheco  Outpatient urology follow up       Tachycardia  Patient noted to have sinus tachycardia up to HR 140s in the setting of acute episodes of nausea and vomiting on 2/7/24. On 2/15/24 patient noted to be having tachycardia. Concern for possible new infection vs dehydration as his feeds were held pending PEG placement confirmation.    - ID following for sepsis concerns  - PRN antiemetics  - POCT glucose ordered      On tube feeding diet  Hx of PEG  tube placement s/p CVA. Tolerated slow basal rate feeds well thus far following fluid drainage. On bolus feeds prior to admission    PLAN:  CT Chest/Abdomen/Pelvis with contrast[2/16] noted stomach  distended with air and fluid on CT imaging so PEG tube feedings held and PEG tube to gravity.  -Nutrition consulted for tube feed recs. When resumed PO diet, Pleasure feeds of pureed foods and sips of thin liquids okay per SLP  -PEG occulded and exchanged by GI 2/14 and 2/15.   Care Instructions  - Flushes: flush PEG daily with 60 ml water  - Antibiotic ointment to site, clean site with soap and water daily and dry thoroughly and clean site daily with half-strength hydrogen peroxide for three days, then soap and water daily.  - Dressing: change dressing on top of bumper daily  -abdominal binder to prevent future dislodgement     Abdominal fluid collection  See "Common bile duct (CBD) obstruction " " "A&P        Choledocholithiasis  See "Common bile duct (CBD) obstruction " A&P        Bilateral pleural effusion  Patient found to have small pleural effusion on imaging [MRCP(02/06/24)"Small right-sided pleural effusion.  Trace left-sided pleural effusion"]. Most likely etiology includes  limited mobility due to recent history of hospitalization for cholecystectomy . Management to include  monitoring at this time      Normocytic anemia  -Patient's with Normocytic anemia..   -Hemoglobin stable.   -Etiology likely due to chronic disease .  -Current CBC reviewed-    Recent Labs   Lab 02/15/24  1411 02/15/24  2157 02/16/24 0418   HGB 7.1* 7.0*  7.0* 7.1*           Component Value Date/Time    MCV 94 02/16/2024 0418    RDW 15.9 (H) 02/16/2024 0418    IRON 12 (L) 02/07/2024 0223    TIBC 195 (L) 02/07/2024 0223    FERRITIN 1,256 (H) 02/07/2024 0223         PLAN  - Monitor serial CBC and transfuse if patient becomes hemodynamically unstable, symptomatic or H/H drops below 7/21.  - Etiology consistent with anemia of chronic disease. Will CTM.    Cholecystitis  See "Common bile duct (CBD) obstruction " A&P        CVA (cerebral vascular accident)  Patient is s/p CVA with residual dysphagia and weakness. He is s/p PEG tube and receives bolus tube feedings.         Type 2 diabetes mellitus  Patient's FSGs are controlled on current medication regimen.  Last A1c reviewed-   Lab Results   Component Value Date    HGBA1C 4.8 02/05/2024     Most recent fingerstick glucose reviewed-   Recent Labs   Lab 02/15/24  1617 02/16/24  0104 02/16/24  0604   POCTGLUCOSE 183* 216* 195*     Current correctional scale  Low  Maintain anti-hyperglycemic dose as follows-   Antihyperglycemics (From admission, onward)      Start     Stop Route Frequency Ordered    02/04/24 2059  insulin aspart U-100 pen 0-5 Units         -- SubQ Before meals & nightly PRN 02/04/24 2000          Hold Oral hypoglycemics while patient is in the hospital.     Patient on " SSI at NH.    -LDSSI  -Glucerna for tube feeds    Primary hypertension  Patient is normotensive on arrival, per chart he is not currently on any antihypertensives.     Will utilize p.r.n. blood pressure medication only if patient's blood pressure greater than 180/110 and he develops symptoms such as worsening chest pain or shortness of breath.    -amlodipine 5mg. Hold for hypotension.  -Holding home lisinopril in the setting of AURORA during admission  -Will add PRN hydralazine for SBP>180 and symptomatic  -will consider uptitration of home regimen if pressures remain uncontrolled        VTE Risk Mitigation (From admission, onward)           Ordered     enoxaparin injection 40 mg  Every 24 hours         02/09/24 1408     IP VTE HIGH RISK PATIENT  Once         02/05/24 1410     Place sequential compression device  Until discontinued         02/04/24 2000                    Discharge Planning   PEYTON: 2/19/2024     Code Status: Full Code   Is the patient medically ready for discharge?: No    Reason for patient still in hospital (select all that apply): Patient unstable, Patient new problem, Patient trending condition, Laboratory test, Treatment, Consult recommendations, and Pending disposition  Discharge Plan A: Return to nursing home                  Johnnie Brasher DO  Department of Hospital Medicine   [facility] - Surgery

## 2024-02-16 NOTE — ASSESSMENT & PLAN NOTE
Patient has hypokalemia which is Acute . Most recent potassium levels reviewed-   Lab Results   Component Value Date    K 3.0 (L) 02/16/2024   . Will continue potassium replacement per protocol and recheck repeat levels after replacement completed.

## 2024-02-16 NOTE — ASSESSMENT & PLAN NOTE
"Patient's PEG tube became occluded on 2/14 dislodged on 2/16 and was replaced by GI team. Radiology confirmed PEG placement by abdominal x-ray.    See "On tube feeding diet" A&P  "

## 2024-02-16 NOTE — PLAN OF CARE
Pt AAOx4 and VSS . Progressing with plan of care. Free of skin breakdown. Clean, dry, and intact dressing noted on abdomen. PATEL drain in place and care performed. R knee. SCDs in place at all times. Incentive spirometer at bedside and pt instructed on its use. PRN nauesa given. No complain of pain at this time. Frequent rounds made to assess pain and safety and no complaints at this time noted. Side rails up x 2. Bed locked. Call light within reach. No falls noted. Will continue to monitor.    Problem: Diabetes Comorbidity  Goal: Blood Glucose Level Within Targeted Range  Outcome: Ongoing, Progressing     Problem: Pain Acute  Goal: Acceptable Pain Control and Functional Ability  Outcome: Ongoing, Progressing     Problem: Skin Injury Risk Increased  Goal: Skin Health and Integrity  Outcome: Ongoing, Progressing     Problem: Adult Inpatient Plan of Care  Goal: Plan of Care Review  Outcome: Ongoing, Progressing  Goal: Patient-Specific Goal (Individualized)  Outcome: Ongoing, Progressing  Goal: Absence of Hospital-Acquired Illness or Injury  Outcome: Ongoing, Progressing  Goal: Optimal Comfort and Wellbeing  Outcome: Ongoing, Progressing  Goal: Readiness for Transition of Care  Outcome: Ongoing, Progressing     Problem: Infection  Goal: Absence of Infection Signs and Symptoms  Outcome: Ongoing, Progressing     Problem: Bleeding (Cholecystectomy)  Goal: Absence of Bleeding  Outcome: Ongoing, Progressing     Problem: Bowel Motility Impaired (Cholecystectomy)  Goal: Effective Bowel Elimination  Outcome: Ongoing, Progressing     Problem: Fluid and Electrolyte Imbalance (Cholecystectomy)  Goal: Fluid and Electrolyte Balance  Outcome: Ongoing, Progressing     Problem: Infection (Cholecystectomy)  Goal: Absence of Infection Signs and Symptoms  Outcome: Ongoing, Progressing     Problem: Ongoing Anesthesia Effects (Cholecystectomy)  Goal: Anesthesia/Sedation Recovery  Outcome: Ongoing, Progressing     Problem: Pain  (Cholecystectomy)  Goal: Acceptable Pain Control  Outcome: Ongoing, Progressing     Problem: Postoperative Nausea and Vomiting (Cholecystectomy)  Goal: Nausea and Vomiting Relief  Outcome: Ongoing, Progressing     Problem: Postoperative Urinary Retention (Cholecystectomy)  Goal: Effective Urinary Elimination  Outcome: Ongoing, Progressing     Problem: Respiratory Compromise (Cholecystectomy)  Goal: Effective Oxygenation and Ventilation  Outcome: Ongoing, Progressing

## 2024-02-16 NOTE — ASSESSMENT & PLAN NOTE
CT Chest/Abdomen/Pelvis with contrast[2/16] noted concerns for esophagitis      PLAN:  Per ID recs, started fluconazole for oral thrush and CT w/  concerns for esophagitis

## 2024-02-16 NOTE — SUBJECTIVE & OBJECTIVE
Interval History: rapid response called overnight for worsening respiratory status requiring NRB, weaned off O2 this morning. WBC has increased significantly, now tachycardic. Per RN, pt continues to have diarrhea. CT C/A/P w/ some patchy pulmonary infiltrates c/f aspiration pneumonitis, stomach is distended w/ air-fluid, small foci of free air in RUQ likely related to perc drainage. Patient coughing on my evaluation, episode of vomiting requiring oral suctioning    Review of Systems   All other systems reviewed and are negative.    Objective:     Vital Signs (Most Recent):  Temp: 97.7 °F (36.5 °C) (02/16/24 1159)  Pulse: (!) 120 (02/16/24 1435)  Resp: 16 (02/16/24 1351)  BP: (!) 154/73 (02/16/24 1435)  SpO2: 98 % (02/16/24 1435) Vital Signs (24h Range):  Temp:  [97.7 °F (36.5 °C)-100 °F (37.8 °C)] 97.7 °F (36.5 °C)  Pulse:  [108-140] 120  Resp:  [16-27] 16  SpO2:  [90 %-100 %] 98 %  BP: ()/(55-89) 154/73     Weight: 79.6 kg (175 lb 8 oz)  Body mass index is 22.53 kg/m².    Estimated Creatinine Clearance: 99.5 mL/min (based on SCr of 0.8 mg/dL).     Physical Exam  Constitutional:       Appearance: He is well-developed. He is ill-appearing. He is not diaphoretic.   HENT:      Head: Normocephalic and atraumatic.      Right Ear: External ear normal.      Left Ear: External ear normal.      Nose: Nose normal.   Eyes:      General: No scleral icterus.        Right eye: No discharge.         Left eye: No discharge.      Extraocular Movements: Extraocular movements intact.      Conjunctiva/sclera: Conjunctivae normal.   Pulmonary:      Effort: Pulmonary effort is normal. No respiratory distress.      Breath sounds: No stridor.   Abdominal:      General: Abdomen is flat.      Palpations: Abdomen is soft.      Comments: Laparoscopic incisions c/d/i  2 RUQ drains w/ s/s drainage  No peritoneal signs   Skin:     General: Skin is dry.      Coloration: Skin is not jaundiced or pale.      Findings: No erythema.    Neurological:      Mental Status: He is alert. Mental status is at baseline.      Comments: Neurologic deficits from prior CVA   Psychiatric:         Mood and Affect: Mood normal.         Behavior: Behavior normal.         Thought Content: Thought content normal.         Judgment: Judgment normal.          Significant Labs: CBC:   Recent Labs   Lab 02/15/24  1411 02/15/24  2147 02/15/24  2157 02/16/24  0418   WBC 18.36*  --  22.66*  22.66* 27.23*   HGB 7.1*  --  7.0*  7.0* 7.1*   HCT 22.2* 21* 22.1*  22.1* 22.2*   *  --  625*  625* 619*     CMP:   Recent Labs   Lab 02/15/24  1411 02/16/24  0418    141   K 3.7 3.0*    107   CO2 23 24   * 193*   BUN 36* 35*   CREATININE 0.8 0.8   CALCIUM 8.4* 9.0   PROT 6.2 6.4   ALBUMIN 2.2* 2.3*   BILITOT 0.3 0.2   ALKPHOS 53* 55   AST 14 13   ALT 10 10   ANIONGAP 10 10     Microbiology Results (last 7 days)       Procedure Component Value Units Date/Time    Clostridium difficile EIA [[id number]]     Order Status: No result Specimen: Stool     Clostridium difficile EIA [[id number]]     Order Status: Canceled Specimen: Stool     Blood culture [[id number]] Collected: 02/15/24 1830    Order Status: Completed Specimen: Blood from Peripheral, Hand, Right Updated: 02/16/24 0145     Blood Culture, Routine No Growth to date    Clostridium difficile EIA [[id number]] Collected: 02/15/24 1835    Order Status: Canceled Specimen: Stool     Blood culture [[id number]] Collected: 02/15/24 1830    Order Status: Canceled Specimen: Blood from Peripheral, Hand, Right     Culture, Anaerobe [[id number]] Collected: 02/09/24 0914    Order Status: Completed Specimen: Body Fluid from Abdomen Updated: 02/15/24 1122     Anaerobic Culture No anaerobes isolated    Fungus culture [[id number]] Collected: 02/09/24 0914    Order Status: Completed Specimen: Body Fluid from Abdomen Updated: 02/14/24 0545     Fungus (Mycology) Culture Culture in progress    AFB Culture & Smear  [[id number]] Collected: 02/09/24 0914    Order Status: Completed Specimen: Body Fluid from Abdomen Updated: 02/12/24 1416     AFB Culture & Smear Culture in progress     AFB CULTURE STAIN No acid fast bacilli seen.    Aerobic culture [[id number]] Collected: 02/09/24 0914    Order Status: Completed Specimen: Abscess from Abdomen Updated: 02/12/24 0750     Aerobic Bacterial Culture No growth    Gram stain [[id number]] Collected: 02/09/24 0914    Order Status: Completed Specimen: Body Fluid from Abdomen Updated: 02/09/24 1844     Gram Stain Result Moderate WBC's      No organisms seen            Significant Imaging: I have reviewed all pertinent imaging results/findings within the past 24 hours.

## 2024-02-16 NOTE — ASSESSMENT & PLAN NOTE
Unclear etiology of acute thrombocytosis, possibly reactive secondary to infection    Recent Labs     02/15/24  1411 02/15/24  2157 02/16/24  0418   * 625*  625* 619*       Trend on CBC  VTE ppx

## 2024-02-16 NOTE — ASSESSMENT & PLAN NOTE
ID following for concerns of infectious diarrhea given leukocytosis on labs and reported diarrhea    Per ID recs, started on PO Vancomycin, can switch to Metronidazole if unable to tolerate PO.   Followup stool studies

## 2024-02-16 NOTE — ASSESSMENT & PLAN NOTE
Patient noted to have sinus tachycardia up to HR 140s in the setting of acute episodes of nausea and vomiting on 2/7/24. On 2/15/24 patient noted to be having tachycardia. Concern for possible new infection vs dehydration as his feeds were held pending PEG placement confirmation.    - ID following for sepsis concerns  - PRN antiemetics  - POCT glucose ordered

## 2024-02-16 NOTE — ASSESSMENT & PLAN NOTE
Patient with Hypoxic Respiratory failure which is Acute.  he is not on home oxygen. Supplemental oxygen was provided and noted- Oxygen Concentration (%):  [100] 100    .   Signs/symptoms of respiratory failure include- tachypnea. Contributing diagnoses includes - Aspiration and Pneumonia Labs and images were reviewed. Patient Has not had a recent ABG. Will treat underlying causes and adjust management of respiratory failure as follows-     On antibiotics for aspiration/pneumonia/sepsis coverage  CXR[2/15] nonacute  Wean supplemental oxygen as tolerated to target SaO2>90%

## 2024-02-16 NOTE — ASSESSMENT & PLAN NOTE
-Patient's with Normocytic anemia..   -Hemoglobin stable.   -Etiology likely due to chronic disease .  -Current CBC reviewed-    Recent Labs   Lab 02/15/24  1411 02/15/24  2157 02/16/24  0418   HGB 7.1* 7.0*  7.0* 7.1*           Component Value Date/Time    MCV 94 02/16/2024 0418    RDW 15.9 (H) 02/16/2024 0418    IRON 12 (L) 02/07/2024 0223    TIBC 195 (L) 02/07/2024 0223    FERRITIN 1,256 (H) 02/07/2024 0223         PLAN  - Monitor serial CBC and transfuse if patient becomes hemodynamically unstable, symptomatic or H/H drops below 7/21.  - Etiology consistent with anemia of chronic disease. Will CTM.

## 2024-02-16 NOTE — RESPIRATORY THERAPY
RAPID RESPONSE RESPIRATORY THERAPY PROACTIVE ROUNDING NOTE             Time of visit: 1101     Code Status: Full Code   : 1955  Bed: 557/557 A:   MRN: [id number]  Time spent at the bedside: < 15 min    SITUATION    Evaluated patient for: HFNC Compliance     BACKGROUND    Patient has a past medical history of CVA (cerebral vascular accident), DM2 (diabetes mellitus, type 2), Dysphagia, HLD (hyperlipidemia), and HTN (hypertension).    24 Hours Vitals Range:  Temp:  [97.7 °F (36.5 °C)-100 °F (37.8 °C)]   Pulse:  [108-140]   Resp:  [16-27]   BP: ()/(55-89)   SpO2:  [90 %-100 %]     Labs:    Recent Labs     02/15/24  1411 24  0418    141   K 3.7 3.0*    107   CO2 23 24   BUN 36* 35*   CREATININE 0.8 0.8   * 193*   PHOS 2.9 3.3   MG 1.3* 2.5        Recent Labs     02/15/24  2147   PH 7.535*   PCO2 30.7*   PO2 49*   HCO3 26.0   POCSATURATED 89   BE 3*       ASSESSMENT/INTERVENTIONS    Patient resting comfortably. No respiratory concerns at this time.    Last VS   Temp: 97.7 °F (36.5 °C) (1159)  Pulse: 108 (1159)  Resp: 17 (1159)  BP: 124/89 (1159)  SpO2: 100 % (1159)    Level of Consciousness: Level of Consciousness (AVPU): alert  Respiratory Effort: Respiratory Effort: Normal, Unlabored Expansion/Accessory Muscle Usage: Expansion/Accessory Muscles/Retractions: no use of accessory muscles, no retractions, expansion symmetric  All Lung Field Breath Sounds: All Lung Fields Breath Sounds: diminished  O2 Device/Concentration: RA  Was the O2 device able to be weaned? No  Ambu at bedside:      Active Orders   Respiratory Care    Incentive spirometry     Frequency: Q4H WAKE     Number of Occurrences: Until Specified    Oxygen Continuous     Frequency: Continuous     Number of Occurrences: Until Specified     Order Questions:      Device type: High flow      Device: High Flow Nasal Cannula (6 -15 Liters)      LPM: 5-15      Titrate O2 per Oxygen Titration Protocol:  Yes      To maintain SpO2 goal of: >= 92%      Notify MD of: Inability to achieve desired SpO2; Sudden change in patient status and requires 20% increase in FiO2; Patient requires >60% FiO2    Pulse Oximetry Continuous     Frequency: Continuous     Number of Occurrences: Until Specified       RECOMMENDATIONS    We recommend: RRT Recs: Continue POC per primary team.      FOLLOW-UP    Please call back the Rapid Response RT, Celena Corcoran RRT at x [phone] for any questions or concerns.

## 2024-02-16 NOTE — PLAN OF CARE
Problem: Diabetes Comorbidity  Goal: Blood Glucose Level Within Targeted Range  2/15/2024 1924 by Vanesa Buitrago LPN  Outcome: Ongoing, Progressing  2/15/2024 1924 by Vanesa Buitrago LPN  Outcome: Ongoing, Progressing     Problem: Pain Acute  Goal: Acceptable Pain Control and Functional Ability  2/15/2024 1924 by Vanesa Buitrago LPN  Outcome: Ongoing, Progressing  2/15/2024 1924 by Vanesa Buitrago LPN  Outcome: Ongoing, Progressing     Problem: Skin Injury Risk Increased  Goal: Skin Health and Integrity  2/15/2024 1924 by Vanesa Buitrago LPN  Outcome: Ongoing, Progressing  2/15/2024 1924 by Vanesa Buitrago LPN  Outcome: Ongoing, Progressing     Problem: Adult Inpatient Plan of Care  Goal: Plan of Care Review  2/15/2024 1924 by Vanesa Buitrago LPN  Outcome: Ongoing, Progressing  2/15/2024 1924 by Vanesa Buitrago LPN  Outcome: Ongoing, Progressing  Goal: Patient-Specific Goal (Individualized)  2/15/2024 1924 by Vanesa Buitrago LPN  Outcome: Ongoing, Progressing  2/15/2024 1924 by Vanesa Buitrago LPN  Outcome: Ongoing, Progressing  Goal: Absence of Hospital-Acquired Illness or Injury  2/15/2024 1924 by Vanesa Buitrago LPN  Outcome: Ongoing, Progressing  2/15/2024 1924 by Vanesa Buitrago LPN  Outcome: Ongoing, Progressing  Goal: Optimal Comfort and Wellbeing  2/15/2024 1924 by Vanesa Buitrago LPN  Outcome: Ongoing, Progressing  2/15/2024 1924 by Vanesa Buitrago LPN  Outcome: Ongoing, Progressing  Goal: Readiness for Transition of Care  2/15/2024 1924 by Vanesa Buitrago LPN  Outcome: Ongoing, Progressing  2/15/2024 1924 by Vanesa Buitrago LPN  Outcome: Ongoing, Progressing     Problem: Infection  Goal: Absence of Infection Signs and Symptoms  2/15/2024 1924 by Vanesa Buitrago LPN  Outcome: Ongoing, Progressing  2/15/2024 1924 by Vanesa Buitrago LPN  Outcome: Ongoing, Progressing     Problem: Bleeding (Cholecystectomy)  Goal: Absence of  Bleeding  2/15/2024 1924 by Vanesa Buitrago LPN  Outcome: Ongoing, Progressing  2/15/2024 1924 by Vanesa Buitrago LPN  Outcome: Ongoing, Progressing     Problem: Bowel Motility Impaired (Cholecystectomy)  Goal: Effective Bowel Elimination  2/15/2024 1924 by Vanesa Buitrago LPN  Outcome: Ongoing, Progressing  2/15/2024 1924 by Vanesa Buitrago LPN  Outcome: Ongoing, Progressing     Problem: Fluid and Electrolyte Imbalance (Cholecystectomy)  Goal: Fluid and Electrolyte Balance  2/15/2024 1924 by Vanesa Buitrago LPN  Outcome: Ongoing, Progressing  2/15/2024 1924 by Vanesa Buitrago LPN  Outcome: Ongoing, Progressing     Problem: Infection (Cholecystectomy)  Goal: Absence of Infection Signs and Symptoms  2/15/2024 1924 by Vanesa Buitrago LPN  Outcome: Ongoing, Progressing  2/15/2024 1924 by Vanesa Buitrago LPN  Outcome: Ongoing, Progressing     Problem: Ongoing Anesthesia Effects (Cholecystectomy)  Goal: Anesthesia/Sedation Recovery  2/15/2024 1924 by Vanesa Buitrago LPN  Outcome: Ongoing, Progressing  2/15/2024 1924 by Vanesa Buitrago LPN  Outcome: Ongoing, Progressing     Problem: Pain (Cholecystectomy)  Goal: Acceptable Pain Control  2/15/2024 1924 by Vanesa Buitrago LPN  Outcome: Ongoing, Progressing  2/15/2024 1924 by Vanesa Buitrago LPN  Outcome: Ongoing, Progressing     Problem: Postoperative Nausea and Vomiting (Cholecystectomy)  Goal: Nausea and Vomiting Relief  2/15/2024 1924 by Vanesa Buitrago LPN  Outcome: Ongoing, Progressing  2/15/2024 1924 by Vanesa Buitrago LPN  Outcome: Ongoing, Progressing     Problem: Postoperative Urinary Retention (Cholecystectomy)  Goal: Effective Urinary Elimination  2/15/2024 1924 by Vanesa Buitrago LPN  Outcome: Ongoing, Progressing  2/15/2024 1924 by Vanesa Buitrago LPN  Outcome: Ongoing, Progressing     Problem: Respiratory Compromise (Cholecystectomy)  Goal: Effective Oxygenation and Ventilation  2/15/2024 1924 by  Vanesa Buitrago LPN  Outcome: Ongoing, Progressing  2/15/2024 1924 by Vanesa Buitrago LPN  Outcome: Ongoing, Progressing

## 2024-02-16 NOTE — CARE UPDATE
RAPID RESPONSE NURSE ROUND       Rounding completed with charge RNAnnalise for increased O2 requirements, anemia reports pt at risk for decompensation, please continue to follow. No additional concerns verbalized at this time. Instructed to call [phone] for further concerns or assistance.

## 2024-02-16 NOTE — NURSING
Nurses Note -- 4 Eyes      2/16/2024   5:59 AM      Skin assessed during: Daily Assessment      [x] No Altered Skin Integrity Present    []Prevention Measures Documented      [] Yes- Altered Skin Integrity Present or Discovered   [] LDA Added if Not in Epic (Describe Wound)   [] New Altered Skin Integrity was Present on Admit and Documented in LDA   [] Wound Image Taken    Wound Care Consulted? No    Attending Nurse:  Dameon     Second RN/Staff Member:  ALCIDES Miller

## 2024-02-16 NOTE — ASSESSMENT & PLAN NOTE
68M w/ recent CVA and residual dysphagia s/p PEG tube presents from OSH for retained gallstone in ampulla, s/p lap cholecystectomy in which the gallbladder was found necrotic and friable. His only symptom is abdominal pain. Labs show leukocytosis. He is transferred for AES eval and possible ERCP.     -MRCP[02/06/24] noted fluid collection in the gallbladder fossa, concerning for  abscess vs. biloma. Also noted choledocholithiasis with gallstones posterior to the liver.  -AES performed ERCP 02/06/24 with removal of choledocolithiasis by biliary sphincterotomy and balloon extraction.   -IR attempt at drainage of fluid collection [2/7/2024] unsuccessful 2/2 episode of N/V with associated tachycardia and hypertension  -IR reattempt at fluid drainage and drain placement [2/9/24] successful without complication. Drain in place with serosanguinous fluid.  -General Surgery consult noted no indication for surgical intervention at the time  -CT[2/16/24] with decreased size of gallbladder fossa fluid collection and no new focal fluid collections         -Meropenem/IV & PO Vanc per ID recs. Can switch PO vanc to Metronidazole if unable to tolerate PO.    -Following cultures  -Fluid studies and cultures with no evidence of bacterial growth thus far  -Antiemetics PRN  - can switch to Metronidazole if unable to tolerate PO.

## 2024-02-16 NOTE — NURSING
MD ordered 1PRBC. No blood consent noted. Called to MD and MD at bedside. However, pt refused to sign. Md will come later and try again. It is okay to hold blood transfusion now per MD. Will continue to monitor.

## 2024-02-16 NOTE — SUBJECTIVE & OBJECTIVE
Interval History: Increasing tachycardia, nausea and vomiting yesterday evening, PEG tube dislodged, and replaced by GI, and placement confirmed via imaging. Downtrending Hgb overnight, however patient declined blood transfusion overnight. Repeat CBC this afternoon was Hgb 6.2, and patient now amenable to transfusion. No reported hematuria, hemoptysis, hematochezia or other source of bleeding. Consent obtained, transfusion pending.  CXR nonacute. Antibiotics broadened per ID recs. CT C/A/P completed for evaluation.       Objective:     Vital Signs (Most Recent):  Temp: 97.7 °F (36.5 °C) (02/16/24 1159)  Pulse: 108 (02/16/24 1159)  Resp: 16 (02/16/24 1351)  BP: 124/89 (02/16/24 1159)  SpO2: 100 % (02/16/24 1159) Vital Signs (24h Range):  Temp:  [97.7 °F (36.5 °C)-100 °F (37.8 °C)] 97.7 °F (36.5 °C)  Pulse:  [108-140] 108  Resp:  [16-27] 16  SpO2:  [90 %-100 %] 100 %  BP: ()/(55-89) 124/89     Weight: 79.6 kg (175 lb 8 oz)  Body mass index is 22.53 kg/m².    Intake/Output Summary (Last 24 hours) at 2/16/2024 1421  Last data filed at 2/16/2024 0430  Gross per 24 hour   Intake 1359.87 ml   Output 1222.5 ml   Net 137.37 ml         Physical Exam  Vitals and nursing note reviewed.   Constitutional:       General: He is not in acute distress.     Appearance: He is well-developed. He is not ill-appearing or diaphoretic.   HENT:      Head: Normocephalic and atraumatic.      Nose: Nose normal.   Eyes:      General: No scleral icterus.        Right eye: No discharge.         Left eye: No discharge.   Cardiovascular:      Rate and Rhythm: Regular rhythm. Tachycardia present.   Pulmonary:      Effort: Pulmonary effort is normal. No respiratory distress.      Breath sounds: No stridor. No wheezing, rhonchi or rales.   Abdominal:      General: Abdomen is flat. There is no distension.      Palpations: Abdomen is soft.      Tenderness: There is abdominal tenderness (mild) in the epigastric area. There is no guarding or rebound.       Comments: -Laparoscopic incisions c/d/I  -2 RUQ drains present  -PEG tube present  -Abdominal binder in place   Musculoskeletal:      Right lower leg: No edema.      Left lower leg: No edema.   Skin:     General: Skin is dry.      Coloration: Skin is not jaundiced or pale.      Findings: No erythema.   Neurological:      Mental Status: He is alert. Mental status is at baseline.               Significant Labs: All pertinent labs within the past 24 hours have been reviewed.  LABS:  Recent Labs   Lab 02/13/24  0226 02/15/24  1411 02/16/24  0418    139 141   K 3.6 3.7 3.0*    106 107   CO2 23 23 24   BUN 16 36* 35*   CREATININE 0.7 0.8 0.8    170* 193*   ANIONGAP 10 10 10     Recent Labs   Lab 02/13/24  0226 02/15/24  1411 02/16/24  0418   MG 1.2* 1.3* 2.5   PHOS 2.8 2.9 3.3     Recent Labs   Lab 02/13/24  0226 02/15/24  1411 02/16/24  0418   AST 28 14 13   ALT 10 10 10   ALKPHOS 67 53* 55   BILITOT 0.2 0.3 0.2   ALBUMIN 1.9* 2.2* 2.3*     POCT Glucose:   Recent Labs   Lab 02/15/24  1617 02/16/24  0104 02/16/24  0604   POCTGLUCOSE 183* 216* 195*    Recent Labs   Lab 02/15/24  1411 02/15/24  2147 02/15/24  2157 02/16/24  0418   WBC 18.36*  --  22.66*  22.66* 27.23*   HGB 7.1*  --  7.0*  7.0* 7.1*   HCT 22.2* 21* 22.1*  22.1* 22.2*   *  --  625*  625* 619*   GRAN 80.8*  14.8*  --  74.9*  74.9*  17.0*  17.0* 79.9*  21.7*        Micro  Blood Cultures  Lab Results   Component Value Date    LABBLOO No Growth to date 02/15/2024     Urine Cultures  Lab Results   Component Value Date    LABURIN 25,000-50,000 colonies/ml Enterococcus faecalis (A) 01/30/2023         Significant Imaging: I have reviewed all pertinent imaging results/findings within the past 24 hours.      Inpatient Medications:     Scheduled Meds:   aspirin  81 mg Per G Tube Daily    atorvastatin  80 mg Per G Tube QHS    busPIRone  7.5 mg Per G Tube Daily    enoxparin  40 mg Subcutaneous Q24H (prophylaxis, 1700)    famotidine   40 mg Per G Tube Daily    meropenem (MERREM) IVPB  1 g Intravenous Q8H    mupirocin   Nasal BID    potassium chloride  10 mEq Intravenous Q1H    sertraline  25 mg Per G Tube Daily    sodium chloride 0.9%  10 mL Intravenous Q6H    vancomycin (VANCOCIN) IV (PEDS and ADULTS)  15 mg/kg Intravenous Q12H    vancomycin  125 mg Oral Q6H     PRN Meds:0.9%  NaCl infusion (for blood administration), dextrose 10%, dextrose 10%, glucagon (human recombinant), glucose, glucose, guaiFENesin 100 mg/5 ml, hydrALAZINE, insulin aspart U-100, melatonin, morphine, naloxone, ondansetron, prochlorperazine, sodium chloride 0.9%, Flushing PICC/Midline Protocol **AND** sodium chloride 0.9% **AND** sodium chloride 0.9%, Pharmacy to dose Vancomycin consult **AND** vancomycin - pharmacy to dose

## 2024-02-16 NOTE — ASSESSMENT & PLAN NOTE
Hx of PEG  tube placement s/p CVA. Tolerated slow basal rate feeds well thus far following fluid drainage. On bolus feeds prior to admission    PLAN:  CT Chest/Abdomen/Pelvis with contrast[2/16] noted stomach  distended with air and fluid on CT imaging so PEG tube feedings held and PEG tube to gravity.  -Nutrition consulted for tube feed recs. When resumed PO diet, Pleasure feeds of pureed foods and sips of thin liquids okay per SLP  -PEG occulded and exchanged by GI 2/14 and 2/15.   Care Instructions  - Flushes: flush PEG daily with 60 ml water  - Antibiotic ointment to site, clean site with soap and water daily and dry thoroughly and clean site daily with half-strength hydrogen peroxide for three days, then soap and water daily.  - Dressing: change dressing on top of bumper daily  -abdominal binder to prevent future dislodgement

## 2024-02-16 NOTE — CARE UPDATE
"I have reviewed the chart of Seymour Velazquez and collaborated with Bhumi Joshua* in the care of the patient who is hospitalized for the following:    Active Hospital Problems    Diagnosis    *Common bile duct (CBD) obstruction    Acute hypoxemic respiratory failure    Acute blood loss anemia    Thrombocytosis    Hypokalemia    Gastrostomy tube dysfunction     Replace by GI on 2/14      Chronic diastolic heart failure    Urinary retention    AURORA (acute kidney injury)    Tachycardia     Sinus per EKG      Bilateral pleural effusion    Choledocholithiasis    Abdominal fluid collection     "7.0 cm fluid collection in the gallbladder fossa, likely representing abscess and/or biloma. "      On tube feeding diet    Cholecystitis    Normocytic anemia    Primary hypertension    Type 2 diabetes mellitus    CVA (cerebral vascular accident)          I have reviewed Seymour Velazquez with the multidisciplinary team during discharge huddle.       Diann Farrell PA-C  [facility]    "

## 2024-02-16 NOTE — ASSESSMENT & PLAN NOTE
Patient's FSGs are controlled on current medication regimen.  Last A1c reviewed-   Lab Results   Component Value Date    HGBA1C 4.8 02/05/2024     Most recent fingerstick glucose reviewed-   Recent Labs   Lab 02/15/24  1617 02/16/24  0104 02/16/24  0604   POCTGLUCOSE 183* 216* 195*     Current correctional scale  Low  Maintain anti-hyperglycemic dose as follows-   Antihyperglycemics (From admission, onward)      Start     Stop Route Frequency Ordered    02/04/24 2059  insulin aspart U-100 pen 0-5 Units         -- SubQ Before meals & nightly PRN 02/04/24 2000          Hold Oral hypoglycemics while patient is in the hospital.     Patient on SSI at NH.    -LDSSI  -Glucerna for tube feeds

## 2024-02-17 LAB
ALBUMIN SERPL BCP-MCNC: 2.3 G/DL (ref 3.5–5.2)
ALP SERPL-CCNC: 45 U/L (ref 55–135)
ALT SERPL W/O P-5'-P-CCNC: 8 U/L (ref 10–44)
ANION GAP SERPL CALC-SCNC: 7 MMOL/L (ref 8–16)
AST SERPL-CCNC: 12 U/L (ref 10–40)
BASOPHILS # BLD AUTO: 0.09 K/UL (ref 0–0.2)
BASOPHILS NFR BLD: 0.5 % (ref 0–1.9)
BILIRUB SERPL-MCNC: 0.4 MG/DL (ref 0.1–1)
BUN SERPL-MCNC: 27 MG/DL (ref 8–23)
CALCIUM SERPL-MCNC: 8.7 MG/DL (ref 8.7–10.5)
CHLORIDE SERPL-SCNC: 107 MMOL/L (ref 95–110)
CO2 SERPL-SCNC: 33 MMOL/L (ref 23–29)
CREAT SERPL-MCNC: 0.8 MG/DL (ref 0.5–1.4)
DIFFERENTIAL METHOD BLD: ABNORMAL
EOSINOPHIL # BLD AUTO: 0 K/UL (ref 0–0.5)
EOSINOPHIL NFR BLD: 0.1 % (ref 0–8)
ERYTHROCYTE [DISTWIDTH] IN BLOOD BY AUTOMATED COUNT: 15.6 % (ref 11.5–14.5)
EST. GFR  (NO RACE VARIABLE): >60 ML/MIN/1.73 M^2
GLUCOSE SERPL-MCNC: 132 MG/DL (ref 70–110)
HCT VFR BLD AUTO: 25 % (ref 40–54)
HGB BLD-MCNC: 8 G/DL (ref 14–18)
IMM GRANULOCYTES # BLD AUTO: 0.17 K/UL (ref 0–0.04)
IMM GRANULOCYTES NFR BLD AUTO: 0.9 % (ref 0–0.5)
INFLUENZA A, MOLECULAR: NOT DETECTED
INFLUENZA B, MOLECULAR: NOT DETECTED
LYMPHOCYTES # BLD AUTO: 2.9 K/UL (ref 1–4.8)
LYMPHOCYTES NFR BLD: 15.6 % (ref 18–48)
MAGNESIUM SERPL-MCNC: 1.9 MG/DL (ref 1.6–2.6)
MCH RBC QN AUTO: 30.4 PG (ref 27–31)
MCHC RBC AUTO-ENTMCNC: 32 G/DL (ref 32–36)
MCV RBC AUTO: 95 FL (ref 82–98)
MONOCYTES # BLD AUTO: 1.8 K/UL (ref 0.3–1)
MONOCYTES NFR BLD: 9.5 % (ref 4–15)
NEUTROPHILS # BLD AUTO: 13.7 K/UL (ref 1.8–7.7)
NEUTROPHILS NFR BLD: 73.4 % (ref 38–73)
NRBC BLD-RTO: 0 /100 WBC
PHOSPHATE SERPL-MCNC: 2.3 MG/DL (ref 2.7–4.5)
PLATELET # BLD AUTO: 482 K/UL (ref 150–450)
PMV BLD AUTO: 9.1 FL (ref 9.2–12.9)
POCT GLUCOSE: 124 MG/DL (ref 70–110)
POCT GLUCOSE: 124 MG/DL (ref 70–110)
POCT GLUCOSE: 135 MG/DL (ref 70–110)
POCT GLUCOSE: 149 MG/DL (ref 70–110)
POCT GLUCOSE: 168 MG/DL (ref 70–110)
POCT GLUCOSE: 186 MG/DL (ref 70–110)
POTASSIUM SERPL-SCNC: 3.6 MMOL/L (ref 3.5–5.1)
PROT SERPL-MCNC: 5.9 G/DL (ref 6–8.4)
RBC # BLD AUTO: 2.63 M/UL (ref 4.6–6.2)
RSV AG BY MOLECULAR METHOD: NOT DETECTED
SARS-COV-2 RNA RESP QL NAA+PROBE: NOT DETECTED
SODIUM SERPL-SCNC: 147 MMOL/L (ref 136–145)
WBC # BLD AUTO: 18.62 K/UL (ref 3.9–12.7)

## 2024-02-17 PROCEDURE — 25000003 PHARM REV CODE 250

## 2024-02-17 PROCEDURE — 27100171 HC OXYGEN HIGH FLOW UP TO 24 HOURS

## 2024-02-17 PROCEDURE — 63600175 PHARM REV CODE 636 W HCPCS

## 2024-02-17 PROCEDURE — 0241U SARS-COV2 (COVID) WITH FLU/RSV BY PCR: CPT

## 2024-02-17 PROCEDURE — 25000003 PHARM REV CODE 250: Performed by: HOSPITALIST

## 2024-02-17 PROCEDURE — 99233 SBSQ HOSP IP/OBS HIGH 50: CPT | Mod: ,,, | Performed by: STUDENT IN AN ORGANIZED HEALTH CARE EDUCATION/TRAINING PROGRAM

## 2024-02-17 PROCEDURE — 85025 COMPLETE CBC W/AUTO DIFF WBC: CPT | Performed by: HOSPITALIST

## 2024-02-17 PROCEDURE — 63600175 PHARM REV CODE 636 W HCPCS: Performed by: STUDENT IN AN ORGANIZED HEALTH CARE EDUCATION/TRAINING PROGRAM

## 2024-02-17 PROCEDURE — 63600175 PHARM REV CODE 636 W HCPCS: Performed by: HOSPITALIST

## 2024-02-17 PROCEDURE — 25000003 PHARM REV CODE 250: Performed by: INTERNAL MEDICINE

## 2024-02-17 PROCEDURE — 94761 N-INVAS EAR/PLS OXIMETRY MLT: CPT

## 2024-02-17 PROCEDURE — 83735 ASSAY OF MAGNESIUM: CPT | Performed by: HOSPITALIST

## 2024-02-17 PROCEDURE — 94640 AIRWAY INHALATION TREATMENT: CPT

## 2024-02-17 PROCEDURE — 27000207 HC ISOLATION

## 2024-02-17 PROCEDURE — 21400001 HC TELEMETRY ROOM

## 2024-02-17 PROCEDURE — 31720 CLEARANCE OF AIRWAYS: CPT

## 2024-02-17 PROCEDURE — 25000242 PHARM REV CODE 250 ALT 637 W/ HCPCS

## 2024-02-17 PROCEDURE — 25000003 PHARM REV CODE 250: Performed by: STUDENT IN AN ORGANIZED HEALTH CARE EDUCATION/TRAINING PROGRAM

## 2024-02-17 PROCEDURE — A4216 STERILE WATER/SALINE, 10 ML: HCPCS | Performed by: INTERNAL MEDICINE

## 2024-02-17 PROCEDURE — 80053 COMPREHEN METABOLIC PANEL: CPT | Performed by: HOSPITALIST

## 2024-02-17 PROCEDURE — 63600175 PHARM REV CODE 636 W HCPCS: Performed by: INTERNAL MEDICINE

## 2024-02-17 PROCEDURE — 99900035 HC TECH TIME PER 15 MIN (STAT)

## 2024-02-17 PROCEDURE — 84100 ASSAY OF PHOSPHORUS: CPT | Performed by: HOSPITALIST

## 2024-02-17 PROCEDURE — 36415 COLL VENOUS BLD VENIPUNCTURE: CPT | Performed by: HOSPITALIST

## 2024-02-17 RX ORDER — GUAIFENESIN 100 MG/5ML
200 SOLUTION ORAL EVERY 6 HOURS
Status: DISCONTINUED | OUTPATIENT
Start: 2024-02-17 | End: 2024-02-20

## 2024-02-17 RX ORDER — ALBUTEROL SULFATE 2.5 MG/.5ML
2.5 SOLUTION RESPIRATORY (INHALATION) EVERY 4 HOURS PRN
Status: DISCONTINUED | OUTPATIENT
Start: 2024-02-17 | End: 2024-02-23 | Stop reason: HOSPADM

## 2024-02-17 RX ADMIN — FLUCONAZOLE 400 MG: 2 INJECTION, SOLUTION INTRAVENOUS at 03:02

## 2024-02-17 RX ADMIN — ATORVASTATIN CALCIUM 80 MG: 40 TABLET, FILM COATED ORAL at 08:02

## 2024-02-17 RX ADMIN — POTASSIUM PHOSPHATE, MONOBASIC AND POTASSIUM PHOSPHATE, DIBASIC 15 MMOL: 224; 236 INJECTION, SOLUTION, CONCENTRATE INTRAVENOUS at 09:02

## 2024-02-17 RX ADMIN — VANCOMYCIN HYDROCHLORIDE 125 MG: KIT at 12:02

## 2024-02-17 RX ADMIN — MEROPENEM 1 G: 1 INJECTION INTRAVENOUS at 02:02

## 2024-02-17 RX ADMIN — Medication 10 ML: at 12:02

## 2024-02-17 RX ADMIN — ONDANSETRON 8 MG: 2 INJECTION INTRAMUSCULAR; INTRAVENOUS at 06:02

## 2024-02-17 RX ADMIN — Medication 10 ML: at 06:02

## 2024-02-17 RX ADMIN — VANCOMYCIN HYDROCHLORIDE 1250 MG: 1.25 INJECTION, POWDER, LYOPHILIZED, FOR SOLUTION INTRAVENOUS at 12:02

## 2024-02-17 RX ADMIN — VANCOMYCIN HYDROCHLORIDE 125 MG: KIT at 05:02

## 2024-02-17 RX ADMIN — MUPIROCIN: 20 OINTMENT TOPICAL at 10:02

## 2024-02-17 RX ADMIN — GUAIFENESIN 200 MG: 200 SOLUTION ORAL at 12:02

## 2024-02-17 RX ADMIN — BUSPIRONE HYDROCHLORIDE 7.5 MG: 5 TABLET ORAL at 09:02

## 2024-02-17 RX ADMIN — VANCOMYCIN HYDROCHLORIDE 1250 MG: 1.25 INJECTION, POWDER, LYOPHILIZED, FOR SOLUTION INTRAVENOUS at 01:02

## 2024-02-17 RX ADMIN — MUPIROCIN: 20 OINTMENT TOPICAL at 08:02

## 2024-02-17 RX ADMIN — ALBUTEROL SULFATE 2.5 MG: 2.5 SOLUTION RESPIRATORY (INHALATION) at 05:02

## 2024-02-17 RX ADMIN — FAMOTIDINE 40 MG: 20 TABLET, FILM COATED ORAL at 09:02

## 2024-02-17 RX ADMIN — SODIUM CHLORIDE, POTASSIUM CHLORIDE, SODIUM LACTATE AND CALCIUM CHLORIDE 1000 ML: 600; 310; 30; 20 INJECTION, SOLUTION INTRAVENOUS at 01:02

## 2024-02-17 RX ADMIN — ASPIRIN 81 MG CHEWABLE TABLET 81 MG: 81 TABLET CHEWABLE at 09:02

## 2024-02-17 RX ADMIN — Medication 6 MG: at 08:02

## 2024-02-17 RX ADMIN — MEROPENEM 1 G: 1 INJECTION INTRAVENOUS at 06:02

## 2024-02-17 RX ADMIN — Medication 10 ML: at 05:02

## 2024-02-17 RX ADMIN — SERTRALINE HYDROCHLORIDE 25 MG: 25 TABLET ORAL at 09:02

## 2024-02-17 RX ADMIN — MEROPENEM 1 G: 1 INJECTION INTRAVENOUS at 09:02

## 2024-02-17 RX ADMIN — GUAIFENESIN 200 MG: 200 SOLUTION ORAL at 08:02

## 2024-02-17 RX ADMIN — ENOXAPARIN SODIUM 40 MG: 40 INJECTION SUBCUTANEOUS at 06:02

## 2024-02-17 RX ADMIN — VANCOMYCIN HYDROCHLORIDE 125 MG: KIT at 06:02

## 2024-02-17 NOTE — ASSESSMENT & PLAN NOTE
ID following for concerns of infectious diarrhea given leukocytosis on labs and reported diarrhea    Per ID recs, started on PO Vancomycin, can switch to Metronidazole if unable to tolerate PO.   C diff not sent     Neurological Follow-up    This is a 50-year-old male with the complaint of painful paresthesias of the hands and feet that are aggravated by exposure to cold. Nerve conduction studies revealed a mild left peroneal neuropathy left ulnar neuropathy with entrapment at the elbow and bilateral wrist subdural carpal tunnel syndrome. Serologic mastication of polyneuropathy has been negative except for the finding of positive Sjogren antibodies on on 2 occasions serum B12 level was 221 pg per DL.    At today's examination he has continued to complain of painful paresthesias of the extremities. He denies weakness of the limbs. He has been scheduled to see a rheumatologist this month. He denies other neurologic symptoms.    Past medical history includes depression and bilateral carpal tunnel release.    Medications include fluoxetine oxycodone and ropinirole gabapentin amitriptyline    Visit Vitals  /80 (BP Location: [facility] [place]: Sitting)   Pulse 100   Wt 55.5 kg   BMI 17.82 kg/m²     Examination :    Postural tremor of both hands was present to was moderate on the right and mild on the left. Light touch perception appears to be impaired at the distal palmar surfaces of the left thumb right thumb right ring finger and right second and third fingers. The sensory examination  was otherwise normal. There was no limb weakness. The head was normal in appearance the neck was supple and without bruit. Full motion of the eyes face jaws pharynx and tongue was present. The pupils were equal and reactive to light. The tendon reflexes were symmetric and within normal limits. The finger to nose and heel to shin tests were normal. Station was normal. Gait was normal. Tandem gait was normal. Speech was normal. The language functions of spontaneous expression and repetition naming and comprehension were intact. This patient was oriented in 3 spheres. Recent memory was intact.    Diagnosis:    This patient complains of intense  and painful paresthesias of the extremities neuropathy is suspected. There is essential tremor and very low serum B12 levels.    Recommendations :    Supplemental B12 was prescribed at 500 µg per day. Propranolol 40 mg daily was  prescribed for tremor. He will be seen by a rheumatologist regarding positive Sjogren antibodies. Serum Lyme disease screen and immunofixation electrophoresis were ordered. He is to return to this clinic for follow-up.

## 2024-02-17 NOTE — ASSESSMENT & PLAN NOTE
Patient with Hypoxic Respiratory failure which is Acute.  he is not on home oxygen. Supplemental oxygen was provided and noted-      .   Signs/symptoms of respiratory failure include- tachypnea. Contributing diagnoses includes - Aspiration and Pneumonia Labs and images were reviewed. Patient Has not had a recent ABG. Will treat underlying causes and adjust management of respiratory failure as follows-     On antibiotics for aspiration/pneumonia/sepsis coverage  CXR[2/15] nonacute  CT Chest 2/16: Atelectasis with small right pleural effusion.   Wean supplemental oxygen as tolerated to target SaO2>90%  Duonebs PRN and mucinex for accompanying rhinorrhea

## 2024-02-17 NOTE — SUBJECTIVE & OBJECTIVE
Interval History: No fevers documented overnight. No further episodes of diarrhea. Hg noted, received transfusion.     Review of Systems   Constitutional:  Negative for chills and fever.   Respiratory:  Negative for shortness of breath.    Gastrointestinal:  Negative for abdominal pain, diarrhea, nausea and vomiting.   All other systems reviewed and are negative.    Objective:     Vital Signs (Most Recent):  Temp: 98.1 °F (36.7 °C) (02/17/24 1206)  Pulse: 97 (02/17/24 1206)  Resp: 18 (02/17/24 1206)  BP: (!) 147/72 (02/17/24 1206)  SpO2: 100 % (02/17/24 1206) Vital Signs (24h Range):  Temp:  [97.7 °F (36.5 °C)-98.5 °F (36.9 °C)] 98.1 °F (36.7 °C)  Pulse:  [] 97  Resp:  [13-18] 18  SpO2:  [97 %-100 %] 100 %  BP: (110-154)/(55-82) 147/72     Weight: 79.6 kg (175 lb 8 oz)  Body mass index is 22.53 kg/m².    Estimated Creatinine Clearance: 99.5 mL/min (based on SCr of 0.8 mg/dL).     Physical Exam  Constitutional:       General: He is not in acute distress.     Appearance: He is ill-appearing.   HENT:      Nose:      Comments: nc     Mouth/Throat:      Comments: thrush  Eyes:      General:         Right eye: No discharge.         Left eye: No discharge.   Pulmonary:      Effort: Pulmonary effort is normal. No respiratory distress.   Abdominal:      General: There is no distension.      Palpations: Abdomen is soft.      Tenderness: There is no abdominal tenderness. There is no guarding.      Comments: PEG  Abdominal drains - minimal output   Genitourinary:     Comments: pacheco  Musculoskeletal:      Right lower leg: No edema.      Left lower leg: No edema.   Skin:     General: Skin is warm and dry.   Neurological:      Mental Status: He is alert.          Significant Labs:   Microbiology Results (last 7 days)       Procedure Component Value Units Date/Time    Blood culture [[id number]] Collected: 02/15/24 1830    Order Status: Completed Specimen: Blood from Peripheral, Hand, Right Updated: 02/16/24 2012     Blood  Culture, Routine No Growth to date      No Growth to date    Clostridium difficile EIA [[id number]]     Order Status: No result Specimen: Stool     Clostridium difficile EIA [[id number]]     Order Status: Canceled Specimen: Stool     Clostridium difficile EIA [[id number]] Collected: 02/15/24 1835    Order Status: Canceled Specimen: Stool     Blood culture [[id number]] Collected: 02/15/24 1830    Order Status: Canceled Specimen: Blood from Peripheral, Hand, Right     Culture, Anaerobe [[id number]] Collected: 02/09/24 0914    Order Status: Completed Specimen: Body Fluid from Abdomen Updated: 02/15/24 1122     Anaerobic Culture No anaerobes isolated    Fungus culture [[id number]] Collected: 02/09/24 0914    Order Status: Completed Specimen: Body Fluid from Abdomen Updated: 02/14/24 0545     Fungus (Mycology) Culture Culture in progress    AFB Culture & Smear [[id number]] Collected: 02/09/24 0914    Order Status: Completed Specimen: Body Fluid from Abdomen Updated: 02/12/24 1416     AFB Culture & Smear Culture in progress     AFB CULTURE STAIN No acid fast bacilli seen.    Aerobic culture [[id number]] Collected: 02/09/24 0914    Order Status: Completed Specimen: Abscess from Abdomen Updated: 02/12/24 0750     Aerobic Bacterial Culture No growth            Significant Imaging: I have reviewed all pertinent imaging results/findings within the past 24 hours.

## 2024-02-17 NOTE — NURSING
Blood transfusion started @ 2237 hrs, vital signs stable, no signs or symptoms for allergic reaction.

## 2024-02-17 NOTE — NURSING
PEG tube clamped at this time per MD orders. Pt resting comfortably at this time, will monitor for N/V.

## 2024-02-17 NOTE — NURSING
Blood transfusion finished, patient did not present any signs or symptoms of allergic reaction, informed doctor. CBC ordered for later this am.

## 2024-02-17 NOTE — CARE UPDATE
RAPID RESPONSE NURSE PROACTIVE ROUNDING NOTE       Time of Visit: 0505    Admit Date: 2024  LOS: 13  Code Status: Full Code   Date of Visit: 2024  : 1955  Age: 68 y.o.  Sex: male  Race: White  [facility]:   MRN: [id number]  Was the patient discharged from an ICU this admission? No   Was the patient discharged from a PACU within last 24 hours? No   Did the patient receive conscious sedation/general anesthesia in last 24 hours? No  Was the patient in the ED within the past 24 hours? No  Was the patient on NIPPV within the past 24 hours? No   Attending Physician: Bhumi Joshua*  [place] [facility] [facility]   Time spent at the bedside: 15 -30 min    SITUATION    Notified by charge RN via phone call.  Reason for alert: Increase Oxygen requirements   Called to evaluate the patient for Respiratory    BACKGROUND     Why is the patient in the hospital?: Common bile duct (CBD) obstruction    Patient has a past medical history of CVA (cerebral vascular accident), DM2 (diabetes mellitus, type 2), Dysphagia, HLD (hyperlipidemia), and HTN (hypertension).    Last Vitals:  Temp: 97.9 °F (36.6 °C) (1537)  Pulse: 81 (1723)  Resp: 16 (1723)  BP: 137/66 (1537)  SpO2: 99 % (1723)    24 Hours Vitals Range:  Temp:  [97.7 °F (36.5 °C)-98.5 °F (36.9 °C)]   Pulse:  []   Resp:  [13-18]   BP: (110-147)/(55-82)   SpO2:  [96 %-100 %]     Labs:  Recent Labs     24  0418 24  1524 24  0313   WBC 27.23* 19.46* 18.62*   HGB 7.1* 6.2* 8.0*   HCT 22.2* 20.1* 25.0*   * 577* 482*       Recent Labs     02/15/24  1411 24  0418 24  1725 24  0313    141 142 147*   K 3.7 3.0* 3.2* 3.6    107 105 107   CO2 23 24 29 33*   BUN 36* 35* 32* 27*   CREATININE 0.8 0.8 0.8 0.8   * 193* 181* 132*   PHOS 2.9 3.3  --  2.3*   MG 1.3* 2.5  --  1.9        Recent Labs     02/15/24  5802   PH 7.535*   PCO2 30.7*   PO2 49*   HCO3 26.0    POCSATURATED 89   BE 3*        ASSESSMENT    Physical Exam  Vitals and nursing note reviewed.   Constitutional:       Appearance: He is ill-appearing.   Cardiovascular:      Rate and Rhythm: Tachycardia present.   Pulmonary:      Breath sounds: Wheezing present.   Neurological:      Mental Status: He is alert. Mental status is at baseline.     Patient in bed on 8 L NC SpO2 90%. Prior patient SpO2 97% on 1L NC. Able to here patient wheezing from door. RRRT NT suction and patient immediately could breath easier. Also given PRN breathing treatment. Patient returned to 1 L NC SpO2 of 98%    INTERVENTIONS    The patient was seen for Respiratory problem. Staff concerns included oxygen saturation < 90% despite supplemental oxygen. The following interventions were performed: supplemental oxygen and PRN suctioning.    RECOMMENDATIONS    -PRN Nt suctioning and CPT   -Maintain IV access  -Continuous telemetry and SpO2     PROVIDER ESCALATION    Yes/No  No        Disposition: Remain in room 551.    FOLLOW-UP    bedside Kaylie MCGRATH  updated on plan of care. Instructed to call the Rapid Response Nurse, Kwadwo Yu RN at [phone] for additional questions or concerns.

## 2024-02-17 NOTE — CARE UPDATE
RAPID RESPONSE NURSE ROUND       Rounding completed with charge RNDameon. No concerns verbalized at this time. Patient receiving PRBCs. Instructed to call [phone] for further concerns or assistance.

## 2024-02-17 NOTE — ASSESSMENT & PLAN NOTE
-Patient's with Normocytic anemia..   -Etiology likely due to chronic disease .  -Current CBC reviewed-    Recent Labs   Lab 02/16/24  0418 02/16/24  1524 02/17/24  0313   HGB 7.1* 6.2* 8.0*           Component Value Date/Time    MCV 95 02/17/2024 0313    RDW 15.6 (H) 02/17/2024 0313    IRON 12 (L) 02/07/2024 0223    TIBC 195 (L) 02/07/2024 0223    FERRITIN 1,256 (H) 02/07/2024 0223         PLAN  - Monitor serial CBC and transfuse if patient becomes hemodynamically unstable, symptomatic or H/H drops below 7/21.  - Etiology consistent with anemia of chronic disease. Will CTM.  - S/P 1 uPRBCs on 2/16

## 2024-02-17 NOTE — ASSESSMENT & PLAN NOTE
Current CBC reviewed-   Lab Results   Component Value Date    HGB 8.0 (L) 02/17/2024    HCT 25.0 (L) 02/17/2024       -Downtrending Hgb overnight 2/15-2/16, however patient declined blood transfusion overnight. Repeat CBC in afternoon 2/16/24 was Hgb 6.2, and patient was amenable to transfusion. No reported hematuria, hemoptysis, hematochezia or other source of bleeding.     PLAN:  -Consent obtained, transfusion of 1u PRBC on 2/16  -Monitor serial CBC and transfuse if patient becomes hemodynamically unstable, symptomatic or H/H drops below 7/21.

## 2024-02-17 NOTE — SUBJECTIVE & OBJECTIVE
Interval History: No acute events overnight. Tachycardia improved with highest heart rate overnight at 109. After blood transfusion, patients Hgb at 8.0. Troponins not elevated. He put out over 1.2L from his gastric tube and reports no current nausea or vomiting. He had no bowel movements overnight and nursing staff reporting that he does not meet requirements for c diff testing. He still has cough and new runny nose. Patient started on PRN breathing treatments and scheduled Mucinex. RUQ/RLQ drain output not charted, but minimal output in bulbs today at bedside. Will continue broad spectrum antibiotics and continue to follow cultures.     Review of Systems  Objective:     Vital Signs (Most Recent):  Temp: 98.1 °F (36.7 °C) (02/17/24 1206)  Pulse: 97 (02/17/24 1206)  Resp: 18 (02/17/24 1206)  BP: (!) 147/72 (02/17/24 1206)  SpO2: 100 % (02/17/24 1206) Vital Signs (24h Range):  Temp:  [97.7 °F (36.5 °C)-98.5 °F (36.9 °C)] 98.1 °F (36.7 °C)  Pulse:  [] 97  Resp:  [13-18] 18  SpO2:  [97 %-100 %] 100 %  BP: (110-154)/(55-82) 147/72     Weight: 79.6 kg (175 lb 8 oz)  Body mass index is 22.53 kg/m².    Intake/Output Summary (Last 24 hours) at 2/17/2024 1308  Last data filed at 2/17/2024 1045  Gross per 24 hour   Intake 448.75 ml   Output 2275 ml   Net -1826.25 ml         Physical Exam  Vitals and nursing note reviewed.   Constitutional:       General: He is not in acute distress.     Appearance: He is well-developed. He is ill-appearing. He is not diaphoretic.   HENT:      Head: Normocephalic and atraumatic.      Nose: Rhinorrhea present.      Mouth/Throat:      Comments: Oral thrush  Eyes:      General: No scleral icterus.        Right eye: No discharge.         Left eye: No discharge.   Cardiovascular:      Rate and Rhythm: Regular rhythm. Tachycardia present.   Pulmonary:      Effort: Pulmonary effort is normal. No respiratory distress.      Breath sounds: Wheezing present.      Comments: On 2L NC  Abdominal:       General: Abdomen is flat. There is no distension.      Palpations: Abdomen is soft.      Tenderness: There is abdominal tenderness (mild) in the epigastric area. There is no guarding or rebound.      Comments: -2 RUQ drains present with minimal output  -PEG tube present and draining clear colored, hazy fluid  -Abdominal binder in place   Musculoskeletal:      Right lower leg: No edema.      Left lower leg: No edema.   Skin:     General: Skin is dry.      Coloration: Skin is not jaundiced or pale.      Findings: No erythema.   Neurological:      Mental Status: He is alert. Mental status is at baseline.             Significant Labs: All pertinent labs within the past 24 hours have been reviewed.  CBC:   Recent Labs   Lab 02/16/24  0418 02/16/24  1524 02/17/24  0313   WBC 27.23* 19.46* 18.62*   HGB 7.1* 6.2* 8.0*   HCT 22.2* 20.1* 25.0*   * 577* 482*     CMP:   Recent Labs   Lab 02/15/24  1411 02/16/24  0418 02/16/24  1725 02/17/24  0313    141 142 147*   K 3.7 3.0* 3.2* 3.6    107 105 107   CO2 23 24 29 33*   * 193* 181* 132*   BUN 36* 35* 32* 27*   CREATININE 0.8 0.8 0.8 0.8   CALCIUM 8.4* 9.0 8.4* 8.7   PROT 6.2 6.4  --  5.9*   ALBUMIN 2.2* 2.3*  --  2.3*   BILITOT 0.3 0.2  --  0.4   ALKPHOS 53* 55  --  45*   AST 14 13  --  12   ALT 10 10  --  8*   ANIONGAP 10 10 8 7*       Significant Imaging: I have reviewed all pertinent imaging results/findings within the past 24 hours.

## 2024-02-17 NOTE — ASSESSMENT & PLAN NOTE
68M with hx CVA, dysphagia, PEG tube, HTN, transferred from [facility] after presenting with acute cholecystitis on 1/30, lap [place] on 2/1 c/b perforated necrotic gallbladder with stone spillage and obstructing CBD stone. He was treated with meropenem prior to transfer. MRCP noted complex collection within gallbladder fossa and gallstones posterior to the liver. He underwent ERCP on 2/6 with complete removal of obstructing stones. He underwent IR drain placement on 2/9 with negative cultures, however cell count appears infected w/ 20k WBC  and 99% segs. Cultures from [facility] (blood and urine) are negative. Concerns for potential aspiration, uptrending leukocytosis over the past 48h, now trending down. Also noted to have low Hg, ?inflammatory response. No further episodes of diarrhea and repeat CT without new fluid collection.     Recommendations:   - continue empiric vanc/manpreet pending cx data, if blcx remain ngtd, can likely deescalate further tomorrow  - continue fluconazole for oral thrush and CT w/ esophagitis  -f/u cx

## 2024-02-17 NOTE — ASSESSMENT & PLAN NOTE
"Patient found to have small pleural effusion on imaging [MRCP(02/06/24)"Small right-sided pleural effusion.  Trace left-sided pleural effusion"]. Most likely etiology includes  limited mobility due to recent history of hospitalization for cholecystectomy . Management to include  monitoring at this time    CT Chest 2/16: Atelectasis with small right pleural effusion.     "

## 2024-02-17 NOTE — NURSING
"Pt called nursing to room with increased cough and breathing discomfort. Cough sounds very congested and pt seems unable to clear the secretions. Pt sat up straight in bed and O2 sats checked and pt down to 89-90% on 1L NC. Increased to 2L pt at 95% but still unable to clear secretions. RR team called to come assess pt; RT called for PRN neb and Dr. Wilson with hospital medicine notified. RR nurse and resp therapist at bedside. RT beginning deep suctioning after getting pt's agreement and understanding.    Pt's cough much dryer now and pt reports feeling "much better" after deep suctioning.  RT Ebrenice at bedside to initiate PRN neb.   Pt now resting comfortably and O2 sats back to 99% on 2L NC. Will continue to monitor.   "

## 2024-02-17 NOTE — PROGRESS NOTES
[facility] - Surgery  [facility] Medicine  Progress Note    Patient Name: Seymour Velazquez  MRN: [id number]  Patient Class: IP- Inpatient   Admission Date: 2/4/2024  Length of Stay: 13 days  Attending Physician: Bhumi Joshua*  Primary Care Provider: Eliazar Rosas MD        Subjective:     Principal Problem:Common bile duct (CBD) obstruction        HPI:  68M w/PMH stroke with G-tube placement, hypertension, diabetes, hyperlipidemia, recent cholecystitis, and dysphagia presents as transfer from OSH for AES eval. He initially presented from NH with leukocytosis w/o specific symptoms, imaging showed cholecystitis. Underwent lap irlanda 2/1, the operative report noted the gallbladder was necrotic and fell apart. The back wall of the gallbladder had an abscess that was perforated. There were spilled stones and purulent bile. A cholangiogram showed patent common duct with single stone at the ampulla that appears too large to pass spontaneously. Patient is transferred to C for possible ERCP.     On arrival, patient reports some abdominal pain, denies N/V/D. He is AFVSS. Admitted to  for further management.     Overview/Hospital Course:  Patient admitted to Hospital Medicine service for medical management and evaluation of suspected CBD obstruction following complicated laparoscopic cholecystectomy. AES was consulted on admission with recommendation of MRCP. MRCP w/ and w/o contrast currently pending. ID was consulted with continuation of Merrem from outside hospital. Merrem de-escalated to Rocephin/Flagyl per ID recs. List of home medications was obtained from home facility and were continued as appropriate. MRCP[02/06/24] noted fluid collection in the gallbladder fossa, concerning for  abscess vs. biloma. Also noted choledocholithiasis with gallstones posterior to the liver. AES performed ERCP 02/06/24 with removal of choledocolithiasis  by biliary sphincterotomy and balloon extraction. General Surgery  consult noted no indication surgical intervention at the time, but can consider IR drainage of fluid collection if clinical status worsens. Intermittent episodes of N/V with associated tachycardia throughout hospitalization, without acute abdomen. Unable to complete IR drainage of fluid collection on 2/7 2/2 N/V. Scheduling antiemetics for better control. Repeat attempt at fluid drainage and drain placement with IR successful without complication morning of 2/9. Slowly evolving AURORA was not responsive to increased IVF resuscitation; urine studies inconsistent with pre-renal etiology. Consulted Nephrology for further assistance. Patient had a retroperitoneal ultrasound showing mild bilateral hydronephrosis and a distended bladder. Rosario catheter was placed on 2/11 and patient put out almost 1L with catheter insertion. SLP evaluated patient and he was started on pleasure feeds in addition to his tube feeds.     Course complicated by Increased nausea/vomiting caused PEG tube to dislodge, so replaced by GI and confirmed via imaging. Abdominal binder placed.  Increasing concerns for sepsis with worsening vitals/labs, so broadened antibiotics coverage with Meropenem and PO Vancomycin per ID recs. CT Chest/Abdomen/Pelvis with contrast[2/16] noted decreased size of gallbladder fossa fluid collection and no new focal fluid collections. Stomach distended with air and fluid on CT imaging so PEG tube feedings held. Given 1u pRbc for symptomatic anemia on 2/16/24.  Started fluconazole for oral thrush and CT w/  concerns for esophagitis.     Interval History: No acute events overnight. Tachycardia improved with highest heart rate overnight at 109. After blood transfusion, patients Hgb at 8.0. Troponins not elevated. He put out over 1.2L from his gastric tube and reports no current nausea or vomiting. He had no bowel movements overnight and nursing staff reporting that he does not meet requirements for c diff testing. He still has  cough and new runny nose. Patient started on PRN breathing treatments and scheduled Mucinex. RUQ/RLQ drain output not charted, but minimal output in bulbs today at bedside. Will continue broad spectrum antibiotics and continue to follow cultures.     Review of Systems  Objective:     Vital Signs (Most Recent):  Temp: 98.1 °F (36.7 °C) (02/17/24 1206)  Pulse: 97 (02/17/24 1206)  Resp: 18 (02/17/24 1206)  BP: (!) 147/72 (02/17/24 1206)  SpO2: 100 % (02/17/24 1206) Vital Signs (24h Range):  Temp:  [97.7 °F (36.5 °C)-98.5 °F (36.9 °C)] 98.1 °F (36.7 °C)  Pulse:  [] 97  Resp:  [13-18] 18  SpO2:  [97 %-100 %] 100 %  BP: (110-154)/(55-82) 147/72     Weight: 79.6 kg (175 lb 8 oz)  Body mass index is 22.53 kg/m².    Intake/Output Summary (Last 24 hours) at 2/17/2024 1308  Last data filed at 2/17/2024 1045  Gross per 24 hour   Intake 448.75 ml   Output 2275 ml   Net -1826.25 ml         Physical Exam  Vitals and nursing note reviewed.   Constitutional:       General: He is not in acute distress.     Appearance: He is well-developed. He is ill-appearing. He is not diaphoretic.   HENT:      Head: Normocephalic and atraumatic.      Nose: Rhinorrhea present.      Mouth/Throat:      Comments: Oral thrush  Eyes:      General: No scleral icterus.        Right eye: No discharge.         Left eye: No discharge.   Cardiovascular:      Rate and Rhythm: Regular rhythm. Tachycardia present.   Pulmonary:      Effort: Pulmonary effort is normal. No respiratory distress.      Breath sounds: Wheezing present.      Comments: On 2L NC  Abdominal:      General: Abdomen is flat. There is no distension.      Palpations: Abdomen is soft.      Tenderness: There is abdominal tenderness (mild) in the epigastric area. There is no guarding or rebound.      Comments: -2 RUQ drains present with minimal output  -PEG tube present and draining clear colored, hazy fluid  -Abdominal binder in place   Musculoskeletal:      Right lower leg: No edema.       Left lower leg: No edema.   Skin:     General: Skin is dry.      Coloration: Skin is not jaundiced or pale.      Findings: No erythema.   Neurological:      Mental Status: He is alert. Mental status is at baseline.             Significant Labs: All pertinent labs within the past 24 hours have been reviewed.  CBC:   Recent Labs   Lab 02/16/24  0418 02/16/24  1524 02/17/24  0313   WBC 27.23* 19.46* 18.62*   HGB 7.1* 6.2* 8.0*   HCT 22.2* 20.1* 25.0*   * 577* 482*     CMP:   Recent Labs   Lab 02/15/24  1411 02/16/24  0418 02/16/24  1725 02/17/24  0313    141 142 147*   K 3.7 3.0* 3.2* 3.6    107 105 107   CO2 23 24 29 33*   * 193* 181* 132*   BUN 36* 35* 32* 27*   CREATININE 0.8 0.8 0.8 0.8   CALCIUM 8.4* 9.0 8.4* 8.7   PROT 6.2 6.4  --  5.9*   ALBUMIN 2.2* 2.3*  --  2.3*   BILITOT 0.3 0.2  --  0.4   ALKPHOS 53* 55  --  45*   AST 14 13  --  12   ALT 10 10  --  8*   ANIONGAP 10 10 8 7*       Significant Imaging: I have reviewed all pertinent imaging results/findings within the past 24 hours.    Assessment/Plan:      * Common bile duct (CBD) obstruction  68M w/ recent CVA and residual dysphagia s/p PEG tube presents from OSH for retained gallstone in ampulla, s/p lap cholecystectomy in which the gallbladder was found necrotic and friable. His only symptom is abdominal pain. Labs show leukocytosis. He is transferred for AES eval and possible ERCP.     -MRCP[02/06/24] noted fluid collection in the gallbladder fossa, concerning for  abscess vs. biloma. Also noted choledocholithiasis with gallstones posterior to the liver.  -AES performed ERCP 02/06/24 with removal of choledocolithiasis by biliary sphincterotomy and balloon extraction.   -IR attempt at drainage of fluid collection [2/7/2024] unsuccessful 2/2 episode of N/V with associated tachycardia and hypertension  -IR reattempt at fluid drainage and drain placement [2/9/24] successful without complication. Drain in place with serosanguinous  fluid.  -General Surgery consult noted no indication for surgical intervention at the time  -CT[2/16/24] with decreased size of gallbladder fossa fluid collection and no new focal fluid collections       -Meropenem/IV & PO Vanc per ID recs. Can switch PO vanc to Metronidazole if unable to tolerate PO.    -Following cultures  -Fluid studies and cultures with no evidence of bacterial growth thus far  -Antiemetics PRN  - can switch to Metronidazole if unable to tolerate PO.        Esophagitis  CT Chest/Abdomen/Pelvis with contrast[2/16] noted concerns for esophagitis      PLAN:  Per ID recs, started fluconazole for oral thrush and CT w/  concerns for esophagitis        Diarrhea of presumed infectious origin  ID following for concerns of infectious diarrhea given leukocytosis on labs and reported diarrhea    Per ID recs, started on PO Vancomycin, can switch to Metronidazole if unable to tolerate PO.   C diff not sent      Hypokalemia  Patient has hypokalemia which is Acute . Most recent potassium levels reviewed-   Lab Results   Component Value Date    K 3.0 (L) 02/16/2024   . Will continue potassium replacement per protocol and recheck repeat levels after replacement completed.     Thrombocytosis  Unclear etiology of acute thrombocytosis, possibly reactive secondary to infection    Recent Labs     02/15/24  1411 02/15/24  2157 02/16/24  0418   * 625*  625* 619*       Trend on CBC  VTE ppx        Acute blood loss anemia  Current CBC reviewed-   Lab Results   Component Value Date    HGB 8.0 (L) 02/17/2024    HCT 25.0 (L) 02/17/2024       -Downtrending Hgb overnight 2/15-2/16, however patient declined blood transfusion overnight. Repeat CBC in afternoon 2/16/24 was Hgb 6.2, and patient was amenable to transfusion. No reported hematuria, hemoptysis, hematochezia or other source of bleeding.     PLAN:  -Consent obtained, transfusion of 1u PRBC on 2/16  -Monitor serial CBC and transfuse if patient becomes  "hemodynamically unstable, symptomatic or H/H drops below 7/21.    Acute hypoxemic respiratory failure  Patient with Hypoxic Respiratory failure which is Acute.  he is not on home oxygen. Supplemental oxygen was provided and noted-      .   Signs/symptoms of respiratory failure include- tachypnea. Contributing diagnoses includes - Aspiration and Pneumonia Labs and images were reviewed. Patient Has not had a recent ABG. Will treat underlying causes and adjust management of respiratory failure as follows-     On antibiotics for aspiration/pneumonia/sepsis coverage  CXR[2/15] nonacute  CT Chest 2/16: Atelectasis with small right pleural effusion.   Wean supplemental oxygen as tolerated to target SaO2>90%  Duonebs PRN and mucinex for accompanying rhinorrhea     Chronic diastolic heart failure  Last echo 2022            Gastrostomy tube dysfunction  Patient's PEG tube became occluded on 2/14 dislodged on 2/16 and was replaced by GI team. Radiology confirmed PEG placement by abdominal x-ray.    See "On tube feeding diet" A&P    Urinary retention  Patient found to have mild hydronephrosis and bladder distension on retroperitoneal ultrasound. Pacheco catheter was placed and patient had significant urine output and resolution of AURORA.    Pacheco catheter placed  Will need voiding trial or discharge with pacheco  Outpatient urology follow up       Tachycardia  Patient noted to have sinus tachycardia up to HR 140s in the setting of acute episodes of nausea and vomiting on 2/7/24. On 2/15/24 patient noted to be having tachycardia. Concern for possible new infection vs dehydration as his feeds were held pending PEG placement confirmation.    - ID following for sepsis concerns  - PRN antiemetics  - POCT glucose ordered      On tube feeding diet  Hx of PEG  tube placement s/p CVA. Tolerated slow basal rate feeds well thus far following fluid drainage. On bolus feeds prior to admission    PLAN:  CT Chest/Abdomen/Pelvis with contrast[2/16] " "noted stomach  distended with air and fluid on CT imaging so PEG tube feedings held and PEG tube to gravity.  -Nutrition consulted for tube feed recs. When resumed PO diet, Pleasure feeds of pureed foods and sips of thin liquids okay per SLP  -PEG occulded and exchanged by GI 2/14 and 2/15.   Care Instructions  - Flushes: flush PEG daily with 60 ml water  - Antibiotic ointment to site, clean site with soap and water daily and dry thoroughly and clean site daily with half-strength hydrogen peroxide for three days, then soap and water daily.  - Dressing: change dressing on top of bumper daily  -abdominal binder to prevent future dislodgement     Abdominal fluid collection  See "Common bile duct (CBD) obstruction " A&P        Choledocholithiasis  See "Common bile duct (CBD) obstruction " A&P        Bilateral pleural effusion  Patient found to have small pleural effusion on imaging [MRCP(02/06/24)"Small right-sided pleural effusion.  Trace left-sided pleural effusion"]. Most likely etiology includes  limited mobility due to recent history of hospitalization for cholecystectomy . Management to include  monitoring at this time    CT Chest 2/16: Atelectasis with small right pleural effusion.       Normocytic anemia  -Patient's with Normocytic anemia..   -Etiology likely due to chronic disease .  -Current CBC reviewed-    Recent Labs   Lab 02/16/24  0418 02/16/24  1524 02/17/24  0313   HGB 7.1* 6.2* 8.0*           Component Value Date/Time    MCV 95 02/17/2024 0313    RDW 15.6 (H) 02/17/2024 0313    IRON 12 (L) 02/07/2024 0223    TIBC 195 (L) 02/07/2024 0223    FERRITIN 1,256 (H) 02/07/2024 0223         PLAN  - Monitor serial CBC and transfuse if patient becomes hemodynamically unstable, symptomatic or H/H drops below 7/21.  - Etiology consistent with anemia of chronic disease. Will CTM.  - S/P 1 uPRBCs on 2/16    Cholecystitis  See "Common bile duct (CBD) obstruction " A&P        CVA (cerebral vascular accident)  Patient " is s/p CVA with residual dysphagia and weakness. He is s/p PEG tube and receives bolus tube feedings.         Type 2 diabetes mellitus  Patient's FSGs are controlled on current medication regimen.  Last A1c reviewed-   Lab Results   Component Value Date    HGBA1C 4.8 02/05/2024     Most recent fingerstick glucose reviewed-   Recent Labs   Lab 02/15/24  1617 02/16/24  0104 02/16/24  0604   POCTGLUCOSE 183* 216* 195*     Current correctional scale  Low  Maintain anti-hyperglycemic dose as follows-   Antihyperglycemics (From admission, onward)      Start     Stop Route Frequency Ordered    02/04/24 2059  insulin aspart U-100 pen 0-5 Units         -- SubQ Before meals & nightly PRN 02/04/24 2000          Hold Oral hypoglycemics while patient is in the hospital.     Patient on SSI at NH.    -LDSSI  -Glucerna for tube feeds    Primary hypertension  Patient is normotensive on arrival, per chart he is not currently on any antihypertensives.     Will utilize p.r.n. blood pressure medication only if patient's blood pressure greater than 180/110 and he develops symptoms such as worsening chest pain or shortness of breath.    -amlodipine 5mg. Hold for hypotension.  -Holding home lisinopril in the setting of AURORA during admission  -Will add PRN hydralazine for SBP>180 and symptomatic  -will consider uptitration of home regimen if pressures remain uncontrolled        VTE Risk Mitigation (From admission, onward)           Ordered     enoxaparin injection 40 mg  Every 24 hours         02/09/24 1408     IP VTE HIGH RISK PATIENT  Once         02/05/24 1410     Place sequential compression device  Until discontinued         02/04/24 2000                    Discharge Planning   PEYTON: 2/19/2024     Code Status: Full Code   Is the patient medically ready for discharge?: No    Reason for patient still in hospital (select all that apply): Treatment  Discharge Plan A: Return to nursing home                  David Poe MD  Department of  [facility] Medicine   [facility] - Surgery

## 2024-02-17 NOTE — PROGRESS NOTES
[facility] - Surgery  Infectious Disease  Progress Note    Patient Name: Seymour Velazquez  MRN: [id number]  Admission Date: 2/4/2024  Length of Stay: 13 days  Attending Physician: Bhumi Joshua*  Primary Care Provider: Eliazar Rosas MD    Isolation Status: Special Contact  Assessment/Plan:      GI  Cholecystitis  68M with hx CVA, dysphagia, PEG tube, HTN, transferred from [facility] after presenting with acute cholecystitis on 1/30, lap ir[place]a on 2/1 c/b perforated necrotic gallbladder with stone spillage and obstructing CBD stone. He was treated with meropenem prior to transfer. MRCP noted complex collection within gallbladder fossa and gallstones posterior to the liver. He underwent ERCP on 2/6 with complete removal of obstructing stones. He underwent IR drain placement on 2/9 with negative cultures, however cell count appears infected w/ 20k WBC  and 99% segs. Cultures from [facility] (blood and urine) are negative. Concerns for potential aspiration, uptrending leukocytosis over the past 48h, now trending down. Also noted to have low Hg, ?inflammatory response. No further episodes of diarrhea and repeat CT without new fluid collection.     Recommendations:   - continue empiric IV vancomycin pharm to dose and manpreet pending cx data, if blcx remain ngtd, can likely deescalate further tomorrow  - continue fluconazole for oral thrush and CT w/ esophagitis  -f/u cx          Thank you for your consult. I will follow-up with patient. Please contact us if you have any additional questions. Above d/w primary team.       Kaylie Dobson MD  Infectious Disease  [facility] - [facility]    Subjective:     Principal Problem:Common bile duct (CBD) obstruction    HPI: Mr Velazquez is a 68 year old male with history of CVA with dysphagia s/p PEG tube, HTN, HLD, DMII presents from OSH for AES eval of possible retained stone after cholecystectomy 02/01.    Patient initially presented from NH with  leukocytosis w/o specific symptoms, imaging showed cholecystitis. He underwent lap irlanda 2/1, the operative report noted the gallbladder was necrotic and fell apart. The back wall of the gallbladder had an abscess that was perforated. There were spilled stones and purulent bile. A cholangiogram showed patent common duct with single stone at the ampulla that appears too large to pass spontaneously. Patient transferred to [facility] for possible ERCP.  While at OSH patient was treated with empiric Meropenem. On arrival, patient reports mild abdominal pain, denies N/V/D. He is AFVSS.  No leukocytosis. LFTS WNL. ID consulted for recommendations.  Patient reports being told he had a PCN allergy as a child. Cannot recall specific reaction. Gi following. Ordered MRCP.  Interval History: No fevers documented overnight. No further episodes of diarrhea. Hg noted, received transfusion.     Review of Systems   Constitutional:  Negative for chills and fever.   Respiratory:  Negative for shortness of breath.    Gastrointestinal:  Negative for abdominal pain, diarrhea, nausea and vomiting.   All other systems reviewed and are negative.    Objective:     Vital Signs (Most Recent):  Temp: 98.1 °F (36.7 °C) (02/17/24 1206)  Pulse: 97 (02/17/24 1206)  Resp: 18 (02/17/24 1206)  BP: (!) 147/72 (02/17/24 1206)  SpO2: 100 % (02/17/24 1206) Vital Signs (24h Range):  Temp:  [97.7 °F (36.5 °C)-98.5 °F (36.9 °C)] 98.1 °F (36.7 °C)  Pulse:  [] 97  Resp:  [13-18] 18  SpO2:  [97 %-100 %] 100 %  BP: (110-154)/(55-82) 147/72     Weight: 79.6 kg (175 lb 8 oz)  Body mass index is 22.53 kg/m².    Estimated Creatinine Clearance: 99.5 mL/min (based on SCr of 0.8 mg/dL).     Physical Exam  Constitutional:       General: He is not in acute distress.     Appearance: He is ill-appearing.   HENT:      Nose:      Comments: nc     Mouth/Throat:      Comments: thrush  Eyes:      General:         Right eye: No discharge.         Left eye: No discharge.    Pulmonary:      Effort: Pulmonary effort is normal. No respiratory distress.   Abdominal:      General: There is no distension.      Palpations: Abdomen is soft.      Tenderness: There is no abdominal tenderness. There is no guarding.      Comments: PEG  Abdominal drains - minimal output   Genitourinary:     Comments: pacheco  Musculoskeletal:      Right lower leg: No edema.      Left lower leg: No edema.   Skin:     General: Skin is warm and dry.   Neurological:      Mental Status: He is alert.          Significant Labs:   Microbiology Results (last 7 days)       Procedure Component Value Units Date/Time    Blood culture [[id number]] Collected: 02/15/24 1830    Order Status: Completed Specimen: Blood from Peripheral, Hand, Right Updated: 02/16/24 2012     Blood Culture, Routine No Growth to date      No Growth to date    Clostridium difficile EIA [[id number]]     Order Status: No result Specimen: Stool     Clostridium difficile EIA [[id number]]     Order Status: Canceled Specimen: Stool     Clostridium difficile EIA [[id number]] Collected: 02/15/24 1835    Order Status: Canceled Specimen: Stool     Blood culture [[id number]] Collected: 02/15/24 1830    Order Status: Canceled Specimen: Blood from Peripheral, Hand, Right     Culture, Anaerobe [[id number]] Collected: 02/09/24 0914    Order Status: Completed Specimen: Body Fluid from Abdomen Updated: 02/15/24 1122     Anaerobic Culture No anaerobes isolated    Fungus culture [[id number]] Collected: 02/09/24 0914    Order Status: Completed Specimen: Body Fluid from Abdomen Updated: 02/14/24 0545     Fungus (Mycology) Culture Culture in progress    AFB Culture & Smear [[id number]] Collected: 02/09/24 0914    Order Status: Completed Specimen: Body Fluid from Abdomen Updated: 02/12/24 1416     AFB Culture & Smear Culture in progress     AFB CULTURE STAIN No acid fast bacilli seen.    Aerobic culture [[id number]] Collected: 02/09/24 0914    Order Status: Completed Specimen:  Abscess from Abdomen Updated: 02/12/24 0750     Aerobic Bacterial Culture No growth            Significant Imaging: I have reviewed all pertinent imaging results/findings within the past 24 hours.

## 2024-02-18 LAB
ALBUMIN SERPL BCP-MCNC: 2.2 G/DL (ref 3.5–5.2)
ALP SERPL-CCNC: 51 U/L (ref 55–135)
ALT SERPL W/O P-5'-P-CCNC: 7 U/L (ref 10–44)
ANION GAP SERPL CALC-SCNC: 8 MMOL/L (ref 8–16)
AST SERPL-CCNC: 11 U/L (ref 10–40)
BASOPHILS # BLD AUTO: 0.1 K/UL (ref 0–0.2)
BASOPHILS NFR BLD: 1 % (ref 0–1.9)
BILIRUB SERPL-MCNC: 0.2 MG/DL (ref 0.1–1)
BUN SERPL-MCNC: 13 MG/DL (ref 8–23)
CALCIUM SERPL-MCNC: 8.3 MG/DL (ref 8.7–10.5)
CHLORIDE SERPL-SCNC: 107 MMOL/L (ref 95–110)
CO2 SERPL-SCNC: 27 MMOL/L (ref 23–29)
CREAT SERPL-MCNC: 0.6 MG/DL (ref 0.5–1.4)
DIFFERENTIAL METHOD BLD: ABNORMAL
EOSINOPHIL # BLD AUTO: 0.1 K/UL (ref 0–0.5)
EOSINOPHIL NFR BLD: 0.6 % (ref 0–8)
ERYTHROCYTE [DISTWIDTH] IN BLOOD BY AUTOMATED COUNT: 16.3 % (ref 11.5–14.5)
EST. GFR  (NO RACE VARIABLE): >60 ML/MIN/1.73 M^2
GLUCOSE SERPL-MCNC: 93 MG/DL (ref 70–110)
HCT VFR BLD AUTO: 23.9 % (ref 40–54)
HGB BLD-MCNC: 7.6 G/DL (ref 14–18)
IMM GRANULOCYTES # BLD AUTO: 0.14 K/UL (ref 0–0.04)
IMM GRANULOCYTES NFR BLD AUTO: 1.4 % (ref 0–0.5)
LYMPHOCYTES # BLD AUTO: 2.5 K/UL (ref 1–4.8)
LYMPHOCYTES NFR BLD: 24.9 % (ref 18–48)
MAGNESIUM SERPL-MCNC: 1.4 MG/DL (ref 1.6–2.6)
MCH RBC QN AUTO: 30.4 PG (ref 27–31)
MCHC RBC AUTO-ENTMCNC: 31.8 G/DL (ref 32–36)
MCV RBC AUTO: 96 FL (ref 82–98)
MONOCYTES # BLD AUTO: 0.7 K/UL (ref 0.3–1)
MONOCYTES NFR BLD: 7.5 % (ref 4–15)
NEUTROPHILS # BLD AUTO: 6.3 K/UL (ref 1.8–7.7)
NEUTROPHILS NFR BLD: 64.6 % (ref 38–73)
NRBC BLD-RTO: 0 /100 WBC
PHOSPHATE SERPL-MCNC: 2.3 MG/DL (ref 2.7–4.5)
PLATELET # BLD AUTO: 432 K/UL (ref 150–450)
PMV BLD AUTO: 9.4 FL (ref 9.2–12.9)
POCT GLUCOSE: 100 MG/DL (ref 70–110)
POCT GLUCOSE: 108 MG/DL (ref 70–110)
POCT GLUCOSE: 127 MG/DL (ref 70–110)
POCT GLUCOSE: 91 MG/DL (ref 70–110)
POCT GLUCOSE: 95 MG/DL (ref 70–110)
POCT GLUCOSE: 96 MG/DL (ref 70–110)
POTASSIUM SERPL-SCNC: 3.7 MMOL/L (ref 3.5–5.1)
PROT SERPL-MCNC: 5.8 G/DL (ref 6–8.4)
RBC # BLD AUTO: 2.5 M/UL (ref 4.6–6.2)
SODIUM SERPL-SCNC: 142 MMOL/L (ref 136–145)
VANCOMYCIN TROUGH SERPL-MCNC: 25.6 UG/ML (ref 10–22)
WBC # BLD AUTO: 9.82 K/UL (ref 3.9–12.7)

## 2024-02-18 PROCEDURE — 99900035 HC TECH TIME PER 15 MIN (STAT)

## 2024-02-18 PROCEDURE — 85025 COMPLETE CBC W/AUTO DIFF WBC: CPT | Performed by: HOSPITALIST

## 2024-02-18 PROCEDURE — 27000221 HC OXYGEN, UP TO 24 HOURS

## 2024-02-18 PROCEDURE — 25000003 PHARM REV CODE 250

## 2024-02-18 PROCEDURE — 99233 SBSQ HOSP IP/OBS HIGH 50: CPT | Mod: ,,, | Performed by: STUDENT IN AN ORGANIZED HEALTH CARE EDUCATION/TRAINING PROGRAM

## 2024-02-18 PROCEDURE — 63600175 PHARM REV CODE 636 W HCPCS

## 2024-02-18 PROCEDURE — 25000003 PHARM REV CODE 250: Performed by: STUDENT IN AN ORGANIZED HEALTH CARE EDUCATION/TRAINING PROGRAM

## 2024-02-18 PROCEDURE — 80053 COMPREHEN METABOLIC PANEL: CPT | Performed by: HOSPITALIST

## 2024-02-18 PROCEDURE — 63600175 PHARM REV CODE 636 W HCPCS: Performed by: HOSPITALIST

## 2024-02-18 PROCEDURE — 94799 UNLISTED PULMONARY SVC/PX: CPT | Mod: XB

## 2024-02-18 PROCEDURE — 25000003 PHARM REV CODE 250: Performed by: HOSPITALIST

## 2024-02-18 PROCEDURE — 36415 COLL VENOUS BLD VENIPUNCTURE: CPT | Performed by: HOSPITALIST

## 2024-02-18 PROCEDURE — 83735 ASSAY OF MAGNESIUM: CPT | Performed by: HOSPITALIST

## 2024-02-18 PROCEDURE — 25000003 PHARM REV CODE 250: Performed by: INTERNAL MEDICINE

## 2024-02-18 PROCEDURE — 21400001 HC TELEMETRY ROOM

## 2024-02-18 PROCEDURE — 80202 ASSAY OF VANCOMYCIN: CPT | Performed by: HOSPITALIST

## 2024-02-18 PROCEDURE — 63600175 PHARM REV CODE 636 W HCPCS: Performed by: STUDENT IN AN ORGANIZED HEALTH CARE EDUCATION/TRAINING PROGRAM

## 2024-02-18 PROCEDURE — 63600175 PHARM REV CODE 636 W HCPCS: Performed by: INTERNAL MEDICINE

## 2024-02-18 PROCEDURE — 94761 N-INVAS EAR/PLS OXIMETRY MLT: CPT

## 2024-02-18 PROCEDURE — A4216 STERILE WATER/SALINE, 10 ML: HCPCS | Performed by: INTERNAL MEDICINE

## 2024-02-18 PROCEDURE — 84100 ASSAY OF PHOSPHORUS: CPT | Performed by: HOSPITALIST

## 2024-02-18 RX ORDER — METRONIDAZOLE 500 MG/1
500 TABLET ORAL
Status: DISCONTINUED | OUTPATIENT
Start: 2024-02-18 | End: 2024-02-23 | Stop reason: HOSPADM

## 2024-02-18 RX ORDER — METRONIDAZOLE 500 MG/1
500 TABLET ORAL 2 TIMES DAILY
Status: DISCONTINUED | OUTPATIENT
Start: 2024-02-18 | End: 2024-02-18

## 2024-02-18 RX ORDER — SODIUM CHLORIDE, SODIUM LACTATE, POTASSIUM CHLORIDE, CALCIUM CHLORIDE 600; 310; 30; 20 MG/100ML; MG/100ML; MG/100ML; MG/100ML
INJECTION, SOLUTION INTRAVENOUS CONTINUOUS
Status: ACTIVE | OUTPATIENT
Start: 2024-02-18 | End: 2024-02-18

## 2024-02-18 RX ADMIN — VANCOMYCIN HYDROCHLORIDE 1250 MG: 1.25 INJECTION, POWDER, LYOPHILIZED, FOR SOLUTION INTRAVENOUS at 01:02

## 2024-02-18 RX ADMIN — FLUCONAZOLE 400 MG: 2 INJECTION, SOLUTION INTRAVENOUS at 05:02

## 2024-02-18 RX ADMIN — MEROPENEM 1 G: 1 INJECTION INTRAVENOUS at 02:02

## 2024-02-18 RX ADMIN — SERTRALINE HYDROCHLORIDE 25 MG: 25 TABLET ORAL at 09:02

## 2024-02-18 RX ADMIN — SODIUM CHLORIDE, POTASSIUM CHLORIDE, SODIUM LACTATE AND CALCIUM CHLORIDE: 600; 310; 30; 20 INJECTION, SOLUTION INTRAVENOUS at 05:02

## 2024-02-18 RX ADMIN — GUAIFENESIN 200 MG: 200 SOLUTION ORAL at 05:02

## 2024-02-18 RX ADMIN — Medication 10 ML: at 12:02

## 2024-02-18 RX ADMIN — VANCOMYCIN HYDROCHLORIDE 125 MG: KIT at 01:02

## 2024-02-18 RX ADMIN — GUAIFENESIN 200 MG: 200 SOLUTION ORAL at 01:02

## 2024-02-18 RX ADMIN — ENOXAPARIN SODIUM 40 MG: 40 INJECTION SUBCUTANEOUS at 05:02

## 2024-02-18 RX ADMIN — CEFEPIME 2 G: 2 INJECTION, POWDER, FOR SOLUTION INTRAVENOUS at 10:02

## 2024-02-18 RX ADMIN — ONDANSETRON 8 MG: 2 INJECTION INTRAMUSCULAR; INTRAVENOUS at 04:02

## 2024-02-18 RX ADMIN — Medication 10 ML: at 01:02

## 2024-02-18 RX ADMIN — SODIUM CHLORIDE, POTASSIUM CHLORIDE, SODIUM LACTATE AND CALCIUM CHLORIDE: 600; 310; 30; 20 INJECTION, SOLUTION INTRAVENOUS at 07:02

## 2024-02-18 RX ADMIN — Medication 10 ML: at 05:02

## 2024-02-18 RX ADMIN — ASPIRIN 81 MG CHEWABLE TABLET 81 MG: 81 TABLET CHEWABLE at 09:02

## 2024-02-18 RX ADMIN — VANCOMYCIN HYDROCHLORIDE 125 MG: KIT at 12:02

## 2024-02-18 RX ADMIN — ATORVASTATIN CALCIUM 80 MG: 40 TABLET, FILM COATED ORAL at 08:02

## 2024-02-18 RX ADMIN — METRONIDAZOLE 500 MG: 500 TABLET ORAL at 02:02

## 2024-02-18 RX ADMIN — VANCOMYCIN HYDROCHLORIDE 125 MG: KIT at 05:02

## 2024-02-18 RX ADMIN — CEFEPIME 2 G: 2 INJECTION, POWDER, FOR SOLUTION INTRAVENOUS at 02:02

## 2024-02-18 RX ADMIN — BUSPIRONE HYDROCHLORIDE 7.5 MG: 5 TABLET ORAL at 09:02

## 2024-02-18 RX ADMIN — FAMOTIDINE 40 MG: 20 TABLET, FILM COATED ORAL at 09:02

## 2024-02-18 RX ADMIN — MEROPENEM 1 G: 1 INJECTION INTRAVENOUS at 10:02

## 2024-02-18 NOTE — NURSING
Nurses Note -- 4 Eyes      2/18/2024   1:41 PM      Skin assessed during: Daily Assessment      [x] No Altered Skin Integrity Present    []Prevention Measures Documented      [] Yes- Altered Skin Integrity Present or Discovered   [] LDA Added if Not in Epic (Describe Wound)   [] New Altered Skin Integrity was Present on Admit and Documented in LDA   [] Wound Image Taken    Wound Care Consulted? No    Attending Nurse:  ALCIDES Lott    Second RN/Staff Member:  ALCIDES Woody

## 2024-02-18 NOTE — ASSESSMENT & PLAN NOTE
68M w/ recent CVA and residual dysphagia s/p PEG tube presents from OSH for retained gallstone in ampulla, s/p lap cholecystectomy in which the gallbladder was found necrotic and friable. His only symptom is abdominal pain. Labs show leukocytosis. He is transferred for AES eval and possible ERCP.     -MRCP[02/06/24] noted fluid collection in the gallbladder fossa, concerning for  abscess vs. biloma. Also noted choledocholithiasis with gallstones posterior to the liver.  -AES performed ERCP 02/06/24 with removal of choledocolithiasis by biliary sphincterotomy and balloon extraction.   -IR attempt at drainage of fluid collection [2/7/2024] unsuccessful 2/2 episode of N/V with associated tachycardia and hypertension  -IR reattempt at fluid drainage and drain placement [2/9/24] successful without complication. Drain in place with serosanguinous fluid.  -General Surgery consult noted no indication for surgical intervention at the time  -CT[2/16/24] with decreased size of gallbladder fossa fluid collection and no new focal fluid collections   -Per ID recs, antibiotics changed to cefepime and metronidazole. EOT 2/24/24   -Following cultures. Blood cultures NGTD. Diarrhea resolved and no c diff sample sent  -Fluid studies and cultures with no evidence of bacterial growth thus far  -Antiemetics PRN

## 2024-02-18 NOTE — RESPIRATORY THERAPY
RAPID RESPONSE RESPIRATORY THERAPY PROACTIVE NOTE           Time of visit:      Code Status: Full Code   : 1955  Bed: 557/557 A:   MRN: [id number]  Time spent at the bedside: 15 -30 min    SITUATION    Evaluated patient for: Desaturation/NT suction procedure    BACKGROUND    Why is the patient in the hospital?: Common bile duct (CBD) obstruction    Patient has a past medical history of CVA (cerebral vascular accident), DM2 (diabetes mellitus, type 2), Dysphagia, HLD (hyperlipidemia), and HTN (hypertension).    24 Hours Vitals Range:  Temp:  [97.4 °F (36.3 °C)-98.5 °F (36.9 °C)]   Pulse:  []   Resp:  [16-22]   BP: (135-147)/(64-82)   SpO2:  [96 %-100 %]     Labs:    Recent Labs     02/15/24  1411 24  0418 24  1725 24  0313    141 142 147*   K 3.7 3.0* 3.2* 3.6    107 105 107   CO2 23 24 29 33*   BUN 36* 35* 32* 27*   CREATININE 0.8 0.8 0.8 0.8   * 193* 181* 132*   PHOS 2.9 3.3  --  2.3*   MG 1.3* 2.5  --  1.9        Recent Labs     02/15/24  2147   PH 7.535*   PCO2 30.7*   PO2 49*   HCO3 26.0   POCSATURATED 89   BE 3*       ASSESSMENT/INTERVENTIONS  Called to pt room for concern over pt's inability to clear secretions. Pt breath sounds coarse bilaterally. NT suctioning performed which yielded a large amount of cloudy/thick thin secretions. Pt breathing and saturation improved. Currently on 1L humidified NC, sat 98%. Left pt bedside at 1722.    Last VS   Temp: 98.3 °F (36.8 °C) (449)  Pulse: 83 (449)  Resp: 20 (449)  BP: 135/64 (449)  SpO2: 100 % (449)    Level of Consciousness: Level of Consciousness (AVPU): alert  Respiratory Effort: Respiratory Effort: Normal Expansion/Accessory Muscle Usage: Expansion/Accessory Muscles/Retractions: expansion symmetric, no retractions, no use of accessory muscles  All Lung Field Breath Sounds: All Lung Fields Breath Sounds: coarse  AVANI Breath Sounds: coarse  LLL Breath Sounds: coarse  RUL Breath  Sounds: coarse  RML Breath Sounds: coarse  RLL Breath Sounds: coarse  O2 Device/Concentration: 1L NC.  NIPPV: No Surgical airway: No  ETCO2 monitored: ETCO2 (mmHg): 36 mmHg  Ambu at bedside:      Active Orders   Respiratory Care    ASP/SUCTION NASOTRACHEAL PRN     Frequency: PRN     Number of Occurrences: Until Specified    Incentive spirometry     Frequency: Q4H WAKE     Number of Occurrences: Until Specified    Inhalation Treatment Q4H PRN     Frequency: Q4H PRN     Number of Occurrences: Until Specified    Oxygen PRN     Frequency: PRN     Number of Occurrences: Until Specified     Order Questions:      Device type: Low flow      Device: Nasal Cannula (1- 5 Liters)      LPM: 1-5      Titrate O2 per Oxygen Titration Protocol: Yes      To maintain SpO2 goal of: >= 90%      Notify MD of: Inability to achieve desired SpO2; Sudden change in patient status and requires 20% increase in FiO2; Patient requires >60% FiO2    Pulse Oximetry Continuous     Frequency: Continuous     Number of Occurrences: Until Specified       RECOMMENDATIONS    We recommend: RRT Recs: Continue POC per primary team.    FOLLOW-UP    Please call back the Rapid Response RT, Dayron Hoffman RRT at x [phone] for any questions or concerns.

## 2024-02-18 NOTE — ASSESSMENT & PLAN NOTE
RESOLVING     ID following for concerns of infectious diarrhea given leukocytosis on labs and reported diarrhea    Per ID recs, started on PO Vancomycin, can switch to Metronidazole if unable to tolerate PO.   C diff not sent

## 2024-02-18 NOTE — ASSESSMENT & PLAN NOTE
Patient has hypokalemia which is Acute . Most recent potassium levels reviewed-   Lab Results   Component Value Date    K 3.6 02/17/2024   . Will continue potassium replacement per protocol and recheck repeat levels after replacement completed.

## 2024-02-18 NOTE — ASSESSMENT & PLAN NOTE
Patient's FSGs are controlled on current medication regimen.  Last A1c reviewed-   Lab Results   Component Value Date    HGBA1C 4.8 02/05/2024     Most recent fingerstick glucose reviewed-   Recent Labs   Lab 02/17/24  1204 02/17/24  1744 02/18/24  0040 02/18/24  0614   POCTGLUCOSE 168* 124* 91 127*       Current correctional scale  Low  Maintain anti-hyperglycemic dose as follows-   Antihyperglycemics (From admission, onward)    Start     Stop Route Frequency Ordered    02/04/24 2059  insulin aspart U-100 pen 0-5 Units         -- SubQ Before meals & nightly PRN 02/04/24 2000        Hold Oral hypoglycemics while patient is in the hospital.     Patient on SSI at NH.    -LDSSI  -Glucerna for tube feeds

## 2024-02-18 NOTE — SUBJECTIVE & OBJECTIVE
Interval History: NHI, no new fevers, clinically improving, endorses dry mouth    Review of Systems   Constitutional:  Negative for fever.   HENT:  Positive for sore throat.    Respiratory:  Negative for shortness of breath.    Gastrointestinal:  Negative for abdominal pain, blood in stool, diarrhea and vomiting.       Objective:     Vital Signs (Most Recent):  Temp: 98.2 °F (36.8 °C) (02/18/24 0743)  Pulse: 72 (02/18/24 0844)  Resp: 18 (02/18/24 0844)  BP: (!) 147/70 (02/18/24 0743)  SpO2: 97 % (02/18/24 0844) Vital Signs (24h Range):  Temp:  [97.4 °F (36.3 °C)-98.3 °F (36.8 °C)] 98.2 °F (36.8 °C)  Pulse:  [67-97] 72  Resp:  [16-22] 18  SpO2:  [96 %-100 %] 97 %  BP: (135-147)/(64-72) 147/70     Weight: 79.6 kg (175 lb 8 oz)  Body mass index is 22.53 kg/m².    Estimated Creatinine Clearance: 99.5 mL/min (based on SCr of 0.8 mg/dL).     Physical Exam  Constitutional:       Appearance: Normal appearance. He is ill-appearing.   HENT:      Head: Normocephalic and atraumatic.      Comments: Resolving thrush      Nose: Nose normal.      Mouth/Throat:      Mouth: Mucous membranes are moist.      Pharynx: Oropharynx is clear.   Eyes:      Conjunctiva/sclera: Conjunctivae normal.   Cardiovascular:      Rate and Rhythm: Normal rate and regular rhythm.      Pulses: Normal pulses.      Heart sounds: Normal heart sounds.   Pulmonary:      Effort: Pulmonary effort is normal.      Breath sounds: Rales present.   Abdominal:      General: Bowel sounds are normal. There is distension.      Palpations: Abdomen is soft.      Tenderness: There is abdominal tenderness. There is no guarding or rebound.      Comments: 2 RUQ drain with minimal output, sanguinous  PEG site c/d/I non tender   Genitourinary:     Comments: pacheco  Musculoskeletal:         General: No deformity.      Cervical back: Neck supple.   Skin:     General: Skin is warm and dry.      Coloration: Skin is not jaundiced.      Findings: No rash.   Neurological:      Mental  Status: He is alert. Mental status is at baseline.          Significant Labs:   Microbiology Results (last 7 days)       Procedure Component Value Units Date/Time    Blood culture [[id number]] Collected: 02/15/24 1830    Order Status: Completed Specimen: Blood from Peripheral, Hand, Right Updated: 02/17/24 2012     Blood Culture, Routine No Growth to date      No Growth to date      No Growth to date    Clostridium difficile EIA [[id number]]     Order Status: Canceled Specimen: Stool     Clostridium difficile EIA [[id number]]     Order Status: Canceled Specimen: Stool     Clostridium difficile EIA [[id number]] Collected: 02/15/24 1835    Order Status: Canceled Specimen: Stool     Blood culture [[id number]] Collected: 02/15/24 1830    Order Status: Canceled Specimen: Blood from Peripheral, Hand, Right     Culture, Anaerobe [[id number]] Collected: 02/09/24 0914    Order Status: Completed Specimen: Body Fluid from Abdomen Updated: 02/15/24 1122     Anaerobic Culture No anaerobes isolated    Fungus culture [[id number]] Collected: 02/09/24 0914    Order Status: Completed Specimen: Body Fluid from Abdomen Updated: 02/14/24 0545     Fungus (Mycology) Culture Culture in progress    AFB Culture & Smear [[id number]] Collected: 02/09/24 0914    Order Status: Completed Specimen: Body Fluid from Abdomen Updated: 02/12/24 1416     AFB Culture & Smear Culture in progress     AFB CULTURE STAIN No acid fast bacilli seen.    Aerobic culture [[id number]] Collected: 02/09/24 0914    Order Status: Completed Specimen: Abscess from Abdomen Updated: 02/12/24 0750     Aerobic Bacterial Culture No growth          All pertinent labs within the past 24 hours have been reviewed.    Significant Imaging: I have reviewed all pertinent imaging results/findings within the past 24 hours.

## 2024-02-18 NOTE — SUBJECTIVE & OBJECTIVE
Interval History: Overnight patient had one episode of increased oxygen requirement to 8L to maintain oxygen saturation of 90% and was noted to have severe wheezing. Rapid response team arrived and after NT suctioning patient was able to return to 1L NC with saturation of 98%. He was also reported to have one dark bowel movement yesterday evening, and on inspection, color was dark green and not concerning for melena. He was started on trickle feeds over night at 10 mL/hr and reported no abdominal pain or nausea this morning. RUQ drain had 10mL output during day shift yesterday and no output overnight. RLQ drain had 10 mL output on both shifts over the last 24 hours. WBC count down to 9.82 from 18.62. Hgb down from 8 to 7.7. Patient take off vancomycin and meropenem and placed on metronidazole and cefepime.     Review of Systems  Objective:     Vital Signs (Most Recent):  Temp: 98.2 °F (36.8 °C) (02/18/24 0743)  Pulse: 72 (02/18/24 0844)  Resp: 18 (02/18/24 0844)  BP: (!) 147/70 (02/18/24 0743)  SpO2: 97 % (02/18/24 0844) Vital Signs (24h Range):  Temp:  [97.4 °F (36.3 °C)-98.3 °F (36.8 °C)] 98.2 °F (36.8 °C)  Pulse:  [67-97] 72  Resp:  [16-22] 18  SpO2:  [96 %-100 %] 97 %  BP: (135-147)/(64-72) 147/70     Weight: 79.6 kg (175 lb 8 oz)  Body mass index is 22.53 kg/m².    Intake/Output Summary (Last 24 hours) at 2/18/2024 0914  Last data filed at 2/18/2024 0753  Gross per 24 hour   Intake 759.07 ml   Output 2630 ml   Net -1870.93 ml         Physical Exam  Vitals and nursing note reviewed.   Constitutional:       General: He is not in acute distress.     Appearance: He is well-developed. He is ill-appearing. He is not diaphoretic.   HENT:      Head: Normocephalic and atraumatic.   Eyes:      General: No scleral icterus.        Right eye: No discharge.         Left eye: No discharge.   Cardiovascular:      Rate and Rhythm: Regular rhythm. Tachycardia present.   Pulmonary:      Effort: Pulmonary effort is normal. No  respiratory distress.      Breath sounds: Wheezing present.      Comments: On 1L NC  Abdominal:      General: Abdomen is flat. There is no distension.      Palpations: Abdomen is soft.      Tenderness: There is no abdominal tenderness. There is no guarding or rebound.      Comments: -2 RUQ drains present with minimal output  -PEG tube insertion site without erythema      Musculoskeletal:      Comments: Mild dependent edema on posterior arms and posterior thighs    Skin:     General: Skin is dry.      Coloration: Skin is not jaundiced or pale.      Findings: No erythema.   Neurological:      Mental Status: He is alert. Mental status is at baseline.             Significant Labs: All pertinent labs within the past 24 hours have been reviewed.    Significant Imaging: I have reviewed all pertinent imaging results/findings within the past 24 hours.

## 2024-02-18 NOTE — PROGRESS NOTES
[facility] - Surgery  Infectious Disease  Progress Note    Patient Name: Seymour Velazquez  MRN: [id number]  Admission Date: 2/4/2024  Length of Stay: 14 days  Attending Physician: Bhumi Joshua*  Primary Care Provider: Eliazar Rosas MD    Isolation Status: No active isolations  Assessment/Plan:      GI  Cholecystitis  68M with hx CVA, dysphagia, PEG tube, HTN, transferred from [facility] after presenting with acute cholecystitis on 1/30, lap [place]a on 2/1 c/b perforated necrotic gallbladder with stone spillage and obstructing CBD stone. He was treated with meropenem prior to transfer. MRCP noted complex collection within gallbladder fossa and gallstones posterior to the liver. He underwent ERCP on 2/6 with complete removal of obstructing stones. He underwent IR drain placement on 2/9 with negative cultures, however cell count appears infected w/ 20k WBC  and 99% segs. Cultures from [facility] (blood and urine) are negative.    He had clinical decompensation on 1/16 with concerns for aspiration and worsening diarrhea, leukocytosis, blood cultures show no growth, repeat CT is reassuring (no new collections and prior collection improving), and esophagitis noted. Clinically he appears stable with improved leukocytosis.No further diarrhea reported.     Recommendations:   -Discontinue IV and PO Vancomycin, discontinue meropenem  -IV cefepime 2 grams q8h and metronidazole 500 mg via PEG tube BID for 1 week with EOT 02/24/2024  -Can consider finishing a full two week course of fluconazole for esophageal candidiasis with EOT 2/29/24.   -Discussed with patient and primary team            Anticipated Disposition: TBD    Thank you for your consult. I will sign off. Please contact us if you have any additional questions.    August Gray MD  Infectious Disease  [facility] - Surgery    Subjective:     Principal Problem:Common bile duct (CBD) obstruction    HPI: Mr Velazquez is a 68 year  old male with history of CVA with dysphagia s/p PEG tube, HTN, HLD, DMII presents from OSH for AES eval of possible retained stone after cholecystectomy 02/01.    Patient initially presented from NH with leukocytosis w/o specific symptoms, imaging showed cholecystitis. He underwent lap [place]a 2/1, the operative report noted the gallbladder was necrotic and fell apart. The back wall of the gallbladder had an abscess that was perforated. There were spilled stones and purulent bile. A cholangiogram showed patent common duct with single stone at the ampulla that appears too large to pass spontaneously. Patient transferred to [facility] [place], [place] for possible ERCP.  While at OSH patient was treated with empiric Meropenem. On arrival, patient reports mild abdominal pain, denies N/V/D. He is AFVSS.  No leukocytosis. LFTS WNL. ID consulted for recommendations.  Patient reports being told he had a PCN allergy as a child. Cannot recall specific reaction. Gi following. Ordered MRCP.  Interval History: NHI, no new fevers, clinically improving, endorses dry mouth    Review of Systems   Constitutional:  Negative for fever.   HENT:  Positive for sore throat.    Respiratory:  Negative for shortness of breath.    Gastrointestinal:  Negative for abdominal pain, blood in stool, diarrhea and vomiting.       Objective:     Vital Signs (Most Recent):  Temp: 98.2 °F (36.8 °C) (02/18/24 0743)  Pulse: 72 (02/18/24 0844)  Resp: 18 (02/18/24 0844)  BP: (!) 147/70 (02/18/24 0743)  SpO2: 97 % (02/18/24 0844) Vital Signs (24h Range):  Temp:  [97.4 °F (36.3 °C)-98.3 °F (36.8 °C)] 98.2 °F (36.8 °C)  Pulse:  [67-97] 72  Resp:  [16-22] 18  SpO2:  [96 %-100 %] 97 %  BP: (135-147)/(64-72) 147/70     Weight: 79.6 kg (175 lb 8 oz)  Body mass index is 22.53 kg/m².    Estimated Creatinine Clearance: 99.5 mL/min (based on SCr of 0.8 mg/dL).     Physical Exam  Constitutional:       Appearance: Normal appearance. He is ill-appearing.   HENT:      Head: Normocephalic and  atraumatic.      Comments: Resolving thrush      Nose: Nose normal.      Mouth/Throat:      Mouth: Mucous membranes are moist.      Pharynx: Oropharynx is clear.   Eyes:      Conjunctiva/sclera: Conjunctivae normal.   Cardiovascular:      Rate and Rhythm: Normal rate and regular rhythm.      Pulses: Normal pulses.      Heart sounds: Normal heart sounds.   Pulmonary:      Effort: Pulmonary effort is normal.      Breath sounds: Rales present.   Abdominal:      General: Bowel sounds are normal. There is distension.      Palpations: Abdomen is soft.      Tenderness: There is abdominal tenderness. There is no guarding or rebound.      Comments: 2 RUQ drain with minimal output, sanguinous  PEG site c/d/I non tender   Genitourinary:     Comments: pacheco  Musculoskeletal:         General: No deformity.      Cervical back: Neck supple.   Skin:     General: Skin is warm and dry.      Coloration: Skin is not jaundiced.      Findings: No rash.   Neurological:      Mental Status: He is alert. Mental status is at baseline.          Significant Labs:   Microbiology Results (last 7 days)       Procedure Component Value Units Date/Time    Blood culture [[id number]] Collected: 02/15/24 1830    Order Status: Completed Specimen: Blood from Peripheral, Hand, Right Updated: 02/17/24 2012     Blood Culture, Routine No Growth to date      No Growth to date      No Growth to date    Clostridium difficile EIA [[id number]]     Order Status: Canceled Specimen: Stool     Clostridium difficile EIA [[id number]]     Order Status: Canceled Specimen: Stool     Clostridium difficile EIA [[id number]] Collected: 02/15/24 1835    Order Status: Canceled Specimen: Stool     Blood culture [[id number]] Collected: 02/15/24 1830    Order Status: Canceled Specimen: Blood from Peripheral, Hand, Right     Culture, Anaerobe [[id number]] Collected: 02/09/24 0914    Order Status: Completed Specimen: Body Fluid from Abdomen Updated: 02/15/24 1122     Anaerobic  Culture No anaerobes isolated    Fungus culture [[id number]] Collected: 02/09/24 0914    Order Status: Completed Specimen: Body Fluid from Abdomen Updated: 02/14/24 0545     Fungus (Mycology) Culture Culture in progress    AFB Culture & Smear [[id number]] Collected: 02/09/24 0914    Order Status: Completed Specimen: Body Fluid from Abdomen Updated: 02/12/24 1416     AFB Culture & Smear Culture in progress     AFB CULTURE STAIN No acid fast bacilli seen.    Aerobic culture [[id number]] Collected: 02/09/24 0914    Order Status: Completed Specimen: Abscess from Abdomen Updated: 02/12/24 0750     Aerobic Bacterial Culture No growth          All pertinent labs within the past 24 hours have been reviewed.    Significant Imaging: I have reviewed all pertinent imaging results/findings within the past 24 hours.

## 2024-02-18 NOTE — PROGRESS NOTES
[facility] - Surgery  [facility] Medicine  Progress Note    Patient Name: Seymour Velazquez  MRN: [id number]  Patient Class: IP- Inpatient   Admission Date: 2/4/2024  Length of Stay: 14 days  Attending Physician: Bhumi Joshua*  Primary Care Provider: Eliazar Rosas MD        Subjective:     Principal Problem:Common bile duct (CBD) obstruction        HPI:  68M w/PMH stroke with G-tube placement, hypertension, diabetes, hyperlipidemia, recent cholecystitis, and dysphagia presents as transfer from OSH for AES eval. He initially presented from NH with leukocytosis w/o specific symptoms, imaging showed cholecystitis. Underwent lap irlanda 2/1, the operative report noted the gallbladder was necrotic and fell apart. The back wall of the gallbladder had an abscess that was perforated. There were spilled stones and purulent bile. A cholangiogram showed patent common duct with single stone at the ampulla that appears too large to pass spontaneously. Patient is transferred to C for possible ERCP.     On arrival, patient reports some abdominal pain, denies N/V/D. He is AFVSS. Admitted to  for further management.     Overview/Hospital Course:  Patient admitted to Hospital Medicine service for medical management and evaluation of suspected CBD obstruction following complicated laparoscopic cholecystectomy. AES was consulted on admission with recommendation of MRCP. MRCP w/ and w/o contrast currently pending. ID was consulted with continuation of Merrem from outside hospital. Merrem de-escalated to Rocephin/Flagyl per ID recs. List of home medications was obtained from home facility and were continued as appropriate. MRCP[02/06/24] noted fluid collection in the gallbladder fossa, concerning for  abscess vs. biloma. Also noted choledocholithiasis with gallstones posterior to the liver. AES performed ERCP 02/06/24 with removal of choledocolithiasis  by biliary sphincterotomy and balloon extraction. General Surgery  consult noted no indication surgical intervention at the time, but can consider IR drainage of fluid collection if clinical status worsens. Intermittent episodes of N/V with associated tachycardia throughout hospitalization, without acute abdomen. Unable to complete IR drainage of fluid collection on 2/7 2/2 N/V. Scheduling antiemetics for better control. Repeat attempt at fluid drainage and drain placement with IR successful without complication morning of 2/9. Slowly evolving AURORA was not responsive to increased IVF resuscitation; urine studies inconsistent with pre-renal etiology. Consulted Nephrology for further assistance. Patient had a retroperitoneal ultrasound showing mild bilateral hydronephrosis and a distended bladder. Rosario catheter was placed on 2/11 and patient put out almost 1L with catheter insertion. SLP evaluated patient and he was started on pleasure feeds in addition to his tube feeds.     His course was complicated by increased nausea/vomiting and PEG tube dislodgement, which was replaced twice by the Gastroenterology team. Abdominal binder placed to secure PEG tube.  Increasing concerns for sepsis with worsening vitals/labs, so broadened antibiotics coverage with Meropenem and added PO Vancomycin per ID recs out of concerns for c diff. He also developed new onset hypoxia and required breathing treatment and NT suctioning in addition to oxygen supplementation that was thought to be secondary to an aspiration event. His diarrhea quickly resolved and sample was not able to be collected for c diff. He received a CT Chest/Abdomen/Pelvis with contrast on 2/16 that noted decreased size of gallbladder fossa fluid collection and no new focal fluid collections but also reported his stomach was distended with air and fluid on CT imaging so PEG tube feedings held and PEG tube was placed to gravity with significant drainage. He was given 1u pRbc for symptomatic anemia on 2/16/24. He was also started  fluconazole for oral thrush and CT w/  concerns for esophagitis. Tube feeds were resumed on 2/17.    Interval History: Overnight patient had one episode of increased oxygen requirement to 8L to maintain oxygen saturation of 90% and was noted to have severe wheezing. Rapid response team arrived and after NT suctioning patient was able to return to 1L NC with saturation of 98%. He was also reported to have one dark bowel movement yesterday evening, and on inspection, color was dark green and not concerning for melena. He was started on trickle feeds over night at 10 mL/hr and reported no abdominal pain or nausea this morning. RUQ drain had 10mL output during day shift yesterday and no output overnight. RLQ drain had 10 mL output on both shifts over the last 24 hours. WBC count down to 9.82 from 18.62. Hgb down from 8 to 7.7. Patient take off vancomycin and meropenem and placed on metronidazole and cefepime.     Review of Systems  Objective:     Vital Signs (Most Recent):  Temp: 98.2 °F (36.8 °C) (02/18/24 0743)  Pulse: 72 (02/18/24 0844)  Resp: 18 (02/18/24 0844)  BP: (!) 147/70 (02/18/24 0743)  SpO2: 97 % (02/18/24 0844) Vital Signs (24h Range):  Temp:  [97.4 °F (36.3 °C)-98.3 °F (36.8 °C)] 98.2 °F (36.8 °C)  Pulse:  [67-97] 72  Resp:  [16-22] 18  SpO2:  [96 %-100 %] 97 %  BP: (135-147)/(64-72) 147/70     Weight: 79.6 kg (175 lb 8 oz)  Body mass index is 22.53 kg/m².    Intake/Output Summary (Last 24 hours) at 2/18/2024 0914  Last data filed at 2/18/2024 0753  Gross per 24 hour   Intake 759.07 ml   Output 2630 ml   Net -1870.93 ml         Physical Exam  Vitals and nursing note reviewed.   Constitutional:       General: He is not in acute distress.     Appearance: He is well-developed. He is ill-appearing. He is not diaphoretic.   HENT:      Head: Normocephalic and atraumatic.   Eyes:      General: No scleral icterus.        Right eye: No discharge.         Left eye: No discharge.   Cardiovascular:      Rate and  Rhythm: Regular rhythm. Tachycardia present.   Pulmonary:      Effort: Pulmonary effort is normal. No respiratory distress.      Breath sounds: Wheezing present.      Comments: On 1L NC  Abdominal:      General: Abdomen is flat. There is no distension.      Palpations: Abdomen is soft.      Tenderness: There is no abdominal tenderness. There is no guarding or rebound.      Comments: -2 RUQ drains present with minimal output  -PEG tube insertion site without erythema      Musculoskeletal:      Comments: Mild dependent edema on posterior arms and posterior thighs    Skin:     General: Skin is dry.      Coloration: Skin is not jaundiced or pale.      Findings: No erythema.   Neurological:      Mental Status: He is alert. Mental status is at baseline.             Significant Labs: All pertinent labs within the past 24 hours have been reviewed.    Significant Imaging: I have reviewed all pertinent imaging results/findings within the past 24 hours.    Assessment/Plan:      * Common bile duct (CBD) obstruction  68M w/ recent CVA and residual dysphagia s/p PEG tube presents from OSH for retained gallstone in ampulla, s/p lap cholecystectomy in which the gallbladder was found necrotic and friable. His only symptom is abdominal pain. Labs show leukocytosis. He is transferred for AES eval and possible ERCP.     -MRCP[02/06/24] noted fluid collection in the gallbladder fossa, concerning for  abscess vs. biloma. Also noted choledocholithiasis with gallstones posterior to the liver.  -AES performed ERCP 02/06/24 with removal of choledocolithiasis by biliary sphincterotomy and balloon extraction.   -IR attempt at drainage of fluid collection [2/7/2024] unsuccessful 2/2 episode of N/V with associated tachycardia and hypertension  -IR reattempt at fluid drainage and drain placement [2/9/24] successful without complication. Drain in place with serosanguinous fluid.  -General Surgery consult noted no indication for surgical  intervention at the time  -CT[2/16/24] with decreased size of gallbladder fossa fluid collection and no new focal fluid collections   -Per ID recs, antibiotics changed to cefepime and metronidazole. EOT 2/24/24   -Following cultures. Blood cultures NGTD. Diarrhea resolved and no c diff sample sent  -Fluid studies and cultures with no evidence of bacterial growth thus far  -Antiemetics PRN        Esophagitis  CT Chest/Abdomen/Pelvis with contrast[2/16] noted concerns for esophagitis      PLAN:  Per ID recs, started fluconazole for oral thrush and CT w/  concerns for esophagitis        Diarrhea of presumed infectious origin  RESOLVING     ID following for concerns of infectious diarrhea given leukocytosis on labs and reported diarrhea    Per ID recs, started on PO Vancomycin, can switch to Metronidazole if unable to tolerate PO.   C diff not sent      Hypokalemia  Patient has hypokalemia which is Acute . Most recent potassium levels reviewed-   Lab Results   Component Value Date    K 3.6 02/17/2024   . Will continue potassium replacement per protocol and recheck repeat levels after replacement completed.     Thrombocytosis  Unclear etiology of acute thrombocytosis, possibly reactive secondary to infection    Recent Labs     02/16/24  0418 02/16/24  1524 02/17/24  0313   * 577* 482*         Trend on CBC  VTE ppx        Acute blood loss anemia  Current CBC reviewed-   Lab Results   Component Value Date    HGB 8.0 (L) 02/17/2024    HCT 25.0 (L) 02/17/2024       -Downtrending Hgb overnight 2/15-2/16, however patient declined blood transfusion overnight. Repeat CBC in afternoon 2/16/24 was Hgb 6.2, and patient was amenable to transfusion. No reported hematuria, hemoptysis, hematochezia or other source of bleeding.     PLAN:  -Consent obtained, transfusion of 1u PRBC on 2/16  -Monitor serial CBC and transfuse if patient becomes hemodynamically unstable, symptomatic or H/H drops below 7/21.    Acute hypoxemic  "respiratory failure  Patient with Hypoxic Respiratory failure which is Acute.  he is not on home oxygen.    Signs/symptoms of respiratory failure include- tachypnea. Contributing diagnoses includes - Aspiration and Pneumonia Labs and images were reviewed. Patient Has not had a recent ABG. Will treat underlying causes and adjust management of respiratory failure as follows-     On antibiotics for aspiration/pneumonia/sepsis coverage  CXR[2/15] nonacute  CT Chest 2/16: Atelectasis with small right pleural effusion.   Wean supplemental oxygen as tolerated to target SaO2>90%  Duonebs PRN and mucinex for accompanying rhinorrhea/secretions   NT suction as needed    Chronic diastolic heart failure  Last echo 2022            Gastrostomy tube dysfunction  Patient's PEG tube became occluded on 2/14 dislodged on 2/16 and was replaced by GI team. Radiology confirmed PEG placement by abdominal x-ray.    See "On tube feeding diet" A&P    Urinary retention  Patient found to have mild hydronephrosis and bladder distension on retroperitoneal ultrasound. Pacheco catheter was placed and patient had significant urine output and resolution of AURORA.    Pacheco catheter placed  Will need voiding trial or discharge with pacheco  Outpatient urology follow up       Tachycardia  Patient noted to have sinus tachycardia up to HR 140s in the setting of acute episodes of nausea and vomiting on 2/7/24. On 2/15/24 patient noted to be having tachycardia. Concern for possible new infection vs dehydration as his feeds were held pending PEG placement confirmation.    - ID following for sepsis concerns  - PRN antiemetics  - POCT glucose ordered      On tube feeding diet  Hx of PEG  tube placement s/p CVA. Tolerated slow basal rate feeds well thus far following fluid drainage. On bolus feeds prior to admission    PLAN:  CT Chest/Abdomen/Pelvis with contrast[2/16] noted stomach  distended with air and fluid on CT imaging so PEG tube feedings held and PEG tube to " "gravity.  -Nutrition consulted for tube feed recs. When resumed PO diet, Pleasure feeds of pureed foods and sips of thin liquids okay per SLP  -PEG occulded and exchanged by GI 2/14 and 2/15.   Care Instructions  - Flushes: flush PEG daily with 60 ml water  - Antibiotic ointment to site, clean site with soap and water daily and dry thoroughly and clean site daily with half-strength hydrogen peroxide for three days, then soap and water daily.  - Dressing: change dressing on top of bumper daily  -abdominal binder to prevent future dislodgement   -Resumed trickle feeds 2/17/24. Advance as tolerated today    Abdominal fluid collection  See "Common bile duct (CBD) obstruction " A&P        Choledocholithiasis  See "Common bile duct (CBD) obstruction " A&P        Bilateral pleural effusion  Patient found to have small pleural effusion on imaging [MRCP(02/06/24)"Small right-sided pleural effusion.  Trace left-sided pleural effusion"]. Most likely etiology includes  limited mobility due to recent history of hospitalization for cholecystectomy . Management to include  monitoring at this time    CT Chest 2/16: Atelectasis with small right pleural effusion.       Normocytic anemia  -Patient's with Normocytic anemia..   -Etiology likely due to chronic disease .  -Current CBC reviewed-    Recent Labs   Lab 02/16/24  0418 02/16/24  1524 02/17/24  0313   HGB 7.1* 6.2* 8.0*           Component Value Date/Time    MCV 95 02/17/2024 0313    RDW 15.6 (H) 02/17/2024 0313    IRON 12 (L) 02/07/2024 0223    TIBC 195 (L) 02/07/2024 0223    FERRITIN 1,256 (H) 02/07/2024 0223         PLAN  - Monitor serial CBC and transfuse if patient becomes hemodynamically unstable, symptomatic or H/H drops below 7/21.  - Etiology consistent with anemia of chronic disease. Will CTM.  - S/P 1 uPRBCs on 2/16    Cholecystitis  See "Common bile duct (CBD) obstruction " A&P        CVA (cerebral vascular accident)  Patient is s/p CVA with residual dysphagia and " weakness. He is s/p PEG tube and receives bolus tube feedings.         Type 2 diabetes mellitus  Patient's FSGs are controlled on current medication regimen.  Last A1c reviewed-   Lab Results   Component Value Date    HGBA1C 4.8 02/05/2024     Most recent fingerstick glucose reviewed-   Recent Labs   Lab 02/17/24  1204 02/17/24  1744 02/18/24  0040 02/18/24  0614   POCTGLUCOSE 168* 124* 91 127*       Current correctional scale  Low  Maintain anti-hyperglycemic dose as follows-   Antihyperglycemics (From admission, onward)      Start     Stop Route Frequency Ordered    02/04/24 2059  insulin aspart U-100 pen 0-5 Units         -- SubQ Before meals & nightly PRN 02/04/24 2000          Hold Oral hypoglycemics while patient is in the hospital.     Patient on SSI at NH.    -LDSSI  -Glucerna for tube feeds    Primary hypertension  Patient is normotensive on arrival, per chart he is not currently on any antihypertensives.     Will utilize p.r.n. blood pressure medication only if patient's blood pressure greater than 180/110 and he develops symptoms such as worsening chest pain or shortness of breath.    -amlodipine 5mg. Hold for hypotension.  -Holding home lisinopril in the setting of AURORA during admission  -Will add PRN hydralazine for SBP>180 and symptomatic  -will consider uptitration of home regimen if pressures remain uncontrolled        VTE Risk Mitigation (From admission, onward)           Ordered     enoxaparin injection 40 mg  Every 24 hours         02/09/24 1408     IP VTE HIGH RISK PATIENT  Once         02/05/24 1410     Place sequential compression device  Until discontinued         02/04/24 2000                    Discharge Planning   PEYTON: 2/19/2024     Code Status: Full Code   Is the patient medically ready for discharge?: No    Reason for patient still in hospital (select all that apply): Treatment  Discharge Plan A: Return to nursing home                  David Poe MD  Department of Hospital Medicine    [facility] - Surgery

## 2024-02-18 NOTE — ASSESSMENT & PLAN NOTE
Unclear etiology of acute thrombocytosis, possibly reactive secondary to infection    Recent Labs     02/16/24  0418 02/16/24  1524 02/17/24  0313   * 577* 482*         Trend on CBC  VTE ppx

## 2024-02-18 NOTE — ASSESSMENT & PLAN NOTE
Patient with Hypoxic Respiratory failure which is Acute.  he is not on home oxygen.    Signs/symptoms of respiratory failure include- tachypnea. Contributing diagnoses includes - Aspiration and Pneumonia Labs and images were reviewed. Patient Has not had a recent ABG. Will treat underlying causes and adjust management of respiratory failure as follows-     On antibiotics for aspiration/pneumonia/sepsis coverage  CXR[2/15] nonacute  CT Chest 2/16: Atelectasis with small right pleural effusion.   Wean supplemental oxygen as tolerated to target SaO2>90%  Duonebs PRN and mucinex for accompanying rhinorrhea/secretions   NT suction as needed

## 2024-02-18 NOTE — PLAN OF CARE
Problem: Adult Inpatient Plan of Care  Goal: Plan of Care Review  Outcome: Ongoing, Progressing  Goal: Patient-Specific Goal (Individualized)  Outcome: Ongoing, Progressing  Goal: Absence of Hospital-Acquired Illness or Injury  Outcome: Ongoing, Progressing  Goal: Optimal Comfort and Wellbeing  Outcome: Ongoing, Progressing  Goal: Readiness for Transition of Care  Outcome: Ongoing, Progressing  Problem: Skin Injury Risk Increased  Goal: Skin Health and Integrity  Outcome: Ongoing, Progressing  Problem: Bowel Motility Impaired (Cholecystectomy)  Goal: Effective Bowel Elimination  Outcome: Ongoing, Progressing  Problem: Pain (Cholecystectomy)  Goal: Acceptable Pain Control  Outcome: Ongoing, Progressing

## 2024-02-18 NOTE — PROGRESS NOTES
Pharmacokinetic Assessment Follow Up: IV Vancomycin    Vancomycin order has been discontinued. Pharmacy will sign off. Please reconsult as needed!     Thank you for the consult,   Yifan Guillermo

## 2024-02-18 NOTE — PROGRESS NOTES
Pharmacokinetic Assessment Follow Up: IV Vancomycin    Vancomycin serum concentration assessment(s):    The trough level was drawn correctly and can be used to guide therapy at this time. The measurement is above the desired definitive target range of 10 to 20 mcg/mL.  - The trough level was drawn ~10 hours after previous dose and resulted at 25.6.    Vancomycin Regimen Plan:    Discontinue the scheduled vancomycin regimen and re-dose when the random level is less than 20 mcg/mL, next level to be drawn at 2000 on 2/18.  - Mr. Velazquez's renal function appears stable and at baseline. BUN is increased from previously, so will continue watching closely.   - Getting level in ~18 hours from dose since he already received his dose for this morning before the level resulted. Giving 12 hours plus a little time to clear. May be able to tolerate lower dose of Q12H regimen, but will use renal function and random level to determine next dosing steps.   - Will pulse dose for now in the setting of supratherapeutic level.     Drug levels (last 3 results):  Recent Labs   Lab Result Units 02/18/24  0041   Vancomycin-Trough ug/mL 25.6*       Pharmacy will continue to follow and monitor vancomycin.    Please contact pharmacy at extension [phone] for questions regarding this assessment.    Thank you for the consult,   Ashley Morris       Patient brief summary:  Seymour Velazquez is a 68 y.o. male initiated on antimicrobial therapy with IV Vancomycin for treatment of sepsis    The patient's current regimen is pulse dosing in the setting of supratherapeutic level    Actual Body Weight:   79.6 kg    Renal Function:   Estimated Creatinine Clearance: 99.5 mL/min (based on SCr of 0.8 mg/dL).    Dialysis Method (if applicable):  N/A

## 2024-02-18 NOTE — ASSESSMENT & PLAN NOTE
68M with hx CVA, dysphagia, PEG tube, HTN, transferred from [facility] after presenting with acute cholecystitis on 1/30, lap [place]a on 2/1 c/b perforated necrotic gallbladder with stone spillage and obstructing CBD stone. He was treated with meropenem prior to transfer. MRCP noted complex collection within gallbladder fossa and gallstones posterior to the liver. He underwent ERCP on 2/6 with complete removal of obstructing stones. He underwent IR drain placement on 2/9 with negative cultures, however cell count appears infected w/ 20k WBC  and 99% segs. Cultures from [facility] (blood and urine) are negative.    He had clinical decompensation on 1/16 with concerns for aspiration and worsening diarrhea, leukocytosis, blood cultures show no growth, repeat CT is reassuring (no new collections and prior collection improving), and esophagitis noted. Clinically he appears stable with improved leukocytosis.No further diarrhea reported.     Recommendations:   -Discontinue IV and PO Vancomycin, discontinue meropenem  -IV cefepime 2 grams q8h and metronidazole 500 mg via PEG tube BID for 1 week with EOT 02/24/2024  -Can consider finishing a full two week course of fluconazole for esophageal candidiasis with EOT 2/29/24.   -Discussed with patient and primary team

## 2024-02-18 NOTE — ASSESSMENT & PLAN NOTE
Hx of PEG  tube placement s/p CVA. Tolerated slow basal rate feeds well thus far following fluid drainage. On bolus feeds prior to admission    PLAN:  CT Chest/Abdomen/Pelvis with contrast[2/16] noted stomach  distended with air and fluid on CT imaging so PEG tube feedings held and PEG tube to gravity.  -Nutrition consulted for tube feed recs. When resumed PO diet, Pleasure feeds of pureed foods and sips of thin liquids okay per SLP  -PEG occulded and exchanged by GI 2/14 and 2/15.   Care Instructions  - Flushes: flush PEG daily with 60 ml water  - Antibiotic ointment to site, clean site with soap and water daily and dry thoroughly and clean site daily with half-strength hydrogen peroxide for three days, then soap and water daily.  - Dressing: change dressing on top of bumper daily  -abdominal binder to prevent future dislodgement   -Resumed trickle feeds 2/17/24. Advance as tolerated today

## 2024-02-18 NOTE — PLAN OF CARE
Problem: Pain Acute  Goal: Acceptable Pain Control and Functional Ability  Outcome: Ongoing, Progressing     Problem: Skin Injury Risk Increased  Goal: Skin Health and Integrity  Outcome: Ongoing, Progressing     Problem: Infection  Goal: Absence of Infection Signs and Symptoms  Outcome: Ongoing, Progressing

## 2024-02-19 LAB
ALBUMIN SERPL BCP-MCNC: 2.2 G/DL (ref 3.5–5.2)
ALP SERPL-CCNC: 64 U/L (ref 55–135)
ALT SERPL W/O P-5'-P-CCNC: 8 U/L (ref 10–44)
ANION GAP SERPL CALC-SCNC: 6 MMOL/L (ref 8–16)
AST SERPL-CCNC: 15 U/L (ref 10–40)
BASOPHILS # BLD AUTO: 0.12 K/UL (ref 0–0.2)
BASOPHILS NFR BLD: 1 % (ref 0–1.9)
BILIRUB SERPL-MCNC: 0.3 MG/DL (ref 0.1–1)
BUN SERPL-MCNC: 13 MG/DL (ref 8–23)
CALCIUM SERPL-MCNC: 8.7 MG/DL (ref 8.7–10.5)
CHLORIDE SERPL-SCNC: 105 MMOL/L (ref 95–110)
CO2 SERPL-SCNC: 25 MMOL/L (ref 23–29)
CREAT SERPL-MCNC: 0.6 MG/DL (ref 0.5–1.4)
DIFFERENTIAL METHOD BLD: ABNORMAL
EOSINOPHIL # BLD AUTO: 0.1 K/UL (ref 0–0.5)
EOSINOPHIL NFR BLD: 1 % (ref 0–8)
ERYTHROCYTE [DISTWIDTH] IN BLOOD BY AUTOMATED COUNT: 16.6 % (ref 11.5–14.5)
EST. GFR  (NO RACE VARIABLE): >60 ML/MIN/1.73 M^2
GLUCOSE SERPL-MCNC: 103 MG/DL (ref 70–110)
HCT VFR BLD AUTO: 24.7 % (ref 40–54)
HGB BLD-MCNC: 8 G/DL (ref 14–18)
IMM GRANULOCYTES # BLD AUTO: 0.18 K/UL (ref 0–0.04)
IMM GRANULOCYTES NFR BLD AUTO: 1.5 % (ref 0–0.5)
LYMPHOCYTES # BLD AUTO: 2.5 K/UL (ref 1–4.8)
LYMPHOCYTES NFR BLD: 19.9 % (ref 18–48)
MAGNESIUM SERPL-MCNC: 1.3 MG/DL (ref 1.6–2.6)
MCH RBC QN AUTO: 31.4 PG (ref 27–31)
MCHC RBC AUTO-ENTMCNC: 32.4 G/DL (ref 32–36)
MCV RBC AUTO: 97 FL (ref 82–98)
MONOCYTES # BLD AUTO: 0.8 K/UL (ref 0.3–1)
MONOCYTES NFR BLD: 6.3 % (ref 4–15)
NEUTROPHILS # BLD AUTO: 8.7 K/UL (ref 1.8–7.7)
NEUTROPHILS NFR BLD: 70.3 % (ref 38–73)
NRBC BLD-RTO: 0 /100 WBC
PHOSPHATE SERPL-MCNC: 2.1 MG/DL (ref 2.7–4.5)
PLATELET # BLD AUTO: 423 K/UL (ref 150–450)
PMV BLD AUTO: 9 FL (ref 9.2–12.9)
POCT GLUCOSE: 105 MG/DL (ref 70–110)
POCT GLUCOSE: 125 MG/DL (ref 70–110)
POCT GLUCOSE: 133 MG/DL (ref 70–110)
POTASSIUM SERPL-SCNC: 3.8 MMOL/L (ref 3.5–5.1)
PROT SERPL-MCNC: 5.8 G/DL (ref 6–8.4)
RBC # BLD AUTO: 2.55 M/UL (ref 4.6–6.2)
SODIUM SERPL-SCNC: 136 MMOL/L (ref 136–145)
WBC # BLD AUTO: 12.4 K/UL (ref 3.9–12.7)

## 2024-02-19 PROCEDURE — 94640 AIRWAY INHALATION TREATMENT: CPT

## 2024-02-19 PROCEDURE — 27000221 HC OXYGEN, UP TO 24 HOURS

## 2024-02-19 PROCEDURE — 84100 ASSAY OF PHOSPHORUS: CPT | Performed by: HOSPITALIST

## 2024-02-19 PROCEDURE — 80053 COMPREHEN METABOLIC PANEL: CPT | Performed by: HOSPITALIST

## 2024-02-19 PROCEDURE — 94799 UNLISTED PULMONARY SVC/PX: CPT | Mod: XB

## 2024-02-19 PROCEDURE — A4216 STERILE WATER/SALINE, 10 ML: HCPCS | Performed by: INTERNAL MEDICINE

## 2024-02-19 PROCEDURE — 25000003 PHARM REV CODE 250

## 2024-02-19 PROCEDURE — 99900035 HC TECH TIME PER 15 MIN (STAT)

## 2024-02-19 PROCEDURE — 94761 N-INVAS EAR/PLS OXIMETRY MLT: CPT

## 2024-02-19 PROCEDURE — 63600175 PHARM REV CODE 636 W HCPCS

## 2024-02-19 PROCEDURE — 51798 US URINE CAPACITY MEASURE: CPT

## 2024-02-19 PROCEDURE — 63600175 PHARM REV CODE 636 W HCPCS: Performed by: HOSPITALIST

## 2024-02-19 PROCEDURE — 83735 ASSAY OF MAGNESIUM: CPT | Performed by: HOSPITALIST

## 2024-02-19 PROCEDURE — 85025 COMPLETE CBC W/AUTO DIFF WBC: CPT | Performed by: HOSPITALIST

## 2024-02-19 PROCEDURE — 25000242 PHARM REV CODE 250 ALT 637 W/ HCPCS

## 2024-02-19 PROCEDURE — 25000003 PHARM REV CODE 250: Performed by: HOSPITALIST

## 2024-02-19 PROCEDURE — 36415 COLL VENOUS BLD VENIPUNCTURE: CPT | Performed by: HOSPITALIST

## 2024-02-19 PROCEDURE — 21400001 HC TELEMETRY ROOM

## 2024-02-19 PROCEDURE — 63600175 PHARM REV CODE 636 W HCPCS: Performed by: STUDENT IN AN ORGANIZED HEALTH CARE EDUCATION/TRAINING PROGRAM

## 2024-02-19 PROCEDURE — 25000003 PHARM REV CODE 250: Performed by: INTERNAL MEDICINE

## 2024-02-19 RX ORDER — MAGNESIUM SULFATE HEPTAHYDRATE 40 MG/ML
2 INJECTION, SOLUTION INTRAVENOUS
Status: COMPLETED | OUTPATIENT
Start: 2024-02-19 | End: 2024-02-19

## 2024-02-19 RX ORDER — MAGNESIUM SULFATE HEPTAHYDRATE 40 MG/ML
2 INJECTION, SOLUTION INTRAVENOUS
Status: DISCONTINUED | OUTPATIENT
Start: 2024-02-19 | End: 2024-02-19

## 2024-02-19 RX ADMIN — GUAIFENESIN 200 MG: 200 SOLUTION ORAL at 05:02

## 2024-02-19 RX ADMIN — Medication 10 ML: at 12:02

## 2024-02-19 RX ADMIN — GUAIFENESIN 200 MG: 200 SOLUTION ORAL at 06:02

## 2024-02-19 RX ADMIN — ATORVASTATIN CALCIUM 80 MG: 40 TABLET, FILM COATED ORAL at 09:02

## 2024-02-19 RX ADMIN — MAGNESIUM SULFATE HEPTAHYDRATE 2 G: 40 INJECTION, SOLUTION INTRAVENOUS at 09:02

## 2024-02-19 RX ADMIN — FAMOTIDINE 40 MG: 20 TABLET, FILM COATED ORAL at 09:02

## 2024-02-19 RX ADMIN — METRONIDAZOLE 500 MG: 500 TABLET ORAL at 02:02

## 2024-02-19 RX ADMIN — GUAIFENESIN 200 MG: 200 SOLUTION ORAL at 03:02

## 2024-02-19 RX ADMIN — ALBUTEROL SULFATE 2.5 MG: 2.5 SOLUTION RESPIRATORY (INHALATION) at 03:02

## 2024-02-19 RX ADMIN — CEFEPIME 2 G: 2 INJECTION, POWDER, FOR SOLUTION INTRAVENOUS at 05:02

## 2024-02-19 RX ADMIN — MAGNESIUM SULFATE HEPTAHYDRATE 2 G: 40 INJECTION, SOLUTION INTRAVENOUS at 03:02

## 2024-02-19 RX ADMIN — CEFEPIME 2 G: 2 INJECTION, POWDER, FOR SOLUTION INTRAVENOUS at 03:02

## 2024-02-19 RX ADMIN — FLUCONAZOLE 400 MG: 2 INJECTION, SOLUTION INTRAVENOUS at 03:02

## 2024-02-19 RX ADMIN — BUSPIRONE HYDROCHLORIDE 7.5 MG: 5 TABLET ORAL at 09:02

## 2024-02-19 RX ADMIN — ENOXAPARIN SODIUM 40 MG: 40 INJECTION SUBCUTANEOUS at 03:02

## 2024-02-19 RX ADMIN — Medication 10 ML: at 06:02

## 2024-02-19 RX ADMIN — METRONIDAZOLE 500 MG: 500 TABLET ORAL at 03:02

## 2024-02-19 RX ADMIN — CEFEPIME 2 G: 2 INJECTION, POWDER, FOR SOLUTION INTRAVENOUS at 09:02

## 2024-02-19 RX ADMIN — MAGNESIUM SULFATE HEPTAHYDRATE 2 G: 40 INJECTION, SOLUTION INTRAVENOUS at 05:02

## 2024-02-19 RX ADMIN — SERTRALINE HYDROCHLORIDE 25 MG: 25 TABLET ORAL at 09:02

## 2024-02-19 RX ADMIN — ASPIRIN 81 MG CHEWABLE TABLET 81 MG: 81 TABLET CHEWABLE at 09:02

## 2024-02-19 RX ADMIN — GUAIFENESIN 200 MG: 200 SOLUTION ORAL at 12:02

## 2024-02-19 NOTE — ASSESSMENT & PLAN NOTE
Patient's FSGs are controlled on current medication regimen.  Last A1c reviewed-   Lab Results   Component Value Date    HGBA1C 4.8 02/05/2024     Most recent fingerstick glucose reviewed-   Recent Labs   Lab 02/18/24  1914 02/18/24  2240 02/19/24  0602 02/19/24  0814   POCTGLUCOSE 100 95 125* 133*       Current correctional scale  Low  Maintain anti-hyperglycemic dose as follows-   Antihyperglycemics (From admission, onward)    Start     Stop Route Frequency Ordered    02/04/24 2059  insulin aspart U-100 pen 0-5 Units         -- SubQ Before meals & nightly PRN 02/04/24 2000        Hold Oral hypoglycemics while patient is in the hospital.     Patient on SSI at NH.    -LDSSI  -Glucerna for tube feeds

## 2024-02-19 NOTE — ASSESSMENT & PLAN NOTE
RESOLVING     ID following for concerns of infectious diarrhea given leukocytosis on labs and reported diarrhea. May be secondary to tube feedings    Per ID recs, started on PO Vancomycin, can switch to Metronidazole if unable to tolerate PO.   C diff not sent

## 2024-02-19 NOTE — ASSESSMENT & PLAN NOTE
IMPROVED    Patient noted to have sinus tachycardia up to HR 140s in the setting of acute episodes of nausea and vomiting on 2/7/24. On 2/15/24 patient noted to be having tachycardia. Concern for possible new infection vs dehydration as his feeds were held pending PEG placement confirmation.    - ID following for sepsis concerns  - PRN antiemetics  - POCT glucose ordered

## 2024-02-19 NOTE — PROGRESS NOTES
[facility] - [facility]  Wound Care    Patient Name:  Seymour Velazquez   MRN:  [id number]  Date: 2/19/2024  Diagnosis: Common bile duct (CBD) obstruction    History:     Past Medical History:   Diagnosis Date    CVA (cerebral vascular accident)     DM2 (diabetes mellitus, type 2)     Dysphagia     HLD (hyperlipidemia)     HTN (hypertension)        Social History     Socioeconomic History    Marital status: Unknown   Tobacco Use    Smoking status: Never    Smokeless tobacco: Never   Substance and Sexual Activity    Alcohol use: Not Currently       Precautions:     Allergies as of 02/03/2024    (Not on File)       [facility] Assessment Details/Treatment   Patient seen for wound care follow-up.   Reviewed chart for this encounter.   See Flow Sheet for findings.     Pt in bed and agreeable to care. The abdominal incision sites are intact, dry and approximated. Island dressings removed, incision sites cleansed with NS and left open to air.      RECOMMENDATIONS:  - Abd incision sites: open to air   - Turning every 2 hours  - Waffle mattress overlay  - Heel protecting boots   - Bedside nursing to maintain pressure injury prevention interventions       Recommendations made to primary team for above plan via secure chat. Wound care will sign off, re-consult as needed.     02/19/2024

## 2024-02-19 NOTE — ASSESSMENT & PLAN NOTE
Unclear etiology of acute thrombocytosis, possibly reactive secondary to infection    Recent Labs     02/16/24  1524 02/17/24  0313 02/18/24  1319   * 482* 432         Trend on CBC  VTE ppx

## 2024-02-19 NOTE — ASSESSMENT & PLAN NOTE
Current CBC reviewed-   Lab Results   Component Value Date    HGB 7.6 (L) 02/18/2024    HCT 23.9 (L) 02/18/2024       -Downtrending Hgb overnight 2/15-2/16, however patient declined blood transfusion overnight. Repeat CBC in afternoon 2/16/24 was Hgb 6.2, and patient was amenable to transfusion. No reported hematuria, hemoptysis, hematochezia or other source of bleeding.     PLAN:  -Consent obtained, transfusion of 1u PRBC on 2/16  -Monitor serial CBC and transfuse if patient becomes hemodynamically unstable, symptomatic or H/H drops below 7/21.

## 2024-02-19 NOTE — NURSING
Respiratory saw pt and did breathing treatment and suctioned pt's mouth, the pt refused to have their throat suctioned. PT did IS with with encouragement and was able to suck in 500 ml per the respiratory therapist.     PT on 3 L Nasal canula and is currently resting. Pt care ongoing.

## 2024-02-19 NOTE — SUBJECTIVE & OBJECTIVE
Interval History: Overnight patient had one event of decreased oxygenation requiring 4L NC. He refused NT suctioning, but received a breathing treatment and reported feeling much better after the treatment and was transitioned back to room air with saturations of 96-97%. He remained afebrile overnight. He refused morning labs. His tube feeds have been advanced to goal and he reports feeling better today, and denies any nausea or vomiting. 2 small liquid bowel movements reported overnight by patient and nursing staff. Voiding trial planned for today with discontinuation of Rosario catheter. R abdominal drains still putting out serous fluid. RUQ 15mL and RLQ 40mL over past 24 hours.     Review of Systems  Objective:     Vital Signs (Most Recent):  Temp: 98.4 °F (36.9 °C) (02/19/24 0748)  Pulse: 79 (02/19/24 0748)  Resp: 18 (02/19/24 0748)  BP: (!) 109/57 (02/19/24 0748)  SpO2: 96 % (02/19/24 0748) Vital Signs (24h Range):  Temp:  [98 °F (36.7 °C)-98.7 °F (37.1 °C)] 98.4 °F (36.9 °C)  Pulse:  [] 79  Resp:  [18-20] 18  SpO2:  [93 %-100 %] 96 %  BP: (109-126)/(54-60) 109/57     Weight: 79.6 kg (175 lb 8 oz)  Body mass index is 22.53 kg/m².    Intake/Output Summary (Last 24 hours) at 2/19/2024 1057  Last data filed at 2/19/2024 0946  Gross per 24 hour   Intake 270 ml   Output 1855 ml   Net -1585 ml         Physical Exam  Vitals and nursing note reviewed.   Constitutional:       General: He is not in acute distress.     Appearance: He is well-developed. He is not ill-appearing or diaphoretic.   HENT:      Head: Normocephalic and atraumatic.      Mouth/Throat:      Mouth: Mucous membranes are dry.   Eyes:      General: No scleral icterus.        Right eye: No discharge.         Left eye: No discharge.   Cardiovascular:      Rate and Rhythm: Normal rate and regular rhythm.      Heart sounds: Normal heart sounds.   Pulmonary:      Effort: Pulmonary effort is normal. No respiratory distress.      Breath sounds: Wheezing  present.      Comments: 96-97% on room air during examination  Abdominal:      General: Abdomen is flat. There is no distension.      Palpations: Abdomen is soft.      Tenderness: There is no abdominal tenderness. There is no guarding or rebound.      Comments: -2 RUQ drains present with minimal output  -PEG tube insertion site without erythema      Musculoskeletal:      Right lower leg: Edema present.      Left lower leg: Edema present.      Comments: Mild dependent edema on posterior arms and posterior thighs    Skin:     General: Skin is dry.      Coloration: Skin is not jaundiced or pale.      Findings: No erythema.   Neurological:      Mental Status: He is alert. Mental status is at baseline.             Significant Labs: All pertinent labs within the past 24 hours have been reviewed.    Significant Imaging: I have reviewed all pertinent imaging results/findings within the past 24 hours.

## 2024-02-19 NOTE — PLAN OF CARE
Pt resting in bed comfortably. PIV lines and midline intact and free of infection and irritation. Fall precautions maintained, no falls noted. Call light within reach, bed locked and in lowest position. Patient instructed to call for assistance. Weight shift assistance and incontinence care provided. Rosario catheter in place, draining to urimeter. PATEL drains in place x2 to right abdomen, output documented. Tube feeds infusing to PEG tube, pt tolerating well. Feed rate increased q4H as tolerated per orders - now at 40/hr, almost to goal rate of 45/hr.  No complaints or concerns at this time. Will continue to monitor and follow plan of care.

## 2024-02-19 NOTE — PLAN OF CARE
Pt A&Ox4, PT has a very productive cough but nothing is coming up. PT is currently resting and is in no pain, pt care ongoing.  Problem: Diabetes Comorbidity  Goal: Blood Glucose Level Within Targeted Range  Outcome: Ongoing, Progressing     Problem: Skin Injury Risk Increased  Goal: Skin Health and Integrity  Outcome: Ongoing, Progressing     Problem: Adult Inpatient Plan of Care  Goal: Plan of Care Review  Outcome: Ongoing, Progressing     Problem: Adult Inpatient Plan of Care  Goal: Patient-Specific Goal (Individualized)  Outcome: Ongoing, Progressing     Problem: Adult Inpatient Plan of Care  Goal: Absence of Hospital-Acquired Illness or Injury  Outcome: Ongoing, Progressing     Problem: Adult Inpatient Plan of Care  Goal: Optimal Comfort and Wellbeing  Outcome: Ongoing, Progressing     Problem: Adult Inpatient Plan of Care  Goal: Readiness for Transition of Care  Outcome: Ongoing, Progressing

## 2024-02-19 NOTE — NURSING
PT is saying they are choking and feel as if their throat is clogged up. Respiratory contacted and respiratory informed the nurse that they will do a treatment and that they are in the ED currently and will be there soon.    Attempted to use IS with pt but pt is barely able to make the plunger inside move and is using accessory muscles with each use. Pt is currently resting. PT care ongoing.

## 2024-02-19 NOTE — ASSESSMENT & PLAN NOTE
-Patient's with Normocytic anemia..   -Etiology likely due to chronic disease .  -Current CBC reviewed-    Recent Labs   Lab 02/16/24  1524 02/17/24  0313 02/18/24  1319   HGB 6.2* 8.0* 7.6*           Component Value Date/Time    MCV 96 02/18/2024 1319    RDW 16.3 (H) 02/18/2024 1319    IRON 12 (L) 02/07/2024 0223    TIBC 195 (L) 02/07/2024 0223    FERRITIN 1,256 (H) 02/07/2024 0223         PLAN  - Monitor serial CBC and transfuse if patient becomes hemodynamically unstable, symptomatic or H/H drops below 7/21.  - Etiology consistent with anemia of chronic disease. Will CTM.  - S/P 1 uPRBCs on 2/16

## 2024-02-19 NOTE — ASSESSMENT & PLAN NOTE
Hx of PEG  tube placement s/p CVA. Tolerated slow basal rate feeds well thus far following fluid drainage. On bolus feeds prior to admission    PLAN:  -Nutrition consulted for tube feed recs. When resumed PO diet, Pleasure feeds of pureed foods and sips of thin liquids okay per SLP  -PEG occulded and exchanged by GI 2/14 and 2/15.   Care Instructions  - Flushes: flush PEG daily with 60 ml water  - Antibiotic ointment to site, clean site with soap and water daily and dry thoroughly and clean site daily with half-strength hydrogen peroxide for three days, then soap and water daily.  - Dressing: change dressing on top of bumper daily  -abdominal binder to prevent future dislodgement   -Continue tube feeding at goal of 45 mL/hour

## 2024-02-19 NOTE — ASSESSMENT & PLAN NOTE
Patient with Hypoxic Respiratory failure which is Acute.  he is not on home oxygen.    Signs/symptoms of respiratory failure include- tachypnea. Contributing diagnoses includes - Aspiration and Pneumonia Labs and images were reviewed. Patient Has not had a recent ABG. Will treat underlying causes and adjust management of respiratory failure as follows-     On antibiotics for aspiration/pneumonia/sepsis coverage. Suspect event was secondary to aspiration event  CXR[2/15] nonacute  CT Chest 2/16: Atelectasis with small right pleural effusion.   Wean supplemental oxygen as tolerated to target SaO2>90%  Duonebs PRN and mucinex for accompanying rhinorrhea/secretions   NT suction as needed

## 2024-02-19 NOTE — ASSESSMENT & PLAN NOTE
CT Chest/Abdomen/Pelvis with contrast[2/16] noted concerns for esophagitis      PLAN:  Per ID recs, started fluconazole for oral thrush and CT w/  concerns for esophagitis  Fluconazole course to end 2/29/24

## 2024-02-19 NOTE — PLAN OF CARE
Recommendations    1. Continue Glucerna 1.5 @ 45 mL/hr x 24 hours to provide 1620 kcal, 89 g PRO and 820 mL fluid.   - Monitor s/s of intolerance such as abdominal pain/distension/nausea/vomiting.  2.  If Bolus recommendations warranted:   - Glucerna 1.5 @ 5 cans/day to provide 1780 kcals, 98 g of protein, and 900 ml of fluid.    -If other alternative formula warranted for insurance purposes:               -Isosource 1.5 @ 5 cans/day to provide 1875 kcals, 85 g of protein, and 955 ml of fluid.   3. ADAT  4.  RD to monitor and follow-up.    Goals: Meet % EEN/EPN by follow-up date.  Nutrition Goal Status: goal met  Communication of RD Recs: other (comment) (POC)

## 2024-02-19 NOTE — ASSESSMENT & PLAN NOTE
68M w/ recent CVA and residual dysphagia s/p PEG tube presents from OSH for retained gallstone in ampulla, s/p lap cholecystectomy in which the gallbladder was found necrotic and friable. His only symptom is abdominal pain. Labs show leukocytosis. He is transferred for AES eval and possible ERCP.     Monitoring 2 right abdominal drain output daily for 2 days of output < 10mL     -MRCP[02/06/24] noted fluid collection in the gallbladder fossa, concerning for  abscess vs.biloma. Also noted choledocholithiasis with gallstones posterior to the liver.  -AES performed ERCP 02/06/24 with removal of choledocolithiasis by biliary sphincterotomy and balloon extraction.   -IR attempt at drainage of fluid collection [2/7/2024] unsuccessful 2/2 episode of N/V with associated tachycardia and hypertension  -IR reattempt at fluid drainage and drain placement [2/9/24] successful without complication. Drain in place with serosanguinous fluid.  -General Surgery consult noted no indication for surgical intervention at the time  -CT[2/16/24] with decreased size of gallbladder fossa fluid collection and no new focal fluid collections   -Per ID recs, antibiotics changed to cefepime and metronidazole. EOT 2/24/24   -Following cultures. Blood cultures NGTD. Diarrhea resolved and no c diff sample sent  -Fluid studies and cultures with no evidence of bacterial growth thus far  -Antiemetics PRN

## 2024-02-19 NOTE — PT/OT/SLP PROGRESS
Physical Therapy      Patient Name:  Seymour Velazquez   MRN:  [id number]    Patient not seen today secondary to Bowel/bladder accident. 2 attempts made for pt, pt soiled of bowel. Will follow-up per POC.

## 2024-02-19 NOTE — PT/OT/SLP PROGRESS
Occupational Therapy      Patient Name:  Seymour Velazquez   MRN:  [id number]    Patient not seen today secondary not being appropriate 2/2 Bowel/bladder accident. Patient was informed that OT/PT would re-attempt post clean up with the PCT. 2nd attempt was made, but pt was still not ready for therapy. RN notified and pt will be re-attempted on 02/20/2024.    2/19/2024

## 2024-02-19 NOTE — ASSESSMENT & PLAN NOTE
Patient has hypokalemia which is Acute . Most recent potassium levels reviewed-   Lab Results   Component Value Date    K 3.7 02/18/2024   . Will continue potassium replacement per protocol and recheck repeat levels after replacement completed.

## 2024-02-19 NOTE — PROGRESS NOTES
"[facility] - [facility]  Adult Nutrition  Progress Note    SUMMARY       Recommendations    1. Continue Glucerna 1.5 @ 45 mL/hr x 24 hours to provide 1620 kcal, 89 g PRO and 820 mL fluid.   - Monitor s/s of intolerance such as abdominal pain/distension/nausea/vomiting.  2.  If Bolus recommendations warranted:   - Glucerna 1.5 @ 5 cans/day to provide 1780 kcals, 98 g of protein, and 900 ml of fluid.    -If other alternative formula warranted for insurance purposes:               -Isosource 1.5 @ 5 cans/day to provide 1875 kcals, 85 g of protein, and 955 ml of fluid.   3. ADAT  4.  RD to monitor and follow-up.    Goals: Meet % EEN/EPN by follow-up date.  Nutrition Goal Status: goal met  Communication of RD Recs: other (comment) (POC)    Assessment and Plan    Nutrition Problem  Inadequate oral intake     Related to (etiology):   Decreased ability to consume sufficient energy     Signs and Symptoms (as evidenced by):   NPO      Interventions/Recommendations (treatment strategy):  Collaboration of nutrition care with other providers  EN  ADAT     Nutrition Diagnosis Status:   Continues    Reason for Assessment    Reason For Assessment: RD follow-up  Diagnosis: other (see comments) (Common bile duct (CBD) obstruction)  Relevant Medical History: DM2, HTN, CVA  Interdisciplinary Rounds: did not attend  General Information Comments: RD f/u. Spoke to pt, endorses tolerance of TF. Denies N/V/D/C or over fullness. Tf running at goal. RD following.  Nutrition Discharge Planning: Pending Clinical Course    Nutrition Risk Screen    Nutrition Risk Screen: tube feeding or parenteral nutrition    Nutrition/Diet History    Spiritual, Cultural Beliefs, Orthodoxy Practices, Values that Affect Care: no  Food Allergies: NKFA    Anthropometrics    Temp: 98.6 °F (37 °C)  Height Method: Stated  Height: 6' 2" (188 cm)  Height (inches): 74 in  Weight Method: Bed Scale  Weight: 79.6 kg (175 lb 8 oz)  Weight (lb): 175.5 lb  Ideal Body Weight " (IBW), Male: 190 lb  % Ideal Body Weight, Male (lb): 92.37 %  BMI (Calculated): 22.5       Lab/Procedures/Meds    Pertinent Labs Reviewed: reviewed  Pertinent Labs Comments: 2/18: h/h: 7.6/23.9, mchc: 31.8, Phos: 2.3, ALP: 51, ALT: 7  Pertinent Medications Reviewed: reviewed  Pertinent Medications Comments: Atorvastatin, enoxaparin, famotidine, NaCl; PRN insulin, morphone, glucose      Estimated/Assessed Needs    Weight Used For Calorie Calculations: 79.4 kg (175 lb)  Energy Calorie Requirements (kcal): 9258-0981 kcal/d (MSJ x 1.25)  Energy Need Method: Mcadoo-St Jeor  Protein Requirements: 64-79 g/d (0.8-1.0 g/kg)  Weight Used For Protein Calculations: 79.4 kg (175 lb)        RDA Method (mL): 1633  CHO Requirement: 204-255 g      Nutrition Prescription Ordered    Current Diet Order: NPO  Current Nutrition Support Formula Ordered: Glucerna 1.5  Current Nutrition Support Rate Ordered: 45 (ml) (ml)  Current Nutrition Support Frequency Ordered: Continuous    Evaluation of Received Nutrient/Fluid Intake    Enteral Calories (kcal): 1620  Enteral Protein (gm): 89  Enteral (Free Water) Fluid (mL): 820  % Kcal Needs: 99  % Protein Needs: 100  I/O: -8.7 L since admit  Energy Calories Required: meeting needs  Protein Required: meeting needs  Fluid Required: not meeting needs  Comments: LBM 2/17 (loose)  % Intake of Estimated Energy Needs: 75 - 100 %  % Meal Intake: NPO    Nutrition Risk    Level of Risk/Frequency of Follow-up: low (1x/ week)     Monitor and Evaluation    Food and Nutrient Intake: energy intake, food and beverage intake, enteral nutrition intake, parenteral nutrition intake  Food and Nutrient Adminstration: diet order, enteral and parenteral nutrition administration  Knowledge/Beliefs/Attitudes: food and nutrition knowledge/skill, beliefs and attitudes  Physical Activity and Function: nutrition-related ADLs and IADLs, factors affecting access to physical activity  Anthropometric Measurements: weight, weight  change, body mass index  Biochemical Data, Medical Tests and Procedures: lipid profile, glucose/endocrine profile, gastrointestinal profile, electrolyte and renal panel, inflammatory profile  Nutrition-Focused Physical Findings: overall appearance, extremities, muscles and bones, head and eyes, skin     Nutrition Follow-Up    RD Follow-up?: Yes    Liz Dubose RD, LDN

## 2024-02-19 NOTE — PROGRESS NOTES
[facility] perfect[facility] - Surgery  [facility] Medicine  Progress Note    Patient Name: Seymour Velazquez  MRN: [id number]  Patient Class: IP- Inpatient   Admission Date: 2/4/2024  Length of Stay: 15 days  Attending Physician: Bhumi Joshua*  Primary Care Provider: Eliazar Rosas MD        Subjective:     Principal Problem:Common bile duct (CBD) obstruction        HPI:  68M w/PMH stroke with G-tube placement, hypertension, diabetes, hyperlipidemia, recent cholecystitis, and dysphagia presents as transfer from OSH for AES eval. He initially presented from NH with leukocytosis w/o specific symptoms, imaging showed cholecystitis. Underwent lap irlanda 2/1, the operative report noted the gallbladder was necrotic and fell apart. The back wall of the gallbladder had an abscess that was perforated. There were spilled stones and purulent bile. A cholangiogram showed patent common duct with single stone at the ampulla that appears too large to pass spontaneously. Patient is transferred to C for possible ERCP.     On arrival, patient reports some abdominal pain, denies N/V/D. He is AFVSS. Admitted to  for further management.     Overview/Hospital Course:  Patient admitted to Hospital Medicine service for medical management and evaluation of suspected CBD obstruction following complicated laparoscopic cholecystectomy. AES was consulted on admission with recommendation of MRCP. MRCP w/ and w/o contrast currently pending. ID was consulted with continuation of Merrem from outside hospital. Merrem de-escalated to Rocephin/Flagyl per ID recs. List of home medications was obtained from home facility and were continued as appropriate. MRCP[02/06/24] noted fluid collection in the gallbladder fossa, concerning for  abscess vs. biloma. Also noted choledocholithiasis with gallstones posterior to the liver. AES performed ERCP 02/06/24 with removal of choledocolithiasis  by biliary sphincterotomy and balloon extraction. General Surgery  consult noted no indication surgical intervention at the time, but can consider IR drainage of fluid collection if clinical status worsens. Intermittent episodes of N/V with associated tachycardia throughout hospitalization, without acute abdomen. Unable to complete IR drainage of fluid collection on 2/7 2/2 N/V. Scheduling antiemetics for better control. Repeat attempt at fluid drainage and drain placement with IR successful without complication morning of 2/9. Slowly evolving AURORA was not responsive to increased IVF resuscitation; urine studies inconsistent with pre-renal etiology. Consulted Nephrology for further assistance. Patient had a retroperitoneal ultrasound showing mild bilateral hydronephrosis and a distended bladder. Rsoario catheter was placed on 2/11 and patient put out almost 1L with catheter insertion. SLP evaluated patient and he was started on pleasure feeds in addition to his tube feeds.     His course was complicated by increased nausea/vomiting and PEG tube dislodgement, which was replaced twice by the Gastroenterology team. Abdominal binder placed to secure PEG tube.  Increasing concerns for sepsis with worsening vitals/labs, so broadened antibiotics coverage with Meropenem and added PO Vancomycin per ID recs out of concerns for c diff. He also developed new onset hypoxia and required breathing treatment and NT suctioning in addition to oxygen supplementation that was thought to be secondary to an aspiration event. His diarrhea quickly resolved and sample was not able to be collected for c diff. He received a CT Chest/Abdomen/Pelvis with contrast on 2/16 that noted decreased size of gallbladder fossa fluid collection and no new focal fluid collections but also reported his stomach was distended with air and fluid on CT imaging so PEG tube feedings held and PEG tube was placed to gravity with significant drainage. He was given 1u pRbc for symptomatic anemia on 2/16/24. He was also started  fluconazole for oral thrush and CT w/ concerns for esophagitis. Tube feeds were resumed on 2/17.    He had clinical improvement on 2/19 with reduced cough, decreased oxygen requirement, and was able to tolerate tube feeds at goal.The output from his two right sided abdominal drains was still elevated. Final recommendations and end of treatment dates were received from Infectious Disease and patient set to complete courses of cefepime/metronidazole on 2/24 and complete fluconazole course on 2/29.     Interval History: Overnight patient had one event of decreased oxygenation requiring 4L NC. He refused NT suctioning, but received a breathing treatment and reported feeling much better after the treatment and was transitioned back to room air with saturations of 96-97%. He remained afebrile overnight. He refused morning labs. His tube feeds have been advanced to goal and he reports feeling better today, and denies any nausea or vomiting. 2 small liquid bowel movements reported overnight by patient and nursing staff. Voiding trial planned for today with discontinuation of Rosario catheter. R abdominal drains still putting out serous fluid. RUQ 15mL and RLQ 40mL over past 24 hours.     Review of Systems  Objective:     Vital Signs (Most Recent):  Temp: 98.4 °F (36.9 °C) (02/19/24 0748)  Pulse: 79 (02/19/24 0748)  Resp: 18 (02/19/24 0748)  BP: (!) 109/57 (02/19/24 0748)  SpO2: 96 % (02/19/24 0748) Vital Signs (24h Range):  Temp:  [98 °F (36.7 °C)-98.7 °F (37.1 °C)] 98.4 °F (36.9 °C)  Pulse:  [] 79  Resp:  [18-20] 18  SpO2:  [93 %-100 %] 96 %  BP: (109-126)/(54-60) 109/57     Weight: 79.6 kg (175 lb 8 oz)  Body mass index is 22.53 kg/m².    Intake/Output Summary (Last 24 hours) at 2/19/2024 1057  Last data filed at 2/19/2024 0946  Gross per 24 hour   Intake 270 ml   Output 1855 ml   Net -1585 ml         Physical Exam  Vitals and nursing note reviewed.   Constitutional:       General: He is not in acute distress.      Appearance: He is well-developed. He is not ill-appearing or diaphoretic.   HENT:      Head: Normocephalic and atraumatic.      Mouth/Throat:      Mouth: Mucous membranes are dry.   Eyes:      General: No scleral icterus.        Right eye: No discharge.         Left eye: No discharge.   Cardiovascular:      Rate and Rhythm: Normal rate and regular rhythm.      Heart sounds: Normal heart sounds.   Pulmonary:      Effort: Pulmonary effort is normal. No respiratory distress.      Breath sounds: Wheezing present.      Comments: 96-97% on room air during examination  Abdominal:      General: Abdomen is flat. There is no distension.      Palpations: Abdomen is soft.      Tenderness: There is no abdominal tenderness. There is no guarding or rebound.      Comments: -2 RUQ drains present with minimal output  -PEG tube insertion site without erythema      Musculoskeletal:      Right lower leg: Edema present.      Left lower leg: Edema present.      Comments: Mild dependent edema on posterior arms and posterior thighs    Skin:     General: Skin is dry.      Coloration: Skin is not jaundiced or pale.      Findings: No erythema.   Neurological:      Mental Status: He is alert. Mental status is at baseline.             Significant Labs: All pertinent labs within the past 24 hours have been reviewed.    Significant Imaging: I have reviewed all pertinent imaging results/findings within the past 24 hours.    Assessment/Plan:      * Common bile duct (CBD) obstruction  68M w/ recent CVA and residual dysphagia s/p PEG tube presents from OSH for retained gallstone in ampulla, s/p lap cholecystectomy in which the gallbladder was found necrotic and friable. His only symptom is abdominal pain. Labs show leukocytosis. He is transferred for AES eval and possible ERCP.     Monitoring 2 right abdominal drain output daily for 2 days of output < 10mL     -MRCP[02/06/24] noted fluid collection in the gallbladder fossa, concerning for  abscess  vs.biloma. Also noted choledocholithiasis with gallstones posterior to the liver.  -AES performed ERCP 02/06/24 with removal of choledocolithiasis by biliary sphincterotomy and balloon extraction.   -IR attempt at drainage of fluid collection [2/7/2024] unsuccessful 2/2 episode of N/V with associated tachycardia and hypertension  -IR reattempt at fluid drainage and drain placement [2/9/24] successful without complication. Drain in place with serosanguinous fluid.  -General Surgery consult noted no indication for surgical intervention at the time  -CT[2/16/24] with decreased size of gallbladder fossa fluid collection and no new focal fluid collections   -Per ID recs, antibiotics changed to cefepime and metronidazole. EOT 2/24/24   -Following cultures. Blood cultures NGTD. Diarrhea resolved and no c diff sample sent  -Fluid studies and cultures with no evidence of bacterial growth thus far  -Antiemetics PRN        Esophagitis  CT Chest/Abdomen/Pelvis with contrast[2/16] noted concerns for esophagitis      PLAN:  Per ID recs, started fluconazole for oral thrush and CT w/  concerns for esophagitis  Fluconazole course to end 2/29/24        Diarrhea of presumed infectious origin  RESOLVING     ID following for concerns of infectious diarrhea given leukocytosis on labs and reported diarrhea. May be secondary to tube feedings    Per ID recs, started on PO Vancomycin, can switch to Metronidazole if unable to tolerate PO.   C diff not sent      Hypokalemia  Patient has hypokalemia which is Acute . Most recent potassium levels reviewed-   Lab Results   Component Value Date    K 3.7 02/18/2024   . Will continue potassium replacement per protocol and recheck repeat levels after replacement completed.     Thrombocytosis  Unclear etiology of acute thrombocytosis, possibly reactive secondary to infection    Recent Labs     02/16/24  1524 02/17/24  0313 02/18/24  1319   * 482* 432         Trend on CBC  VTE ppx        Acute  "blood loss anemia  Current CBC reviewed-   Lab Results   Component Value Date    HGB 7.6 (L) 02/18/2024    HCT 23.9 (L) 02/18/2024       -Downtrending Hgb overnight 2/15-2/16, however patient declined blood transfusion overnight. Repeat CBC in afternoon 2/16/24 was Hgb 6.2, and patient was amenable to transfusion. No reported hematuria, hemoptysis, hematochezia or other source of bleeding.     PLAN:  -Consent obtained, transfusion of 1u PRBC on 2/16  -Monitor serial CBC and transfuse if patient becomes hemodynamically unstable, symptomatic or H/H drops below 7/21.    Acute hypoxemic respiratory failure  Patient with Hypoxic Respiratory failure which is Acute.  he is not on home oxygen.    Signs/symptoms of respiratory failure include- tachypnea. Contributing diagnoses includes - Aspiration and Pneumonia Labs and images were reviewed. Patient Has not had a recent ABG. Will treat underlying causes and adjust management of respiratory failure as follows-     On antibiotics for aspiration/pneumonia/sepsis coverage. Suspect event was secondary to aspiration event  CXR[2/15] nonacute  CT Chest 2/16: Atelectasis with small right pleural effusion.   Wean supplemental oxygen as tolerated to target SaO2>90%  Duonebs PRN and mucinex for accompanying rhinorrhea/secretions   NT suction as needed     Chronic diastolic heart failure  Last echo 2022            Gastrostomy tube dysfunction  Patient's PEG tube became occluded on 2/14 dislodged on 2/16 and was replaced by GI team. Radiology confirmed PEG placement by abdominal x-ray.    See "On tube feeding diet" A&P    Urinary retention  Patient found to have mild hydronephrosis and bladder distension on retroperitoneal ultrasound. Rosario catheter was placed and patient had significant urine output and resolution of AURORA.    Rosario catheter placed 2/11/24  Voiding trial with bladder scans q6 hours on 2/19  Outpatient urology follow up       Tachycardia  IMPROVED    Patient noted to have " "sinus tachycardia up to HR 140s in the setting of acute episodes of nausea and vomiting on 2/7/24. On 2/15/24 patient noted to be having tachycardia. Concern for possible new infection vs dehydration as his feeds were held pending PEG placement confirmation.    - ID following for sepsis concerns  - PRN antiemetics  - POCT glucose ordered      On tube feeding diet  Hx of PEG  tube placement s/p CVA. Tolerated slow basal rate feeds well thus far following fluid drainage. On bolus feeds prior to admission    PLAN:  -Nutrition consulted for tube feed recs. When resumed PO diet, Pleasure feeds of pureed foods and sips of thin liquids okay per SLP  -PEG occulded and exchanged by GI 2/14 and 2/15.   Care Instructions  - Flushes: flush PEG daily with 60 ml water  - Antibiotic ointment to site, clean site with soap and water daily and dry thoroughly and clean site daily with half-strength hydrogen peroxide for three days, then soap and water daily.  - Dressing: change dressing on top of bumper daily  -abdominal binder to prevent future dislodgement   -Continue tube feeding at goal of 45 mL/hour      Abdominal fluid collection  See "Common bile duct (CBD) obstruction " A&P        Choledocholithiasis  See "Common bile duct (CBD) obstruction " A&P        Bilateral pleural effusion  Patient found to have small pleural effusion on imaging [MRCP(02/06/24)"Small right-sided pleural effusion.  Trace left-sided pleural effusion"]. Most likely etiology includes  limited mobility due to recent history of hospitalization for cholecystectomy . Management to include  monitoring at this time    CT Chest 2/16: Atelectasis with small right pleural effusion.       Normocytic anemia  -Patient's with Normocytic anemia..   -Etiology likely due to chronic disease .  -Current CBC reviewed-    Recent Labs   Lab 02/16/24  1524 02/17/24  0313 02/18/24  1319   HGB 6.2* 8.0* 7.6*           Component Value Date/Time    MCV 96 02/18/2024 1319    RDW 16.3 " "(H) 02/18/2024 1319    IRON 12 (L) 02/07/2024 0223    TIBC 195 (L) 02/07/2024 0223    FERRITIN 1,256 (H) 02/07/2024 0223         PLAN  - Monitor serial CBC and transfuse if patient becomes hemodynamically unstable, symptomatic or H/H drops below 7/21.  - Etiology consistent with anemia of chronic disease. Will CTM.  - S/P 1 uPRBCs on 2/16    Cholecystitis  See "Common bile duct (CBD) obstruction " A&P        CVA (cerebral vascular accident)  Patient is s/p CVA with residual dysphagia and weakness. He is s/p PEG tube and receives bolus tube feedings.         Type 2 diabetes mellitus  Patient's FSGs are controlled on current medication regimen.  Last A1c reviewed-   Lab Results   Component Value Date    HGBA1C 4.8 02/05/2024     Most recent fingerstick glucose reviewed-   Recent Labs   Lab 02/18/24  1914 02/18/24  2240 02/19/24  0602 02/19/24  0814   POCTGLUCOSE 100 95 125* 133*       Current correctional scale  Low  Maintain anti-hyperglycemic dose as follows-   Antihyperglycemics (From admission, onward)      Start     Stop Route Frequency Ordered    02/04/24 2059  insulin aspart U-100 pen 0-5 Units         -- SubQ Before meals & nightly PRN 02/04/24 2000          Hold Oral hypoglycemics while patient is in the hospital.     Patient on SSI at NH.    -LDSSI  -Glucerna for tube feeds    Primary hypertension  Patient is normotensive on arrival, per chart he is not currently on any antihypertensives.     Will utilize p.r.n. blood pressure medication only if patient's blood pressure greater than 180/110 and he develops symptoms such as worsening chest pain or shortness of breath.    -amlodipine 5mg. Hold for hypotension.  -Holding home lisinopril in the setting of AURORA during admission  -Will add PRN hydralazine for SBP>180 and symptomatic  -will consider uptitration of home regimen if pressures remain uncontrolled        VTE Risk Mitigation (From admission, onward)           Ordered     enoxaparin injection 40 mg  Every " 24 hours         02/09/24 1408     IP VTE HIGH RISK PATIENT  Once         02/05/24 1410     Place sequential compression device  Until discontinued         02/04/24 2000                    Discharge Planning   PEYTON: 2/21/2024     Code Status: Full Code   Is the patient medically ready for discharge?: No    Reason for patient still in hospital (select all that apply): Treatment  Discharge Plan A: Return to nursing home                  David Poe MD  Department of [facility] Medicine   [facility] - Surgery

## 2024-02-20 PROBLEM — R00.0 TACHYCARDIA: Status: RESOLVED | Noted: 2024-02-07 | Resolved: 2024-02-20

## 2024-02-20 LAB
BACTERIA BLD CULT: NORMAL
POCT GLUCOSE: 109 MG/DL (ref 70–110)
POCT GLUCOSE: 117 MG/DL (ref 70–110)
POCT GLUCOSE: 123 MG/DL (ref 70–110)
POCT GLUCOSE: 129 MG/DL (ref 70–110)
POCT GLUCOSE: 152 MG/DL (ref 70–110)

## 2024-02-20 PROCEDURE — 63600175 PHARM REV CODE 636 W HCPCS: Performed by: HOSPITALIST

## 2024-02-20 PROCEDURE — 25000003 PHARM REV CODE 250: Performed by: INTERNAL MEDICINE

## 2024-02-20 PROCEDURE — 25000003 PHARM REV CODE 250

## 2024-02-20 PROCEDURE — A4216 STERILE WATER/SALINE, 10 ML: HCPCS | Performed by: INTERNAL MEDICINE

## 2024-02-20 PROCEDURE — 25000003 PHARM REV CODE 250: Performed by: HOSPITALIST

## 2024-02-20 PROCEDURE — 21400001 HC TELEMETRY ROOM

## 2024-02-20 PROCEDURE — 63600175 PHARM REV CODE 636 W HCPCS

## 2024-02-20 RX ADMIN — ATORVASTATIN CALCIUM 80 MG: 40 TABLET, FILM COATED ORAL at 10:02

## 2024-02-20 RX ADMIN — Medication 10 ML: at 01:02

## 2024-02-20 RX ADMIN — CEFEPIME 2 G: 2 INJECTION, POWDER, FOR SOLUTION INTRAVENOUS at 11:02

## 2024-02-20 RX ADMIN — GUAIFENESIN 200 MG: 200 SOLUTION ORAL at 05:02

## 2024-02-20 RX ADMIN — SERTRALINE HYDROCHLORIDE 25 MG: 25 TABLET ORAL at 09:02

## 2024-02-20 RX ADMIN — CEFEPIME 2 G: 2 INJECTION, POWDER, FOR SOLUTION INTRAVENOUS at 05:02

## 2024-02-20 RX ADMIN — Medication 10 ML: at 12:02

## 2024-02-20 RX ADMIN — GUAIFENESIN 200 MG: 200 SOLUTION ORAL at 01:02

## 2024-02-20 RX ADMIN — Medication 10 ML: at 11:02

## 2024-02-20 RX ADMIN — METRONIDAZOLE 500 MG: 500 TABLET ORAL at 03:02

## 2024-02-20 RX ADMIN — BUSPIRONE HYDROCHLORIDE 7.5 MG: 5 TABLET ORAL at 09:02

## 2024-02-20 RX ADMIN — FLUCONAZOLE 400 MG: 2 INJECTION, SOLUTION INTRAVENOUS at 04:02

## 2024-02-20 RX ADMIN — FAMOTIDINE 40 MG: 20 TABLET, FILM COATED ORAL at 09:02

## 2024-02-20 RX ADMIN — GUAIFENESIN 200 MG: 200 SOLUTION ORAL at 12:02

## 2024-02-20 RX ADMIN — ASPIRIN 81 MG CHEWABLE TABLET 81 MG: 81 TABLET CHEWABLE at 09:02

## 2024-02-20 RX ADMIN — METRONIDAZOLE 500 MG: 500 TABLET ORAL at 04:02

## 2024-02-20 RX ADMIN — ENOXAPARIN SODIUM 40 MG: 40 INJECTION SUBCUTANEOUS at 04:02

## 2024-02-20 RX ADMIN — Medication 10 ML: at 05:02

## 2024-02-20 RX ADMIN — CEFEPIME 2 G: 2 INJECTION, POWDER, FOR SOLUTION INTRAVENOUS at 01:02

## 2024-02-20 RX ADMIN — Medication 10 ML: at 06:02

## 2024-02-20 NOTE — PLAN OF CARE
Problem: Adult Inpatient Plan of Care  Goal: Plan of Care Review  Outcome: Ongoing, Progressing  Goal: Patient-Specific Goal (Individualized)  Outcome: Ongoing, Progressing  Goal: Absence of Hospital-Acquired Illness or Injury  Outcome: Ongoing, Progressing  Goal: Optimal Comfort and Wellbeing  Outcome: Ongoing, Progressing  Goal: Readiness for Transition of Care  Outcome: Ongoing, Progressing     Problem: Skin Injury Risk Increased  Goal: Skin Health and Integrity  Outcome: Ongoing, Progressing     Problem: Pain Acute  Goal: Acceptable Pain Control and Functional Ability  Outcome: Ongoing, Progressing     Problem: Bowel Motility Impaired (Cholecystectomy)  Goal: Effective Bowel Elimination  Outcome: Ongoing, Progressing

## 2024-02-20 NOTE — ASSESSMENT & PLAN NOTE
RESOLVED    ID following for concerns of infectious diarrhea given leukocytosis on labs and reported diarrhea. May be secondary to tube feedings    Per ID recs, started on PO Vancomycin, can switch to Metronidazole if unable to tolerate PO.   C diff not sent

## 2024-02-20 NOTE — ASSESSMENT & PLAN NOTE
Patient found to have mild hydronephrosis and bladder distension on retroperitoneal ultrasound. Rosario catheter was placed and patient had significant urine output and resolution of AURORA.    Rosario catheter placed 2/11/24  Voiding trial on 2/19 failed. Rosario replaced  Outpatient urology follow up

## 2024-02-20 NOTE — SUBJECTIVE & OBJECTIVE
Interval History: Patient failed voiding trial overnight, and was found to be retaining 750 mL.  He refused in and out catheterization, so Rosario was replaced.  His two right abdominal drains continue to put out serous fluid, with the right upper quadrant drain putting out 50 mL and the right lower quadrant drain putting out 80 mL over the past 24 hours.  He is saturating well on room air.  His tube feeds are at goal, and patient reports feeling well this morning.  Due to improved clinical stability, and continued refusal of morning labs, lab draw frequency will be spaced out to 72 hours.      Objective:     Vital Signs (Most Recent):  Temp: 98.5 °F (36.9 °C) (02/20/24 1202)  Pulse: 73 (02/20/24 1202)  Resp: 16 (02/20/24 1202)  BP: (!) 107/52 (02/20/24 1202)  SpO2: 100 % (02/20/24 1202) Vital Signs (24h Range):  Temp:  [98 °F (36.7 °C)-98.6 °F (37 °C)] 98.5 °F (36.9 °C)  Pulse:  [72-88] 73  Resp:  [16-18] 16  SpO2:  [93 %-100 %] 100 %  BP: (107-140)/(52-70) 107/52     Weight: 79.6 kg (175 lb 8 oz)  Body mass index is 22.53 kg/m².    Intake/Output Summary (Last 24 hours) at 2/20/2024 1447  Last data filed at 2/20/2024 0609  Gross per 24 hour   Intake 630 ml   Output 2630 ml   Net -2000 ml         Physical Exam  Vitals and nursing note reviewed.   Constitutional:       General: He is not in acute distress.     Appearance: He is well-developed. He is not ill-appearing or diaphoretic.   HENT:      Head: Normocephalic and atraumatic.      Mouth/Throat:      Mouth: Mucous membranes are dry.   Eyes:      General: No scleral icterus.        Right eye: No discharge.         Left eye: No discharge.   Cardiovascular:      Rate and Rhythm: Normal rate and regular rhythm.      Heart sounds: Normal heart sounds.   Pulmonary:      Effort: Pulmonary effort is normal. No respiratory distress.      Breath sounds: Normal breath sounds. No wheezing.      Comments: 96-97% on room air during examination  Abdominal:      General: Abdomen  is flat. There is no distension.      Palpations: Abdomen is soft.      Tenderness: There is no abdominal tenderness. There is no guarding or rebound.      Comments: -2 RUQ drains present with minimal output  -PEG tube insertion site without erythema      Musculoskeletal:      Comments: Mild dependent edema on posterior arms and posterior thighs    Skin:     General: Skin is dry.      Coloration: Skin is not jaundiced or pale.      Findings: No erythema.   Neurological:      Mental Status: He is alert. Mental status is at baseline.             Significant Labs: All pertinent labs within the past 24 hours have been reviewed.    Significant Imaging: I have reviewed all pertinent imaging results/findings within the past 24 hours.

## 2024-02-20 NOTE — PROGRESS NOTES
[facility] perfect[facility] - Surgery  [facility] Medicine  Progress Note    Patient Name: Seymour Velazquez  MRN: [id number]  Patient Class: IP- Inpatient   Admission Date: 2/4/2024  Length of Stay: 16 days  Attending Physician: Bhumi Joshua*  Primary Care Provider: Eliazar Rosas MD        Subjective:     Principal Problem:Common bile duct (CBD) obstruction        HPI:  68M w/PMH stroke with G-tube placement, hypertension, diabetes, hyperlipidemia, recent cholecystitis, and dysphagia presents as transfer from OSH for AES eval. He initially presented from NH with leukocytosis w/o specific symptoms, imaging showed cholecystitis. Underwent lap irlanda 2/1, the operative report noted the gallbladder was necrotic and fell apart. The back wall of the gallbladder had an abscess that was perforated. There were spilled stones and purulent bile. A cholangiogram showed patent common duct with single stone at the ampulla that appears too large to pass spontaneously. Patient is transferred to C for possible ERCP.     On arrival, patient reports some abdominal pain, denies N/V/D. He is AFVSS. Admitted to  for further management.     Overview/Hospital Course:  Patient admitted to Hospital Medicine service for medical management and evaluation of suspected CBD obstruction following complicated laparoscopic cholecystectomy. AES was consulted on admission with recommendation of MRCP. MRCP w/ and w/o contrast currently pending. ID was consulted with continuation of Merrem from outside hospital. Merrem de-escalated to Rocephin/Flagyl per ID recs. List of home medications was obtained from home facility and were continued as appropriate. MRCP[02/06/24] noted fluid collection in the gallbladder fossa, concerning for  abscess vs. biloma. Also noted choledocholithiasis with gallstones posterior to the liver. AES performed ERCP 02/06/24 with removal of choledocolithiasis  by biliary sphincterotomy and balloon extraction. General Surgery  consult noted no indication surgical intervention at the time, but can consider IR drainage of fluid collection if clinical status worsens. Intermittent episodes of N/V with associated tachycardia throughout hospitalization, without acute abdomen. Unable to complete IR drainage of fluid collection on 2/7 2/2 N/V. Scheduling antiemetics for better control. Repeat attempt at fluid drainage and drain placement with IR successful without complication morning of 2/9. Slowly evolving AURORA was not responsive to increased IVF resuscitation; urine studies inconsistent with pre-renal etiology. Consulted Nephrology for further assistance. Patient had a retroperitoneal ultrasound showing mild bilateral hydronephrosis and a distended bladder. Rosario catheter was placed on 2/11 and patient put out almost 1L with catheter insertion. SLP evaluated patient and he was started on pleasure feeds in addition to his tube feeds.     His course was complicated by increased nausea/vomiting and PEG tube dislodgement, which was replaced twice by the Gastroenterology team. Abdominal binder placed to secure PEG tube.  Increasing concerns for sepsis with worsening vitals/labs, so broadened antibiotics coverage with Meropenem and added PO Vancomycin per ID recs out of concerns for c diff. He also developed new onset hypoxia and required breathing treatment and NT suctioning in addition to oxygen supplementation that was thought to be secondary to an aspiration event. His diarrhea quickly resolved and sample was not able to be collected for c diff. He received a CT Chest/Abdomen/Pelvis with contrast on 2/16 that noted decreased size of gallbladder fossa fluid collection and no new focal fluid collections but also reported his stomach was distended with air and fluid on CT imaging so PEG tube feedings held and PEG tube was placed to gravity with significant drainage. He was given 1u pRbc for symptomatic anemia on 2/16/24. He was also started  fluconazole for oral thrush and CT w/ concerns for esophagitis. Tube feeds were resumed on 2/17.    He had clinical improvement on 2/19 with reduced cough, decreased oxygen requirement, and was able to tolerate tube feeds at goal.The output from his two right sided abdominal drains was still elevated. Final recommendations and end of treatment dates were received from Infectious Disease and patient set to complete courses of cefepime/metronidazole on 2/24 and complete fluconazole course on 2/29.     Interval History: Patient failed voiding trial overnight, and was found to be retaining 750 mL.  He refused in and out catheterization, so Rosario was replaced.  His two right abdominal drains continue to put out serous fluid, with the right upper quadrant drain putting out 50 mL and the right lower quadrant drain putting out 80 mL over the past 24 hours.  He is saturating well on room air.  His tube feeds are at goal, and patient reports feeling well this morning.  Due to improved clinical stability, and continued refusal of morning labs, lab draw frequency will be spaced out to 72 hours.      Objective:     Vital Signs (Most Recent):  Temp: 98.5 °F (36.9 °C) (02/20/24 1202)  Pulse: 73 (02/20/24 1202)  Resp: 16 (02/20/24 1202)  BP: (!) 107/52 (02/20/24 1202)  SpO2: 100 % (02/20/24 1202) Vital Signs (24h Range):  Temp:  [98 °F (36.7 °C)-98.6 °F (37 °C)] 98.5 °F (36.9 °C)  Pulse:  [72-88] 73  Resp:  [16-18] 16  SpO2:  [93 %-100 %] 100 %  BP: (107-140)/(52-70) 107/52     Weight: 79.6 kg (175 lb 8 oz)  Body mass index is 22.53 kg/m².    Intake/Output Summary (Last 24 hours) at 2/20/2024 1447  Last data filed at 2/20/2024 0609  Gross per 24 hour   Intake 630 ml   Output 2630 ml   Net -2000 ml         Physical Exam  Vitals and nursing note reviewed.   Constitutional:       General: He is not in acute distress.     Appearance: He is well-developed. He is not ill-appearing or diaphoretic.   HENT:      Head: Normocephalic and  atraumatic.      Mouth/Throat:      Mouth: Mucous membranes are dry.   Eyes:      General: No scleral icterus.        Right eye: No discharge.         Left eye: No discharge.   Cardiovascular:      Rate and Rhythm: Normal rate and regular rhythm.      Heart sounds: Normal heart sounds.   Pulmonary:      Effort: Pulmonary effort is normal. No respiratory distress.      Breath sounds: Normal breath sounds. No wheezing.      Comments: 96-97% on room air during examination  Abdominal:      General: Abdomen is flat. There is no distension.      Palpations: Abdomen is soft.      Tenderness: There is no abdominal tenderness. There is no guarding or rebound.      Comments: -2 RUQ drains present with minimal output  -PEG tube insertion site without erythema      Musculoskeletal:      Comments: Mild dependent edema on posterior arms and posterior thighs    Skin:     General: Skin is dry.      Coloration: Skin is not jaundiced or pale.      Findings: No erythema.   Neurological:      Mental Status: He is alert. Mental status is at baseline.             Significant Labs: All pertinent labs within the past 24 hours have been reviewed.    Significant Imaging: I have reviewed all pertinent imaging results/findings within the past 24 hours.    Assessment/Plan:      * Common bile duct (CBD) obstruction  68M w/ recent CVA and residual dysphagia s/p PEG tube presents from OSH for retained gallstone in ampulla, s/p lap cholecystectomy in which the gallbladder was found necrotic and friable. His only symptom is abdominal pain. Labs show leukocytosis. He is transferred for AES eval and possible ERCP.     Monitoring 2 right abdominal drain output daily for 2 days of output < 10mL     -MRCP[02/06/24] noted fluid collection in the gallbladder fossa, concerning for  abscess vs.biloma. Also noted choledocholithiasis with gallstones posterior to the liver.  -AES performed ERCP 02/06/24 with removal of choledocolithiasis by biliary  sphincterotomy and balloon extraction.   -IR attempt at drainage of fluid collection [2/7/2024] unsuccessful 2/2 episode of N/V with associated tachycardia and hypertension  -IR reattempt at fluid drainage and drain placement [2/9/24] successful without complication. Drain in place with serosanguinous fluid.  -General Surgery consult noted no indication for surgical intervention at the time  -CT[2/16/24] with decreased size of gallbladder fossa fluid collection and no new focal fluid collections   -Per ID recs, antibiotics changed to cefepime and metronidazole. EOT 2/24/24   -Following cultures. Blood cultures NGTD. Diarrhea resolved and no c diff sample sent  -Fluid studies and cultures with no evidence of bacterial growth thus far  -Antiemetics PRN        Esophagitis  CT Chest/Abdomen/Pelvis with contrast[2/16] noted concerns for esophagitis      PLAN:  Per ID recs, started fluconazole for oral thrush and CT w/  concerns for esophagitis  Fluconazole course to end 2/29/24        Diarrhea of presumed infectious origin  RESOLVED    ID following for concerns of infectious diarrhea given leukocytosis on labs and reported diarrhea. May be secondary to tube feedings    Per ID recs, started on PO Vancomycin, can switch to Metronidazole if unable to tolerate PO.   C diff not sent      Hypokalemia  Patient has hypokalemia which is Acute . Most recent potassium levels reviewed-   Lab Results   Component Value Date    K 3.7 02/18/2024   . Will continue potassium replacement per protocol and recheck repeat levels after replacement completed.     Thrombocytosis  Unclear etiology of acute thrombocytosis, possibly reactive secondary to infection    Recent Labs     02/16/24  1524 02/17/24  0313 02/18/24  1319   * 482* 432         Trend on CBC  VTE ppx        Acute blood loss anemia  Current CBC reviewed-   Lab Results   Component Value Date    HGB 7.6 (L) 02/18/2024    HCT 23.9 (L) 02/18/2024       -Downtrending Hgb  "overnight 2/15-2/16, however patient declined blood transfusion overnight. Repeat CBC in afternoon 2/16/24 was Hgb 6.2, and patient was amenable to transfusion. No reported hematuria, hemoptysis, hematochezia or other source of bleeding.     PLAN:  -Consent obtained, transfusion of 1u PRBC on 2/16  -Monitor serial CBC and transfuse if patient becomes hemodynamically unstable, symptomatic or H/H drops below 7/21.    Acute hypoxemic respiratory failure  RESOLVED    Patient with Hypoxic Respiratory failure which is Acute.  he is not on home oxygen.    Signs/symptoms of respiratory failure include- tachypnea. Contributing diagnoses includes - Aspiration and Pneumonia Labs and images were reviewed. Patient Has not had a recent ABG. Will treat underlying causes and adjust management of respiratory failure as follows-     On antibiotics for aspiration/pneumonia/sepsis coverage. Suspect event was secondary to aspiration event  CXR[2/15] nonacute  CT Chest 2/16: Atelectasis with small right pleural effusion.   Wean supplemental oxygen as tolerated to target SaO2>90%  Duonebs PRN   NT suction as needed     Chronic diastolic heart failure  Last echo 2022            Gastrostomy tube dysfunction  Patient's PEG tube became occluded on 2/14 dislodged on 2/16 and was replaced by GI team. Radiology confirmed PEG placement by abdominal x-ray.    See "On tube feeding diet" A&P    Urinary retention  Patient found to have mild hydronephrosis and bladder distension on retroperitoneal ultrasound. Rosario catheter was placed and patient had significant urine output and resolution of AURORA.    Rosario catheter placed 2/11/24  Voiding trial on 2/19 failed. Rosario replaced  Outpatient urology follow up       On tube feeding diet  Hx of PEG  tube placement s/p CVA. Tolerated slow basal rate feeds well thus far following fluid drainage. On bolus feeds prior to admission    PLAN:  -Nutrition consulted for tube feed recs. When resumed PO diet, Pleasure " "feeds of pureed foods and sips of thin liquids okay per SLP  -PEG occulded and exchanged by GI 2/14 and 2/15.   Care Instructions  - Flushes: flush PEG daily with 60 ml water  - Antibiotic ointment to site, clean site with soap and water daily and dry thoroughly and clean site daily with half-strength hydrogen peroxide for three days, then soap and water daily.  - Dressing: change dressing on top of bumper daily  -abdominal binder to prevent future dislodgement   -Continue tube feeding at goal of 45 mL/hour      Abdominal fluid collection  See "Common bile duct (CBD) obstruction " A&P        Choledocholithiasis  See "Common bile duct (CBD) obstruction " A&P        Bilateral pleural effusion  Patient found to have small pleural effusion on imaging [MRCP(02/06/24)"Small right-sided pleural effusion.  Trace left-sided pleural effusion"]. Most likely etiology includes  limited mobility due to recent history of hospitalization for cholecystectomy . Management to include  monitoring at this time    CT Chest 2/16: Atelectasis with small right pleural effusion.       Normocytic anemia  -Patient's with Normocytic anemia..   -Etiology likely due to chronic disease .  -Current CBC reviewed-    Recent Labs   Lab 02/16/24  1524 02/17/24  0313 02/18/24  1319   HGB 6.2* 8.0* 7.6*           Component Value Date/Time    MCV 96 02/18/2024 1319    RDW 16.3 (H) 02/18/2024 1319    IRON 12 (L) 02/07/2024 0223    TIBC 195 (L) 02/07/2024 0223    FERRITIN 1,256 (H) 02/07/2024 0223         PLAN  - Monitor serial CBC and transfuse if patient becomes hemodynamically unstable, symptomatic or H/H drops below 7/21.  - Etiology consistent with anemia of chronic disease. Will CTM.  - S/P 1 uPRBCs on 2/16    Cholecystitis  See "Common bile duct (CBD) obstruction " A&P        CVA (cerebral vascular accident)  Patient is s/p CVA with residual dysphagia and weakness. He is s/p PEG tube and receives bolus tube feedings.         Type 2 diabetes " mellitus  Patient's FSGs are controlled on current medication regimen.  Last A1c reviewed-   Lab Results   Component Value Date    HGBA1C 4.8 02/05/2024     Most recent fingerstick glucose reviewed-   Recent Labs   Lab 02/18/24  1914 02/18/24  2240 02/19/24  0602 02/19/24  0814   POCTGLUCOSE 100 95 125* 133*       Current correctional scale  Low  Maintain anti-hyperglycemic dose as follows-   Antihyperglycemics (From admission, onward)      Start     Stop Route Frequency Ordered    02/04/24 2059  insulin aspart U-100 pen 0-5 Units         -- SubQ Before meals & nightly PRN 02/04/24 2000          Hold Oral hypoglycemics while patient is in the hospital.     Patient on SSI at NH.    -LDSSI  -Glucerna for tube feeds    Primary hypertension  Patient is normotensive on arrival, per chart he is not currently on any antihypertensives.     Will utilize p.r.n. blood pressure medication only if patient's blood pressure greater than 180/110 and he develops symptoms such as worsening chest pain or shortness of breath.    -amlodipine 5mg. Hold for hypotension.  -Holding home lisinopril in the setting of AURORA during admission  -Will add PRN hydralazine for SBP>180 and symptomatic  -will consider uptitration of home regimen if pressures remain uncontrolled        VTE Risk Mitigation (From admission, onward)           Ordered     enoxaparin injection 40 mg  Every 24 hours         02/09/24 1408     IP VTE HIGH RISK PATIENT  Once         02/05/24 1410     Place sequential compression device  Until discontinued         02/04/24 2000                    Discharge Planning   PEYTON: 2/21/2024     Code Status: Full Code   Is the patient medically ready for discharge?: No    Reason for patient still in hospital (select all that apply): Treatment  Discharge Plan A: Return to nursing home                  David Poe MD  Department of Hospital Medicine   [facility] - Surgery

## 2024-02-20 NOTE — ASSESSMENT & PLAN NOTE
RESOLVED    Patient with Hypoxic Respiratory failure which is Acute.  he is not on home oxygen.    Signs/symptoms of respiratory failure include- tachypnea. Contributing diagnoses includes - Aspiration and Pneumonia Labs and images were reviewed. Patient Has not had a recent ABG. Will treat underlying causes and adjust management of respiratory failure as follows-     On antibiotics for aspiration/pneumonia/sepsis coverage. Suspect event was secondary to aspiration event  CXR[2/15] nonacute  CT Chest 2/16: Atelectasis with small right pleural effusion.   Wean supplemental oxygen as tolerated to target SaO2>90%  Duonebs PRN   NT suction as needed

## 2024-02-20 NOTE — ASSESSMENT & PLAN NOTE
IMPROVED    Patient noted to have sinus tachycardia up to HR 140s in the setting of acute episodes of nausea and vomiting on 2/7/24. On 2/15/24 patient noted to be having tachycardia. Concern for possible new infection vs dehydration as his feeds were held pending PEG placement confirmation.

## 2024-02-20 NOTE — NURSING
Nurses Note -- 4 Eyes      2/19/2024   6:12 PM      Skin assessed during: Daily Assessment      [x] No Altered Skin Integrity Present    []Prevention Measures Documented      [] Yes- Altered Skin Integrity Present or Discovered   [] LDA Added if Not in Epic (Describe Wound)   [] New Altered Skin Integrity was Present on Admit and Documented in LDA   [] Wound Image Taken    Wound Care Consulted? No    Attending Nurse:  Opal MCGRATH    Second RN/Staff Member: Cele GIANG RN

## 2024-02-21 LAB
POCT GLUCOSE: 110 MG/DL (ref 70–110)
POCT GLUCOSE: 138 MG/DL (ref 70–110)
POCT GLUCOSE: 142 MG/DL (ref 70–110)

## 2024-02-21 PROCEDURE — 21400001 HC TELEMETRY ROOM

## 2024-02-21 PROCEDURE — 25000003 PHARM REV CODE 250: Performed by: STUDENT IN AN ORGANIZED HEALTH CARE EDUCATION/TRAINING PROGRAM

## 2024-02-21 PROCEDURE — 63600175 PHARM REV CODE 636 W HCPCS: Performed by: HOSPITALIST

## 2024-02-21 PROCEDURE — 25000003 PHARM REV CODE 250

## 2024-02-21 PROCEDURE — 25000003 PHARM REV CODE 250: Performed by: INTERNAL MEDICINE

## 2024-02-21 PROCEDURE — 63600175 PHARM REV CODE 636 W HCPCS

## 2024-02-21 PROCEDURE — 97110 THERAPEUTIC EXERCISES: CPT

## 2024-02-21 PROCEDURE — 93010 ELECTROCARDIOGRAM REPORT: CPT | Mod: ,,, | Performed by: INTERNAL MEDICINE

## 2024-02-21 PROCEDURE — 97530 THERAPEUTIC ACTIVITIES: CPT

## 2024-02-21 PROCEDURE — A4216 STERILE WATER/SALINE, 10 ML: HCPCS | Performed by: INTERNAL MEDICINE

## 2024-02-21 PROCEDURE — 93005 ELECTROCARDIOGRAM TRACING: CPT

## 2024-02-21 PROCEDURE — 25000003 PHARM REV CODE 250: Performed by: HOSPITALIST

## 2024-02-21 RX ORDER — ACETAMINOPHEN 325 MG/1
650 TABLET ORAL EVERY 6 HOURS PRN
Status: DISCONTINUED | OUTPATIENT
Start: 2024-02-21 | End: 2024-02-23 | Stop reason: HOSPADM

## 2024-02-21 RX ADMIN — CEFEPIME 2 G: 2 INJECTION, POWDER, FOR SOLUTION INTRAVENOUS at 05:02

## 2024-02-21 RX ADMIN — ASPIRIN 81 MG CHEWABLE TABLET 81 MG: 81 TABLET CHEWABLE at 09:02

## 2024-02-21 RX ADMIN — CEFEPIME 2 G: 2 INJECTION, POWDER, FOR SOLUTION INTRAVENOUS at 02:02

## 2024-02-21 RX ADMIN — METRONIDAZOLE 500 MG: 500 TABLET ORAL at 02:02

## 2024-02-21 RX ADMIN — ACETAMINOPHEN 650 MG: 325 TABLET ORAL at 05:02

## 2024-02-21 RX ADMIN — METRONIDAZOLE 500 MG: 500 TABLET ORAL at 03:02

## 2024-02-21 RX ADMIN — ATORVASTATIN CALCIUM 80 MG: 40 TABLET, FILM COATED ORAL at 08:02

## 2024-02-21 RX ADMIN — Medication 10 ML: at 06:02

## 2024-02-21 RX ADMIN — ACETAMINOPHEN 650 MG: 325 TABLET ORAL at 11:02

## 2024-02-21 RX ADMIN — Medication 10 ML: at 12:02

## 2024-02-21 RX ADMIN — FLUCONAZOLE 400 MG: 2 INJECTION, SOLUTION INTRAVENOUS at 03:02

## 2024-02-21 RX ADMIN — Medication 10 ML: at 05:02

## 2024-02-21 RX ADMIN — ENOXAPARIN SODIUM 40 MG: 40 INJECTION SUBCUTANEOUS at 05:02

## 2024-02-21 RX ADMIN — BUSPIRONE HYDROCHLORIDE 7.5 MG: 5 TABLET ORAL at 09:02

## 2024-02-21 RX ADMIN — SERTRALINE HYDROCHLORIDE 25 MG: 25 TABLET ORAL at 09:02

## 2024-02-21 RX ADMIN — FAMOTIDINE 40 MG: 20 TABLET, FILM COATED ORAL at 09:02

## 2024-02-21 RX ADMIN — CEFEPIME 2 G: 2 INJECTION, POWDER, FOR SOLUTION INTRAVENOUS at 11:02

## 2024-02-21 NOTE — ASSESSMENT & PLAN NOTE
Patient's FSGs are controlled on current medication regimen.  Last A1c reviewed-   Lab Results   Component Value Date    HGBA1C 4.8 02/05/2024     Most recent fingerstick glucose reviewed-   Recent Labs   Lab 02/20/24  1858 02/21/24  0625 02/21/24  1125   POCTGLUCOSE 109 142* 138*     Current correctional scale  Low  Maintain anti-hyperglycemic dose as follows-   Antihyperglycemics (From admission, onward)      Start     Stop Route Frequency Ordered    02/04/24 2059  insulin aspart U-100 pen 0-5 Units         -- SubQ Before meals & nightly PRN 02/04/24 2000          Hold Oral hypoglycemics while patient is in the hospital.     Patient on SSI at NH.    -LDSSI  -Glucerna for tube feeds

## 2024-02-21 NOTE — SUBJECTIVE & OBJECTIVE
Interval History: No acute events overnight, afebrile, hemodynamically stable. Acetaminophen administered for back pain this morning.     Objective:     Vital Signs (Most Recent):  Temp: 98.2 °F (36.8 °C) (02/21/24 1525)  Pulse: 75 (02/21/24 1551)  Resp: 16 (02/21/24 1525)  BP: 125/73 (02/21/24 1525)  SpO2: 98 % (02/21/24 1525) Vital Signs (24h Range):  Temp:  [98.1 °F (36.7 °C)-98.4 °F (36.9 °C)] 98.2 °F (36.8 °C)  Pulse:  [71-85] 75  Resp:  [16-20] 16  SpO2:  [97 %-100 %] 98 %  BP: (106-136)/(61-73) 125/73     Weight: 79.6 kg (175 lb 8 oz)  Body mass index is 22.53 kg/m².    Intake/Output Summary (Last 24 hours) at 2/21/2024 1614  Last data filed at 2/21/2024 1210  Gross per 24 hour   Intake 540 ml   Output 910 ml   Net -370 ml         Physical Exam  Vitals and nursing note reviewed.   Constitutional:       General: He is not in acute distress.     Appearance: He is well-developed. He is not ill-appearing, toxic-appearing or diaphoretic.   HENT:      Head: Normocephalic and atraumatic.      Mouth/Throat:      Mouth: Mucous membranes are dry.   Eyes:      General: No scleral icterus.        Right eye: No discharge.         Left eye: No discharge.   Cardiovascular:      Rate and Rhythm: Normal rate and regular rhythm.      Heart sounds: Normal heart sounds.   Pulmonary:      Effort: Pulmonary effort is normal. No respiratory distress.      Breath sounds: Normal breath sounds. No wheezing or rales.   Abdominal:      General: Abdomen is flat. There is no distension.      Palpations: Abdomen is soft.      Tenderness: There is abdominal tenderness (mild abominal tenderness around drain insertion site). There is no guarding or rebound.      Comments: -2 RUQ drains present with minimal output  -PEG tube insertion site without erythema      Skin:     General: Skin is dry.      Coloration: Skin is not jaundiced or pale.      Findings: No erythema.   Neurological:      Mental Status: He is alert. Mental status is at  baseline.             Significant Labs: All pertinent labs within the past 24 hours have been reviewed.    Significant Imaging: I have reviewed all pertinent imaging results/findings within the past 24 hours.

## 2024-02-21 NOTE — PT/OT/SLP PROGRESS
"Physical Therapy Treatment    Patient Name:  Seymour Velazquez   MRN:  [id number]    Recommendations:     Discharge Recommendations: No Therapy Indicated (pt is at baseline functional mobility)  Discharge Equipment Recommendations: hospital bed, lift device, wheelchair (equipment per NH)  Barriers to discharge: None    Assessment:     Seymour Velazquez is a 68 y.o. male admitted with a medical diagnosis of Common bile duct (CBD) obstruction.  He presents with the following impairments/functional limitations: decreased upper extremity function, decreased lower extremity function, decreased safety awareness, decreased ROM, impaired functional mobility, impaired sensation . PT D/Antelmo due to pt is not making progress towards goals and appears to be at his baseline function.    Recent Surgery: Procedure(s) (LRB):  ERCP (ENDOSCOPIC RETROGRADE CHOLANGIOPANCREATOGRAPHY) (N/A) 15 Days Post-Op    Plan:       (D/C PT due to pt is at his baseline functional mobility)   Plan of Care Expires:  03/13/24    Subjective   "I can't do it" (reach for the bed rail with L UE)    Pain/Comfort:  Pain Rating 1:  (pt c/o pain with respositioning of his UEs in sitting- pt unable to grade)  Location - Side 1: Bilateral  Location - Orientation 1: generalized  Location 1: arm  Pain Addressed 1: Reposition, Distraction, Cessation of Activity  Pain Rating Post-Intervention 1: 0/10 (at rest in supine)      Objective:     Communicated with nurse prior to session.  Patient found HOB elevated with PATEL drain, PEG Tube, peripheral IV, pacheco catheter upon PT entry to room.     General Precautions: Standard, aspiration, fall  Orthopedic Precautions: N/A  Braces: N/A  Respiratory Status: Room air     Functional Mobility:  Bed Mobility:     Roll to L : total assist  Roll to R: total assist  Supine to Sit: total assistance and of 2 persons  Sit to Supine: total assistance  Transfers:     Sit to Stand:  total assistance with hand-held assist-pt unable to " fully clear his hips from the bed despite total assist x 3 trials    AM-PAC 6 CLICK MOBILITY  Turning over in bed (including adjusting bedclothes, sheets and blankets)?: 2  Sitting down on and standing up from a chair with arms (e.g., wheelchair, bedside commode, etc.): 1  Moving from lying on back to sitting on the side of the bed?: 2  Moving to and from a bed to a chair (including a wheelchair)?: 1  Need to walk in hospital room?: 1  Climbing 3-5 steps with a railing?: 1  Basic Mobility Total Score: 8     Treatment & Education:  Pt sat on the EOB ~ 18 min with moderate to max assist between standing attempts to allow for linen to be changed. Pt leans to the R and posterior despite verbal and manual cues to reposition pt and to reach for the bed rail with his L UE. Pt rolled to L and R to be cleaned and linens replaced. Pt performed B LE exs in sitting with P/AAROM to R LE and AROM to L LE (within ROM limitations) x 8 reps into knee ext and ankle DF    Patient left HOB elevated with all lines intact, call button in reach, bed alarm on, and nurse notified..    GOALS:   Multidisciplinary Problems       Physical Therapy Goals          Problem: Physical Therapy    Goal Priority Disciplines Outcome Goal Variances Interventions   Physical Therapy Goal     PT, PT/OT Adequate for Care Transition     Description: Goals to be met by: 3/13/24     Patient will increase functional independence with mobility by performin. Supine to sit with Moderate Assistance  2. Sit to supine with Moderate Assistance  3. Bed to chair transfer with Moderate Assistance using Slideboard  5. Sitting at edge of bed x10 minutes with Minimal Assistance                         Time Tracking:     PT Received On: 24  PT Start Time: 815     PT Stop Time: 839  PT Total Time (min): 24 min     Billable Minutes: Therapeutic Activity 14 and Therapeutic Exercise 10    Treatment Type: Treatment  PT/PTA: PT     Number of PTA visits since last PT  visit: 0     02/21/2024

## 2024-02-21 NOTE — ASSESSMENT & PLAN NOTE
68M w/ recent CVA and residual dysphagia s/p PEG tube presents from OSH for retained gallstone in ampulla, s/p lap cholecystectomy in which the gallbladder was found necrotic and friable. His only symptom is abdominal pain. Labs show leukocytosis. He is transferred for AES eval and possible ERCP.     Output by Drain (mL) 02/19/24 0701 - 02/19/24 1900 02/19/24 1901 - 02/20/24 0700 02/20/24 0701 - 02/20/24 1900 02/20/24 1901 - 02/21/24 0700 02/21/24 0701 - 02/21/24 1615        Closed/Suction Drain Lateral RLQ Bulb  80 10  40        Closed/Suction Drain 02/09/24 0916 Tube - 1 Right RUQ Bulb 10 Fr. 45 5 10          Gastrostomy/Enterostomy LUQ feeding          Monitoring 2 right abdominal drain output daily for 2 days of output < 10mL     -MRCP[02/06/24] noted fluid collection in the gallbladder fossa, concerning for  abscess vs.biloma. Also noted choledocholithiasis with gallstones posterior to the liver.  -AES performed ERCP 02/06/24 with removal of choledocolithiasis by biliary sphincterotomy and balloon extraction.   -IR attempt at drainage of fluid collection [2/7/2024] unsuccessful 2/2 episode of N/V with associated tachycardia and hypertension  -IR reattempt at fluid drainage and drain placement [2/9/24] successful without complication. Drain in place with serosanguinous fluid.  -General Surgery consult noted no indication for surgical intervention at the time  -CT[2/16/24] with decreased size of gallbladder fossa fluid collection and no new focal fluid collections   -Per ID recs, antibiotics changed to cefepime and metronidazole. EOT 2/24/24   -Following cultures. Blood cultures NGTD. Diarrhea resolved and no c diff sample sent  -Fluid studies and cultures with no evidence of bacterial growth thus far  -Antiemetics PRN       History  Chief Complaint   Patient presents with    Vomiting     States post op gastric sleeve and hiatal hernia repair, discharged today at 15 noon   States when takes a sip of broth or any liquid immediately vomits and than has dry heaves     HPI    This is a 48 year female that presents with vomiting  Patient is postop day 2 from a gastric sleeve along with hiatal hernia repair  Patient states she was discharged today at noon  States since she got home she has not been able to keep anything down  States she has been having extreme pain she tries take pain medication with keeps vomiting get out  Vomiting up bile  States she is dry heaving  She called the bariatric surgery team who advised she come to the hospital   Patient states she has pain in the epigastric area  Denies any fevers  46 year female that presents with vomiting  Will give fluids get basic lab speak with bariatric surgery next spoke with bariatric surgery Dr Pappas who recommended symptomatic treatment in if no improvement admit patient to the hospital observation    Prior to Admission Medications   Prescriptions Last Dose Informant Patient Reported? Taking?    Alpha-Lipoic Acid 100 MG CAPS Past Week at Unknown time Self Yes Yes   Sig: Take 200 mg by mouth    Ascorbic Acid (VITAMIN C) 1000 MG tablet Past Week at Unknown time Self Yes Yes   Sig: Take 1,000 mg by mouth daily   Cholecalciferol (VITAMIN D3) 5000 units TABS Past Week at Unknown time Self Yes Yes   Sig: Take 5,000 Units by mouth daily   Collagen 500 MG CAPS Past Week at Unknown time  Yes Yes   Sig: Take by mouth   DHEA 50 MG TABS Past Week at Unknown time  Yes Yes   Sig: Take by mouth   FOLIC ACID PO Past Week at Unknown time Self Yes Yes   Sig: Take 266 mg daily   GLUCOS-REINA-MSM-ZU-I-XIZG-SECU PO   Yes No   Sig: Take by mouth   HYDROcodone-acetaminophen (NORCO) 5-325 mg per tablet   No No   Sig: Take 1 tablet by mouth every 6 (six) hours as needed for pain for up to 4 daysMax Daily Amount: 4 tablets   HYDROcodone-acetaminophen (NORCO) 5-325 mg per tablet   No No   Sig: Take 1 tablet by mouth every 6 (six) hours as needed for pain for up to 4 daysMax Daily Amount: 4 tablets   acetaminophen (TYLENOL) 325 mg tablet 8/20/2019 at Unknown time  No Yes   Sig: Take 3 tablets (975 mg total) by mouth every 8 (eight) hours for 3 days   calcium gluconate 500 mg tablet Past Week at Unknown time Self Yes Yes   Sig: daily Take 1200 mg daily   calcium polycarbophil (FIBERCON) 625 mg tablet Past Week at Unknown time  Yes Yes   Sig: Take 625 mg by mouth daily   co-enzyme Q-10 30 MG capsule Past Week at Unknown time Self Yes Yes   Sig: Take 30 mg by mouth daily   enoxaparin (LOVENOX) 40 mg/0 4 mL 8/20/2019 at Unknown time  No Yes   Sig: Inject 0 4 mL (40 mg total) under the skin daily for 13 days   escitalopram (LEXAPRO) 20 mg tablet 8/19/2019 Self Yes No   Sig: Take 10 mg by mouth daily    omeprazole (PriLOSEC) 40 MG capsule 8/18/2019 Self Yes No   Sig: Take 40 mg by mouth daily   zinc gluconate 50 mg tablet Past Week at Unknown time  Yes Yes   Sig: Take 50 mg by mouth daily      Facility-Administered Medications: None       Past Medical History:   Diagnosis Date    Acid reflux     Anxiety     Asthma     CPAP (continuous positive airway pressure) dependence     Depression     Frequent headaches     Heartburn     Hiatal hernia     Increased urinary frequency     Lyme disease     Night sweats     Obesity     Osteoarthritis     Sleep apnea     SOB (shortness of breath)     Swelling of multiple joints        Past Surgical History:   Procedure Laterality Date    AUGMENTATION BREAST      BREAST SURGERY      implants(silicon) repaired x5 last [id number]    COCCYGECTOMY  03/25/2019    COLONOSCOPY      FOOT SURGERY Right     x4    PARAESOPHAGEAL HERNIA REPAIR N/A 8/19/2019    Procedure: REPAIR HERNIA PARAESOPHAGEAL  LAPAROSCOPIC;  Surgeon: Damaris Greenfield MD;  Location: [address] [place] [address];  Service: Bariatrics    ND EGD TRANSORAL BIOPSY SINGLE/MULTIPLE N/A 2019    Procedure: ESOPHAGOGASTRODUODENOSCOPY (EGD); Surgeon: Urmila Layne MD;  Location: [facility];  Service: General    ND LAP, CAITLIN RESTRICT PROC, LONGITUDINAL GASTRECTOMY N/A 2019    Procedure: Rut David;  Surgeon: Urmila Layne MD;  Location: [facility];  Service: Bariatrics       Family History   Problem Relation Age of Onset    Heart attack Mother     Skin cancer Mother     Abnormal EKG Mother     Parkinsonism Father     Skin cancer Sister     Asthma Sister     Allergies Sister      I have reviewed and agree with the history as documented  Social History     Tobacco Use    Smoking status: Former Smoker     Last attempt to quit: 1995     Years since quittin 3    Smokeless tobacco: Never Used    Tobacco comment: Quit 15 years ago   Substance Use Topics    Alcohol use: Yes     Comment: social    Drug use: No        Review of Systems   Constitutional: Negative  Negative for diaphoresis and fever  HENT: Negative  Respiratory: Negative  Negative for cough, shortness of breath and wheezing  Cardiovascular: Negative  Negative for chest pain, palpitations and leg swelling  Gastrointestinal: Positive for abdominal pain, constipation, nausea and vomiting  Negative for abdominal distention  Genitourinary: Negative  Musculoskeletal: Negative  Skin: Negative  Neurological: Negative  Psychiatric/Behavioral: Negative  All other systems reviewed and are negative  Physical Exam  Physical Exam   Constitutional: She is oriented to person, place, and time  She appears well-developed and well-nourished  No distress  HENT:   Head: Normocephalic and atraumatic  Eyes: Pupils are equal, round, and reactive to light  EOM are normal    Neck: Normal range of motion  Neck supple  Cardiovascular: Normal rate, regular rhythm and normal heart sounds     No murmur heard   Pulmonary/Chest: Effort normal and breath sounds normal    Abdominal: Soft  Bowel sounds are normal  She exhibits no distension  There is tenderness  There is no rebound and no guarding  Abdomen is soft mild tenderness in the epigastric area incisions clean dry intact   Musculoskeletal: Normal range of motion  Neurological: She is alert and oriented to person, place, and time  Skin: Skin is warm and dry  She is not diaphoretic  Psychiatric: She has a normal mood and affect  Vitals reviewed        Vital Signs  ED Triage Vitals [08/20/19 2225]   Temperature Pulse Respirations Blood Pressure SpO2   98 3 °F (36 8 °C) 69 20 135/64 97 %      Temp Source Heart Rate Source Patient Position - Orthostatic VS BP Location FiO2 (%)   Oral Monitor Lying Left arm --      Pain Score       8           Vitals:    08/20/19 2345 08/21/19 0009 08/21/19 0034 08/21/19 0359   BP:  147/65 140/78 140/78   Pulse: 58 74 61 61   Patient Position - Orthostatic VS:  Sitting           Visual Acuity      ED Medications  Medications   lactated ringers infusion (75 mL/hr Intravenous New Bag 8/21/19 0100)   diphenhydrAMINE (BENADRYL) tablet 25 mg (has no administration in time range)   scopolamine (TRANSDERM-SCOP) 1 5 mg/3 days TD 72 hr patch 1 patch (1 patch Transdermal Medication Applied 8/21/19 0104)   famotidine (PEPCID) injection 20 mg (has no administration in time range)   phenol (CHLORASEPTIC) 1 4 % mucosal liquid 2 spray (has no administration in time range)   enoxaparin (LOVENOX) subcutaneous injection 40 mg (has no administration in time range)   promethazine (PHENERGAN) injection 25 mg (has no administration in time range)   lactated ringers bolus 1,000 mL (1,000 mL Intravenous New Bag 8/21/19 0637)   ketorolac (TORADOL) injection 15 mg (15 mg Intravenous Given 8/21/19 0641)   ondansetron (ZOFRAN) injection 4 mg (has no administration in time range)   sodium phosphate-biphosphate (FLEET) enema 1 enema (has no administration in time range)   sodium phosphate-biphosphate (FLEET) enema 1 enema (has no administration in time range)   sodium chloride 0 9 % bolus 1,000 mL (1,000 mL Intravenous New Bag 8/20/19 2259)   ondansetron (ZOFRAN) injection 4 mg (4 mg Intravenous Given 8/20/19 2259)   morphine (PF) 4 mg/mL injection 4 mg (4 mg Intravenous Given 8/20/19 2300)   metoclopramide (REGLAN) injection 10 mg (10 mg Intravenous Given 8/20/19 2340)   diphenhydrAMINE (BENADRYL) injection 25 mg (25 mg Intravenous Given 8/21/19 0005)   simethicone (MYLICON) oral suspension 80 mg (80 mg Oral Given 8/21/19 0650)   diazepam (VALIUM) injection 5 mg (5 mg Intravenous Given 8/21/19 0644)       Diagnostic Studies  Results Reviewed     Procedure Component Value Units Date/Time    Lipase [[id number]]  (Normal) Collected:  08/20/19 2258    Lab Status:  Final result Specimen:  Blood from Arm, Left Updated:  08/20/19 2341     Lipase 157 u/L     Comprehensive metabolic panel [[id number]] Collected:  08/20/19 2258    Lab Status:  Final result Specimen:  Blood from Arm, Left Updated:  08/20/19 2325     Sodium 139 mmol/L      Potassium 3 9 mmol/L      Chloride 104 mmol/L      CO2 25 mmol/L      ANION GAP 10 mmol/L      BUN 15 mg/dL      Creatinine 0 96 mg/dL      Glucose 104 mg/dL      Calcium 8 3 mg/dL      AST 23 U/L      ALT 35 U/L      Alkaline Phosphatase 57 U/L      Total Protein 7 1 g/dL      Albumin 3 6 g/dL      Total Bilirubin 0 40 mg/dL      eGFR 68 ml/min/1 73sq m     Narrative:       [place] guidelines for Chronic Kidney Disease (CKD):     Stage 1 with normal or high GFR (GFR > 90 mL/min/1 73 square meters)    Stage 2 Mild CKD (GFR = 60-89 mL/min/1 73 square meters)    Stage 3A Moderate CKD (GFR = 45-59 mL/min/1 73 square meters)    Stage 3B Moderate CKD (GFR = 30-44 mL/min/1 73 square meters)    Stage 4 Severe CKD (GFR = 15-29 mL/min/1 73 square meters)    Stage 5 End Stage CKD (GFR <15 mL/min/1 73 square meters)  Note: GFR calculation is accurate only with a steady state creatinine    CBC and differential [[id number]]  (Abnormal) Collected:  08/20/19 2258    Lab Status:  Final result Specimen:  Blood from Arm, Left Updated:  08/20/19 2308     WBC 10 23 Thousand/uL      RBC 3 92 Million/uL      Hemoglobin 12 7 g/dL      Hematocrit 38 7 %      MCV 99 fL      MCH 32 4 pg      MCHC 32 8 g/dL      RDW 13 2 %      MPV 10 6 fL      Platelets 080 Thousands/uL      nRBC 0 /100 WBCs      Neutrophils Relative 77 %      Immat GRANS % 0 %      Lymphocytes Relative 16 %      Monocytes Relative 7 %      Eosinophils Relative 0 %      Basophils Relative 0 %      Neutrophils Absolute 7 80 Thousands/µL      Immature Grans Absolute 0 03 Thousand/uL      Lymphocytes Absolute 1 67 Thousands/µL      Monocytes Absolute 0 68 Thousand/µL      Eosinophils Absolute 0 02 Thousand/µL      Basophils Absolute 0 03 Thousands/µL                  No orders to display              Procedures  Procedures       ED Course  ED Course as of Aug 21 0701   Tue Aug 20, 2019   2257 Ultrasound guided IV in left [facility]       2333 Patient still symptomatic, will admit                                   MDM    Disposition  Final diagnoses:   Nausea and vomiting     Time reflects when diagnosis was documented in both MDM as applicable and the Disposition within this note     Time User Action Codes Description Comment    8/20/2019 11:45 PM Alessandra Lyons  8  [R11 2] Nausea and vomiting       ED Disposition     ED Disposition Condition Date/Time Comment    Admit Stable Tue Aug 20, 2019 11:45 PM Case was discussed with bariatric and the patient's admission status was agreed to be Admission Status: observation status to the service of Dr Janneth Patterson           Follow-up Information    None         Current Discharge Medication List      CONTINUE these medications which have NOT CHANGED    Details   acetaminophen (TYLENOL) 325 mg tablet Take 3 tablets (975 mg total) by mouth every 8 (eight) hours for 3 days  Qty: 30 tablet, Refills: 0    Associated Diagnoses: S/P laparoscopic sleeve gastrectomy      Alpha-Lipoic Acid 100 MG CAPS Take 200 mg by mouth       Ascorbic Acid (VITAMIN C) 1000 MG tablet Take 1,000 mg by mouth daily      calcium gluconate 500 mg tablet daily Take 1200 mg daily      calcium polycarbophil (FIBERCON) 625 mg tablet Take 625 mg by mouth daily      Cholecalciferol (VITAMIN D3) 5000 units TABS Take 5,000 Units by mouth daily      co-enzyme Q-10 30 MG capsule Take 30 mg by mouth daily      Collagen 500 MG CAPS Take by mouth      DHEA 50 MG TABS Take by mouth      enoxaparin (LOVENOX) 40 mg/0 4 mL Inject 0 4 mL (40 mg total) under the skin daily for 13 days  Qty: 5 2 mL, Refills: 0    Associated Diagnoses: Morbid (severe) obesity due to excess calories (HCC)      FOLIC ACID PO Take 208 mg daily      zinc gluconate 50 mg tablet Take 50 mg by mouth daily      escitalopram (LEXAPRO) 20 mg tablet Take 10 mg by mouth daily       GLUCOS-REINA-MSM-RX-P-OFDI-SECU PO Take by mouth      !! HYDROcodone-acetaminophen (NORCO) 5-325 mg per tablet Take 1 tablet by mouth every 6 (six) hours as needed for pain for up to 4 daysMax Daily Amount: 4 tablets  Qty: 15 each, Refills: 0    Associated Diagnoses: S/P laparoscopic sleeve gastrectomy      !! HYDROcodone-acetaminophen (NORCO) 5-325 mg per tablet Take 1 tablet by mouth every 6 (six) hours as needed for pain for up to 4 daysMax Daily Amount: 4 tablets  Qty: 15 tablet, Refills: 0    Associated Diagnoses: S/P laparoscopic sleeve gastrectomy      omeprazole (PriLOSEC) 40 MG capsule Take 40 mg by mouth daily       ! ! - Potential duplicate medications found  Please discuss with provider  No discharge procedures on file      ED Provider  Electronically Signed by           Shira Redmond MD  08/21/19 7463

## 2024-02-21 NOTE — PLAN OF CARE
Problem: Physical Therapy  Goal: Physical Therapy Goal  Description: Goals to be met by: 3/13/24     Patient will increase functional independence with mobility by performin. Supine to sit with Moderate Assistance-not met  2. Sit to supine with Moderate Assistance-not met  3. Bed to chair transfer with Moderate Assistance using Slideboard-not met  5. Sitting at edge of bed x10 minutes with Minimal Assistance-not met    Outcome: Adequate for Care Transition   PT D/Antelmo due to pt is not making progress towards goals as set and at baseline was dependent with transfers with a aranza lift per pt. Ruth Webb PT 24

## 2024-02-21 NOTE — PROGRESS NOTES
[facility] - Surgery  [facility] Medicine  Progress Note    Patient Name: Seymour Velazquez  MRN: [id number]  Patient Class: IP- Inpatient   Admission Date: 2/4/2024  Length of Stay: 17 days  Attending Physician: Bhumi Joshua*  Primary Care Provider: Eliazar Rosas MD        Subjective:     Principal Problem:Common bile duct (CBD) obstruction        HPI:  68M w/PMH stroke with G-tube placement, hypertension, diabetes, hyperlipidemia, recent cholecystitis, and dysphagia presents as transfer from OSH for AES eval. He initially presented from NH with leukocytosis w/o specific symptoms, imaging showed cholecystitis. Underwent lap irlanda 2/1, the operative report noted the gallbladder was necrotic and fell apart. The back wall of the gallbladder had an abscess that was perforated. There were spilled stones and purulent bile. A cholangiogram showed patent common duct with single stone at the ampulla that appears too large to pass spontaneously. Patient is transferred to C for possible ERCP.     On arrival, patient reports some abdominal pain, denies N/V/D. He is AFVSS. Admitted to  for further management.     Overview/Hospital Course:  Patient admitted to Hospital Medicine service for medical management and evaluation of suspected CBD obstruction following complicated laparoscopic cholecystectomy. AES was consulted on admission with recommendation of MRCP. MRCP w/ and w/o contrast currently pending. ID was consulted with continuation of Merrem from outside hospital. Merrem de-escalated to Rocephin/Flagyl per ID recs. List of home medications was obtained from home facility and were continued as appropriate. MRCP[02/06/24] noted fluid collection in the gallbladder fossa, concerning for  abscess vs. biloma. Also noted choledocholithiasis with gallstones posterior to the liver. AES performed ERCP 02/06/24 with removal of choledocolithiasis  by biliary sphincterotomy and balloon extraction. General Surgery  consult noted no indication surgical intervention at the time, but can consider IR drainage of fluid collection if clinical status worsens. Intermittent episodes of N/V with associated tachycardia throughout hospitalization, without acute abdomen. Unable to complete IR drainage of fluid collection on 2/7 2/2 N/V. Scheduling antiemetics for better control. Repeat attempt at fluid drainage and drain placement with IR successful without complication morning of 2/9. Slowly evolving AURORA was not responsive to increased IVF resuscitation; urine studies inconsistent with pre-renal etiology. Consulted Nephrology for further assistance. Patient had a retroperitoneal ultrasound showing mild bilateral hydronephrosis and a distended bladder. Rosario catheter was placed on 2/11 and patient put out almost 1L with catheter insertion. SLP evaluated patient and he was started on pleasure feeds in addition to his tube feeds.     His course was complicated by increased nausea/vomiting and PEG tube dislodgement, which was replaced twice by the Gastroenterology team. Abdominal binder placed to secure PEG tube.  Increasing concerns for sepsis with worsening vitals/labs, so broadened antibiotics coverage with Meropenem and added PO Vancomycin per ID recs out of concerns for c diff. He also developed new onset hypoxia and required breathing treatment and NT suctioning in addition to oxygen supplementation that was thought to be secondary to an aspiration event. His diarrhea quickly resolved and sample was not able to be collected for c diff. He received a CT Chest/Abdomen/Pelvis with contrast on 2/16 that noted decreased size of gallbladder fossa fluid collection and no new focal fluid collections but also reported his stomach was distended with air and fluid on CT imaging so PEG tube feedings held and PEG tube was placed to gravity with significant drainage. He was given 1u pRbc for symptomatic anemia on 2/16/24. He was also started  fluconazole for oral thrush and CT w/ concerns for esophagitis. Tube feeds were resumed on 2/17.    He had clinical improvement on 2/19 with reduced cough, decreased oxygen requirement, and was able to tolerate tube feeds at goal.The output from his two right sided abdominal drains was still elevated. Final recommendations and end of treatment dates were received from Infectious Disease and patient set to complete courses of cefepime/metronidazole on 2/24 and complete fluconazole course on 2/29.     Interval History: No acute events overnight, afebrile, hemodynamically stable. Acetaminophen administered for back pain this morning.     Objective:     Vital Signs (Most Recent):  Temp: 98.2 °F (36.8 °C) (02/21/24 1525)  Pulse: 75 (02/21/24 1551)  Resp: 16 (02/21/24 1525)  BP: 125/73 (02/21/24 1525)  SpO2: 98 % (02/21/24 1525) Vital Signs (24h Range):  Temp:  [98.1 °F (36.7 °C)-98.4 °F (36.9 °C)] 98.2 °F (36.8 °C)  Pulse:  [71-85] 75  Resp:  [16-20] 16  SpO2:  [97 %-100 %] 98 %  BP: (106-136)/(61-73) 125/73     Weight: 79.6 kg (175 lb 8 oz)  Body mass index is 22.53 kg/m².    Intake/Output Summary (Last 24 hours) at 2/21/2024 1614  Last data filed at 2/21/2024 1210  Gross per 24 hour   Intake 540 ml   Output 910 ml   Net -370 ml         Physical Exam  Vitals and nursing note reviewed.   Constitutional:       General: He is not in acute distress.     Appearance: He is well-developed. He is not ill-appearing, toxic-appearing or diaphoretic.   HENT:      Head: Normocephalic and atraumatic.      Mouth/Throat:      Mouth: Mucous membranes are dry.   Eyes:      General: No scleral icterus.        Right eye: No discharge.         Left eye: No discharge.   Cardiovascular:      Rate and Rhythm: Normal rate and regular rhythm.      Heart sounds: Normal heart sounds.   Pulmonary:      Effort: Pulmonary effort is normal. No respiratory distress.      Breath sounds: Normal breath sounds. No wheezing or rales.   Abdominal:       General: Abdomen is flat. There is no distension.      Palpations: Abdomen is soft.      Tenderness: There is abdominal tenderness (mild abominal tenderness around drain insertion site). There is no guarding or rebound.      Comments: -2 RUQ drains present with minimal output  -PEG tube insertion site without erythema      Skin:     General: Skin is dry.      Coloration: Skin is not jaundiced or pale.      Findings: No erythema.   Neurological:      Mental Status: He is alert. Mental status is at baseline.             Significant Labs: All pertinent labs within the past 24 hours have been reviewed.    Significant Imaging: I have reviewed all pertinent imaging results/findings within the past 24 hours.      Assessment/Plan:      * Common bile duct (CBD) obstruction  68M w/ recent CVA and residual dysphagia s/p PEG tube presents from OSH for retained gallstone in ampulla, s/p lap cholecystectomy in which the gallbladder was found necrotic and friable. His only symptom is abdominal pain. Labs show leukocytosis. He is transferred for AES eval and possible ERCP.     Output by Drain (mL) 02/19/24 0701 - 02/19/24 1900 02/19/24 1901 - 02/20/24 0700 02/20/24 0701 - 02/20/24 1900 02/20/24 1901 - 02/21/24 0700 02/21/24 0701 - 02/21/24 1615        Closed/Suction Drain Lateral RLQ Bulb  80 10  40        Closed/Suction Drain 02/09/24 0916 Tube - 1 Right RUQ Bulb 10 Fr. 45 5 10          Gastrostomy/Enterostomy LUQ feeding          Monitoring 2 right abdominal drain output daily for 2 days of output < 10mL     -MRCP[02/06/24] noted fluid collection in the gallbladder fossa, concerning for  abscess vs.biloma. Also noted choledocholithiasis with gallstones posterior to the liver.  -AES performed ERCP 02/06/24 with removal of choledocolithiasis by biliary sphincterotomy and balloon extraction.   -IR attempt at drainage of fluid collection [2/7/2024] unsuccessful 2/2 episode of N/V with associated tachycardia and hypertension  -IR  reattempt at fluid drainage and drain placement [2/9/24] successful without complication. Drain in place with serosanguinous fluid.  -General Surgery consult noted no indication for surgical intervention at the time  -CT[2/16/24] with decreased size of gallbladder fossa fluid collection and no new focal fluid collections   -Per ID recs, antibiotics changed to cefepime and metronidazole. EOT 2/24/24   -Following cultures. Blood cultures NGTD. Diarrhea resolved and no c diff sample sent  -Fluid studies and cultures with no evidence of bacterial growth thus far  -Antiemetics PRN        Esophagitis  CT Chest/Abdomen/Pelvis with contrast[2/16] noted concerns for esophagitis      PLAN:  Per ID recs, started fluconazole for oral thrush and CT w/  concerns for esophagitis  Fluconazole course to end 2/29/24        Diarrhea of presumed infectious origin  RESOLVED    ID following for concerns of infectious diarrhea given leukocytosis on labs and reported diarrhea. May be secondary to tube feedings    Per ID recs, started on PO Vancomycin, can switch to Metronidazole if unable to tolerate PO.   C diff not sent      Hypokalemia  Patient has hypokalemia which is Acute . Most recent potassium levels reviewed-   Lab Results   Component Value Date    K 3.7 02/18/2024   . Will continue potassium replacement per protocol and recheck repeat levels after replacement completed.     Thrombocytosis  Unclear etiology of acute thrombocytosis, possibly reactive secondary to infection    Recent Labs     02/16/24  1524 02/17/24  0313 02/18/24  1319   * 482* 432         Trend on CBC  VTE ppx        Acute blood loss anemia  Current CBC reviewed-   Lab Results   Component Value Date    HGB 8.0 (L) 02/19/2024    HCT 24.7 (L) 02/19/2024       -Downtrending Hgb overnight 2/15-2/16, however patient declined blood transfusion overnight. Repeat CBC in afternoon 2/16/24 was Hgb 6.2, and patient was amenable to transfusion. No reported hematuria,  "hemoptysis, hematochezia or other source of bleeding.     PLAN:  -Consent obtained, transfusion of 1u PRBC on 2/16  -Monitor serial CBC and transfuse if patient becomes hemodynamically unstable, symptomatic or H/H drops below 7/21.    Acute hypoxemic respiratory failure  RESOLVED    Patient with Hypoxic Respiratory failure which is Acute.  he is not on home oxygen.    Signs/symptoms of respiratory failure include- tachypnea. Contributing diagnoses includes - Aspiration and Pneumonia Labs and images were reviewed. Patient Has not had a recent ABG. Will treat underlying causes and adjust management of respiratory failure as follows-     On antibiotics for aspiration/pneumonia/sepsis coverage. Suspect event was secondary to aspiration event  CXR[2/15] nonacute  CT Chest 2/16: Atelectasis with small right pleural effusion.   Wean supplemental oxygen as tolerated to target SaO2>90%  Duonebs PRN   NT suction as needed     Chronic diastolic heart failure  Last echo 2022            Gastrostomy tube dysfunction  Patient's PEG tube became occluded on 2/14 dislodged on 2/16 and was replaced by GI team. Radiology confirmed PEG placement by abdominal x-ray.    See "On tube feeding diet" A&P    Urinary retention  Patient found to have mild hydronephrosis and bladder distension on retroperitoneal ultrasound. Rosario catheter was placed and patient had significant urine output and resolution of AURORA.    Rosario catheter placed 2/11/24  Voiding trial on 2/19 failed. Rosario replaced  Outpatient urology follow up       On tube feeding diet  Hx of PEG  tube placement s/p CVA. Tolerated slow basal rate feeds well thus far following fluid drainage. On bolus feeds prior to admission    PLAN:  -Nutrition consulted for tube feed recs. When resumed PO diet, Pleasure feeds of pureed foods and sips of thin liquids okay per SLP  -PEG occulded and exchanged by GI 2/14 and 2/15.   Care Instructions  - Flushes: flush PEG daily with 60 ml water  - " "Antibiotic ointment to site, clean site with soap and water daily and dry thoroughly and clean site daily with half-strength hydrogen peroxide for three days, then soap and water daily.  - Dressing: change dressing on top of bumper daily  -abdominal binder to prevent future dislodgement   -Continue tube feeding at goal of 45 mL/hour      Abdominal fluid collection  See "Common bile duct (CBD) obstruction " A&P        Choledocholithiasis  See "Common bile duct (CBD) obstruction " A&P        Bilateral pleural effusion  Patient found to have small pleural effusion on imaging [MRCP(02/06/24)"Small right-sided pleural effusion.  Trace left-sided pleural effusion"]. Most likely etiology includes  limited mobility due to recent history of hospitalization for cholecystectomy . Management to include  monitoring at this time    CT Chest 2/16: Atelectasis with small right pleural effusion.       Normocytic anemia  -Patient's with Normocytic anemia..   -Etiology likely due to chronic disease .  -Current CBC reviewed-    Recent Labs   Lab 02/17/24  0313 02/18/24  1319 02/19/24  1320   HGB 8.0* 7.6* 8.0*         Component Value Date/Time    MCV 97 02/19/2024 1320    RDW 16.6 (H) 02/19/2024 1320    IRON 12 (L) 02/07/2024 0223    TIBC 195 (L) 02/07/2024 0223    FERRITIN 1,256 (H) 02/07/2024 0223         PLAN  - Monitor serial CBC and transfuse if patient becomes hemodynamically unstable, symptomatic or H/H drops below 7/21.  - Etiology consistent with anemia of chronic disease. Will CTM.  - S/P 1 uPRBCs on 2/16    Cholecystitis  See "Common bile duct (CBD) obstruction " A&P        CVA (cerebral vascular accident)  Patient is s/p CVA with residual dysphagia and weakness. He is s/p PEG tube and receives bolus tube feedings.         Type 2 diabetes mellitus  Patient's FSGs are controlled on current medication regimen.  Last A1c reviewed-   Lab Results   Component Value Date    HGBA1C 4.8 02/05/2024     Most recent fingerstick glucose " reviewed-   Recent Labs   Lab 02/20/24  1858 02/21/24  0625 02/21/24  1125   POCTGLUCOSE 109 142* 138*     Current correctional scale  Low  Maintain anti-hyperglycemic dose as follows-   Antihyperglycemics (From admission, onward)      Start     Stop Route Frequency Ordered    02/04/24 2059  insulin aspart U-100 pen 0-5 Units         -- SubQ Before meals & nightly PRN 02/04/24 2000          Hold Oral hypoglycemics while patient is in the hospital.     Patient on SSI at NH.    -LDSSI  -Glucerna for tube feeds    Primary hypertension  Patient is normotensive on arrival, per chart he is not currently on any antihypertensives.     Will utilize p.r.n. blood pressure medication only if patient's blood pressure greater than 180/110 and he develops symptoms such as worsening chest pain or shortness of breath.    -amlodipine 5mg. Hold for hypotension.  -Holding home lisinopril in the setting of AURORA during admission  -Will add PRN hydralazine for SBP>180 and symptomatic  -will consider uptitration of home regimen if pressures remain uncontrolled        VTE Risk Mitigation (From admission, onward)           Ordered     enoxaparin injection 40 mg  Every 24 hours         02/09/24 1408     IP VTE HIGH RISK PATIENT  Once         02/05/24 1410     Place sequential compression device  Until discontinued         02/04/24 2000                    Discharge Planning   PEYTON: 2/22/2024     Code Status: Full Code   Is the patient medically ready for discharge?: No    Reason for patient still in hospital (select all that apply): Patient trending condition  Discharge Plan A: Return to nursing home                  Johnnie Brasher DO  Department of Hospital Medicine   [facility] - Surgery

## 2024-02-21 NOTE — ASSESSMENT & PLAN NOTE
Current CBC reviewed-   Lab Results   Component Value Date    HGB 8.0 (L) 02/19/2024    HCT 24.7 (L) 02/19/2024       -Downtrending Hgb overnight 2/15-2/16, however patient declined blood transfusion overnight. Repeat CBC in afternoon 2/16/24 was Hgb 6.2, and patient was amenable to transfusion. No reported hematuria, hemoptysis, hematochezia or other source of bleeding.     PLAN:  -Consent obtained, transfusion of 1u PRBC on 2/16  -Monitor serial CBC and transfuse if patient becomes hemodynamically unstable, symptomatic or H/H drops below 7/21.

## 2024-02-21 NOTE — ASSESSMENT & PLAN NOTE
-Patient's with Normocytic anemia..   -Etiology likely due to chronic disease .  -Current CBC reviewed-    Recent Labs   Lab 02/17/24  0313 02/18/24  1319 02/19/24  1320   HGB 8.0* 7.6* 8.0*         Component Value Date/Time    MCV 97 02/19/2024 1320    RDW 16.6 (H) 02/19/2024 1320    IRON 12 (L) 02/07/2024 0223    TIBC 195 (L) 02/07/2024 0223    FERRITIN 1,256 (H) 02/07/2024 0223         PLAN  - Monitor serial CBC and transfuse if patient becomes hemodynamically unstable, symptomatic or H/H drops below 7/21.  - Etiology consistent with anemia of chronic disease. Will CTM.  - S/P 1 uPRBCs on 2/16

## 2024-02-22 PROBLEM — R19.7 DIARRHEA OF PRESUMED INFECTIOUS ORIGIN: Status: RESOLVED | Noted: 2024-02-16 | Resolved: 2024-02-22

## 2024-02-22 LAB
OHS QRS DURATION: 78 MS
OHS QTC CALCULATION: 450 MS
POCT GLUCOSE: 108 MG/DL (ref 70–110)
POCT GLUCOSE: 118 MG/DL (ref 70–110)
POCT GLUCOSE: 120 MG/DL (ref 70–110)
POCT GLUCOSE: 124 MG/DL (ref 70–110)
POCT GLUCOSE: 142 MG/DL (ref 70–110)

## 2024-02-22 PROCEDURE — A4216 STERILE WATER/SALINE, 10 ML: HCPCS | Performed by: INTERNAL MEDICINE

## 2024-02-22 PROCEDURE — 21400001 HC TELEMETRY ROOM

## 2024-02-22 PROCEDURE — 25000003 PHARM REV CODE 250

## 2024-02-22 PROCEDURE — 63600175 PHARM REV CODE 636 W HCPCS

## 2024-02-22 PROCEDURE — 25000003 PHARM REV CODE 250: Performed by: INTERNAL MEDICINE

## 2024-02-22 PROCEDURE — 25000003 PHARM REV CODE 250: Performed by: HOSPITALIST

## 2024-02-22 PROCEDURE — 63600175 PHARM REV CODE 636 W HCPCS: Performed by: HOSPITALIST

## 2024-02-22 RX ORDER — FAMOTIDINE 40 MG/1
40 TABLET, FILM COATED ORAL DAILY
Qty: 30 TABLET | Refills: 0
Start: 2024-02-23 | End: 2024-03-24

## 2024-02-22 RX ORDER — ONDANSETRON 4 MG/1
4 TABLET, FILM COATED ORAL EVERY 8 HOURS PRN
Start: 2024-02-22

## 2024-02-22 RX ORDER — TRAZODONE HYDROCHLORIDE 50 MG/1
50 TABLET ORAL NIGHTLY
Start: 2024-02-22

## 2024-02-22 RX ORDER — METRONIDAZOLE 500 MG/1
500 TABLET ORAL EVERY 12 HOURS
Qty: 13 TABLET | Refills: 0 | Status: CANCELLED
Start: 2024-02-18 | End: 2024-02-25

## 2024-02-22 RX ORDER — FLUCONAZOLE 2 MG/ML
400 INJECTION, SOLUTION INTRAVENOUS DAILY
Qty: 1400 ML | Refills: 0 | Status: CANCELLED
Start: 2024-02-22 | End: 2024-02-29

## 2024-02-22 RX ORDER — FAMOTIDINE 40 MG/1
40 TABLET, FILM COATED ORAL DAILY
Qty: 30 TABLET | Refills: 11 | Status: CANCELLED
Start: 2024-02-23 | End: 2025-02-22

## 2024-02-22 RX ORDER — FLUCONAZOLE 2 MG/ML
400 INJECTION, SOLUTION INTRAVENOUS DAILY
Start: 2024-02-22 | End: 2024-02-29

## 2024-02-22 RX ORDER — ATORVASTATIN CALCIUM 80 MG/1
80 TABLET, FILM COATED ORAL NIGHTLY
Qty: 90 TABLET
Start: 2024-02-22 | End: 2025-02-21

## 2024-02-22 RX ORDER — NAPROXEN SODIUM 220 MG/1
81 TABLET, FILM COATED ORAL DAILY
Refills: 0
Start: 2024-02-23 | End: 2025-02-22

## 2024-02-22 RX ORDER — METRONIDAZOLE 500 MG/1
500 TABLET ORAL EVERY 12 HOURS
Start: 2024-02-22 | End: 2024-02-24

## 2024-02-22 RX ORDER — SENNOSIDES 8.6 MG/1
1 TABLET ORAL DAILY
Start: 2024-02-22

## 2024-02-22 RX ORDER — BUSPIRONE HYDROCHLORIDE 7.5 MG/1
7.5 TABLET ORAL DAILY
Qty: 30 TABLET
Start: 2024-02-23 | End: 2025-02-22

## 2024-02-22 RX ORDER — SERTRALINE HYDROCHLORIDE 25 MG/1
25 TABLET, FILM COATED ORAL DAILY
Qty: 30 TABLET | Refills: 0
Start: 2024-02-23 | End: 2025-02-22

## 2024-02-22 RX ADMIN — FLUCONAZOLE 400 MG: 2 INJECTION, SOLUTION INTRAVENOUS at 04:02

## 2024-02-22 RX ADMIN — CEFEPIME 2 G: 2 INJECTION, POWDER, FOR SOLUTION INTRAVENOUS at 06:02

## 2024-02-22 RX ADMIN — CEFEPIME 2 G: 2 INJECTION, POWDER, FOR SOLUTION INTRAVENOUS at 03:02

## 2024-02-22 RX ADMIN — Medication 10 ML: at 11:02

## 2024-02-22 RX ADMIN — SERTRALINE HYDROCHLORIDE 25 MG: 25 TABLET ORAL at 08:02

## 2024-02-22 RX ADMIN — FAMOTIDINE 40 MG: 20 TABLET, FILM COATED ORAL at 08:02

## 2024-02-22 RX ADMIN — METRONIDAZOLE 500 MG: 500 TABLET ORAL at 03:02

## 2024-02-22 RX ADMIN — Medication 10 ML: at 06:02

## 2024-02-22 RX ADMIN — Medication 10 ML: at 12:02

## 2024-02-22 RX ADMIN — ENOXAPARIN SODIUM 40 MG: 40 INJECTION SUBCUTANEOUS at 04:02

## 2024-02-22 RX ADMIN — METRONIDAZOLE 500 MG: 500 TABLET ORAL at 04:02

## 2024-02-22 RX ADMIN — MORPHINE SULFATE 2 MG: 2 INJECTION, SOLUTION INTRAMUSCULAR; INTRAVENOUS at 12:02

## 2024-02-22 RX ADMIN — ATORVASTATIN CALCIUM 80 MG: 40 TABLET, FILM COATED ORAL at 09:02

## 2024-02-22 RX ADMIN — CEFEPIME 2 G: 2 INJECTION, POWDER, FOR SOLUTION INTRAVENOUS at 10:02

## 2024-02-22 RX ADMIN — BUSPIRONE HYDROCHLORIDE 7.5 MG: 5 TABLET ORAL at 08:02

## 2024-02-22 RX ADMIN — ASPIRIN 81 MG CHEWABLE TABLET 81 MG: 81 TABLET CHEWABLE at 08:02

## 2024-02-22 NOTE — PLAN OF CARE
Updated patient information sent to Katlin ([phone]) with [facility] per her request-though [facility].    3pm  [employer] insurance still auth pending

## 2024-02-22 NOTE — ASSESSMENT & PLAN NOTE
Patient's FSGs are controlled on current medication regimen.  Last A1c reviewed-   Lab Results   Component Value Date    HGBA1C 4.8 02/05/2024     Most recent fingerstick glucose reviewed-   Recent Labs   Lab 02/22/24  0542 02/22/24  0812 02/22/24  1033 02/22/24  1300   POCTGLUCOSE 124* 120* 118* 108       Current correctional scale  Low  Maintain anti-hyperglycemic dose as follows-   Antihyperglycemics (From admission, onward)    Start     Stop Route Frequency Ordered    02/04/24 2059  insulin aspart U-100 pen 0-5 Units         -- SubQ Before meals & nightly PRN 02/04/24 2000        Hold Oral hypoglycemics while patient is in the hospital.     Patient on SSI at NH.    -LDSSI  -Glucerna for tube feeds

## 2024-02-22 NOTE — SUBJECTIVE & OBJECTIVE
Interval History: No acute events overnight, afebrile, hemodynamically stable. Drain output of abdominal drains continues to be >10mL. Patient has no reported pain this morning and says his cough has improved. He refused labs this morning. Pending discharge to nursing home in [place].         Objective:     Vital Signs (Most Recent):  Temp: 98 °F (36.7 °C) (02/22/24 1038)  Pulse: 80 (02/22/24 1201)  Resp: 18 (02/22/24 1038)  BP: 126/68 (02/22/24 1038)  SpO2: 98 % (02/22/24 1038) Vital Signs (24h Range):  Temp:  [98 °F (36.7 °C)-98.3 °F (36.8 °C)] 98 °F (36.7 °C)  Pulse:  [75-99] 80  Resp:  [16-20] 18  SpO2:  [98 %-99 %] 98 %  BP: (125-134)/(61-73) 126/68     Weight: 79.6 kg (175 lb 8 oz)  Body mass index is 22.53 kg/m².    Intake/Output Summary (Last 24 hours) at 2/22/2024 1454  Last data filed at 2/22/2024 0409  Gross per 24 hour   Intake 120 ml   Output 40 ml   Net 80 ml         Physical Exam  Vitals and nursing note reviewed.   Constitutional:       General: He is not in acute distress.     Appearance: He is well-developed. He is not ill-appearing, toxic-appearing or diaphoretic.   HENT:      Head: Normocephalic and atraumatic.      Mouth/Throat:      Mouth: Mucous membranes are dry.   Eyes:      General: No scleral icterus.        Right eye: No discharge.         Left eye: No discharge.   Cardiovascular:      Rate and Rhythm: Normal rate and regular rhythm.      Heart sounds: Normal heart sounds.   Pulmonary:      Effort: Pulmonary effort is normal. No respiratory distress.      Breath sounds: Normal breath sounds. No wheezing or rales.   Abdominal:      General: Abdomen is flat. There is no distension.      Palpations: Abdomen is soft.      Tenderness: There is no abdominal tenderness. There is no guarding or rebound.      Comments: -2 RUQ drains present with minimal output  -PEG tube insertion site without erythema      Skin:     General: Skin is dry.      Coloration: Skin is not jaundiced or pale.       Findings: No erythema.   Neurological:      Mental Status: He is alert. Mental status is at baseline.             Significant Labs: All pertinent labs within the past 24 hours have been reviewed.    Significant Imaging: I have reviewed all pertinent imaging results/findings within the past 24 hours.

## 2024-02-22 NOTE — PLAN OF CARE
"   NURSING HOME ORDERS    02/22/2024  [facility]  [facility] - SURGERY  [address]  [facility] [address]  Dept: [phone]  Loc: [phone]     Admit to Nursing Home:  Skilled Nursing Facility    Diagnoses:  Active Hospital Problems    Diagnosis  POA    *Common bile duct (CBD) obstruction [K83.1]  Yes    Acute hypoxemic respiratory failure [J96.01]  No    Thrombocytosis [D75.839]  No    Hypokalemia [E87.6]  No    Diarrhea of presumed infectious origin [R19.7]  No    Acute blood loss anemia [D62]  Yes    Esophagitis [K20.90]  Clinically Undetermined    Gastrostomy tube dysfunction [K94.23]  No     Replace by GI on 2/14      Chronic diastolic heart failure [I50.32]  Yes    Urinary retention [R33.9]  No    Bilateral pleural effusion [J90]  Yes    Choledocholithiasis [K80.50]  Yes    Abdominal fluid collection [R18.8]  Yes     "7.0 cm fluid collection in the gallbladder fossa, likely representing abscess and/or biloma. "      On tube feeding diet [Z78.9]  Yes    Cholecystitis [K81.9]  Yes    Normocytic anemia [D64.9]  Yes    Primary hypertension [I10]  Yes     Chronic    Type 2 diabetes mellitus [E11.9]  Yes     Chronic    CVA (cerebral vascular accident) [I63.9]  Yes     Chronic      Resolved Hospital Problems    Diagnosis Date Resolved POA    AURORA (acute kidney injury) [N17.9] 02/16/2024 No    Tachycardia [R00.0] 02/20/2024 No     Sinus per EKG      Hyponatremia [E87.1] 02/11/2024 No    Hypomagnesemia [E83.42] 02/06/2024 Yes    Hypophosphatemia [E83.39] 02/06/2024 Yes       Patient is homebound due to:  Common bile duct (CBD) obstruction    Allergies:  Review of patient's allergies indicates:   Allergen Reactions    Pcn [penicillins]      Tolerated cefazolin 10/2022. -- Patient reports being told he had a PCN allergy as a child. Cannot recall specific reaction.       Vitals:  Routine    Diet:   1. Continue Glucerna 1.5 @ 45 mL/hr x 24 hours to provide 1620 kcal, 89 g PRO and 820 mL fluid. "   - Monitor s/s of intolerance such as abdominal pain/distension/nausea/vomiting.  2.  If Bolus recommendations warranted:   - Glucerna 1.5 @ 5 cans/day to provide 1780 kcals, 98 g of protein, and 900 ml of fluid.    -If other alternative formula warranted for insurance purposes:               -Isosource 1.5 @ 5 cans/day to provide 1875 kcals, 85 g of protein, and 955 ml of fluid.     Activities:   Activity as tolerated    Goals of Care Treatment Preferences:  Code Status: Full Code      Labs:  Routine    Nursing Precautions:  Aspiration , Fall, and Pressure ulcer prevention    Consults:   OT to evaluate and treat- 3 times a week, ST to evaluate and treat- 3 times a week, and Nutrition to evaluate and recommend diet     Miscellaneous Care:   PEG Care:  Clean site every 24 hours  Rosario Care: Empty Rosario bag every shift.  Change Rosario every month  Routine Skin for Bedridden Patients:  Apply moisture barrier cream to all  Diabetes Care: Diabetes: Check blood sugar. Fingerstick blood sugar A.M and p.m.  Abdominal Drain Care: - flush drains daily with 10 cc normal saline. Schedule Interventional Radiology appointment and obtain CT abdomen and pelvis when drain output is < 10 cc per day for 2 days. Clean sites every 24 hours.                Diabetes Care:  SN to perform and educate Diabetic management with blood glucose monitoring:      Medications: Discontinue all previous medication orders, if any. See new list below.     Medication List        START taking these medications      atorvastatin 80 MG tablet  Commonly known as: LIPITOR  1 tablet (80 mg total) by Per G Tube route every evening.     dextrose 5 % in water (D5W) PgBk 100 mL with ceFEPIme 2 gram SolR 2 g  Inject 2 g into the vein every 8 (eight) hours. for 2 days     famotidine 40 MG tablet  Commonly known as: PEPCID  1 tablet (40 mg total) by Per G Tube route once daily.  Start taking on: February 23, 2024     fluconazole 400 mg/200 mL IVPB  Commonly known as:  DIFLUCAN  Inject 200 mLs (400 mg total) into the vein once daily. for 7 days     metroNIDAZOLE 500 MG tablet  Commonly known as: FLAGYL  1 tablet (500 mg total) by Per G Tube route every 12 (twelve) hours. for 2 days            CHANGE how you take these medications      aspirin 81 MG Chew  1 tablet (81 mg total) by Per G Tube route once daily.  Start taking on: February 23, 2024  What changed: how to take this     busPIRone 7.5 MG tablet  Commonly known as: BUSPAR  1 tablet (7.5 mg total) by Per G Tube route once daily.  Start taking on: February 23, 2024  What changed: how to take this     ondansetron 4 MG tablet  Commonly known as: ZOFRAN  1 tablet (4 mg total) by Per G Tube route every 8 (eight) hours as needed for Nausea.  What changed: how to take this     senna 8.6 mg tablet  Commonly known as: SENOKOT  1 tablet by Per G Tube route once daily.  What changed: how to take this     sertraline 25 MG tablet  Commonly known as: ZOLOFT  1 tablet (25 mg total) by Per G Tube route once daily.  Start taking on: February 23, 2024  What changed: how to take this     traZODone 50 MG tablet  Commonly known as: DESYREL  1 tablet (50 mg total) by Per G Tube route every evening.  What changed: how to take this            CONTINUE taking these medications      fluticasone propionate 50 mcg/actuation nasal spray  Commonly known as: FLONASE  1 spray by Each Nostril route 2 (two) times a day.     sucralfate 100 mg/mL suspension  Commonly known as: CARAFATE  Take 1 g by mouth 4 (four) times daily before meals and nightly.            STOP taking these medications      megestroL 625 mg/5 mL (125 mg/mL) Susp  Commonly known as: MEGACE ES     metoclopramide HCl 5 mg/5 mL Soln  Commonly known as: REGLAN     omeprazole 40 MG capsule  Commonly known as: PRILOSEC                Immunizations Administered as of 2/22/2024       No immunizations on file.            This patient has had both covid vaccinations    Some patients may experience  side effects after vaccination.  These may include fever, headache, muscle or joint aches.  Most symptoms resolve with 24-48 hours and do not require urgent medical evaluation unless they persist for more than 72 hours or symptoms are concerning for an unrelated medical condition.          _________________________________  David Poe MD  02/22/2024

## 2024-02-22 NOTE — ASSESSMENT & PLAN NOTE
68M w/ recent CVA and residual dysphagia s/p PEG tube presents from OSH for retained gallstone in ampulla, s/p lap cholecystectomy in which the gallbladder was found necrotic and friable. His only symptom is abdominal pain. Labs show leukocytosis. He is transferred for AES eval and possible ERCP.     Output by Drain (mL) 02/20/24 0701 - 02/20/24 1900 02/20/24 1901 - 02/21/24 0700 02/21/24 0701 - 02/21/24 1900 02/21/24 1901 - 02/22/24 0700 02/22/24 0701 - 02/22/24 1510        Closed/Suction Drain Lateral RLQ Bulb 10  40 40         Closed/Suction Drain 02/09/24 0916 Tube - 1 Right RUQ Bulb 10 Fr. 10   0         Gastrostomy/Enterostomy LUQ feeding          Monitoring 2 right abdominal drain output daily for 2 days of output < 10mL     -MRCP[02/06/24] noted fluid collection in the gallbladder fossa, concerning for  abscess vs.biloma. Also noted choledocholithiasis with gallstones posterior to the liver.  -AES performed ERCP 02/06/24 with removal of choledocolithiasis by biliary sphincterotomy and balloon extraction.   -IR attempt at drainage of fluid collection [2/7/2024] unsuccessful 2/2 episode of N/V with associated tachycardia and hypertension  -IR reattempt at fluid drainage and drain placement [2/9/24] successful without complication. Drain in place with serosanguinous fluid.  -General Surgery consult noted no indication for surgical intervention at the time  -CT[2/16/24] with decreased size of gallbladder fossa fluid collection and no new focal fluid collections   -Per ID recs, antibiotics changed to cefepime and metronidazole. EOT 2/24/24   -Following cultures. Blood cultures NGTD. Diarrhea resolved and no c diff sample sent  -Fluid studies and cultures with no evidence of bacterial growth thus far  -Antiemetics PRN

## 2024-02-22 NOTE — PROGRESS NOTES
[facility] - [facility]  [facility] Medicine  Progress Note    Patient Name: Seymour Velazquez  MRN: [id number]  Patient Class: IP- Inpatient   Admission Date: 2/4/2024  Length of Stay: 18 days  Attending Physician: Braxton Portillo DO  Primary Care Provider: Eliazar Rosas MD        Subjective:     Principal Problem:Common bile duct (CBD) obstruction        HPI:  68M w/PMH stroke with G-tube placement, hypertension, diabetes, hyperlipidemia, recent cholecystitis, and dysphagia presents as transfer from OSH for AES eval. He initially presented from NH with leukocytosis w/o specific symptoms, imaging showed cholecystitis. Underwent lap irlanda 2/1, the operative report noted the gallbladder was necrotic and fell apart. The back wall of the gallbladder had an abscess that was perforated. There were spilled stones and purulent bile. A cholangiogram showed patent common duct with single stone at the ampulla that appears too large to pass spontaneously. Patient is transferred to C for possible ERCP.     On arrival, patient reports some abdominal pain, denies N/V/D. He is AFVSS. Admitted to  for further management.     Overview/Hospital Course:  Patient admitted to Hospital Medicine service for medical management and evaluation of suspected CBD obstruction following complicated laparoscopic cholecystectomy. AES was consulted on admission with recommendation of MRCP. MRCP w/ and w/o contrast currently pending. ID was consulted with continuation of Merrem from outside hospital. Merrem de-escalated to Rocephin/Flagyl per ID recs. List of home medications was obtained from home facility and were continued as appropriate. MRCP[02/06/24] noted fluid collection in the gallbladder fossa, concerning for  abscess vs. biloma. Also noted choledocholithiasis with gallstones posterior to the liver. AES performed ERCP 02/06/24 with removal of choledocolithiasis  by biliary sphincterotomy and balloon extraction. General Surgery consult  noted no indication surgical intervention at the time, but can consider IR drainage of fluid collection if clinical status worsens. Intermittent episodes of N/V with associated tachycardia throughout hospitalization, without acute abdomen. Unable to complete IR drainage of fluid collection on 2/7 2/2 N/V. Scheduling antiemetics for better control. Repeat attempt at fluid drainage and drain placement with IR successful without complication morning of 2/9. Slowly evolving AURORA was not responsive to increased IVF resuscitation; urine studies inconsistent with pre-renal etiology. Consulted Nephrology for further assistance. Patient had a retroperitoneal ultrasound showing mild bilateral hydronephrosis and a distended bladder. Rosario catheter was placed on 2/11 and patient put out almost 1L with catheter insertion. SLP evaluated patient and he was started on pleasure feeds in addition to his tube feeds.     His course was complicated by increased nausea/vomiting and PEG tube dislodgement, which was replaced twice by the Gastroenterology team. Abdominal binder placed to secure PEG tube.  Increasing concerns for sepsis with worsening vitals/labs, so broadened antibiotics coverage with Meropenem and added PO Vancomycin per ID recs out of concerns for c diff. He also developed new onset hypoxia and required breathing treatment and NT suctioning in addition to oxygen supplementation that was thought to be secondary to an aspiration event. His diarrhea quickly resolved and sample was not able to be collected for c diff. He received a CT Chest/Abdomen/Pelvis with contrast on 2/16 that noted decreased size of gallbladder fossa fluid collection and no new focal fluid collections but also reported his stomach was distended with air and fluid on CT imaging so PEG tube feedings held and PEG tube was placed to gravity with significant drainage. He was given 1u pRbc for symptomatic anemia on 2/16/24. He was also started fluconazole for  oral thrush and CT w/ concerns for esophagitis. Tube feeds were resumed on 2/17.    He had clinical improvement on 2/19 with reduced cough, decreased oxygen requirement, and was able to tolerate tube feeds at goal.The output from his two right sided abdominal drains was still elevated. Final recommendations and end of treatment dates were received from Infectious Disease and patient set to complete courses of cefepime/metronidazole on 2/24 and complete fluconazole course on 2/29. Due to continued drain output, patient was not able to have his drains removed during his hospitalization, and will require outpatient IR follow-up. He is pending SNF placement  at his nursing home due to requirement of IV antibiotics and drain management.     Interval History: No acute events overnight, afebrile, hemodynamically stable. Drain output of abdominal drains continues to be >10mL. Patient has no reported pain this morning and says his cough has improved. He refused labs this morning. Pending discharge to nursing home in [place].         Objective:     Vital Signs (Most Recent):  Temp: 98 °F (36.7 °C) (02/22/24 1038)  Pulse: 80 (02/22/24 1201)  Resp: 18 (02/22/24 1038)  BP: 126/68 (02/22/24 1038)  SpO2: 98 % (02/22/24 1038) Vital Signs (24h Range):  Temp:  [98 °F (36.7 °C)-98.3 °F (36.8 °C)] 98 °F (36.7 °C)  Pulse:  [75-99] 80  Resp:  [16-20] 18  SpO2:  [98 %-99 %] 98 %  BP: (125-134)/(61-73) 126/68     Weight: 79.6 kg (175 lb 8 oz)  Body mass index is 22.53 kg/m².    Intake/Output Summary (Last 24 hours) at 2/22/2024 1454  Last data filed at 2/22/2024 0409  Gross per 24 hour   Intake 120 ml   Output 40 ml   Net 80 ml         Physical Exam  Vitals and nursing note reviewed.   Constitutional:       General: He is not in acute distress.     Appearance: He is well-developed. He is not ill-appearing, toxic-appearing or diaphoretic.   HENT:      Head: Normocephalic and atraumatic.      Mouth/Throat:      Mouth: Mucous membranes are  dry.   Eyes:      General: No scleral icterus.        Right eye: No discharge.         Left eye: No discharge.   Cardiovascular:      Rate and Rhythm: Normal rate and regular rhythm.      Heart sounds: Normal heart sounds.   Pulmonary:      Effort: Pulmonary effort is normal. No respiratory distress.      Breath sounds: Normal breath sounds. No wheezing or rales.   Abdominal:      General: Abdomen is flat. There is no distension.      Palpations: Abdomen is soft.      Tenderness: There is no abdominal tenderness. There is no guarding or rebound.      Comments: -2 RUQ drains present with minimal output  -PEG tube insertion site without erythema      Skin:     General: Skin is dry.      Coloration: Skin is not jaundiced or pale.      Findings: No erythema.   Neurological:      Mental Status: He is alert. Mental status is at baseline.             Significant Labs: All pertinent labs within the past 24 hours have been reviewed.    Significant Imaging: I have reviewed all pertinent imaging results/findings within the past 24 hours.    Assessment/Plan:      * Common bile duct (CBD) obstruction  68M w/ recent CVA and residual dysphagia s/p PEG tube presents from OSH for retained gallstone in ampulla, s/p lap cholecystectomy in which the gallbladder was found necrotic and friable. His only symptom is abdominal pain. Labs show leukocytosis. He is transferred for AES eval and possible ERCP.     Output by Drain (mL) 02/20/24 0701 - 02/20/24 1900 02/20/24 1901 - 02/21/24 0700 02/21/24 0701 - 02/21/24 1900 02/21/24 1901 - 02/22/24 0700 02/22/24 0701 - 02/22/24 1510        Closed/Suction Drain Lateral RLQ Bulb 10  40 40         Closed/Suction Drain 02/09/24 0916 Tube - 1 Right RUQ Bulb 10 Fr. 10   0         Gastrostomy/Enterostomy LUQ feeding          Monitoring 2 right abdominal drain output daily for 2 days of output < 10mL     -MRCP[02/06/24] noted fluid collection in the gallbladder fossa, concerning for  abscess vs.biloma.  Also noted choledocholithiasis with gallstones posterior to the liver.  -AES performed ERCP 02/06/24 with removal of choledocolithiasis by biliary sphincterotomy and balloon extraction.   -IR attempt at drainage of fluid collection [2/7/2024] unsuccessful 2/2 episode of N/V with associated tachycardia and hypertension  -IR reattempt at fluid drainage and drain placement [2/9/24] successful without complication. Drain in place with serosanguinous fluid.  -General Surgery consult noted no indication for surgical intervention at the time  -CT[2/16/24] with decreased size of gallbladder fossa fluid collection and no new focal fluid collections   -Per ID recs, antibiotics changed to cefepime and metronidazole. EOT 2/24/24   -Following cultures. Blood cultures NGTD. Diarrhea resolved and no c diff sample sent  -Fluid studies and cultures with no evidence of bacterial growth thus far  -Antiemetics PRN        Esophagitis  CT Chest/Abdomen/Pelvis with contrast[2/16] noted concerns for esophagitis      PLAN:  Per ID recs, started fluconazole for oral thrush and CT w/  concerns for esophagitis  Fluconazole course to end 2/29/24        Hypokalemia  Patient has hypokalemia which is Acute . Most recent potassium levels reviewed-   Lab Results   Component Value Date    K 3.7 02/18/2024   . Will continue potassium replacement per protocol and recheck repeat levels after replacement completed.     Thrombocytosis  Unclear etiology of acute thrombocytosis, possibly reactive secondary to infection    Recent Labs     02/16/24  1524 02/17/24  0313 02/18/24  1319   * 482* 432         Trend on CBC  VTE ppx        Acute blood loss anemia  Current CBC reviewed-   Lab Results   Component Value Date    HGB 8.0 (L) 02/19/2024    HCT 24.7 (L) 02/19/2024       -Downtrending Hgb overnight 2/15-2/16, however patient declined blood transfusion overnight. Repeat CBC in afternoon 2/16/24 was Hgb 6.2, and patient was amenable to transfusion. No  "reported hematuria, hemoptysis, hematochezia or other source of bleeding.     PLAN:  -Consent obtained, transfusion of 1u PRBC on 2/16  -Monitor serial CBC and transfuse if patient becomes hemodynamically unstable, symptomatic or H/H drops below 7/21.    Acute hypoxemic respiratory failure  RESOLVED    Patient with Hypoxic Respiratory failure which is Acute.  he is not on home oxygen.    Signs/symptoms of respiratory failure include- tachypnea. Contributing diagnoses includes - Aspiration and Pneumonia Labs and images were reviewed. Patient Has not had a recent ABG. Will treat underlying causes and adjust management of respiratory failure as follows-     On antibiotics for aspiration/pneumonia/sepsis coverage. Suspect event was secondary to aspiration event  CXR[2/15] nonacute  CT Chest 2/16: Atelectasis with small right pleural effusion.   Wean supplemental oxygen as tolerated to target SaO2>90%  Duonebs PRN   NT suction as needed     Chronic diastolic heart failure  Last echo 2022            Gastrostomy tube dysfunction  Patient's PEG tube became occluded on 2/14 dislodged on 2/16 and was replaced by GI team. Radiology confirmed PEG placement by abdominal x-ray.    See "On tube feeding diet" A&P    Urinary retention  Patient found to have mild hydronephrosis and bladder distension on retroperitoneal ultrasound. Rosario catheter was placed and patient had significant urine output and resolution of AURORA.    Rosario catheter placed 2/11/24  Voiding trial on 2/19 failed. Rosario replaced  Outpatient urology follow up       On tube feeding diet  Hx of PEG  tube placement s/p CVA. Tolerated slow basal rate feeds well thus far following fluid drainage. On bolus feeds prior to admission    PLAN:  -Nutrition consulted for tube feed recs. When resumed PO diet, Pleasure feeds of pureed foods and sips of thin liquids okay per SLP  -PEG occulded and exchanged by GI 2/14 and 2/15.   Care Instructions  - Flushes: flush PEG daily with " "60 ml water  - Antibiotic ointment to site, clean site with soap and water daily and dry thoroughly and clean site daily with half-strength hydrogen peroxide for three days, then soap and water daily.  - Dressing: change dressing on top of bumper daily  -abdominal binder to prevent future dislodgement   -Continue tube feeding at goal of 45 mL/hour      Abdominal fluid collection  See "Common bile duct (CBD) obstruction " A&P        Choledocholithiasis  See "Common bile duct (CBD) obstruction " A&P        Bilateral pleural effusion  Patient found to have small pleural effusion on imaging [MRCP(02/06/24)"Small right-sided pleural effusion.  Trace left-sided pleural effusion"]. Most likely etiology includes  limited mobility due to recent history of hospitalization for cholecystectomy . Management to include  monitoring at this time    CT Chest 2/16: Atelectasis with small right pleural effusion.       Normocytic anemia  -Patient's with Normocytic anemia..   -Etiology likely due to chronic disease .  -Current CBC reviewed-    Recent Labs   Lab 02/17/24  0313 02/18/24  1319 02/19/24  1320   HGB 8.0* 7.6* 8.0*         Component Value Date/Time    MCV 97 02/19/2024 1320    RDW 16.6 (H) 02/19/2024 1320    IRON 12 (L) 02/07/2024 0223    TIBC 195 (L) 02/07/2024 0223    FERRITIN 1,256 (H) 02/07/2024 0223         PLAN  - Monitor serial CBC and transfuse if patient becomes hemodynamically unstable, symptomatic or H/H drops below 7/21.  - Etiology consistent with anemia of chronic disease. Will CTM.  - S/P 1 uPRBCs on 2/16    Cholecystitis  See "Common bile duct (CBD) obstruction " A&P        CVA (cerebral vascular accident)  Patient is s/p CVA with residual dysphagia and weakness. He is s/p PEG tube and receives bolus tube feedings.         Type 2 diabetes mellitus  Patient's FSGs are controlled on current medication regimen.  Last A1c reviewed-   Lab Results   Component Value Date    HGBA1C 4.8 02/05/2024     Most recent " fingerstick glucose reviewed-   Recent Labs   Lab 02/22/24  0542 02/22/24  0812 02/22/24  1033 02/22/24  1300   POCTGLUCOSE 124* 120* 118* 108       Current correctional scale  Low  Maintain anti-hyperglycemic dose as follows-   Antihyperglycemics (From admission, onward)      Start     Stop Route Frequency Ordered    02/04/24 2059  insulin aspart U-100 pen 0-5 Units         -- SubQ Before meals & nightly PRN 02/04/24 2000          Hold Oral hypoglycemics while patient is in the hospital.     Patient on SSI at NH.    -LDSSI  -Glucerna for tube feeds    Primary hypertension  Patient is normotensive on arrival, per chart he is not currently on any antihypertensives.     Will utilize p.r.n. blood pressure medication only if patient's blood pressure greater than 180/110 and he develops symptoms such as worsening chest pain or shortness of breath.    -amlodipine 5mg. Hold for hypotension.  -Holding home lisinopril in the setting of AURORA during admission  -Will add PRN hydralazine for SBP>180 and symptomatic  -will consider uptitration of home regimen if pressures remain uncontrolled        VTE Risk Mitigation (From admission, onward)           Ordered     enoxaparin injection 40 mg  Every 24 hours         02/09/24 1408     IP VTE HIGH RISK PATIENT  Once         02/05/24 1410     Place sequential compression device  Until discontinued         02/04/24 2000                    Discharge Planning   PEYTON: 2/23/2024     Code Status: Full Code   Is the patient medically ready for discharge?: No    Reason for patient still in hospital (select all that apply): Treatment and Pending disposition  Discharge Plan A: Return to nursing home                  David Poe MD  Department of Hospital Medicine   [facility] - Surgery

## 2024-02-23 VITALS
WEIGHT: 175.5 LBS | OXYGEN SATURATION: 93 % | DIASTOLIC BLOOD PRESSURE: 65 MMHG | SYSTOLIC BLOOD PRESSURE: 138 MMHG | BODY MASS INDEX: 22.52 KG/M2 | RESPIRATION RATE: 18 BRPM | TEMPERATURE: 98 F | HEIGHT: 74 IN | HEART RATE: 83 BPM

## 2024-02-23 LAB
POCT GLUCOSE: 113 MG/DL (ref 70–110)
POCT GLUCOSE: 114 MG/DL (ref 70–110)
POCT GLUCOSE: 115 MG/DL (ref 70–110)
POCT GLUCOSE: 94 MG/DL (ref 70–110)

## 2024-02-23 PROCEDURE — 25000003 PHARM REV CODE 250: Performed by: INTERNAL MEDICINE

## 2024-02-23 PROCEDURE — A4216 STERILE WATER/SALINE, 10 ML: HCPCS | Performed by: INTERNAL MEDICINE

## 2024-02-23 PROCEDURE — 63600175 PHARM REV CODE 636 W HCPCS

## 2024-02-23 PROCEDURE — 25000003 PHARM REV CODE 250

## 2024-02-23 PROCEDURE — 63600175 PHARM REV CODE 636 W HCPCS: Performed by: HOSPITALIST

## 2024-02-23 PROCEDURE — 25000003 PHARM REV CODE 250: Performed by: HOSPITALIST

## 2024-02-23 RX ORDER — METHOCARBAMOL 500 MG/1
250 TABLET, FILM COATED ORAL 4 TIMES DAILY
Status: CANCELLED | OUTPATIENT
Start: 2024-02-23

## 2024-02-23 RX ADMIN — Medication 10 ML: at 05:02

## 2024-02-23 RX ADMIN — METRONIDAZOLE 500 MG: 500 TABLET ORAL at 03:02

## 2024-02-23 RX ADMIN — ASPIRIN 81 MG CHEWABLE TABLET 81 MG: 81 TABLET CHEWABLE at 10:02

## 2024-02-23 RX ADMIN — CEFEPIME 2 G: 2 INJECTION, POWDER, FOR SOLUTION INTRAVENOUS at 05:02

## 2024-02-23 RX ADMIN — SERTRALINE HYDROCHLORIDE 25 MG: 25 TABLET ORAL at 10:02

## 2024-02-23 RX ADMIN — BUSPIRONE HYDROCHLORIDE 7.5 MG: 5 TABLET ORAL at 10:02

## 2024-02-23 RX ADMIN — Medication 10 ML: at 06:02

## 2024-02-23 RX ADMIN — FAMOTIDINE 40 MG: 20 TABLET, FILM COATED ORAL at 10:02

## 2024-02-23 RX ADMIN — ENOXAPARIN SODIUM 40 MG: 40 INJECTION SUBCUTANEOUS at 05:02

## 2024-02-23 NOTE — ASSESSMENT & PLAN NOTE
Patient's FSGs are controlled on current medication regimen.  Last A1c reviewed-   Lab Results   Component Value Date    HGBA1C 4.8 02/05/2024     Most recent fingerstick glucose reviewed-   Recent Labs   Lab 02/22/24  1300 02/22/24  1633 02/23/24  0425 02/23/24  0809   POCTGLUCOSE 108 142* 94 115*       Current correctional scale  Low  Maintain anti-hyperglycemic dose as follows-   Antihyperglycemics (From admission, onward)    Start     Stop Route Frequency Ordered    02/04/24 2059  insulin aspart U-100 pen 0-5 Units         -- SubQ Before meals & nightly PRN 02/04/24 2000        Hold Oral hypoglycemics while patient is in the hospital.     Patient on SSI at NH.    -LDSSI  -Glucerna for tube feeds

## 2024-02-23 NOTE — PLAN OF CARE
Phoned Vignesh Shepard in admissions at [facility] ([phone]) in regards to status of VA authorization and facility transfer. Left message on her  for return call.    11:14am  Return call from Vignesh - reports VA authorization still pending. She states they are prepared for admission today and will continue to push for auth today. She states will call me with update.    2pm  Per Vignesh VA auth obtained- may set up transportation  Nurse can call report to [phone] nurse Thorne    [facility] ambulance transportation set up for 4pm    Notified daughter Evelin and nurse Melton of above.

## 2024-02-23 NOTE — DISCHARGE SUMMARY
[facility] - [facility]  Hospital Medicine  Discharge Summary      Patient Name: Seymour Velazquez  MRN: [id number]  SEA: [id number]  Patient Class: IP- Inpatient  Admission Date: 2/4/2024  Hospital Length of Stay: 19 days  Discharge Date and Time:  02/23/2024 5:08 PM  Attending Physician: Braxton Portillo DO   Discharging Provider: David Poe MD  Primary Care Provider: Eliazar Rosas MD  [facility] Medicine Team: [facility] HOSP MED 3 David Poe MD  Primary Care Team: [facility] 3    HPI:   68M w/PMH stroke with G-tube placement, hypertension, diabetes, hyperlipidemia, recent cholecystitis, and dysphagia presents as transfer from OSH for AES eval. He initially presented from NH with leukocytosis w/o specific symptoms, imaging showed cholecystitis. Underwent lap irlanda 2/1, the operative report noted the gallbladder was necrotic and fell apart. The back wall of the gallbladder had an abscess that was perforated. There were spilled stones and purulent bile. A cholangiogram showed patent common duct with single stone at the ampulla that appears too large to pass spontaneously. Patient is transferred to [facility] for possible ERCP.     On arrival, patient reports some abdominal pain, denies N/V/D. He is AFVSS. Admitted to  for further management.     Procedure(s) (LRB):  ERCP (ENDOSCOPIC RETROGRADE CHOLANGIOPANCREATOGRAPHY) (N/A)      Hospital Course:   Patient admitted to Hospital Medicine service for medical management and evaluation of suspected CBD obstruction following complicated laparoscopic cholecystectomy. AES was consulted on admission with recommendation of MRCP. MRCP w/ and w/o contrast currently pending. ID was consulted with continuation of Merrem from outside hospital. Merrem de-escalated to Rocephin/Flagyl per ID recs. List of home medications was obtained from home facility and were continued as appropriate. MRCP[02/06/24] noted fluid collection in the gallbladder fossa, concerning for  abscess vs.  biloma. Also noted choledocholithiasis with gallstones posterior to the liver. AES performed ERCP 02/06/24 with removal of choledocolithiasis  by biliary sphincterotomy and balloon extraction. General Surgery consult noted no indication surgical intervention at the time, but can consider IR drainage of fluid collection if clinical status worsens. Intermittent episodes of N/V with associated tachycardia throughout hospitalization, without acute abdomen. Unable to complete IR drainage of fluid collection on 2/7 2/2 N/V. Scheduling antiemetics for better control. Repeat attempt at fluid drainage and drain placement with IR successful without complication morning of 2/9. Slowly evolving AURORA was not responsive to increased IVF resuscitation; urine studies inconsistent with pre-renal etiology. Consulted Nephrology for further assistance. Patient had a retroperitoneal ultrasound showing mild bilateral hydronephrosis and a distended bladder. Rosario catheter was placed on 2/11 and patient put out almost 1L with catheter insertion. SLP evaluated patient and he was started on pleasure feeds in addition to his tube feeds.     His course was complicated by increased nausea/vomiting and PEG tube dislodgement, which was replaced twice by the Gastroenterology team. Abdominal binder placed to secure PEG tube.  Increasing concerns for sepsis with worsening vitals/labs, so broadened antibiotics coverage with Meropenem and added PO Vancomycin per ID recs out of concerns for c diff. He also developed new onset hypoxia and required breathing treatment and NT suctioning in addition to oxygen supplementation that was thought to be secondary to an aspiration event. His diarrhea quickly resolved and sample was not able to be collected for c diff. He received a CT Chest/Abdomen/Pelvis with contrast on 2/16 that noted decreased size of gallbladder fossa fluid collection and no new focal fluid collections but also reported his stomach was  distended with air and fluid on CT imaging so PEG tube feedings held and PEG tube was placed to gravity with significant drainage. He was given 1u pRbc for symptomatic anemia on 2/16/24. He was also started fluconazole for oral thrush and CT w/ concerns for esophagitis. Tube feeds were resumed on 2/17.    He had clinical improvement on 2/19 with reduced cough, decreased oxygen requirement, and was able to tolerate tube feeds at goal.The output from his two right sided abdominal drains was still elevated. Final recommendations and end of treatment dates were received from Infectious Disease and patient set to complete courses of cefepime/metronidazole on 2/24 and complete fluconazole course on 2/29. Due to continued drain output, patient was not able to have his drains removed during his hospitalization, and will require outpatient IR follow-up. He will be discharged to SNF to complete IV antibiotics and monitor drain management. He will need to follow with IR and receive a CTAP in two weeks in [place].      Goals of Care Treatment Preferences:  Code Status: Full Code      Consults:   Consults (From admission, onward)          Status Ordering Provider     Inpatient consult to Midline team  Once        Provider:  (Not yet assigned)    Completed SHANIA FAN     Inpatient consult to Midline team  Once        Provider:  (Not yet assigned)    Completed SARBJIT OLIVEIRA     Inpatient consult to Nephrology  Once        Provider:  (Not yet assigned)    Completed ALEXA WILLIAMSON     Inpatient consult to Interventional Radiology  Once        Provider:  (Not yet assigned)    Completed BENJAMIN MCKEON     Inpatient consult to Registered Dietitian/Nutritionist  Once        Provider:  (Not yet assigned)    Completed BENJAMIN MCKEON     Inpatient consult to General Surgery  Once        Provider:  (Not yet assigned)    Completed MAGDIEL VITAL     Inpatient consult to Infectious Diseases  Once        Provider:   (Not yet assigned)    Completed BRODY COLEMAN     Inpatient consult to [facility] ([facility])  Once        Provider:  (Not yet assigned)    Completed BRODY COLEMAN            No new Assessment & Plan notes have been filed under this hospital service since the last note was generated.  Service: Hospital Medicine    Final Active Diagnoses:    Diagnosis Date Noted POA    PRINCIPAL PROBLEM:  Common bile duct (CBD) obstruction [K83.1] 02/04/2024 Yes    Acute hypoxemic respiratory failure [J96.01] 02/16/2024 No    Thrombocytosis [D75.839] 02/16/2024 No    Hypokalemia [E87.6] 02/16/2024 No    Acute blood loss anemia [D62] 02/16/2024 Yes    Esophagitis [K20.90] 02/16/2024 Clinically Undetermined    Gastrostomy tube dysfunction [K94.23] 02/15/2024 No    Chronic diastolic heart failure [I50.32] 02/15/2024 Yes    Urinary retention [R33.9] 02/13/2024 No    Bilateral pleural effusion [J90] 02/06/2024 Yes    Choledocholithiasis [K80.50] 02/06/2024 Yes    Abdominal fluid collection [R18.8] 02/06/2024 Yes    On tube feeding diet [Z78.9] 02/06/2024 Yes    Cholecystitis [K81.9] 02/05/2024 Yes    Normocytic anemia [D64.9] 02/05/2024 Yes    Primary hypertension [I10] 02/04/2024 Yes     Chronic    Type 2 diabetes mellitus [E11.9] 02/04/2024 Yes     Chronic    CVA (cerebral vascular accident) [I63.9] 02/04/2024 Yes     Chronic      Problems Resolved During this Admission:    Diagnosis Date Noted Date Resolved POA    Diarrhea of presumed infectious origin [R19.7] 02/16/2024 02/22/2024 No    AURORA (acute kidney injury) [N17.9] 02/08/2024 02/16/2024 No    Tachycardia [R00.0] 02/07/2024 02/20/2024 No    Hyponatremia [E87.1] 02/06/2024 02/11/2024 No    Hypomagnesemia [E83.42] 02/05/2024 02/06/2024 Yes    Hypophosphatemia [E83.39] 02/05/2024 02/06/2024 Yes       Discharged Condition: stable    Disposition: Skilled Nursing Facility    Follow Up:   Follow-up Information       Eliazar Rosas MD Follow up.    Specialties: Palliative  Medicine, Internal Medicine  Contact information:  [address] [place] [id number]  [phone]                           Patient Instructions:      Ambulatory referral/consult to Urology   Standing Status: Future   Referral Priority: Routine Referral Type: Consultation   Referral Reason: Specialty Services Required   Requested Specialty: Urology   Number of Visits Requested: 1     Ambulatory referral/consult to Interventional Radiology   Standing Status: Future   Referral Priority: Urgent Referral Type: Consultation   Referral Reason: Specialty Services Required   Requested Specialty: Interventional Radiology   Number of Visits Requested: 1       Significant Diagnostic Studies: N/A    Pending Diagnostic Studies:       None           Medications:  Reconciled Home Medications:      Medication List        START taking these medications      atorvastatin 80 MG tablet  Commonly known as: LIPITOR  1 tablet (80 mg total) by Per G Tube route every evening.     dextrose 5 % in water (D5W) PgBk 100 mL with ceFEPIme 2 gram SolR 2 g  Inject 2 g into the vein every 8 (eight) hours. for 2 days     famotidine 40 MG tablet  Commonly known as: PEPCID  1 tablet (40 mg total) by Per G Tube route once daily.     fluconazole 400 mg/200 mL IVPB  Commonly known as: DIFLUCAN  Inject 200 mLs (400 mg total) into the vein once daily. for 7 days     metroNIDAZOLE 500 MG tablet  Commonly known as: FLAGYL  1 tablet (500 mg total) by Per G Tube route every 12 (twelve) hours. for 2 days            CHANGE how you take these medications      aspirin 81 MG Chew  1 tablet (81 mg total) by Per G Tube route once daily.  What changed: how to take this     busPIRone 7.5 MG tablet  Commonly known as: BUSPAR  1 tablet (7.5 mg total) by Per G Tube route once daily.  What changed: how to take this     ondansetron 4 MG tablet  Commonly known as: ZOFRAN  1 tablet (4 mg total) by Per G Tube route every 8 (eight) hours as needed for Nausea.  What  changed: how to take this     senna 8.6 mg tablet  Commonly known as: SENOKOT  1 tablet by Per G Tube route once daily.  What changed: how to take this     sertraline 25 MG tablet  Commonly known as: ZOLOFT  1 tablet (25 mg total) by Per G Tube route once daily.  What changed: how to take this     traZODone 50 MG tablet  Commonly known as: DESYREL  1 tablet (50 mg total) by Per G Tube route every evening.  What changed: how to take this            CONTINUE taking these medications      fluticasone propionate 50 mcg/actuation nasal spray  Commonly known as: FLONASE  1 spray by Each Nostril route 2 (two) times a day.     sucralfate 100 mg/mL suspension  Commonly known as: CARAFATE  Take 1 g by mouth 4 (four) times daily before meals and nightly.            STOP taking these medications      megestroL 625 mg/5 mL (125 mg/mL) Susp  Commonly known as: MEGACE ES     metoclopramide HCl 5 mg/5 mL Soln  Commonly known as: REGLAN     omeprazole 40 MG capsule  Commonly known as: PRILOSEC              Indwelling Lines/Drains at time of discharge:   Lines/Drains/Airways       Drain  Duration                  Closed/Suction Drain Lateral RLQ Bulb -- days         Gastrostomy/Enterostomy LUQ feeding -- days         Closed/Suction Drain 02/09/24 0916 Tube - 1 Right RUQ Bulb 10 Fr. 14 days         Urethral Catheter 02/19/24 2330 Coude 16 Fr. 3 days                    Time spent on the discharge of patient: 40 minutes         David Poe MD  Department of Hospital Medicine  [facility] - Surgery

## 2024-02-23 NOTE — ASSESSMENT & PLAN NOTE
68M w/ recent CVA and residual dysphagia s/p PEG tube presents from OSH for retained gallstone in ampulla, s/p lap cholecystectomy in which the gallbladder was found necrotic and friable. His only symptom is abdominal pain. Labs show leukocytosis. He is transferred for AES eval and possible ERCP.     Output by Drain (mL) 02/21/24 0701 - 02/21/24 1900 02/21/24 1901 - 02/22/24 0700 02/22/24 0701 - 02/22/24 1900 02/22/24 1901 - 02/23/24 0700 02/23/24 0701 - 02/23/24 1055        Closed/Suction Drain Lateral RLQ Bulb 40 40           Closed/Suction Drain 02/09/24 0916 Tube - 1 Right RUQ Bulb 10 Fr.  0           Gastrostomy/Enterostomy LUQ feeding          Monitoring 2 right abdominal drain output daily for 2 days of output < 10mL     -MRCP[02/06/24] noted fluid collection in the gallbladder fossa, concerning for  abscess vs.biloma. Also noted choledocholithiasis with gallstones posterior to the liver.  -AES performed ERCP 02/06/24 with removal of choledocolithiasis by biliary sphincterotomy and balloon extraction.   -IR attempt at drainage of fluid collection [2/7/2024] unsuccessful 2/2 episode of N/V with associated tachycardia and hypertension  -IR reattempt at fluid drainage and drain placement [2/9/24] successful without complication. Drain in place with serosanguinous fluid.  -General Surgery consult noted no indication for surgical intervention at the time  -CT[2/16/24] with decreased size of gallbladder fossa fluid collection and no new focal fluid collections   -Per ID recs, antibiotics changed to cefepime and metronidazole. EOT 2/24/24   -Following cultures. Blood cultures NGTD. Diarrhea resolved and no c diff sample sent  -Fluid studies and cultures with no evidence of bacterial growth thus far  -Antiemetics PRN

## 2024-02-23 NOTE — PROGRESS NOTES
[facility] - Surgery  [facility] Medicine  Progress Note    Patient Name: Seymour Velazquez  MRN: [id number]  Patient Class: IP- Inpatient   Admission Date: 2/4/2024  Length of Stay: 19 days  Attending Physician: Braxton Portillo DO  Primary Care Provider: Eliazar Rosas MD        Subjective:     Principal Problem:Common bile duct (CBD) obstruction        HPI:  68M w/PMH stroke with G-tube placement, hypertension, diabetes, hyperlipidemia, recent cholecystitis, and dysphagia presents as transfer from OSH for AES eval. He initially presented from NH with leukocytosis w/o specific symptoms, imaging showed cholecystitis. Underwent lap irlanda 2/1, the operative report noted the gallbladder was necrotic and fell apart. The back wall of the gallbladder had an abscess that was perforated. There were spilled stones and purulent bile. A cholangiogram showed patent common duct with single stone at the ampulla that appears too large to pass spontaneously. Patient is transferred to C for possible ERCP.     On arrival, patient reports some abdominal pain, denies N/V/D. He is AFVSS. Admitted to  for further management.     Overview/Hospital Course:  Patient admitted to Hospital Medicine service for medical management and evaluation of suspected CBD obstruction following complicated laparoscopic cholecystectomy. AES was consulted on admission with recommendation of MRCP. MRCP w/ and w/o contrast currently pending. ID was consulted with continuation of Merrem from outside hospital. Merrem de-escalated to Rocephin/Flagyl per ID recs. List of home medications was obtained from home facility and were continued as appropriate. MRCP[02/06/24] noted fluid collection in the gallbladder fossa, concerning for  abscess vs. biloma. Also noted choledocholithiasis with gallstones posterior to the liver. AES performed ERCP 02/06/24 with removal of choledocolithiasis  by biliary sphincterotomy and balloon extraction. General Surgery consult  noted no indication surgical intervention at the time, but can consider IR drainage of fluid collection if clinical status worsens. Intermittent episodes of N/V with associated tachycardia throughout hospitalization, without acute abdomen. Unable to complete IR drainage of fluid collection on 2/7 2/2 N/V. Scheduling antiemetics for better control. Repeat attempt at fluid drainage and drain placement with IR successful without complication morning of 2/9. Slowly evolving AURORA was not responsive to increased IVF resuscitation; urine studies inconsistent with pre-renal etiology. Consulted Nephrology for further assistance. Patient had a retroperitoneal ultrasound showing mild bilateral hydronephrosis and a distended bladder. Rosario catheter was placed on 2/11 and patient put out almost 1L with catheter insertion. SLP evaluated patient and he was started on pleasure feeds in addition to his tube feeds.     His course was complicated by increased nausea/vomiting and PEG tube dislodgement, which was replaced twice by the Gastroenterology team. Abdominal binder placed to secure PEG tube.  Increasing concerns for sepsis with worsening vitals/labs, so broadened antibiotics coverage with Meropenem and added PO Vancomycin per ID recs out of concerns for c diff. He also developed new onset hypoxia and required breathing treatment and NT suctioning in addition to oxygen supplementation that was thought to be secondary to an aspiration event. His diarrhea quickly resolved and sample was not able to be collected for c diff. He received a CT Chest/Abdomen/Pelvis with contrast on 2/16 that noted decreased size of gallbladder fossa fluid collection and no new focal fluid collections but also reported his stomach was distended with air and fluid on CT imaging so PEG tube feedings held and PEG tube was placed to gravity with significant drainage. He was given 1u pRbc for symptomatic anemia on 2/16/24. He was also started fluconazole for  oral thrush and CT w/ concerns for esophagitis. Tube feeds were resumed on 2/17.    He had clinical improvement on 2/19 with reduced cough, decreased oxygen requirement, and was able to tolerate tube feeds at goal.The output from his two right sided abdominal drains was still elevated. Final recommendations and end of treatment dates were received from Infectious Disease and patient set to complete courses of cefepime/metronidazole on 2/24 and complete fluconazole course on 2/29. Due to continued drain output, patient was not able to have his drains removed during his hospitalization, and will require outpatient IR follow-up. He is pending SNF placement  at his nursing home due to requirement of IV antibiotics and drain management.     Interval History: No acute events overnight, afebrile, hemodynamically stable. Drain output not documented over last 24 hours, but on examination this morning, RLQ drains continues to have >10mL of serous output. Patient has no reported pain this morning. He refused labs 2/22 and refused labs this morning when requested. Pending discharge to nursing home in [facility].        Objective:     Vital Signs (Most Recent):  Temp: 97.8 °F (36.6 °C) (02/23/24 0802)  Pulse: 89 (02/23/24 0802)  Resp: 20 (02/23/24 0802)  BP: 111/60 (02/23/24 0802)  SpO2: 97 % (02/23/24 0802) Vital Signs (24h Range):  Temp:  [97.5 °F (36.4 °C)-98.7 °F (37.1 °C)] 97.8 °F (36.6 °C)  Pulse:  [] 89  Resp:  [18-20] 20  SpO2:  [97 %-99 %] 97 %  BP: (111-145)/(60-75) 111/60     Weight: 79.6 kg (175 lb 8 oz)  Body mass index is 22.53 kg/m².    Intake/Output Summary (Last 24 hours) at 2/23/2024 1052  Last data filed at 2/23/2024 0600  Gross per 24 hour   Intake 540 ml   Output 3050 ml   Net -2510 ml         Physical Exam  Vitals and nursing note reviewed.   Constitutional:       General: He is not in acute distress.     Appearance: He is well-developed. He is not ill-appearing, toxic-appearing or  diaphoretic.   HENT:      Head: Normocephalic and atraumatic.      Mouth/Throat:      Mouth: Mucous membranes are moist.   Eyes:      General: No scleral icterus.        Right eye: No discharge.         Left eye: No discharge.   Cardiovascular:      Rate and Rhythm: Normal rate and regular rhythm.      Heart sounds: Normal heart sounds.   Pulmonary:      Effort: Pulmonary effort is normal. No respiratory distress.      Breath sounds: Normal breath sounds. No wheezing or rales.   Abdominal:      General: Abdomen is flat. There is no distension.      Palpations: Abdomen is soft.      Tenderness: There is no abdominal tenderness. There is no guarding or rebound.      Comments: -2 RUQ drains present with minimal output  -PEG tube insertion site without erythema      Skin:     General: Skin is dry.      Coloration: Skin is not jaundiced or pale.      Findings: No erythema.   Neurological:      Mental Status: He is alert. Mental status is at baseline.             Significant Labs: All pertinent labs within the past 24 hours have been reviewed.    Significant Imaging: I have reviewed all pertinent imaging results/findings within the past 24 hours.    Assessment/Plan:      * Common bile duct (CBD) obstruction  68M w/ recent CVA and residual dysphagia s/p PEG tube presents from OSH for retained gallstone in ampulla, s/p lap cholecystectomy in which the gallbladder was found necrotic and friable. His only symptom is abdominal pain. Labs show leukocytosis. He is transferred for AES eval and possible ERCP.     Output by Drain (mL) 02/21/24 0701 - 02/21/24 1900 02/21/24 1901 - 02/22/24 0700 02/22/24 0701 - 02/22/24 1900 02/22/24 1901 - 02/23/24 0700 02/23/24 0701 - 02/23/24 1055        Closed/Suction Drain Lateral RLQ Bulb 40 40           Closed/Suction Drain 02/09/24 0916 Tube - 1 Right RUQ Bulb 10 Fr.  0           Gastrostomy/Enterostomy LUQ feeding          Monitoring 2 right abdominal drain output daily for 2 days of output  "< 10mL     -MRCP[02/06/24] noted fluid collection in the gallbladder fossa, concerning for  abscess vs.biloma. Also noted choledocholithiasis with gallstones posterior to the liver.  -AES performed ERCP 02/06/24 with removal of choledocolithiasis by biliary sphincterotomy and balloon extraction.   -IR attempt at drainage of fluid collection [2/7/2024] unsuccessful 2/2 episode of N/V with associated tachycardia and hypertension  -IR reattempt at fluid drainage and drain placement [2/9/24] successful without complication. Drain in place with serosanguinous fluid.  -General Surgery consult noted no indication for surgical intervention at the time  -CT[2/16/24] with decreased size of gallbladder fossa fluid collection and no new focal fluid collections   -Per ID recs, antibiotics changed to cefepime and metronidazole. EOT 2/24/24   -Following cultures. Blood cultures NGTD. Diarrhea resolved and no c diff sample sent  -Fluid studies and cultures with no evidence of bacterial growth thus far  -Antiemetics PRN        Esophagitis  CT Chest/Abdomen/Pelvis with contrast[2/16] noted concerns for esophagitis      PLAN:  Per ID recs, started fluconazole for oral thrush and CT w/  concerns for esophagitis  Fluconazole course to end 2/29/24        Hypokalemia  Patient has hypokalemia which is Acute . Most recent potassium levels reviewed-   Lab Results   Component Value Date    K 3.8 02/19/2024   . Will continue potassium replacement per protocol and recheck repeat levels after replacement completed.     Thrombocytosis  Unclear etiology of acute thrombocytosis, possibly reactive secondary to infection    No results for input(s): "PLT" in the last 72 hours.      Trend on CBC  VTE ppx        Acute blood loss anemia  Current CBC reviewed-   Lab Results   Component Value Date    HGB 8.0 (L) 02/19/2024    HCT 24.7 (L) 02/19/2024       -Downtrending Hgb overnight 2/15-2/16, however patient declined blood transfusion overnight. Repeat " "CBC in afternoon 2/16/24 was Hgb 6.2, and patient was amenable to transfusion. No reported hematuria, hemoptysis, hematochezia or other source of bleeding.     PLAN:  -Consent obtained, transfusion of 1u PRBC on 2/16  -Monitor serial CBC and transfuse if patient becomes hemodynamically unstable, symptomatic or H/H drops below 7/21.    Acute hypoxemic respiratory failure  RESOLVED    Patient with Hypoxic Respiratory failure which is Acute.  he is not on home oxygen.    Signs/symptoms of respiratory failure include- tachypnea. Contributing diagnoses includes - Aspiration and Pneumonia Labs and images were reviewed. Patient Has not had a recent ABG. Will treat underlying causes and adjust management of respiratory failure as follows-     On antibiotics for aspiration/pneumonia/sepsis coverage. Suspect event was secondary to aspiration event  CXR[2/15] nonacute  CT Chest 2/16: Atelectasis with small right pleural effusion.   Wean supplemental oxygen as tolerated to target SaO2>90%  Duonebs PRN   NT suction as needed     Chronic diastolic heart failure  Last echo 2022            Gastrostomy tube dysfunction  Patient's PEG tube became occluded on 2/14 dislodged on 2/16 and was replaced by GI team. Radiology confirmed PEG placement by abdominal x-ray.    See "On tube feeding diet" A&P    Urinary retention  Patient found to have mild hydronephrosis and bladder distension on retroperitoneal ultrasound. Rosario catheter was placed and patient had significant urine output and resolution of AURORA.    Rosario catheter placed 2/11/24  Voiding trial on 2/19 failed. Rosario replaced  Outpatient urology follow up       On tube feeding diet  Hx of PEG  tube placement s/p CVA. Tolerated slow basal rate feeds well thus far following fluid drainage. On bolus feeds prior to admission    PLAN:  -Nutrition consulted for tube feed recs. When resumed PO diet, Pleasure feeds of pureed foods and sips of thin liquids okay per SLP  -PEG occulded and " "exchanged by GI 2/14 and 2/15.   Care Instructions  - Flushes: flush PEG daily with 60 ml water  - Antibiotic ointment to site, clean site with soap and water daily and dry thoroughly and clean site daily with half-strength hydrogen peroxide for three days, then soap and water daily.  - Dressing: change dressing on top of bumper daily  -abdominal binder to prevent future dislodgement   -Continue tube feeding at goal of 45 mL/hour      Abdominal fluid collection  See "Common bile duct (CBD) obstruction " A&P        Choledocholithiasis  See "Common bile duct (CBD) obstruction " A&P        Bilateral pleural effusion  Patient found to have small pleural effusion on imaging [MRCP(02/06/24)"Small right-sided pleural effusion.  Trace left-sided pleural effusion"]. Most likely etiology includes  limited mobility due to recent history of hospitalization for cholecystectomy . Management to include  monitoring at this time    CT Chest 2/16: Atelectasis with small right pleural effusion.       Normocytic anemia  -Patient's with Normocytic anemia..   -Etiology likely due to chronic disease .  -Current CBC reviewed-    Recent Labs   Lab 02/17/24  0313 02/18/24  1319 02/19/24  1320   HGB 8.0* 7.6* 8.0*           Component Value Date/Time    MCV 97 02/19/2024 1320    RDW 16.6 (H) 02/19/2024 1320    IRON 12 (L) 02/07/2024 0223    TIBC 195 (L) 02/07/2024 0223    FERRITIN 1,256 (H) 02/07/2024 0223         PLAN  - Monitor serial CBC and transfuse if patient becomes hemodynamically unstable, symptomatic or H/H drops below 7/21.  - Etiology consistent with anemia of chronic disease. Will CTM.  - S/P 1 uPRBCs on 2/16    Cholecystitis  See "Common bile duct (CBD) obstruction " A&P        CVA (cerebral vascular accident)  Patient is s/p CVA with residual dysphagia and weakness. He is s/p PEG tube and receives bolus tube feedings.         Type 2 diabetes mellitus  Patient's FSGs are controlled on current medication regimen.  Last A1c " reviewed-   Lab Results   Component Value Date    HGBA1C 4.8 02/05/2024     Most recent fingerstick glucose reviewed-   Recent Labs   Lab 02/22/24  1300 02/22/24  1633 02/23/24  0425 02/23/24  0809   POCTGLUCOSE 108 142* 94 115*       Current correctional scale  Low  Maintain anti-hyperglycemic dose as follows-   Antihyperglycemics (From admission, onward)      Start     Stop Route Frequency Ordered    02/04/24 2059  insulin aspart U-100 pen 0-5 Units         -- SubQ Before meals & nightly PRN 02/04/24 2000          Hold Oral hypoglycemics while patient is in the hospital.     Patient on SSI at NH.    -LDSSI  -Glucerna for tube feeds    Primary hypertension  Patient is normotensive on arrival, per chart he is not currently on any antihypertensives.     Will utilize p.r.n. blood pressure medication only if patient's blood pressure greater than 180/110 and he develops symptoms such as worsening chest pain or shortness of breath.    -amlodipine 5mg. Hold for hypotension.  -Holding home lisinopril in the setting of AURORA during admission  -Will add PRN hydralazine for SBP>180 and symptomatic  -will consider uptitration of home regimen if pressures remain uncontrolled        VTE Risk Mitigation (From admission, onward)           Ordered     enoxaparin injection 40 mg  Every 24 hours         02/09/24 1408     IP VTE HIGH RISK PATIENT  Once         02/05/24 1410     Place sequential compression device  Until discontinued         02/04/24 2000                    Discharge Planning   PEYTON: 2/23/2024     Code Status: Full Code   Is the patient medically ready for discharge?: No    Reason for patient still in hospital (select all that apply): Pending disposition  Discharge Plan A: Return to nursing home                  David Poe MD  Department of Hospital Medicine   [facility] - Surgery

## 2024-02-23 NOTE — ASSESSMENT & PLAN NOTE
"Unclear etiology of acute thrombocytosis, possibly reactive secondary to infection    No results for input(s): "PLT" in the last 72 hours.      Trend on CBC  VTE ppx      "

## 2024-02-23 NOTE — SUBJECTIVE & OBJECTIVE
Interval History: No acute events overnight, afebrile, hemodynamically stable. Drain output not documented over last 24 hours, but on examination this morning, RLQ drains continues to have >10mL of serous output. Patient has no reported pain this morning. He refused labs 2/22 and refused labs this morning when requested. Pending discharge to nursing home in [facility].        Objective:     Vital Signs (Most Recent):  Temp: 97.8 °F (36.6 °C) (02/23/24 0802)  Pulse: 89 (02/23/24 0802)  Resp: 20 (02/23/24 0802)  BP: 111/60 (02/23/24 0802)  SpO2: 97 % (02/23/24 0802) Vital Signs (24h Range):  Temp:  [97.5 °F (36.4 °C)-98.7 °F (37.1 °C)] 97.8 °F (36.6 °C)  Pulse:  [] 89  Resp:  [18-20] 20  SpO2:  [97 %-99 %] 97 %  BP: (111-145)/(60-75) 111/60     Weight: 79.6 kg (175 lb 8 oz)  Body mass index is 22.53 kg/m².    Intake/Output Summary (Last 24 hours) at 2/23/2024 1052  Last data filed at 2/23/2024 0600  Gross per 24 hour   Intake 540 ml   Output 3050 ml   Net -2510 ml         Physical Exam  Vitals and nursing note reviewed.   Constitutional:       General: He is not in acute distress.     Appearance: He is well-developed. He is not ill-appearing, toxic-appearing or diaphoretic.   HENT:      Head: Normocephalic and atraumatic.      Mouth/Throat:      Mouth: Mucous membranes are moist.   Eyes:      General: No scleral icterus.        Right eye: No discharge.         Left eye: No discharge.   Cardiovascular:      Rate and Rhythm: Normal rate and regular rhythm.      Heart sounds: Normal heart sounds.   Pulmonary:      Effort: Pulmonary effort is normal. No respiratory distress.      Breath sounds: Normal breath sounds. No wheezing or rales.   Abdominal:      General: Abdomen is flat. There is no distension.      Palpations: Abdomen is soft.      Tenderness: There is no abdominal tenderness. There is no guarding or rebound.      Comments: -2 RUQ drains present with minimal output  -PEG tube insertion site  without erythema      Skin:     General: Skin is dry.      Coloration: Skin is not jaundiced or pale.      Findings: No erythema.   Neurological:      Mental Status: He is alert. Mental status is at baseline.             Significant Labs: All pertinent labs within the past 24 hours have been reviewed.    Significant Imaging: I have reviewed all pertinent imaging results/findings within the past 24 hours.

## 2024-02-24 ENCOUNTER — LAB REQUISITION (OUTPATIENT)
Dept: LAB | Facility: HOSPITAL | Age: 69
End: 2024-02-24
Payer: OTHER GOVERNMENT

## 2024-02-24 DIAGNOSIS — Z02.0 ENCOUNTER FOR EXAMINATION FOR ADMISSION TO EDUCATIONAL INSTITUTION: ICD-10-CM

## 2024-02-24 LAB
ALBUMIN SERPL-MCNC: 2.4 G/DL (ref 3.4–4.8)
ALBUMIN/GLOB SERPL: 0.7 RATIO (ref 1.1–2)
ALP SERPL-CCNC: 67 UNIT/L (ref 40–150)
ALT SERPL-CCNC: 8 UNIT/L (ref 0–55)
AST SERPL-CCNC: 14 UNIT/L (ref 5–34)
BASOPHILS # BLD AUTO: 0.15 X10(3)/MCL
BASOPHILS NFR BLD AUTO: 1.2 %
BILIRUB SERPL-MCNC: 0.3 MG/DL
BUN SERPL-MCNC: 15 MG/DL (ref 8.4–25.7)
CALCIUM SERPL-MCNC: 8.5 MG/DL (ref 8.8–10)
CHLORIDE SERPL-SCNC: 109 MMOL/L (ref 98–107)
CHOLEST SERPL-MCNC: 100 MG/DL
CHOLEST/HDLC SERPL: 3 {RATIO} (ref 0–5)
CO2 SERPL-SCNC: 24 MMOL/L (ref 23–31)
CREAT SERPL-MCNC: 0.77 MG/DL (ref 0.73–1.18)
EOSINOPHIL # BLD AUTO: 0.3 X10(3)/MCL (ref 0–0.9)
EOSINOPHIL NFR BLD AUTO: 2.5 %
ERYTHROCYTE [DISTWIDTH] IN BLOOD BY AUTOMATED COUNT: 17.1 % (ref 11.5–17)
EST. AVERAGE GLUCOSE BLD GHB EST-MCNC: 76.7 MG/DL
GFR SERPLBLD CREATININE-BSD FMLA CKD-EPI: >60 MLS/MIN/1.73/M2
GLOBULIN SER-MCNC: 3.6 GM/DL (ref 2.4–3.5)
GLUCOSE SERPL-MCNC: 96 MG/DL (ref 82–115)
HBA1C MFR BLD: 4.3 %
HCT VFR BLD AUTO: 26.8 % (ref 42–52)
HDLC SERPL-MCNC: 31 MG/DL (ref 35–60)
HGB BLD-MCNC: 8.7 G/DL (ref 14–18)
IMM GRANULOCYTES # BLD AUTO: 0.19 X10(3)/MCL (ref 0–0.04)
IMM GRANULOCYTES NFR BLD AUTO: 1.6 %
LDLC SERPL CALC-MCNC: 41 MG/DL (ref 50–140)
LYMPHOCYTES # BLD AUTO: 2.22 X10(3)/MCL (ref 0.6–4.6)
LYMPHOCYTES NFR BLD AUTO: 18.2 %
MCH RBC QN AUTO: 31.4 PG (ref 27–31)
MCHC RBC AUTO-ENTMCNC: 32.5 G/DL (ref 33–36)
MCV RBC AUTO: 96.8 FL (ref 80–94)
MONOCYTES # BLD AUTO: 0.98 X10(3)/MCL (ref 0.1–1.3)
MONOCYTES NFR BLD AUTO: 8 %
NEUTROPHILS # BLD AUTO: 8.34 X10(3)/MCL (ref 2.1–9.2)
NEUTROPHILS NFR BLD AUTO: 68.5 %
NRBC BLD AUTO-RTO: 0 %
PLATELET # BLD AUTO: 393 X10(3)/MCL (ref 130–400)
PMV BLD AUTO: 9.7 FL (ref 7.4–10.4)
POTASSIUM SERPL-SCNC: 3.8 MMOL/L (ref 3.5–5.1)
PROT SERPL-MCNC: 6 GM/DL (ref 5.8–7.6)
RBC # BLD AUTO: 2.77 X10(6)/MCL (ref 4.7–6.1)
SODIUM SERPL-SCNC: 140 MMOL/L (ref 136–145)
TRIGL SERPL-MCNC: 138 MG/DL (ref 34–140)
TSH SERPL-ACNC: 2.39 UIU/ML (ref 0.35–4.94)
VLDLC SERPL CALC-MCNC: 28 MG/DL
WBC # SPEC AUTO: 12.18 X10(3)/MCL (ref 4.5–11.5)

## 2024-02-24 PROCEDURE — 80053 COMPREHEN METABOLIC PANEL: CPT | Performed by: INTERNAL MEDICINE

## 2024-02-24 PROCEDURE — 85025 COMPLETE CBC W/AUTO DIFF WBC: CPT | Performed by: INTERNAL MEDICINE

## 2024-02-24 PROCEDURE — 84443 ASSAY THYROID STIM HORMONE: CPT | Performed by: INTERNAL MEDICINE

## 2024-02-24 PROCEDURE — 83036 HEMOGLOBIN GLYCOSYLATED A1C: CPT | Performed by: INTERNAL MEDICINE

## 2024-02-24 PROCEDURE — 80061 LIPID PANEL: CPT | Performed by: INTERNAL MEDICINE

## 2024-02-25 ENCOUNTER — LAB REQUISITION (OUTPATIENT)
Dept: LAB | Facility: HOSPITAL | Age: 69
End: 2024-02-25
Payer: OTHER GOVERNMENT

## 2024-02-25 DIAGNOSIS — N17.9 ACUTE KIDNEY FAILURE, UNSPECIFIED: ICD-10-CM

## 2024-02-25 DIAGNOSIS — E11.9 TYPE 2 DIABETES MELLITUS WITHOUT COMPLICATIONS: ICD-10-CM

## 2024-02-25 LAB
ALBUMIN SERPL-MCNC: 2.5 G/DL (ref 3.4–4.8)
ALBUMIN/GLOB SERPL: 0.7 RATIO (ref 1.1–2)
ALP SERPL-CCNC: 71 UNIT/L (ref 40–150)
ALT SERPL-CCNC: 8 UNIT/L (ref 0–55)
AST SERPL-CCNC: 11 UNIT/L (ref 5–34)
BASOPHILS # BLD AUTO: 0.15 X10(3)/MCL
BASOPHILS NFR BLD AUTO: 1.4 %
BILIRUB SERPL-MCNC: 0.3 MG/DL
BUN SERPL-MCNC: 17.2 MG/DL (ref 8.4–25.7)
CALCIUM SERPL-MCNC: 8.4 MG/DL (ref 8.8–10)
CHLORIDE SERPL-SCNC: 108 MMOL/L (ref 98–107)
CO2 SERPL-SCNC: 24 MMOL/L (ref 23–31)
CREAT SERPL-MCNC: 0.67 MG/DL (ref 0.73–1.18)
EOSINOPHIL # BLD AUTO: 0.44 X10(3)/MCL (ref 0–0.9)
EOSINOPHIL NFR BLD AUTO: 4.1 %
ERYTHROCYTE [DISTWIDTH] IN BLOOD BY AUTOMATED COUNT: 17 % (ref 11.5–17)
GFR SERPLBLD CREATININE-BSD FMLA CKD-EPI: >60 MLS/MIN/1.73/M2
GLOBULIN SER-MCNC: 3.5 GM/DL (ref 2.4–3.5)
GLUCOSE SERPL-MCNC: 82 MG/DL (ref 82–115)
HCT VFR BLD AUTO: 26.6 % (ref 42–52)
HGB BLD-MCNC: 8.6 G/DL (ref 14–18)
IMM GRANULOCYTES # BLD AUTO: 0.15 X10(3)/MCL (ref 0–0.04)
IMM GRANULOCYTES NFR BLD AUTO: 1.4 %
LACTATE SERPL-SCNC: 0.6 MMOL/L (ref 0.5–2.2)
LYMPHOCYTES # BLD AUTO: 3.13 X10(3)/MCL (ref 0.6–4.6)
LYMPHOCYTES NFR BLD AUTO: 29.3 %
MCH RBC QN AUTO: 30.8 PG (ref 27–31)
MCHC RBC AUTO-ENTMCNC: 32.3 G/DL (ref 33–36)
MCV RBC AUTO: 95.3 FL (ref 80–94)
MONOCYTES # BLD AUTO: 0.79 X10(3)/MCL (ref 0.1–1.3)
MONOCYTES NFR BLD AUTO: 7.4 %
NEUTROPHILS # BLD AUTO: 6.03 X10(3)/MCL (ref 2.1–9.2)
NEUTROPHILS NFR BLD AUTO: 56.4 %
NRBC BLD AUTO-RTO: 0 %
PLATELET # BLD AUTO: 404 X10(3)/MCL (ref 130–400)
PMV BLD AUTO: 9.5 FL (ref 7.4–10.4)
POTASSIUM SERPL-SCNC: 4 MMOL/L (ref 3.5–5.1)
PROT SERPL-MCNC: 6 GM/DL (ref 5.8–7.6)
RBC # BLD AUTO: 2.79 X10(6)/MCL (ref 4.7–6.1)
SODIUM SERPL-SCNC: 138 MMOL/L (ref 136–145)
WBC # SPEC AUTO: 10.69 X10(3)/MCL (ref 4.5–11.5)

## 2024-02-25 PROCEDURE — 83605 ASSAY OF LACTIC ACID: CPT | Performed by: INTERNAL MEDICINE

## 2024-02-25 PROCEDURE — 85025 COMPLETE CBC W/AUTO DIFF WBC: CPT | Performed by: INTERNAL MEDICINE

## 2024-02-25 PROCEDURE — 80053 COMPREHEN METABOLIC PANEL: CPT | Performed by: INTERNAL MEDICINE

## 2024-02-26 NOTE — PHYSICIAN QUERY
PT Name: Seymour Velazquez  MR #: [id number]     DOCUMENTATION CLARIFICATION      CDS/: Lili Urena RN          Contact information: [email or web address]    This form is a permanent document in the medical record.     Query Date: February 26, 2024    Dear Provider,  By submitting this query, we are merely seeking further clarification of documentation.  Please utilize your independent clinical judgment when addressing the question(s) below.     The Medical Record contains the following:    Supporting Clinical Findings Location in Medical Record   Signs/symptoms of respiratory failure include- tachypnea. Contributing diagnoses includes - Aspiration and Pneumonia/ Labs and images were reviewed. Patient Has not had a recent ABG. Will treat underlying causes and adjust management of respiratory failure as follows-    On antibiotics for aspiration/Pneumonia/sepsis coverage  CXR[2/15] nonacute  Wean supplemental oxygen as tolerated to target SaO2>90%     Acute hypoxemic respiratory failure  Patient with Hypoxic Respiratory failure which is Acute.  he is not on home oxygen. Supplemental oxygen was provided and noted- Oxygen Concentration (%):  [100] 100    68M w/PMH stroke with G-tube placement, hypertension, diabetes, hyperlipidemia, recent cholecystitis, and dysphagia presents as transfer from OSH for AES eval. He initially presented from NH with leukocytosis w/o specific symptoms, imaging showed cholecystitis. Underwent lap irlanda 2/1, the operative report noted the gallbladder was necrotic and fell apart. The back wall of the gallbladder had an abscess that was perforated. There were spilled stones and purulent bile. A cholangiogram showed patent common duct with single stone at the ampulla that appears too large to pass spontaneously. Patient is transferred to [facility] for possible ERCP.     Vital Signs (24h Range):  Temp:  [97.7 °F (36.5 °C)-100 °F (37.8 °C)] 97.7 °F (36.5 °C)  Pulse:  [108-140]  108  Resp:  [16-27] 16  SpO2:  [90 %-100 %] 100 %  BP: ()/(55-89) 124/89    Recent Labs   Lab 02/15/24  1411 02/15/24  2147 02/15/24  2157 02/16/24  0418   WBC 18.36*  --  22.66*  22.66* 27.23*      Progress Note 2/16 (Anshu/Tres) HM     FINDINGS:  There is a drainage catheter overlying the right upper abdominal quadrant.  Monitoring EKG leads are present.     The trachea is unremarkable.  There are calcifications of the aortic knob.  The cardiomediastinal silhouette is within normal limits.  There is no evidence of free air beneath the hemidiaphragms.  There are no pleural effusions.  There is no evidence of a pneumothorax.  There is no evidence of pneumomediastinum.  No airspace opacity is present.  There are degenerative changes in the osseous structures.     Impression:     Stable examination.  No new airspace disease.     Right upper quadrant drainage catheter in place.    Chest Xray 2/15             Please clarify if the ____Pneumonia_______________________ diagnosis has been:    [  ] Ruled In   [  ] Ruled In, Now Resolved   [x  ] Ruled Out   [  ] Other/Clarification of findings (please specify): _______________    [   ] Clinically undetermined           Please document in your progress notes daily for the duration of treatment, until resolved, and include in your discharge summary.    Form No. [id number]

## 2024-02-26 NOTE — PHYSICIAN QUERY
PT Name: Seymour Velazquez  MR #: [id number]     DOCUMENTATION CLARIFICATION      CDS/: Lili Urena RN            Contact information: [email or web address]    This form is a permanent document in the medical record.     Query Date: February 26, 2024    Dear Provider,  By submitting this query, we are merely seeking further clarification of documentation.  Please utilize your independent clinical judgment when addressing the question(s) below.     The Medical Record contains the following:    Supporting Clinical Findings Location in Medical Record   Patient noted to have sinus tachycardia up to HR 140s in the setting of acute episodes of nausea and vomiting on 2/7/24. On 2/15/24 patient noted to be having tachycardia. Concern for possible new infection vs dehydration as his feeds were held pending PEG placement confirmation.    - ID following for Sepsis concerns  Progress note 2/16 HM (Anshu/Tres)    Increasing concerns for Sepsis with worsening vitals/labs, so broadened antibiotics coverage with Meropenem and PO Vancomycin per ID recs.      Signs/symptoms of respiratory failure include- tachypnea. Contributing diagnoses includes - Aspiration and Pneumonia Labs and images were reviewed. Patient Has not had a recent ABG. Will treat underlying causes and adjust management of respiratory failure as follows-    On antibiotics for aspiration/pneumonia/Sepsis coverage  CXR[2/15] nonacute  Wean supplemental oxygen as tolerated to target SaO2>90%   Progress note 2/16 HM (Anshu/Tres)   68M w/ DM2, HTN, HLD, h/o CVA w/ residual deficits incl dysarthria, dysphagia (s/p PEG) transferred from OSH after complicated post-operative course following cholecystectomy for obstructive jaundice. MRCP demonstrated more-than-anticipated intraperitoneal free air, choledocholithiasis with spilled gallstones posterior to the liver, a 7 cm fluid collection in the gallbladder fossa concerning for abscess vs biloma.  AES consulted and performed ERCP with sphincterotomy and removal of biliary stones. GSGY consulted for spilled stones and fluid collection; given overall stability, no intervention warranted, per GSGY.     Vital Signs (24h Range):  Temp:  [97.7 °F (36.5 °C)-100 °F (37.8 °C)] 97.7 °F (36.5 °C)  Pulse:  [108-140] 108  Resp:  [16-27] 16  SpO2:  [90 %-100 %] 100 %  BP: ()/(55-89) 124/89    Recent Labs   Lab 02/15/24  1411 02/15/24  2147 02/15/24  2157 02/16/24  0418   WBC 18.36*  --  22.66*  22.66* 27.23*       Lactic Acid Level     0.5 - 2.2 mmol/L 1.1    Progress note 2/16  (Anshu/Tres)                                      Labs 2/15         Please clarify if the __Sepsis _________________________ diagnosis has been:    [  ] Ruled In   [ x ] Ruled In, Now Resolved   [  ] Ruled Out   [  ] Other/Clarification of findings (please specify): _______________    [   ] Clinically undetermined           Please document in your progress notes daily for the duration of treatment, until resolved, and include in your discharge summary.    Form No. [id number]

## 2024-02-27 ENCOUNTER — LAB REQUISITION (OUTPATIENT)
Dept: LAB | Facility: HOSPITAL | Age: 69
End: 2024-02-27
Payer: OTHER GOVERNMENT

## 2024-02-27 DIAGNOSIS — D64.9 ANEMIA, UNSPECIFIED: ICD-10-CM

## 2024-02-27 LAB
BASOPHILS # BLD AUTO: 0.12 X10(3)/MCL
BASOPHILS NFR BLD AUTO: 1.1 %
EOSINOPHIL # BLD AUTO: 0.4 X10(3)/MCL (ref 0–0.9)
EOSINOPHIL NFR BLD AUTO: 3.6 %
ERYTHROCYTE [DISTWIDTH] IN BLOOD BY AUTOMATED COUNT: 16.8 % (ref 11.5–17)
HCT VFR BLD AUTO: 27.2 % (ref 42–52)
HGB BLD-MCNC: 8.6 G/DL (ref 14–18)
IMM GRANULOCYTES # BLD AUTO: 0.07 X10(3)/MCL (ref 0–0.04)
IMM GRANULOCYTES NFR BLD AUTO: 0.6 %
LYMPHOCYTES # BLD AUTO: 2.48 X10(3)/MCL (ref 0.6–4.6)
LYMPHOCYTES NFR BLD AUTO: 22.3 %
MCH RBC QN AUTO: 31 PG (ref 27–31)
MCHC RBC AUTO-ENTMCNC: 31.6 G/DL (ref 33–36)
MCV RBC AUTO: 98.2 FL (ref 80–94)
MONOCYTES # BLD AUTO: 0.85 X10(3)/MCL (ref 0.1–1.3)
MONOCYTES NFR BLD AUTO: 7.7 %
NEUTROPHILS # BLD AUTO: 7.19 X10(3)/MCL (ref 2.1–9.2)
NEUTROPHILS NFR BLD AUTO: 64.7 %
NRBC BLD AUTO-RTO: 0 %
PLATELET # BLD AUTO: 352 X10(3)/MCL (ref 130–400)
PMV BLD AUTO: 9.5 FL (ref 7.4–10.4)
RBC # BLD AUTO: 2.77 X10(6)/MCL (ref 4.7–6.1)
WBC # SPEC AUTO: 11.11 X10(3)/MCL (ref 4.5–11.5)

## 2024-02-27 PROCEDURE — 85025 COMPLETE CBC W/AUTO DIFF WBC: CPT | Performed by: INTERNAL MEDICINE

## 2024-02-27 NOTE — PLAN OF CARE
[facility]  Discharge Final Note    Primary Care Provider: Eliazar Rosas MD    Expected Discharge Date: 2/23/2024    Final Discharge Note (most recent)       Final Note - 02/23/24 1838          Final Note    Assessment Type Final Discharge Note     Anticipated Discharge Disposition Skilled Nursing Facility   [facility] Resources/Appts/Education Provided Provided patient/caregiver with written discharge plan information;Provided education on problems/symptoms using teachback                      Contact Info       Eliazar Rosas MD   Specialty: Palliative Medicine, Internal Medicine   Relationship: PCP - [address] [address]   Phone: [phone]       Next Steps: Follow up

## 2024-02-28 ENCOUNTER — LAB REQUISITION (OUTPATIENT)
Dept: LAB | Facility: HOSPITAL | Age: 69
End: 2024-02-28
Payer: OTHER GOVERNMENT

## 2024-02-28 DIAGNOSIS — D64.9 ANEMIA, UNSPECIFIED: ICD-10-CM

## 2024-02-28 LAB
BASOPHILS # BLD AUTO: 0.14 X10(3)/MCL
BASOPHILS NFR BLD AUTO: 1.1 %
EOSINOPHIL # BLD AUTO: 0.3 X10(3)/MCL (ref 0–0.9)
EOSINOPHIL NFR BLD AUTO: 2.4 %
ERYTHROCYTE [DISTWIDTH] IN BLOOD BY AUTOMATED COUNT: 16.8 % (ref 11.5–17)
FOLATE SERPL-MCNC: 19.3 NG/ML (ref 7–31.4)
HCT VFR BLD AUTO: 30 % (ref 42–52)
HGB BLD-MCNC: 9.8 G/DL (ref 14–18)
IMM GRANULOCYTES # BLD AUTO: 0.04 X10(3)/MCL (ref 0–0.04)
IMM GRANULOCYTES NFR BLD AUTO: 0.3 %
LYMPHOCYTES # BLD AUTO: 1.84 X10(3)/MCL (ref 0.6–4.6)
LYMPHOCYTES NFR BLD AUTO: 14.8 %
MCH RBC QN AUTO: 31.4 PG (ref 27–31)
MCHC RBC AUTO-ENTMCNC: 32.7 G/DL (ref 33–36)
MCV RBC AUTO: 96.2 FL (ref 80–94)
MONOCYTES # BLD AUTO: 0.78 X10(3)/MCL (ref 0.1–1.3)
MONOCYTES NFR BLD AUTO: 6.3 %
NEUTROPHILS # BLD AUTO: 9.31 X10(3)/MCL (ref 2.1–9.2)
NEUTROPHILS NFR BLD AUTO: 75.1 %
NRBC BLD AUTO-RTO: 0 %
PLATELET # BLD AUTO: 392 X10(3)/MCL (ref 130–400)
PMV BLD AUTO: 9.5 FL (ref 7.4–10.4)
RBC # BLD AUTO: 3.12 X10(6)/MCL (ref 4.7–6.1)
VIT B12 SERPL-MCNC: 895 PG/ML (ref 213–816)
WBC # SPEC AUTO: 12.41 X10(3)/MCL (ref 4.5–11.5)

## 2024-02-28 PROCEDURE — 82746 ASSAY OF FOLIC ACID SERUM: CPT | Performed by: NURSE PRACTITIONER

## 2024-02-28 PROCEDURE — 82607 VITAMIN B-12: CPT | Performed by: NURSE PRACTITIONER

## 2024-02-28 PROCEDURE — 85025 COMPLETE CBC W/AUTO DIFF WBC: CPT | Performed by: NURSE PRACTITIONER

## 2024-02-29 ENCOUNTER — LAB REQUISITION (OUTPATIENT)
Dept: LAB | Facility: HOSPITAL | Age: 69
End: 2024-02-29
Payer: OTHER GOVERNMENT

## 2024-02-29 DIAGNOSIS — R31.9 HEMATURIA, UNSPECIFIED: ICD-10-CM

## 2024-02-29 LAB
APPEARANCE UR: ABNORMAL
BACTERIA #/AREA URNS AUTO: ABNORMAL /HPF
BILIRUB UR QL STRIP.AUTO: ABNORMAL
COLOR UR AUTO: ABNORMAL
GLUCOSE UR QL STRIP.AUTO: NEGATIVE
KETONES UR QL STRIP.AUTO: NEGATIVE
LEUKOCYTE ESTERASE UR QL STRIP.AUTO: ABNORMAL
NITRITE UR QL STRIP.AUTO: NEGATIVE
PH UR STRIP.AUTO: 7 [PH]
PROT UR QL STRIP.AUTO: 100
RBC #/AREA URNS AUTO: ABNORMAL /HPF
RBC UR QL AUTO: ABNORMAL
SP GR UR STRIP.AUTO: 1.01 (ref 1–1.03)
SQUAMOUS #/AREA URNS AUTO: ABNORMAL /HPF
UROBILINOGEN UR STRIP-ACNC: 0.2
WBC #/AREA URNS AUTO: ABNORMAL /HPF

## 2024-02-29 PROCEDURE — 81001 URINALYSIS AUTO W/SCOPE: CPT | Performed by: NURSE PRACTITIONER

## 2024-02-29 PROCEDURE — 81003 URINALYSIS AUTO W/O SCOPE: CPT | Performed by: NURSE PRACTITIONER

## 2024-03-04 ENCOUNTER — LAB REQUISITION (OUTPATIENT)
Dept: LAB | Facility: HOSPITAL | Age: 69
End: 2024-03-04
Payer: OTHER GOVERNMENT

## 2024-03-04 DIAGNOSIS — R31.9 HEMATURIA, UNSPECIFIED: ICD-10-CM

## 2024-03-04 LAB
ALBUMIN SERPL-MCNC: 2.6 G/DL (ref 3.4–4.8)
ALBUMIN/GLOB SERPL: 0.6 RATIO (ref 1.1–2)
ALP SERPL-CCNC: 81 UNIT/L (ref 40–150)
ALT SERPL-CCNC: 10 UNIT/L (ref 0–55)
AST SERPL-CCNC: 13 UNIT/L (ref 5–34)
BILIRUB SERPL-MCNC: 0.2 MG/DL
BUN SERPL-MCNC: 23.7 MG/DL (ref 8.4–25.7)
CALCIUM SERPL-MCNC: 9.2 MG/DL (ref 8.8–10)
CHLORIDE SERPL-SCNC: 100 MMOL/L (ref 98–107)
CO2 SERPL-SCNC: 28 MMOL/L (ref 23–31)
CREAT SERPL-MCNC: 0.66 MG/DL (ref 0.73–1.18)
ERYTHROCYTE [DISTWIDTH] IN BLOOD BY AUTOMATED COUNT: 15.3 % (ref 11.5–17)
GFR SERPLBLD CREATININE-BSD FMLA CKD-EPI: >60 MLS/MIN/1.73/M2
GLOBULIN SER-MCNC: 4.4 GM/DL (ref 2.4–3.5)
GLUCOSE SERPL-MCNC: 109 MG/DL (ref 82–115)
HCT VFR BLD AUTO: 27.1 % (ref 42–52)
HGB BLD-MCNC: 8.9 G/DL (ref 14–18)
MCH RBC QN AUTO: 30.7 PG (ref 27–31)
MCHC RBC AUTO-ENTMCNC: 32.8 G/DL (ref 33–36)
MCV RBC AUTO: 93.4 FL (ref 80–94)
NRBC BLD AUTO-RTO: 0 %
PLATELET # BLD AUTO: 368 X10(3)/MCL (ref 130–400)
PMV BLD AUTO: 9.3 FL (ref 7.4–10.4)
POTASSIUM SERPL-SCNC: 3.8 MMOL/L (ref 3.5–5.1)
PROT SERPL-MCNC: 7 GM/DL (ref 5.8–7.6)
RBC # BLD AUTO: 2.9 X10(6)/MCL (ref 4.7–6.1)
SODIUM SERPL-SCNC: 137 MMOL/L (ref 136–145)
WBC # SPEC AUTO: 8.01 X10(3)/MCL (ref 4.5–11.5)

## 2024-03-04 PROCEDURE — 85027 COMPLETE CBC AUTOMATED: CPT | Performed by: NURSE PRACTITIONER

## 2024-03-04 PROCEDURE — 80053 COMPREHEN METABOLIC PANEL: CPT | Performed by: NURSE PRACTITIONER

## 2024-03-11 LAB — FUNGUS SPEC CULT: NORMAL

## 2024-03-11 PROCEDURE — 87086 URINE CULTURE/COLONY COUNT: CPT | Performed by: NURSE PRACTITIONER

## 2024-03-11 PROCEDURE — 85025 COMPLETE CBC W/AUTO DIFF WBC: CPT | Performed by: NURSE PRACTITIONER

## 2024-03-11 PROCEDURE — 81001 URINALYSIS AUTO W/SCOPE: CPT | Performed by: NURSE PRACTITIONER

## 2024-03-12 ENCOUNTER — LAB REQUISITION (OUTPATIENT)
Dept: LAB | Facility: HOSPITAL | Age: 69
End: 2024-03-12
Payer: OTHER GOVERNMENT

## 2024-03-12 DIAGNOSIS — R50.9 FEVER, UNSPECIFIED: ICD-10-CM

## 2024-03-12 DIAGNOSIS — R31.0 GROSS HEMATURIA: ICD-10-CM

## 2024-03-12 LAB
APPEARANCE UR: ABNORMAL
BACTERIA #/AREA URNS AUTO: ABNORMAL /HPF
BASOPHILS # BLD AUTO: 0.07 X10(3)/MCL
BASOPHILS NFR BLD AUTO: 0.6 %
BILIRUB UR QL STRIP.AUTO: ABNORMAL
COLOR UR AUTO: ABNORMAL
EOSINOPHIL # BLD AUTO: 0.2 X10(3)/MCL (ref 0–0.9)
EOSINOPHIL NFR BLD AUTO: 1.6 %
ERYTHROCYTE [DISTWIDTH] IN BLOOD BY AUTOMATED COUNT: 14.6 % (ref 11.5–17)
GLUCOSE UR QL STRIP.AUTO: NEGATIVE
HCT VFR BLD AUTO: 24.7 % (ref 42–52)
HGB BLD-MCNC: 7.9 G/DL (ref 14–18)
IMM GRANULOCYTES # BLD AUTO: 0.07 X10(3)/MCL (ref 0–0.04)
IMM GRANULOCYTES NFR BLD AUTO: 0.6 %
KETONES UR QL STRIP.AUTO: NEGATIVE
LEUKOCYTE ESTERASE UR QL STRIP.AUTO: ABNORMAL
LYMPHOCYTES # BLD AUTO: 2.22 X10(3)/MCL (ref 0.6–4.6)
LYMPHOCYTES NFR BLD AUTO: 18.2 %
MCH RBC QN AUTO: 29.6 PG (ref 27–31)
MCHC RBC AUTO-ENTMCNC: 32 G/DL (ref 33–36)
MCV RBC AUTO: 92.5 FL (ref 80–94)
MONOCYTES # BLD AUTO: 0.94 X10(3)/MCL (ref 0.1–1.3)
MONOCYTES NFR BLD AUTO: 7.7 %
NEUTROPHILS # BLD AUTO: 8.69 X10(3)/MCL (ref 2.1–9.2)
NEUTROPHILS NFR BLD AUTO: 71.3 %
NITRITE UR QL STRIP.AUTO: POSITIVE
NRBC BLD AUTO-RTO: 0 %
PH UR STRIP.AUTO: 8 [PH]
PLATELET # BLD AUTO: 374 X10(3)/MCL (ref 130–400)
PMV BLD AUTO: 8.8 FL (ref 7.4–10.4)
PROT UR QL STRIP.AUTO: 100
RBC # BLD AUTO: 2.67 X10(6)/MCL (ref 4.7–6.1)
RBC #/AREA URNS AUTO: >100 /HPF
RBC UR QL AUTO: ABNORMAL
SP GR UR STRIP.AUTO: 1.01 (ref 1–1.03)
SQUAMOUS #/AREA URNS AUTO: ABNORMAL /HPF
UROBILINOGEN UR STRIP-ACNC: 1
WBC # SPEC AUTO: 12.19 X10(3)/MCL (ref 4.5–11.5)
WBC #/AREA URNS AUTO: ABNORMAL /HPF

## 2024-03-14 LAB — BACTERIA UR CULT: ABNORMAL

## 2024-03-29 LAB
ACID FAST MOD KINY STN SPEC: NORMAL
MYCOBACTERIUM SPEC QL CULT: NORMAL

## 2024-04-02 ENCOUNTER — LAB REQUISITION (OUTPATIENT)
Dept: LAB | Facility: HOSPITAL | Age: 69
End: 2024-04-02
Payer: MEDICAID

## 2024-04-02 DIAGNOSIS — R97.20 ELEVATED PROSTATE SPECIFIC ANTIGEN (PSA): ICD-10-CM

## 2024-04-02 LAB
ALBUMIN SERPL-MCNC: 2.8 G/DL (ref 3.4–4.8)
ALBUMIN/GLOB SERPL: 0.7 RATIO (ref 1.1–2)
ALP SERPL-CCNC: 113 UNIT/L (ref 40–150)
ALT SERPL-CCNC: 14 UNIT/L (ref 0–55)
AST SERPL-CCNC: 15 UNIT/L (ref 5–34)
BASOPHILS # BLD AUTO: 0.1 X10(3)/MCL
BASOPHILS NFR BLD AUTO: 1.4 %
BILIRUB SERPL-MCNC: 0.3 MG/DL
BUN SERPL-MCNC: 29.7 MG/DL (ref 8.4–25.7)
CALCIUM SERPL-MCNC: 9 MG/DL (ref 8.8–10)
CHLORIDE SERPL-SCNC: 104 MMOL/L (ref 98–107)
CO2 SERPL-SCNC: 28 MMOL/L (ref 23–31)
CREAT SERPL-MCNC: 0.74 MG/DL (ref 0.73–1.18)
EOSINOPHIL # BLD AUTO: 0.39 X10(3)/MCL (ref 0–0.9)
EOSINOPHIL NFR BLD AUTO: 5.3 %
ERYTHROCYTE [DISTWIDTH] IN BLOOD BY AUTOMATED COUNT: 15.4 % (ref 11.5–17)
GFR SERPLBLD CREATININE-BSD FMLA CKD-EPI: >60 MLS/MIN/1.73/M2
GLOBULIN SER-MCNC: 4.1 GM/DL (ref 2.4–3.5)
GLUCOSE SERPL-MCNC: 119 MG/DL (ref 82–115)
HCT VFR BLD AUTO: 32.8 % (ref 42–52)
HGB BLD-MCNC: 10.5 G/DL (ref 14–18)
IMM GRANULOCYTES # BLD AUTO: 0.02 X10(3)/MCL (ref 0–0.04)
IMM GRANULOCYTES NFR BLD AUTO: 0.3 %
LYMPHOCYTES # BLD AUTO: 1.67 X10(3)/MCL (ref 0.6–4.6)
LYMPHOCYTES NFR BLD AUTO: 22.7 %
MCH RBC QN AUTO: 29.3 PG (ref 27–31)
MCHC RBC AUTO-ENTMCNC: 32 G/DL (ref 33–36)
MCV RBC AUTO: 91.6 FL (ref 80–94)
MONOCYTES # BLD AUTO: 0.65 X10(3)/MCL (ref 0.1–1.3)
MONOCYTES NFR BLD AUTO: 8.8 %
NEUTROPHILS # BLD AUTO: 4.53 X10(3)/MCL (ref 2.1–9.2)
NEUTROPHILS NFR BLD AUTO: 61.5 %
NRBC BLD AUTO-RTO: 0 %
PLATELET # BLD AUTO: 312 X10(3)/MCL (ref 130–400)
PMV BLD AUTO: 9.3 FL (ref 7.4–10.4)
POTASSIUM SERPL-SCNC: 4.3 MMOL/L (ref 3.5–5.1)
PROT SERPL-MCNC: 6.9 GM/DL (ref 5.8–7.6)
PSA SERPL-MCNC: 1.99 NG/ML
RBC # BLD AUTO: 3.58 X10(6)/MCL (ref 4.7–6.1)
SODIUM SERPL-SCNC: 140 MMOL/L (ref 136–145)
WBC # SPEC AUTO: 7.36 X10(3)/MCL (ref 4.5–11.5)

## 2024-04-02 PROCEDURE — 80053 COMPREHEN METABOLIC PANEL: CPT | Performed by: INTERNAL MEDICINE

## 2024-04-02 PROCEDURE — 85025 COMPLETE CBC W/AUTO DIFF WBC: CPT | Performed by: INTERNAL MEDICINE

## 2024-04-02 PROCEDURE — 84153 ASSAY OF PSA TOTAL: CPT | Performed by: INTERNAL MEDICINE

## 2024-05-06 PROBLEM — I63.9 CVA (CEREBRAL VASCULAR ACCIDENT): Chronic | Status: RESOLVED | Noted: 2024-02-04 | Resolved: 2024-05-06

## 2024-05-20 PROBLEM — J96.01 ACUTE HYPOXEMIC RESPIRATORY FAILURE: Status: RESOLVED | Noted: 2024-02-16 | Resolved: 2024-05-20

## 2024-05-22 ENCOUNTER — LAB REQUISITION (OUTPATIENT)
Dept: LAB | Facility: HOSPITAL | Age: 69
End: 2024-05-22
Payer: MEDICARE

## 2024-05-22 DIAGNOSIS — I10 ESSENTIAL (PRIMARY) HYPERTENSION: ICD-10-CM

## 2024-05-22 LAB
ALBUMIN SERPL-MCNC: 3 G/DL (ref 3.4–4.8)
ALBUMIN/GLOB SERPL: 0.9 RATIO (ref 1.1–2)
ALP SERPL-CCNC: 102 UNIT/L (ref 40–150)
ALT SERPL-CCNC: 13 UNIT/L (ref 0–55)
ANION GAP SERPL CALC-SCNC: 8 MEQ/L
AST SERPL-CCNC: 15 UNIT/L (ref 5–34)
BILIRUB SERPL-MCNC: 0.3 MG/DL
BUN SERPL-MCNC: 34.3 MG/DL (ref 8.4–25.7)
CALCIUM SERPL-MCNC: 8.8 MG/DL (ref 8.8–10)
CHLORIDE SERPL-SCNC: 107 MMOL/L (ref 98–107)
CO2 SERPL-SCNC: 27 MMOL/L (ref 23–31)
CREAT SERPL-MCNC: 0.78 MG/DL (ref 0.73–1.18)
CREAT/UREA NIT SERPL: 44
GFR SERPLBLD CREATININE-BSD FMLA CKD-EPI: >60 ML/MIN/1.73/M2
GLOBULIN SER-MCNC: 3.5 GM/DL (ref 2.4–3.5)
GLUCOSE SERPL-MCNC: 104 MG/DL (ref 82–115)
POTASSIUM SERPL-SCNC: 4.2 MMOL/L (ref 3.5–5.1)
PROT SERPL-MCNC: 6.5 GM/DL (ref 5.8–7.6)
SODIUM SERPL-SCNC: 142 MMOL/L (ref 136–145)

## 2024-05-22 PROCEDURE — 80053 COMPREHEN METABOLIC PANEL: CPT | Performed by: INTERNAL MEDICINE

## 2024-06-03 ENCOUNTER — LAB REQUISITION (OUTPATIENT)
Dept: LAB | Facility: HOSPITAL | Age: 69
End: 2024-06-03
Payer: MEDICARE

## 2024-06-03 DIAGNOSIS — M10.9 GOUT, UNSPECIFIED: ICD-10-CM

## 2024-06-03 DIAGNOSIS — E55.9 VITAMIN D DEFICIENCY, UNSPECIFIED: ICD-10-CM

## 2024-06-03 LAB
25(OH)D3+25(OH)D2 SERPL-MCNC: 28 NG/ML (ref 30–80)
ALBUMIN SERPL-MCNC: 3.1 G/DL (ref 3.4–4.8)
ALBUMIN/GLOB SERPL: 0.9 RATIO (ref 1.1–2)
ALP SERPL-CCNC: 93 UNIT/L (ref 40–150)
ALT SERPL-CCNC: 13 UNIT/L (ref 0–55)
ANION GAP SERPL CALC-SCNC: 10 MEQ/L
AST SERPL-CCNC: 13 UNIT/L (ref 5–34)
BASOPHILS # BLD AUTO: 0.07 X10(3)/MCL
BASOPHILS NFR BLD AUTO: 1 %
BILIRUB SERPL-MCNC: 0.7 MG/DL
BUN SERPL-MCNC: 28 MG/DL (ref 8.4–25.7)
CALCIUM SERPL-MCNC: 9 MG/DL (ref 8.8–10)
CHLORIDE SERPL-SCNC: 105 MMOL/L (ref 98–107)
CHOLEST SERPL-MCNC: 92 MG/DL
CHOLEST/HDLC SERPL: 3 {RATIO} (ref 0–5)
CO2 SERPL-SCNC: 29 MMOL/L (ref 23–31)
CREAT SERPL-MCNC: 0.85 MG/DL (ref 0.73–1.18)
CREAT/UREA NIT SERPL: 33
EOSINOPHIL # BLD AUTO: 0.31 X10(3)/MCL (ref 0–0.9)
EOSINOPHIL NFR BLD AUTO: 4.6 %
ERYTHROCYTE [DISTWIDTH] IN BLOOD BY AUTOMATED COUNT: 14.4 % (ref 11.5–17)
EST. AVERAGE GLUCOSE BLD GHB EST-MCNC: 88.2 MG/DL
GFR SERPLBLD CREATININE-BSD FMLA CKD-EPI: >60 ML/MIN/1.73/M2
GLOBULIN SER-MCNC: 3.6 GM/DL (ref 2.4–3.5)
GLUCOSE SERPL-MCNC: 101 MG/DL (ref 82–115)
HBA1C MFR BLD: 4.7 %
HCT VFR BLD AUTO: 29 % (ref 42–52)
HDLC SERPL-MCNC: 31 MG/DL (ref 35–60)
HGB BLD-MCNC: 9.1 G/DL (ref 14–18)
IMM GRANULOCYTES # BLD AUTO: 0.01 X10(3)/MCL (ref 0–0.04)
IMM GRANULOCYTES NFR BLD AUTO: 0.1 %
LDLC SERPL CALC-MCNC: 41 MG/DL (ref 50–140)
LYMPHOCYTES # BLD AUTO: 1.6 X10(3)/MCL (ref 0.6–4.6)
LYMPHOCYTES NFR BLD AUTO: 23.7 %
MCH RBC QN AUTO: 28.9 PG (ref 27–31)
MCHC RBC AUTO-ENTMCNC: 31.4 G/DL (ref 33–36)
MCV RBC AUTO: 92.1 FL (ref 80–94)
MONOCYTES # BLD AUTO: 0.6 X10(3)/MCL (ref 0.1–1.3)
MONOCYTES NFR BLD AUTO: 8.9 %
NEUTROPHILS # BLD AUTO: 4.16 X10(3)/MCL (ref 2.1–9.2)
NEUTROPHILS NFR BLD AUTO: 61.7 %
NRBC BLD AUTO-RTO: 0 %
PLATELET # BLD AUTO: 263 X10(3)/MCL (ref 130–400)
PMV BLD AUTO: 10.1 FL (ref 7.4–10.4)
POTASSIUM SERPL-SCNC: 3.9 MMOL/L (ref 3.5–5.1)
PREALB SERPL-MCNC: 20.3 MG/DL (ref 16–42)
PROT SERPL-MCNC: 6.7 GM/DL (ref 5.8–7.6)
PSA SERPL-MCNC: 3.68 NG/ML
RBC # BLD AUTO: 3.15 X10(6)/MCL (ref 4.7–6.1)
SODIUM SERPL-SCNC: 144 MMOL/L (ref 136–145)
TRIGL SERPL-MCNC: 100 MG/DL (ref 34–140)
URATE SERPL-MCNC: 4.5 MG/DL (ref 3.5–7.2)
VALPROATE SERPL-MCNC: <12.5 UG/ML (ref 50–100)
VLDLC SERPL CALC-MCNC: 20 MG/DL
WBC # SPEC AUTO: 6.75 X10(3)/MCL (ref 4.5–11.5)

## 2024-06-03 PROCEDURE — 84134 ASSAY OF PREALBUMIN: CPT | Performed by: INTERNAL MEDICINE

## 2024-06-03 PROCEDURE — 80053 COMPREHEN METABOLIC PANEL: CPT | Performed by: INTERNAL MEDICINE

## 2024-06-03 PROCEDURE — 85025 COMPLETE CBC W/AUTO DIFF WBC: CPT | Performed by: INTERNAL MEDICINE

## 2024-06-03 PROCEDURE — 82306 VITAMIN D 25 HYDROXY: CPT | Performed by: INTERNAL MEDICINE

## 2024-06-03 PROCEDURE — 80164 ASSAY DIPROPYLACETIC ACD TOT: CPT | Performed by: INTERNAL MEDICINE

## 2024-06-03 PROCEDURE — 83036 HEMOGLOBIN GLYCOSYLATED A1C: CPT | Performed by: INTERNAL MEDICINE

## 2024-06-03 PROCEDURE — 80061 LIPID PANEL: CPT | Performed by: INTERNAL MEDICINE

## 2024-06-03 PROCEDURE — 84153 ASSAY OF PSA TOTAL: CPT | Performed by: INTERNAL MEDICINE

## 2024-06-03 PROCEDURE — 84550 ASSAY OF BLOOD/URIC ACID: CPT | Performed by: INTERNAL MEDICINE

## 2024-06-07 ENCOUNTER — LAB REQUISITION (OUTPATIENT)
Dept: LAB | Facility: HOSPITAL | Age: 69
End: 2024-06-07
Payer: MEDICARE

## 2024-06-07 DIAGNOSIS — D64.9 ANEMIA, UNSPECIFIED: ICD-10-CM

## 2024-06-07 LAB
BASOPHILS # BLD AUTO: 0.07 X10(3)/MCL
BASOPHILS NFR BLD AUTO: 0.8 %
EOSINOPHIL # BLD AUTO: 0.36 X10(3)/MCL (ref 0–0.9)
EOSINOPHIL NFR BLD AUTO: 4.2 %
ERYTHROCYTE [DISTWIDTH] IN BLOOD BY AUTOMATED COUNT: 14.2 % (ref 11.5–17)
HCT VFR BLD AUTO: 28.2 % (ref 42–52)
HGB BLD-MCNC: 8.9 G/DL (ref 14–18)
IMM GRANULOCYTES # BLD AUTO: 0.02 X10(3)/MCL (ref 0–0.04)
IMM GRANULOCYTES NFR BLD AUTO: 0.2 %
LYMPHOCYTES # BLD AUTO: 1.78 X10(3)/MCL (ref 0.6–4.6)
LYMPHOCYTES NFR BLD AUTO: 21 %
MCH RBC QN AUTO: 29.1 PG (ref 27–31)
MCHC RBC AUTO-ENTMCNC: 31.6 G/DL (ref 33–36)
MCV RBC AUTO: 92.2 FL (ref 80–94)
MONOCYTES # BLD AUTO: 0.79 X10(3)/MCL (ref 0.1–1.3)
MONOCYTES NFR BLD AUTO: 9.3 %
NEUTROPHILS # BLD AUTO: 5.46 X10(3)/MCL (ref 2.1–9.2)
NEUTROPHILS NFR BLD AUTO: 64.5 %
NRBC BLD AUTO-RTO: 0 %
PLATELET # BLD AUTO: 257 X10(3)/MCL (ref 130–400)
PMV BLD AUTO: 9.9 FL (ref 7.4–10.4)
RBC # BLD AUTO: 3.06 X10(6)/MCL (ref 4.7–6.1)
WBC # SPEC AUTO: 8.48 X10(3)/MCL (ref 4.5–11.5)

## 2024-06-07 PROCEDURE — 85025 COMPLETE CBC W/AUTO DIFF WBC: CPT | Performed by: NURSE PRACTITIONER

## 2024-06-08 ENCOUNTER — LAB REQUISITION (OUTPATIENT)
Dept: LAB | Facility: HOSPITAL | Age: 69
End: 2024-06-08
Payer: OTHER GOVERNMENT

## 2024-06-08 DIAGNOSIS — D64.9 ANEMIA, UNSPECIFIED: ICD-10-CM

## 2024-06-08 LAB
FERRITIN SERPL-MCNC: 312.59 NG/ML (ref 21.81–274.66)
IRON SATN MFR SERPL: 13 % (ref 20–50)
IRON SERPL-MCNC: 23 UG/DL (ref 65–175)
TIBC SERPL-MCNC: 150 UG/DL (ref 69–240)
TIBC SERPL-MCNC: 173 UG/DL (ref 250–450)
TRANSFERRIN SERPL-MCNC: 158 MG/DL (ref 163–344)

## 2024-06-08 PROCEDURE — 83540 ASSAY OF IRON: CPT | Performed by: NURSE PRACTITIONER

## 2024-06-08 PROCEDURE — 82728 ASSAY OF FERRITIN: CPT | Performed by: NURSE PRACTITIONER

## 2024-06-11 ENCOUNTER — LAB REQUISITION (OUTPATIENT)
Dept: LAB | Facility: HOSPITAL | Age: 69
End: 2024-06-11
Payer: OTHER GOVERNMENT

## 2024-06-11 DIAGNOSIS — D50.8 OTHER IRON DEFICIENCY ANEMIAS: ICD-10-CM

## 2024-06-11 DIAGNOSIS — I61.3 NONTRAUMATIC INTRACEREBRAL HEMORRHAGE IN BRAIN STEM: ICD-10-CM

## 2024-06-11 LAB
25(OH)D3+25(OH)D2 SERPL-MCNC: 26 NG/ML (ref 30–80)
ALBUMIN SERPL-MCNC: 3 G/DL (ref 3.4–4.8)
ALBUMIN/GLOB SERPL: 0.8 RATIO (ref 1.1–2)
ALP SERPL-CCNC: 100 UNIT/L (ref 40–150)
ALT SERPL-CCNC: 11 UNIT/L (ref 0–55)
ANION GAP SERPL CALC-SCNC: 10 MEQ/L
AST SERPL-CCNC: 11 UNIT/L (ref 5–34)
BASOPHILS # BLD AUTO: 0.08 X10(3)/MCL
BASOPHILS NFR BLD AUTO: 1.2 %
BILIRUB SERPL-MCNC: 0.4 MG/DL
BUN SERPL-MCNC: 30 MG/DL (ref 8.4–25.7)
CALCIUM SERPL-MCNC: 9 MG/DL (ref 8.8–10)
CHLORIDE SERPL-SCNC: 102 MMOL/L (ref 98–107)
CHOLEST SERPL-MCNC: 84 MG/DL
CHOLEST/HDLC SERPL: 3 {RATIO} (ref 0–5)
CO2 SERPL-SCNC: 27 MMOL/L (ref 23–31)
CREAT SERPL-MCNC: 0.75 MG/DL (ref 0.73–1.18)
CREAT/UREA NIT SERPL: 40
EOSINOPHIL # BLD AUTO: 0.46 X10(3)/MCL (ref 0–0.9)
EOSINOPHIL NFR BLD AUTO: 6.8 %
ERYTHROCYTE [DISTWIDTH] IN BLOOD BY AUTOMATED COUNT: 13.5 % (ref 11.5–17)
EST. AVERAGE GLUCOSE BLD GHB EST-MCNC: 85.3 MG/DL
GFR SERPLBLD CREATININE-BSD FMLA CKD-EPI: >60 ML/MIN/1.73/M2
GLOBULIN SER-MCNC: 3.7 GM/DL (ref 2.4–3.5)
GLUCOSE SERPL-MCNC: 105 MG/DL (ref 82–115)
HBA1C MFR BLD: 4.6 %
HCT VFR BLD AUTO: 29.4 % (ref 42–52)
HDLC SERPL-MCNC: 29 MG/DL (ref 35–60)
HGB BLD-MCNC: 9.4 G/DL (ref 14–18)
IMM GRANULOCYTES # BLD AUTO: 0.02 X10(3)/MCL (ref 0–0.04)
IMM GRANULOCYTES NFR BLD AUTO: 0.3 %
IRON SATN MFR SERPL: 20 % (ref 20–50)
IRON SERPL-MCNC: 32 UG/DL (ref 65–175)
LDLC SERPL CALC-MCNC: 37 MG/DL (ref 50–140)
LYMPHOCYTES # BLD AUTO: 1.66 X10(3)/MCL (ref 0.6–4.6)
LYMPHOCYTES NFR BLD AUTO: 24.5 %
MCH RBC QN AUTO: 28.8 PG (ref 27–31)
MCHC RBC AUTO-ENTMCNC: 32 G/DL (ref 33–36)
MCV RBC AUTO: 90.2 FL (ref 80–94)
MONOCYTES # BLD AUTO: 0.7 X10(3)/MCL (ref 0.1–1.3)
MONOCYTES NFR BLD AUTO: 10.3 %
NEUTROPHILS # BLD AUTO: 3.86 X10(3)/MCL (ref 2.1–9.2)
NEUTROPHILS NFR BLD AUTO: 56.9 %
NRBC BLD AUTO-RTO: 0 %
PLATELET # BLD AUTO: 279 X10(3)/MCL (ref 130–400)
PMV BLD AUTO: 9.9 FL (ref 7.4–10.4)
POTASSIUM SERPL-SCNC: 3.6 MMOL/L (ref 3.5–5.1)
PREALB SERPL-MCNC: 18.1 MG/DL (ref 16–42)
PROT SERPL-MCNC: 6.7 GM/DL (ref 5.8–7.6)
RBC # BLD AUTO: 3.26 X10(6)/MCL (ref 4.7–6.1)
SODIUM SERPL-SCNC: 139 MMOL/L (ref 136–145)
TIBC SERPL-MCNC: 132 UG/DL (ref 69–240)
TIBC SERPL-MCNC: 164 UG/DL (ref 250–450)
TRANSFERRIN SERPL-MCNC: 145 MG/DL (ref 163–344)
TRIGL SERPL-MCNC: 89 MG/DL (ref 34–140)
VLDLC SERPL CALC-MCNC: 18 MG/DL
WBC # SPEC AUTO: 6.78 X10(3)/MCL (ref 4.5–11.5)

## 2024-06-11 PROCEDURE — 83036 HEMOGLOBIN GLYCOSYLATED A1C: CPT | Performed by: NURSE PRACTITIONER

## 2024-06-11 PROCEDURE — 83540 ASSAY OF IRON: CPT | Performed by: NURSE PRACTITIONER

## 2024-06-11 PROCEDURE — 80061 LIPID PANEL: CPT | Performed by: NURSE PRACTITIONER

## 2024-06-11 PROCEDURE — 80053 COMPREHEN METABOLIC PANEL: CPT | Performed by: NURSE PRACTITIONER

## 2024-06-11 PROCEDURE — 82306 VITAMIN D 25 HYDROXY: CPT | Performed by: NURSE PRACTITIONER

## 2024-06-11 PROCEDURE — 84134 ASSAY OF PREALBUMIN: CPT | Performed by: NURSE PRACTITIONER

## 2024-06-11 PROCEDURE — 85025 COMPLETE CBC W/AUTO DIFF WBC: CPT | Performed by: NURSE PRACTITIONER

## 2024-06-19 ENCOUNTER — LAB REQUISITION (OUTPATIENT)
Dept: LAB | Facility: HOSPITAL | Age: 69
End: 2024-06-19
Payer: MEDICARE

## 2024-06-19 DIAGNOSIS — R79.9 ABNORMAL FINDING OF BLOOD CHEMISTRY, UNSPECIFIED: ICD-10-CM

## 2024-06-19 LAB
ALBUMIN SERPL-MCNC: 3.2 G/DL (ref 3.4–4.8)
ALBUMIN/GLOB SERPL: 0.8 RATIO (ref 1.1–2)
ALP SERPL-CCNC: 83 UNIT/L (ref 40–150)
ALT SERPL-CCNC: 13 UNIT/L (ref 0–55)
ANION GAP SERPL CALC-SCNC: 13 MEQ/L
AST SERPL-CCNC: 16 UNIT/L (ref 5–34)
BASOPHILS # BLD AUTO: 0.09 X10(3)/MCL
BASOPHILS NFR BLD AUTO: 1 %
BILIRUB SERPL-MCNC: 0.4 MG/DL
BUN SERPL-MCNC: 23.6 MG/DL (ref 8.4–25.7)
CALCIUM SERPL-MCNC: 9.8 MG/DL (ref 8.8–10)
CHLORIDE SERPL-SCNC: 101 MMOL/L (ref 98–107)
CO2 SERPL-SCNC: 28 MMOL/L (ref 23–31)
CREAT SERPL-MCNC: 0.81 MG/DL (ref 0.73–1.18)
CREAT/UREA NIT SERPL: 29
EOSINOPHIL # BLD AUTO: 0.33 X10(3)/MCL (ref 0–0.9)
EOSINOPHIL NFR BLD AUTO: 3.6 %
ERYTHROCYTE [DISTWIDTH] IN BLOOD BY AUTOMATED COUNT: 13.4 % (ref 11.5–17)
GFR SERPLBLD CREATININE-BSD FMLA CKD-EPI: >60 ML/MIN/1.73/M2
GLOBULIN SER-MCNC: 3.8 GM/DL (ref 2.4–3.5)
GLUCOSE SERPL-MCNC: 96 MG/DL (ref 82–115)
HCT VFR BLD AUTO: 30.1 % (ref 42–52)
HGB BLD-MCNC: 9.5 G/DL (ref 14–18)
IMM GRANULOCYTES # BLD AUTO: 0.03 X10(3)/MCL (ref 0–0.04)
IMM GRANULOCYTES NFR BLD AUTO: 0.3 %
LYMPHOCYTES # BLD AUTO: 1.51 X10(3)/MCL (ref 0.6–4.6)
LYMPHOCYTES NFR BLD AUTO: 16.3 %
MCH RBC QN AUTO: 28.6 PG (ref 27–31)
MCHC RBC AUTO-ENTMCNC: 31.6 G/DL (ref 33–36)
MCV RBC AUTO: 90.7 FL (ref 80–94)
MONOCYTES # BLD AUTO: 0.84 X10(3)/MCL (ref 0.1–1.3)
MONOCYTES NFR BLD AUTO: 9.1 %
NEUTROPHILS # BLD AUTO: 6.47 X10(3)/MCL (ref 2.1–9.2)
NEUTROPHILS NFR BLD AUTO: 69.7 %
NRBC BLD AUTO-RTO: 0 %
PLATELET # BLD AUTO: 339 X10(3)/MCL (ref 130–400)
PMV BLD AUTO: 9.4 FL (ref 7.4–10.4)
POTASSIUM SERPL-SCNC: 3.3 MMOL/L (ref 3.5–5.1)
PROT SERPL-MCNC: 7 GM/DL (ref 5.8–7.6)
RBC # BLD AUTO: 3.32 X10(6)/MCL (ref 4.7–6.1)
SODIUM SERPL-SCNC: 142 MMOL/L (ref 136–145)
WBC # BLD AUTO: 9.27 X10(3)/MCL (ref 4.5–11.5)

## 2024-06-19 PROCEDURE — 80053 COMPREHEN METABOLIC PANEL: CPT | Performed by: INTERNAL MEDICINE

## 2024-06-19 PROCEDURE — 85025 COMPLETE CBC W/AUTO DIFF WBC: CPT | Performed by: INTERNAL MEDICINE

## 2024-07-01 ENCOUNTER — LAB REQUISITION (OUTPATIENT)
Dept: LAB | Facility: HOSPITAL | Age: 69
End: 2024-07-01
Payer: OTHER GOVERNMENT

## 2024-07-01 DIAGNOSIS — M62.81 MUSCLE WEAKNESS (GENERALIZED): ICD-10-CM

## 2024-07-01 DIAGNOSIS — I61.3 NONTRAUMATIC INTRACEREBRAL HEMORRHAGE IN BRAIN STEM: ICD-10-CM

## 2024-07-01 LAB
25(OH)D3+25(OH)D2 SERPL-MCNC: 27 NG/ML (ref 30–80)
ALBUMIN SERPL-MCNC: 3 G/DL (ref 3.4–4.8)
ALBUMIN/GLOB SERPL: 0.8 RATIO (ref 1.1–2)
ALP SERPL-CCNC: 67 UNIT/L (ref 40–150)
ALT SERPL-CCNC: 10 UNIT/L (ref 0–55)
ANION GAP SERPL CALC-SCNC: 12 MEQ/L
AST SERPL-CCNC: 17 UNIT/L (ref 5–34)
BASOPHILS # BLD AUTO: 0.09 X10(3)/MCL
BASOPHILS NFR BLD AUTO: 1.2 %
BILIRUB SERPL-MCNC: 0.4 MG/DL
BUN SERPL-MCNC: 17.5 MG/DL (ref 8.4–25.7)
CALCIUM SERPL-MCNC: 8.8 MG/DL (ref 8.8–10)
CHLORIDE SERPL-SCNC: 105 MMOL/L (ref 98–107)
CHOLEST SERPL-MCNC: 100 MG/DL
CHOLEST/HDLC SERPL: 4 {RATIO} (ref 0–5)
CO2 SERPL-SCNC: 23 MMOL/L (ref 23–31)
CREAT SERPL-MCNC: 0.88 MG/DL (ref 0.73–1.18)
CREAT/UREA NIT SERPL: 20
EOSINOPHIL # BLD AUTO: 0.52 X10(3)/MCL (ref 0–0.9)
EOSINOPHIL NFR BLD AUTO: 7 %
ERYTHROCYTE [DISTWIDTH] IN BLOOD BY AUTOMATED COUNT: 13.2 % (ref 11.5–17)
EST. AVERAGE GLUCOSE BLD GHB EST-MCNC: 88.2 MG/DL
FERRITIN SERPL-MCNC: 372.41 NG/ML (ref 21.81–274.66)
GFR SERPLBLD CREATININE-BSD FMLA CKD-EPI: >60 ML/MIN/1.73/M2
GLOBULIN SER-MCNC: 3.6 GM/DL (ref 2.4–3.5)
GLUCOSE SERPL-MCNC: 59 MG/DL (ref 82–115)
HBA1C MFR BLD: 4.7 %
HCT VFR BLD AUTO: 32.5 % (ref 42–52)
HDLC SERPL-MCNC: 27 MG/DL (ref 35–60)
HGB BLD-MCNC: 10.1 G/DL (ref 14–18)
IMM GRANULOCYTES # BLD AUTO: 0.02 X10(3)/MCL (ref 0–0.04)
IMM GRANULOCYTES NFR BLD AUTO: 0.3 %
IRON SATN MFR SERPL: 24 % (ref 20–50)
IRON SERPL-MCNC: 38 UG/DL (ref 65–175)
LDLC SERPL CALC-MCNC: 47 MG/DL (ref 50–140)
LYMPHOCYTES # BLD AUTO: 2.08 X10(3)/MCL (ref 0.6–4.6)
LYMPHOCYTES NFR BLD AUTO: 27.8 %
MCH RBC QN AUTO: 28.3 PG (ref 27–31)
MCHC RBC AUTO-ENTMCNC: 31.1 G/DL (ref 33–36)
MCV RBC AUTO: 91 FL (ref 80–94)
MONOCYTES # BLD AUTO: 0.55 X10(3)/MCL (ref 0.1–1.3)
MONOCYTES NFR BLD AUTO: 7.4 %
NEUTROPHILS # BLD AUTO: 4.21 X10(3)/MCL (ref 2.1–9.2)
NEUTROPHILS NFR BLD AUTO: 56.3 %
NRBC BLD AUTO-RTO: 0 %
PLATELET # BLD AUTO: 274 X10(3)/MCL (ref 130–400)
PMV BLD AUTO: 9.6 FL (ref 7.4–10.4)
POTASSIUM SERPL-SCNC: 3.9 MMOL/L (ref 3.5–5.1)
PREALB SERPL-MCNC: 19.4 MG/DL (ref 16–42)
PROT SERPL-MCNC: 6.6 GM/DL (ref 5.8–7.6)
RBC # BLD AUTO: 3.57 X10(6)/MCL (ref 4.7–6.1)
SODIUM SERPL-SCNC: 140 MMOL/L (ref 136–145)
TIBC SERPL-MCNC: 119 UG/DL (ref 69–240)
TIBC SERPL-MCNC: 157 UG/DL (ref 250–450)
TRANSFERRIN SERPL-MCNC: 144 MG/DL (ref 163–344)
TRIGL SERPL-MCNC: 128 MG/DL (ref 34–140)
TSH SERPL-ACNC: 2.62 UIU/ML (ref 0.35–4.94)
VLDLC SERPL CALC-MCNC: 26 MG/DL
WBC # BLD AUTO: 7.47 X10(3)/MCL (ref 4.5–11.5)

## 2024-07-01 PROCEDURE — 83540 ASSAY OF IRON: CPT | Performed by: NURSE PRACTITIONER

## 2024-07-01 PROCEDURE — 80061 LIPID PANEL: CPT | Performed by: NURSE PRACTITIONER

## 2024-07-01 PROCEDURE — 84134 ASSAY OF PREALBUMIN: CPT | Performed by: NURSE PRACTITIONER

## 2024-07-01 PROCEDURE — 80053 COMPREHEN METABOLIC PANEL: CPT | Performed by: NURSE PRACTITIONER

## 2024-07-01 PROCEDURE — 84443 ASSAY THYROID STIM HORMONE: CPT | Performed by: NURSE PRACTITIONER

## 2024-07-01 PROCEDURE — 83036 HEMOGLOBIN GLYCOSYLATED A1C: CPT | Performed by: NURSE PRACTITIONER

## 2024-07-01 PROCEDURE — 85025 COMPLETE CBC W/AUTO DIFF WBC: CPT | Performed by: NURSE PRACTITIONER

## 2024-07-01 PROCEDURE — 83550 IRON BINDING TEST: CPT | Performed by: NURSE PRACTITIONER

## 2024-07-01 PROCEDURE — 82306 VITAMIN D 25 HYDROXY: CPT | Performed by: NURSE PRACTITIONER

## 2024-07-01 PROCEDURE — 82728 ASSAY OF FERRITIN: CPT | Performed by: NURSE PRACTITIONER

## 2024-08-04 ENCOUNTER — LAB REQUISITION (OUTPATIENT)
Dept: LAB | Facility: HOSPITAL | Age: 69
End: 2024-08-04
Payer: MEDICARE

## 2024-08-04 DIAGNOSIS — R11.12 PROJECTILE VOMITING: ICD-10-CM

## 2024-08-04 LAB
ALBUMIN SERPL-MCNC: 3.3 G/DL (ref 3.4–4.8)
ALBUMIN/GLOB SERPL: 0.8 RATIO (ref 1.1–2)
ALP SERPL-CCNC: 76 UNIT/L (ref 40–150)
ALT SERPL-CCNC: 8 UNIT/L (ref 0–55)
ANION GAP SERPL CALC-SCNC: 10 MEQ/L
AST SERPL-CCNC: 11 UNIT/L (ref 5–34)
BILIRUB SERPL-MCNC: 0.3 MG/DL
BUN SERPL-MCNC: 21.1 MG/DL (ref 8.4–25.7)
CALCIUM SERPL-MCNC: 10 MG/DL (ref 8.8–10)
CHLORIDE SERPL-SCNC: 102 MMOL/L (ref 98–107)
CO2 SERPL-SCNC: 30 MMOL/L (ref 23–31)
CREAT SERPL-MCNC: 0.86 MG/DL (ref 0.73–1.18)
CREAT/UREA NIT SERPL: 25
ERYTHROCYTE [DISTWIDTH] IN BLOOD BY AUTOMATED COUNT: 14.9 % (ref 11.5–17)
GFR SERPLBLD CREATININE-BSD FMLA CKD-EPI: >60 ML/MIN/1.73/M2
GLOBULIN SER-MCNC: 4 GM/DL (ref 2.4–3.5)
GLUCOSE SERPL-MCNC: 120 MG/DL (ref 82–115)
HCT VFR BLD AUTO: 33 % (ref 42–52)
HGB BLD-MCNC: 10.4 G/DL (ref 14–18)
MCH RBC QN AUTO: 28.6 PG (ref 27–31)
MCHC RBC AUTO-ENTMCNC: 31.5 G/DL (ref 33–36)
MCV RBC AUTO: 90.7 FL (ref 80–94)
NRBC BLD AUTO-RTO: 0 %
PLATELET # BLD AUTO: 299 X10(3)/MCL (ref 130–400)
PMV BLD AUTO: 9.3 FL (ref 7.4–10.4)
POTASSIUM SERPL-SCNC: 3.4 MMOL/L (ref 3.5–5.1)
PROT SERPL-MCNC: 7.3 GM/DL (ref 5.8–7.6)
RBC # BLD AUTO: 3.64 X10(6)/MCL (ref 4.7–6.1)
SODIUM SERPL-SCNC: 142 MMOL/L (ref 136–145)
WBC # BLD AUTO: 8.71 X10(3)/MCL (ref 4.5–11.5)

## 2024-08-04 PROCEDURE — 80053 COMPREHEN METABOLIC PANEL: CPT | Performed by: NURSE PRACTITIONER

## 2024-08-04 PROCEDURE — 85027 COMPLETE CBC AUTOMATED: CPT | Performed by: NURSE PRACTITIONER

## 2024-08-12 DIAGNOSIS — I69.391 DYSPHAGIA, POST-STROKE: Primary | ICD-10-CM

## 2024-08-13 ENCOUNTER — LAB REQUISITION (OUTPATIENT)
Dept: LAB | Facility: HOSPITAL | Age: 69
End: 2024-08-13
Payer: MEDICARE

## 2024-08-13 DIAGNOSIS — I61.3 NONTRAUMATIC INTRACEREBRAL HEMORRHAGE IN BRAIN STEM: ICD-10-CM

## 2024-08-13 LAB
ALBUMIN SERPL-MCNC: 3.2 G/DL (ref 3.4–4.8)
ALP SERPL-CCNC: 85 UNIT/L (ref 40–150)
ALT SERPL-CCNC: 9 UNIT/L (ref 0–55)
AST SERPL-CCNC: 10 UNIT/L (ref 5–34)
BASOPHILS # BLD AUTO: 0.09 X10(3)/MCL
BASOPHILS NFR BLD AUTO: 1.2 %
BILIRUB DIRECT SERPL-MCNC: 0.2 MG/DL (ref 0–?)
BILIRUB SERPL-MCNC: 0.4 MG/DL
BILIRUBIN DIRECT+TOT PNL SERPL-MCNC: 0.2 MG/DL (ref 0–0.8)
EOSINOPHIL # BLD AUTO: 0.41 X10(3)/MCL (ref 0–0.9)
EOSINOPHIL NFR BLD AUTO: 5.3 %
ERYTHROCYTE [DISTWIDTH] IN BLOOD BY AUTOMATED COUNT: 14.8 % (ref 11.5–17)
HCT VFR BLD AUTO: 30.7 % (ref 42–52)
HGB BLD-MCNC: 9.7 G/DL (ref 14–18)
IMM GRANULOCYTES # BLD AUTO: 0.02 X10(3)/MCL (ref 0–0.04)
IMM GRANULOCYTES NFR BLD AUTO: 0.3 %
LYMPHOCYTES # BLD AUTO: 2.12 X10(3)/MCL (ref 0.6–4.6)
LYMPHOCYTES NFR BLD AUTO: 27.4 %
MCH RBC QN AUTO: 28.5 PG (ref 27–31)
MCHC RBC AUTO-ENTMCNC: 31.6 G/DL (ref 33–36)
MCV RBC AUTO: 90.3 FL (ref 80–94)
MONOCYTES # BLD AUTO: 0.66 X10(3)/MCL (ref 0.1–1.3)
MONOCYTES NFR BLD AUTO: 8.5 %
NEUTROPHILS # BLD AUTO: 4.43 X10(3)/MCL (ref 2.1–9.2)
NEUTROPHILS NFR BLD AUTO: 57.3 %
NRBC BLD AUTO-RTO: 0 %
PLATELET # BLD AUTO: 296 X10(3)/MCL (ref 130–400)
PMV BLD AUTO: 9.5 FL (ref 7.4–10.4)
PREALB SERPL-MCNC: 18.6 MG/DL (ref 16–42)
PROT SERPL-MCNC: 6.6 GM/DL (ref 5.8–7.6)
RBC # BLD AUTO: 3.4 X10(6)/MCL (ref 4.7–6.1)
WBC # BLD AUTO: 7.73 X10(3)/MCL (ref 4.5–11.5)

## 2024-08-13 PROCEDURE — 84134 ASSAY OF PREALBUMIN: CPT | Performed by: INTERNAL MEDICINE

## 2024-08-13 PROCEDURE — 85025 COMPLETE CBC W/AUTO DIFF WBC: CPT | Performed by: INTERNAL MEDICINE

## 2024-08-13 PROCEDURE — 80076 HEPATIC FUNCTION PANEL: CPT | Performed by: INTERNAL MEDICINE

## 2024-08-14 PROCEDURE — 82570 ASSAY OF URINE CREATININE: CPT | Performed by: INTERNAL MEDICINE

## 2024-08-14 PROCEDURE — 82043 UR ALBUMIN QUANTITATIVE: CPT | Performed by: INTERNAL MEDICINE

## 2024-08-15 ENCOUNTER — LAB REQUISITION (OUTPATIENT)
Dept: LAB | Facility: HOSPITAL | Age: 69
End: 2024-08-15
Payer: MEDICARE

## 2024-08-15 ENCOUNTER — CLINICAL SUPPORT (OUTPATIENT)
Dept: REHABILITATION | Facility: HOSPITAL | Age: 69
End: 2024-08-15
Attending: INTERNAL MEDICINE
Payer: MEDICARE

## 2024-08-15 ENCOUNTER — HOSPITAL ENCOUNTER (OUTPATIENT)
Dept: RADIOLOGY | Facility: HOSPITAL | Age: 69
Discharge: HOME | End: 2024-08-15
Attending: INTERNAL MEDICINE
Payer: MEDICARE

## 2024-08-15 DIAGNOSIS — I69.391 DYSPHAGIA, POST-STROKE: ICD-10-CM

## 2024-08-15 DIAGNOSIS — D64.9 ANEMIA, UNSPECIFIED: ICD-10-CM

## 2024-08-15 DIAGNOSIS — I61.3 NONTRAUMATIC INTRACEREBRAL HEMORRHAGE IN BRAIN STEM: ICD-10-CM

## 2024-08-15 LAB
CREAT UR-MCNC: 50.2 MG/DL (ref 63–166)
HEMOCCULT SP1 STL QL: POSITIVE
MICROALBUMIN UR-MCNC: 20.3 UG/ML
MICROALBUMIN/CREAT RATIO PNL UR: 40.4 MG/GM CR (ref 0–30)

## 2024-08-15 PROCEDURE — 82270 OCCULT BLOOD FECES: CPT | Performed by: INTERNAL MEDICINE

## 2024-08-15 PROCEDURE — 92611 MOTION FLUOROSCOPY/SWALLOW: CPT

## 2024-08-15 PROCEDURE — 74230 X-RAY XM SWLNG FUNCJ C+: CPT | Mod: TC

## 2024-08-15 NOTE — PROGRESS NOTES
"  [facility]  Speech Language Pathology Department  Outpatient Modified Barium Swallow Study    Patient Name:  Seymour Velazquez   MRN:  [id number]    Recommendations     General recommendations:   Skilled SLP services at facility for advancement of solid textures as appropriate  Diet texture/consistency recommendations: Puree solids (IDDSI 4) and thin liquids (IDDSI 0)  Medications: crushed in puree  Swallow strategies/precautions: small bites/sips, upright for PO intake, and assist with feeding as needed    History/Reason for Referral     Seymour Velaqzuez is a/n 68 y.o. male referred by Dr. Wayne for a Modified Barium Swallow Study.  Patient with hx of L pontine infarct in September 2022.  Patient seen at [facility] where he transferred to inpatient rehab.  Initially patient required soft and bite sized solids with mildly thick liquids.  MBS completed with recommendations of minced and moist with thin liquids (laryngeal penetration of both thin and mildly thick liquids occurred).  Patient with poor PO intake ultimately receiving PEG in October 2022.  SLP evaluation in February 2024 with recommendations of pleasure feeds of pureed solids with thin liquids.    Current Method of Nutrition:  No records available from current nursing home, patient reports NPO status and PEG dependent.    Patient complaint: "Can I go home?"    Subjective     Patient awake, alert, and calm.  Spiritual/Cultural/Confucianist Beliefs/Practices that affect care: no    Pain/Comfort: no    Respiratory Status:  room air    Restraints/positioning devices: none--straps in place for safety on paramedic stretcher    Fluoroscopic Findings     Oral Musculature  Dentition: missing teeth  Secretion Management: oral holding of secretions (thick secretions removed from oral cavity prior to exam)  Mucosal Quality: good  Facial Movement: WFL  Buccal Strength & Mobility: WFL  Mandibular Strength & Mobility: WFL  Oral " Labial Strength & Mobility: WFL  Lingual Strength & Mobility: WFL  Vocal Quality: adequate    Setup  Upright on paramedic stretcher  Adequate head control    Visualization  Lateral view    Oral Phase:   Tongue pumping  Disorganized lingual movement  Loss of bolus control with prespill to the pyriform sinuses    Pharyngeal Phase:   Swallow delay with spill to the pyriform sinuses  Consistency Laryngeal Penetration Aspiration Residue   Mildly thick liquid by spoon None None None   Puree None None None   Mildly thick liquid by straw None None None   Thin liquid by straw None None None       Cervical Esophageal Phase:   UES appeared to accommodate all bolus types without stasis or retrograde movement visualized    Additional Comments:  Chewable solids were not tested due to due to lingual pumping and bolus holding of pureed textures    Assessment     Pt exhibited mild oropharyngeal dysphagia characterized by the findings noted above.  No laryngeal penetration/aspiration was visualized during this study.  Both swallow safety and swallow efficiency are preserved,     Patient appears to be at low risk for aspiration related pneumonia when considering  prior medical history .    Patient Education     Patient provided with verbal education regarding results.  Understanding was verbalized, however additional teaching warranted.  MBS results provided to paramedics for patient to return to facility.    Time Tracking     SLP Treatment Date:  08/15/24  Speech Start Time:  1230  Speech Stop Time:  1250     Speech Total Time (min):  20    Billable minutes:   Motion Fluoroscopic Evaluation, Video Recording, 20 minutes     08/15/2024

## 2024-09-10 ENCOUNTER — HOSPITAL ENCOUNTER (EMERGENCY)
Facility: HOSPITAL | Age: 69
Discharge: HOME | End: 2024-09-10
Attending: EMERGENCY MEDICINE
Payer: MEDICARE

## 2024-09-10 ENCOUNTER — LAB REQUISITION (OUTPATIENT)
Dept: LAB | Facility: HOSPITAL | Age: 69
End: 2024-09-10
Payer: MEDICARE

## 2024-09-10 VITALS
HEART RATE: 89 BPM | SYSTOLIC BLOOD PRESSURE: 149 MMHG | WEIGHT: 250 LBS | BODY MASS INDEX: 32.08 KG/M2 | TEMPERATURE: 98 F | OXYGEN SATURATION: 96 % | RESPIRATION RATE: 19 BRPM | HEIGHT: 74 IN | DIASTOLIC BLOOD PRESSURE: 77 MMHG

## 2024-09-10 DIAGNOSIS — I69.328 OTHER SPEECH AND LANGUAGE DEFICITS FOLLOWING CEREBRAL INFARCTION: ICD-10-CM

## 2024-09-10 DIAGNOSIS — K94.23 PEG TUBE MALFUNCTION: Primary | ICD-10-CM

## 2024-09-10 LAB
25(OH)D3+25(OH)D2 SERPL-MCNC: 33 NG/ML (ref 30–80)
ALBUMIN SERPL-MCNC: 3 G/DL (ref 3.4–4.8)
ALBUMIN/GLOB SERPL: 0.9 RATIO (ref 1.1–2)
ALP SERPL-CCNC: 64 UNIT/L (ref 40–150)
ALT SERPL-CCNC: 6 UNIT/L (ref 0–55)
ANION GAP SERPL CALC-SCNC: 10 MEQ/L
AST SERPL-CCNC: 8 UNIT/L (ref 5–34)
BASOPHILS # BLD AUTO: 0.1 X10(3)/MCL
BASOPHILS NFR BLD AUTO: 1 %
BILIRUB SERPL-MCNC: 0.4 MG/DL
BUN SERPL-MCNC: 14.1 MG/DL (ref 8.4–25.7)
CALCIUM SERPL-MCNC: 9.1 MG/DL (ref 8.8–10)
CHLORIDE SERPL-SCNC: 105 MMOL/L (ref 98–107)
CHOLEST SERPL-MCNC: 121 MG/DL
CHOLEST/HDLC SERPL: 4 {RATIO} (ref 0–5)
CO2 SERPL-SCNC: 26 MMOL/L (ref 23–31)
CREAT SERPL-MCNC: 0.81 MG/DL (ref 0.73–1.18)
CREAT/UREA NIT SERPL: 17
EOSINOPHIL # BLD AUTO: 0.27 X10(3)/MCL (ref 0–0.9)
EOSINOPHIL NFR BLD AUTO: 2.7 %
ERYTHROCYTE [DISTWIDTH] IN BLOOD BY AUTOMATED COUNT: 14.7 % (ref 11.5–17)
EST. AVERAGE GLUCOSE BLD GHB EST-MCNC: 91.1 MG/DL
FERRITIN SERPL-MCNC: 294.63 NG/ML (ref 21.81–274.66)
GFR SERPLBLD CREATININE-BSD FMLA CKD-EPI: >60 ML/MIN/1.73/M2
GLOBULIN SER-MCNC: 3.5 GM/DL (ref 2.4–3.5)
GLUCOSE SERPL-MCNC: 96 MG/DL (ref 82–115)
HBA1C MFR BLD: 4.8 %
HCT VFR BLD AUTO: 34 % (ref 42–52)
HDLC SERPL-MCNC: 32 MG/DL (ref 35–60)
HGB BLD-MCNC: 11 G/DL (ref 14–18)
IMM GRANULOCYTES # BLD AUTO: 0.04 X10(3)/MCL (ref 0–0.04)
IMM GRANULOCYTES NFR BLD AUTO: 0.4 %
IRON SATN MFR SERPL: 14 % (ref 20–50)
IRON SERPL-MCNC: 22 UG/DL (ref 65–175)
LDLC SERPL CALC-MCNC: 71 MG/DL (ref 50–140)
LYMPHOCYTES # BLD AUTO: 1.53 X10(3)/MCL (ref 0.6–4.6)
LYMPHOCYTES NFR BLD AUTO: 15 %
MCH RBC QN AUTO: 28.9 PG (ref 27–31)
MCHC RBC AUTO-ENTMCNC: 32.4 G/DL (ref 33–36)
MCV RBC AUTO: 89.2 FL (ref 80–94)
MONOCYTES # BLD AUTO: 0.64 X10(3)/MCL (ref 0.1–1.3)
MONOCYTES NFR BLD AUTO: 6.3 %
NEUTROPHILS # BLD AUTO: 7.6 X10(3)/MCL (ref 2.1–9.2)
NEUTROPHILS NFR BLD AUTO: 74.6 %
NRBC BLD AUTO-RTO: 0 %
PLATELET # BLD AUTO: 294 X10(3)/MCL (ref 130–400)
PMV BLD AUTO: 9.3 FL (ref 7.4–10.4)
POTASSIUM SERPL-SCNC: 4.2 MMOL/L (ref 3.5–5.1)
PREALB SERPL-MCNC: 18.4 MG/DL (ref 16–42)
PROT SERPL-MCNC: 6.5 GM/DL (ref 5.8–7.6)
RBC # BLD AUTO: 3.81 X10(6)/MCL (ref 4.7–6.1)
SODIUM SERPL-SCNC: 141 MMOL/L (ref 136–145)
TIBC SERPL-MCNC: 132 UG/DL (ref 69–240)
TIBC SERPL-MCNC: 154 UG/DL (ref 250–450)
TRANSFERRIN SERPL-MCNC: 148 MG/DL (ref 163–344)
TRIGL SERPL-MCNC: 90 MG/DL (ref 34–140)
TSH SERPL-ACNC: 0.97 UIU/ML (ref 0.35–4.94)
VLDLC SERPL CALC-MCNC: 18 MG/DL
WBC # BLD AUTO: 10.18 X10(3)/MCL (ref 4.5–11.5)

## 2024-09-10 PROCEDURE — 84134 ASSAY OF PREALBUMIN: CPT | Performed by: NURSE PRACTITIONER

## 2024-09-10 PROCEDURE — 80061 LIPID PANEL: CPT | Performed by: NURSE PRACTITIONER

## 2024-09-10 PROCEDURE — 83036 HEMOGLOBIN GLYCOSYLATED A1C: CPT | Performed by: NURSE PRACTITIONER

## 2024-09-10 PROCEDURE — 84443 ASSAY THYROID STIM HORMONE: CPT | Performed by: NURSE PRACTITIONER

## 2024-09-10 PROCEDURE — 25500020 PHARM REV CODE 255

## 2024-09-10 PROCEDURE — 99283 EMERGENCY DEPT VISIT LOW MDM: CPT | Mod: 25

## 2024-09-10 PROCEDURE — 83540 ASSAY OF IRON: CPT | Performed by: NURSE PRACTITIONER

## 2024-09-10 PROCEDURE — 82728 ASSAY OF FERRITIN: CPT | Performed by: NURSE PRACTITIONER

## 2024-09-10 PROCEDURE — 80053 COMPREHEN METABOLIC PANEL: CPT | Performed by: NURSE PRACTITIONER

## 2024-09-10 PROCEDURE — 82306 VITAMIN D 25 HYDROXY: CPT | Performed by: NURSE PRACTITIONER

## 2024-09-10 PROCEDURE — 85025 COMPLETE CBC W/AUTO DIFF WBC: CPT | Performed by: NURSE PRACTITIONER

## 2024-09-10 PROCEDURE — 43762 RPLC GTUBE NO REVJ TRC: CPT

## 2024-09-10 RX ORDER — DIATRIZOATE MEGLUMINE AND DIATRIZOATE SODIUM 660; 100 MG/ML; MG/ML
30 SOLUTION ORAL; RECTAL
Status: COMPLETED | OUTPATIENT
Start: 2024-09-10 | End: 2024-09-10

## 2024-09-10 RX ADMIN — DIATRIZOATE MEGLUMINE AND DIATRIZOATE SODIUM 30 ML: 660; 100 LIQUID ORAL; RECTAL at 02:09

## 2024-09-10 NOTE — FIRST PROVIDER EVALUATION
"Medical screening examination initiated.  I have conducted a focused provider triage encounter, findings are as follows:    Brief history of present illness:  HPI     peg problem     Additional comments: Sent from [facility] for "split" in peg tube. Peg still   in place on arrival.           Last edited by Adina Dubose RN on 9/10/2024 12:38 PM.          Vitals:    09/10/24 1238   BP: 136/71   Pulse: 91   Resp: 18   Temp: 97.9 °F (36.6 °C)   TempSrc: Oral   SpO2: 97%   Weight: 113.4 kg (250 lb)   Height: 6' 2" (1.88 m)       Pertinent physical exam:  awake alert male sitting on EMS stretcher    Brief workup plan:  PEG replacement, imaging confirmation for final disposition    Preliminary workup initiated; this workup will be continued and followed by the physician or advanced practice provider that is assigned to the patient when roomed.  "

## 2024-09-10 NOTE — ED PROVIDER NOTES
"Encounter Date: 9/10/2024       History     Chief Complaint   Patient presents with    peg problem     Sent from [facility] for "split" in peg tube. Peg still in place on arrival.      68 y.o. White male with a history of CVA with deficits, hypertension, and DM presents to Emergency Department with a chief complaint of PEG tube malfunction. Sent from NH for replacement of PEG tube; states he has a crack in the tubing. Per NH staff patient had recent hospitalization at [facility] and a new PEG tube site was placed. Associated symptoms include none. No other reported symptoms at this time      The history is provided by the nursing home and the EMS personnel. No  was used.   Illness   The current episode started today. The problem has been unchanged. Pertinent negatives include no fever, no photophobia, no abdominal pain, no nausea, no vomiting, no stridor, no cough, no shortness of breath and no wheezing. Services received include medications given and tests performed. Recently, medical care has been given at another facility.     Review of patient's allergies indicates:   Allergen Reactions    Pcn [penicillins]      Tolerated cefazolin 10/2022. -- Patient reports being told he had a PCN allergy as a child. Cannot recall specific reaction.     Past Medical History:   Diagnosis Date    CVA (cerebral vascular accident)     DM2 (diabetes mellitus, type 2)     Dysphagia     HLD (hyperlipidemia)     HTN (hypertension)      Past Surgical History:   Procedure Laterality Date    ABDOMINAL SURGERY      ERCP N/A 2/6/2024    Procedure: ERCP (ENDOSCOPIC RETROGRADE CHOLANGIOPANCREATOGRAPHY);  Surgeon: Jeff Setarns MD;  Location: [address];  Service: Endoscopy;  Laterality: N/A;    INSERTION, PEG TUBE       No family history on file.  Social History     Tobacco Use    Smoking status: Never    Smokeless tobacco: Never   Substance Use Topics    Alcohol use: Not Currently     Review of Systems "   Constitutional:  Negative for chills, fatigue and fever.   Eyes:  Negative for photophobia and visual disturbance.   Respiratory:  Negative for cough, shortness of breath, wheezing and stridor.    Cardiovascular:  Negative for chest pain, palpitations and leg swelling.   Gastrointestinal:  Negative for abdominal pain, anal bleeding, nausea and vomiting.        PEG tube problem.    All other systems reviewed and are negative.      Physical Exam     Initial Vitals [09/10/24 1238]   BP Pulse Resp Temp SpO2   136/71 91 18 97.9 °F (36.6 °C) 97 %      MAP       --         Physical Exam    Nursing note and vitals reviewed.  Constitutional: He is not diaphoretic. He is cooperative.  Non-toxic appearance. No distress.   HENT:   Head: Normocephalic and atraumatic.   Right Ear: External ear normal.   Left Ear: External ear normal.   Nose: Nose normal.   Eyes: Conjunctivae and EOM are normal. Pupils are equal, round, and reactive to light.   Neck: Neck supple.   Normal range of motion.  Cardiovascular:  Normal rate, regular rhythm, normal heart sounds and intact distal pulses.           Pulmonary/Chest: Effort normal and breath sounds normal. No tachypnea and no bradypnea. No respiratory distress. He has no decreased breath sounds. He has no wheezes. He has no rhonchi. He has no rales. He exhibits no tenderness.   Abdominal: Abdomen is soft. Bowel sounds are normal. He exhibits no distension. There is no abdominal tenderness.   18 FR PEG tube noted to abdomen. Small crack noted to top of tubing. Unable to flush. No signs of infection on exam.  There is no rebound.   Genitourinary:    Genitourinary Comments: Rosario catheter in place draining yellow urine.      Musculoskeletal:      Cervical back: Normal range of motion and neck supple.     Neurological: He is alert and oriented to person, place, and time. He has normal strength. No sensory deficit. GCS score is 15. GCS eye subscore is 4. GCS verbal subscore is 5. GCS motor  subscore is 6.   Skin: Skin is warm and dry. Capillary refill takes less than 2 seconds.   Psychiatric: He has a normal mood and affect. Thought content normal.         ED Course   Feeding Tube    Date/Time: 9/10/2024 2:30 PM  Location procedure was performed: [facility] EMERGENCY DEPARTMENT    Performed by: Amanda Strauss NP  Authorized by: Amanda Strauss NP  Consent: Verbal consent obtained.  Risks and benefits: risks, benefits and alternatives were discussed  Consent given by: patient  Patient understanding: patient states understanding of the procedure being performed  Patient consent: the patient's understanding of the procedure matches consent given  Imaging studies: imaging studies available  Patient identity confirmed: verbally with patient  Indications: tube blocked and tube cracked  Tube type: gastrostomy  Patient position: supine  Procedure type: replacement  Tube size: 18 Fr  Bulb inflation volume: 10 (ml)  Bulb inflation fluid: normal saline  Placement/position confirmation: x-ray and auscultation  Tube placement difficulty: none  Complications: No  Patient tolerance: patient tolerated the procedure well with no immediate complications        Labs Reviewed - No data to display       Imaging Results              XR Gastric tube check, non-radiologist performed (Final result)  Result time 09/10/24 15:24:56      Final result by Morgan Velazquez MD (09/10/24 15:24:56)                   Impression:      Contrast opacifies the upper stomach.      Electronically signed by: Morgan Velazquez  Date:    09/10/2024  Time:    15:24               Narrative:    EXAMINATION:  XR GASTRIC TUBE CHECK, NON-RADIOLOGIST PERFORMED    CLINICAL HISTORY:  PEG tube placement confirmation.;    TECHNIQUE:  Frontal radiograph of the abdomen after administration of enteric contrast through the patient's percutaneous feeding tube    COMPARISON:  Radiography 02/16/2024    FINDINGS:  Some oral contrast within the colon likely from an  earlier exam.  Contrast injected through the gastrostomy tube opacifies the upper stomach.  No definitive extraluminal contrast.  No dilated bowel identified.                                       Medications   diatrizoate meglumineand-diatrizoate sodium (GASTROVIEW) solution 30 mL (30 mLs Per G Tube Given 9/10/24 1999)     Medical Decision Making  Patient awake, alert, has non-labored breathing, and follows commands appropriately. Arrived to ED due to PEG tube malfunction. NH staff reports crack in tubing. Afebrile. NAD Noted.       Judging by the patient's chief complaint and pertinent history, the patient has the following possible differential diagnoses, including but not limited to the following: PEG tube Malfunction, Gastrostomy Tube Replacement, Clogged G-Tube     Some of these are deemed to be lower likelihood and some more likely based on my physical exam and history combined with possible lab work and/or imaging studies. Please see the pertinent studies, and refer to the HPI. Some of these diagnoses will take further evaluation to fully rule out, perhaps as an outpatient and the patient was encouraged to follow up when discharged for more comprehensive evaluation.       Amount and/or Complexity of Data Reviewed  Radiology: ordered. Decision-making details documented in ED Course.     Details: Informed patient of results.   Discussion of management or test interpretation with external provider(s): PEG tube replaced in ER after speaking with GI services. Imaging revealed tubing in stomach. Patient has no additional complaints at disposition, is non-toxic in appearance, and VSS. Will discharge home back to NH. Discussed plan of care and interventions with patient. Agreed to and aware of plan of care. Comfortable being discharged to NH. Patient discharged home. Patient denies new or additional complaints; no further tests indicated at this time. Verbalized understanding of instructions. No emergent or apparent  distress noted prior to discharge. To follow up with PCP in 1 week as needed. Strict ER return precautions given.       Risk  OTC drugs.               ED Course as of 09/10/24 1546   Tue Sep 10, 2024   1330 Nursing staff attempted to flush tubing without success.  [JA]   1341 Unclear if patient had a recent change of PEG tube site with recent admission to [facility] or if his PEG tube was exchanged. Discussed patient with Dr. Ibarra, will consult GI services for further management and recommendations. [JA]   1416 Discussed patient with EREN Wiley, with GI. Reports patient's site is not new and PEG tube can be replaced on today. EGD performed at [facility] during last hospitalization. [JA]   1528 XR Gastric tube check, non-radiologist performed  Contrast opacifies the upper stomach. [JA]   1533 Reviewed imaging with Dr. Ibarra. Tube appears in the correct place. Patient to be discharged back to NH. [JA]      ED Course User Index  [JA] Amanda Strauss, NP                           Clinical Impression:  Final diagnoses:  [K94.23] PEG tube malfunction (Primary)          ED Disposition Condition    Discharge Stable          ED Prescriptions    None       Follow-up Information       Follow up With Specialties Details Why Contact Info    PCP  Call in 1 week As needed, If symptoms worsen     [facility] - Emergency Dept Emergency Medicine Go to  If symptoms worsen, As needed [address] [facility] [id number]  [phone]             Amanda Strauss, NP  09/10/24 2499

## 2024-09-24 ENCOUNTER — LAB REQUISITION (OUTPATIENT)
Dept: LAB | Facility: HOSPITAL | Age: 69
End: 2024-09-24
Payer: MEDICARE

## 2024-09-24 DIAGNOSIS — I61.3 NONTRAUMATIC INTRACEREBRAL HEMORRHAGE IN BRAIN STEM: ICD-10-CM

## 2024-09-24 DIAGNOSIS — R11.12 PROJECTILE VOMITING: ICD-10-CM

## 2024-09-24 LAB
ANION GAP SERPL CALC-SCNC: 11 MEQ/L
BUN SERPL-MCNC: 38.4 MG/DL (ref 8.4–25.7)
CALCIUM SERPL-MCNC: 9.1 MG/DL (ref 8.8–10)
CHLORIDE SERPL-SCNC: 106 MMOL/L (ref 98–107)
CO2 SERPL-SCNC: 23 MMOL/L (ref 23–31)
CREAT SERPL-MCNC: 0.95 MG/DL (ref 0.73–1.18)
CREAT/UREA NIT SERPL: 40
GFR SERPLBLD CREATININE-BSD FMLA CKD-EPI: >60 ML/MIN/1.73/M2
GLUCOSE SERPL-MCNC: 86 MG/DL (ref 82–115)
POTASSIUM SERPL-SCNC: 4.8 MMOL/L (ref 3.5–5.1)
PREALB SERPL-MCNC: 18.4 MG/DL (ref 16–42)
SODIUM SERPL-SCNC: 140 MMOL/L (ref 136–145)

## 2024-09-24 PROCEDURE — 84134 ASSAY OF PREALBUMIN: CPT | Performed by: NURSE PRACTITIONER

## 2024-09-24 PROCEDURE — 80048 BASIC METABOLIC PNL TOTAL CA: CPT | Performed by: NURSE PRACTITIONER

## 2024-10-21 DIAGNOSIS — R55 SYNCOPE AND COLLAPSE: ICD-10-CM

## 2024-10-21 DIAGNOSIS — R13.10 DYSPHAGIA: ICD-10-CM

## 2024-10-21 DIAGNOSIS — I10 HYPERTENSION: ICD-10-CM

## 2024-10-21 DIAGNOSIS — I63.9 CVA (CEREBRAL VASCULAR ACCIDENT): Primary | ICD-10-CM

## 2024-10-21 DIAGNOSIS — I50.22 CHRONIC SYSTOLIC CONGESTIVE HEART FAILURE: ICD-10-CM

## 2024-10-22 ENCOUNTER — LAB REQUISITION (OUTPATIENT)
Dept: LAB | Facility: HOSPITAL | Age: 69
End: 2024-10-22
Payer: MEDICARE

## 2024-10-22 DIAGNOSIS — Z93.1 GASTROSTOMY STATUS: ICD-10-CM

## 2024-10-22 LAB
ANION GAP SERPL CALC-SCNC: 9 MEQ/L
BUN SERPL-MCNC: 22.4 MG/DL (ref 8.4–25.7)
CALCIUM SERPL-MCNC: 9.2 MG/DL (ref 8.8–10)
CHLORIDE SERPL-SCNC: 104 MMOL/L (ref 98–107)
CO2 SERPL-SCNC: 28 MMOL/L (ref 23–31)
CREAT SERPL-MCNC: 0.85 MG/DL (ref 0.72–1.25)
CREAT/UREA NIT SERPL: 26
GFR SERPLBLD CREATININE-BSD FMLA CKD-EPI: >60 ML/MIN/1.73/M2
GLUCOSE SERPL-MCNC: 91 MG/DL (ref 82–115)
POTASSIUM SERPL-SCNC: 4 MMOL/L (ref 3.5–5.1)
PREALB SERPL-MCNC: 19 MG/DL (ref 16–42)
SODIUM SERPL-SCNC: 141 MMOL/L (ref 136–145)

## 2024-10-22 PROCEDURE — 80048 BASIC METABOLIC PNL TOTAL CA: CPT | Performed by: NURSE PRACTITIONER

## 2024-10-22 PROCEDURE — 84134 ASSAY OF PREALBUMIN: CPT | Performed by: NURSE PRACTITIONER

## 2024-11-07 ENCOUNTER — LAB REQUISITION (OUTPATIENT)
Dept: LAB | Facility: HOSPITAL | Age: 69
End: 2024-11-07
Payer: MEDICARE

## 2024-11-07 DIAGNOSIS — R11.12 PROJECTILE VOMITING: ICD-10-CM

## 2024-11-07 DIAGNOSIS — D64.9 ANEMIA, UNSPECIFIED: ICD-10-CM

## 2024-11-07 DIAGNOSIS — N30.00 ACUTE CYSTITIS WITHOUT HEMATURIA: ICD-10-CM

## 2024-11-07 LAB
25(OH)D3+25(OH)D2 SERPL-MCNC: 29 NG/ML (ref 30–80)
ALBUMIN SERPL-MCNC: 2.9 G/DL (ref 3.4–4.8)
ALBUMIN/GLOB SERPL: 1 RATIO (ref 1.1–2)
ALP SERPL-CCNC: 66 UNIT/L (ref 40–150)
ALT SERPL-CCNC: 5 UNIT/L (ref 0–55)
ANION GAP SERPL CALC-SCNC: 8 MEQ/L
AST SERPL-CCNC: 9 UNIT/L (ref 5–34)
BASOPHILS # BLD AUTO: 0.07 X10(3)/MCL
BASOPHILS NFR BLD AUTO: 1 %
BILIRUB SERPL-MCNC: 0.4 MG/DL
BUN SERPL-MCNC: 11.6 MG/DL (ref 8.4–25.7)
CALCIUM SERPL-MCNC: 8.2 MG/DL (ref 8.8–10)
CHLORIDE SERPL-SCNC: 108 MMOL/L (ref 98–107)
CHOLEST SERPL-MCNC: 90 MG/DL
CHOLEST/HDLC SERPL: 3 {RATIO} (ref 0–5)
CO2 SERPL-SCNC: 26 MMOL/L (ref 23–31)
CREAT SERPL-MCNC: 0.7 MG/DL (ref 0.72–1.25)
CREAT/UREA NIT SERPL: 17
EOSINOPHIL # BLD AUTO: 0.43 X10(3)/MCL (ref 0–0.9)
EOSINOPHIL NFR BLD AUTO: 6.4 %
ERYTHROCYTE [DISTWIDTH] IN BLOOD BY AUTOMATED COUNT: 14.7 % (ref 11.5–17)
FERRITIN SERPL-MCNC: 390.38 NG/ML (ref 21.81–274.66)
GFR SERPLBLD CREATININE-BSD FMLA CKD-EPI: >60 ML/MIN/1.73/M2
GLOBULIN SER-MCNC: 2.9 GM/DL (ref 2.4–3.5)
GLUCOSE SERPL-MCNC: 89 MG/DL (ref 82–115)
HCT VFR BLD AUTO: 31.2 % (ref 42–52)
HDLC SERPL-MCNC: 28 MG/DL (ref 35–60)
HGB BLD-MCNC: 10 G/DL (ref 14–18)
IMM GRANULOCYTES # BLD AUTO: 0.02 X10(3)/MCL (ref 0–0.04)
IMM GRANULOCYTES NFR BLD AUTO: 0.3 %
IRON SATN MFR SERPL: 17 % (ref 20–50)
IRON SERPL-MCNC: 25 UG/DL (ref 65–175)
LDLC SERPL CALC-MCNC: 47 MG/DL (ref 50–140)
LYMPHOCYTES # BLD AUTO: 1.82 X10(3)/MCL (ref 0.6–4.6)
LYMPHOCYTES NFR BLD AUTO: 27.2 %
MCH RBC QN AUTO: 29.2 PG (ref 27–31)
MCHC RBC AUTO-ENTMCNC: 32.1 G/DL (ref 33–36)
MCV RBC AUTO: 91.2 FL (ref 80–94)
MONOCYTES # BLD AUTO: 0.5 X10(3)/MCL (ref 0.1–1.3)
MONOCYTES NFR BLD AUTO: 7.5 %
NEUTROPHILS # BLD AUTO: 3.85 X10(3)/MCL (ref 2.1–9.2)
NEUTROPHILS NFR BLD AUTO: 57.6 %
NRBC BLD AUTO-RTO: 0 %
PLATELET # BLD AUTO: 241 X10(3)/MCL (ref 130–400)
PMV BLD AUTO: 9.8 FL (ref 7.4–10.4)
POTASSIUM SERPL-SCNC: 3.5 MMOL/L (ref 3.5–5.1)
PREALB SERPL-MCNC: 14.7 MG/DL (ref 16–42)
PROT SERPL-MCNC: 5.8 GM/DL (ref 5.8–7.6)
RBC # BLD AUTO: 3.42 X10(6)/MCL (ref 4.7–6.1)
SODIUM SERPL-SCNC: 142 MMOL/L (ref 136–145)
TIBC SERPL-MCNC: 119 UG/DL (ref 69–240)
TIBC SERPL-MCNC: 144 UG/DL (ref 250–450)
TRANSFERRIN SERPL-MCNC: 132 MG/DL (ref 163–344)
TRIGL SERPL-MCNC: 77 MG/DL (ref 34–140)
TSH SERPL-ACNC: 2.49 UIU/ML (ref 0.35–4.94)
VLDLC SERPL CALC-MCNC: 15 MG/DL
WBC # BLD AUTO: 6.69 X10(3)/MCL (ref 4.5–11.5)

## 2024-11-07 PROCEDURE — 85025 COMPLETE CBC W/AUTO DIFF WBC: CPT | Performed by: NURSE PRACTITIONER

## 2024-11-07 PROCEDURE — 80061 LIPID PANEL: CPT | Performed by: NURSE PRACTITIONER

## 2024-11-07 PROCEDURE — 84134 ASSAY OF PREALBUMIN: CPT | Performed by: NURSE PRACTITIONER

## 2024-11-07 PROCEDURE — 80053 COMPREHEN METABOLIC PANEL: CPT | Performed by: NURSE PRACTITIONER

## 2024-11-07 PROCEDURE — 82728 ASSAY OF FERRITIN: CPT | Performed by: NURSE PRACTITIONER

## 2024-11-07 PROCEDURE — 84443 ASSAY THYROID STIM HORMONE: CPT | Performed by: NURSE PRACTITIONER

## 2024-11-07 PROCEDURE — 82306 VITAMIN D 25 HYDROXY: CPT | Performed by: NURSE PRACTITIONER

## 2024-11-07 PROCEDURE — 83540 ASSAY OF IRON: CPT | Performed by: NURSE PRACTITIONER

## 2024-11-08 ENCOUNTER — HOSPITAL ENCOUNTER (OUTPATIENT)
Dept: CARDIOLOGY | Facility: HOSPITAL | Age: 69
Discharge: HOME | End: 2024-11-08
Attending: NURSE PRACTITIONER
Payer: MEDICARE

## 2024-11-08 ENCOUNTER — HOSPITAL ENCOUNTER (OUTPATIENT)
Dept: RADIOLOGY | Facility: HOSPITAL | Age: 69
Discharge: HOME | End: 2024-11-08
Attending: NURSE PRACTITIONER
Payer: MEDICARE

## 2024-11-08 DIAGNOSIS — I63.9 CEREBRAL VASCULAR ACCIDENT: ICD-10-CM

## 2024-11-08 DIAGNOSIS — I50.22 CHRONIC SYSTOLIC CONGESTIVE HEART FAILURE: ICD-10-CM

## 2024-11-08 DIAGNOSIS — R55 SYNCOPE AND COLLAPSE: ICD-10-CM

## 2024-11-08 DIAGNOSIS — I63.9 CVA (CEREBRAL VASCULAR ACCIDENT): ICD-10-CM

## 2024-11-08 DIAGNOSIS — R13.10 DIFFICULTY IN SWALLOWING: ICD-10-CM

## 2024-11-08 DIAGNOSIS — R13.10 DYSPHAGIA: ICD-10-CM

## 2024-11-08 DIAGNOSIS — I10 HYPERTENSION: ICD-10-CM

## 2024-11-08 DIAGNOSIS — I10 HTN (HYPERTENSION): ICD-10-CM

## 2024-11-08 DIAGNOSIS — I50.22 CHRONIC SYSTOLIC HEART FAILURE: ICD-10-CM

## 2024-11-08 LAB
LEFT CCA DIST DIAS: 20 CM/S
LEFT CCA DIST SYS: 103 CM/S
LEFT CCA PROX DIAS: 15 CM/S
LEFT CCA PROX SYS: 110 CM/S
LEFT ECA DIAS: 0 CM/S
LEFT ECA SYS: 116 CM/S
LEFT ICA DIST DIAS: 37 CM/S
LEFT ICA DIST SYS: 115 CM/S
LEFT ICA MID DIAS: 38 CM/S
LEFT ICA MID SYS: 137 CM/S
LEFT ICA PROX DIAS: 24 CM/S
LEFT ICA PROX SYS: 139 CM/S
OHS CV CAROTID RIGHT ICA EDV HIGHEST: 23
OHS CV CAROTID ULTRASOUND LEFT ICA/CCA RATIO: 1.35
OHS CV CAROTID ULTRASOUND RIGHT ICA/CCA RATIO: 1.13
OHS CV CPX 85 PERCENT MAX PREDICTED HEART RATE MALE: 128
OHS CV CPX MAX PREDICTED HEART RATE: 151
OHS CV CPX PATIENT IS FEMALE: 0
OHS CV CPX PATIENT IS MALE: 1
OHS CV INITIAL DOSE: 8.2 MCG/KG/MIN
OHS CV PEAK DOSE: 23.7 MCG/KG/MIN
OHS CV PV CAROTID LEFT HIGHEST CCA: 110
OHS CV PV CAROTID LEFT HIGHEST ICA: 139
OHS CV PV CAROTID RIGHT HIGHEST CCA: 111
OHS CV PV CAROTID RIGHT HIGHEST ICA: 125
OHS CV US CAROTID LEFT HIGHEST EDV: 38
RIGHT CCA DIST DIAS: 8 CM/S
RIGHT CCA DIST SYS: 111 CM/S
RIGHT CCA PROX DIAS: 7 CM/S
RIGHT CCA PROX SYS: 107 CM/S
RIGHT ECA DIAS: 0 CM/S
RIGHT ECA SYS: 130 CM/S
RIGHT ICA DIST DIAS: 20 CM/S
RIGHT ICA DIST SYS: 115 CM/S
RIGHT ICA MID DIAS: 23 CM/S
RIGHT ICA MID SYS: 125 CM/S
RIGHT ICA PROX DIAS: 21 CM/S
RIGHT ICA PROX SYS: 120 CM/S
RIGHT VERTEBRAL DIAS: 9 CM/S
RIGHT VERTEBRAL SYS: 36 CM/S

## 2024-11-08 PROCEDURE — A9500 TC99M SESTAMIBI: HCPCS | Performed by: NURSE PRACTITIONER

## 2024-11-08 PROCEDURE — 78452 HT MUSCLE IMAGE SPECT MULT: CPT

## 2024-11-08 PROCEDURE — 93880 EXTRACRANIAL BILAT STUDY: CPT

## 2024-11-08 PROCEDURE — 93017 CV STRESS TEST TRACING ONLY: CPT

## 2024-11-08 PROCEDURE — 63600175 PHARM REV CODE 636 W HCPCS: Performed by: NURSE PRACTITIONER

## 2024-11-08 RX ORDER — TETRAKIS(2-METHOXYISOBUTYLISOCYANIDE)COPPER(I) TETRAFLUOROBORATE 1 MG/ML
23.7 INJECTION, POWDER, LYOPHILIZED, FOR SOLUTION INTRAVENOUS
Status: COMPLETED | OUTPATIENT
Start: 2024-11-08 | End: 2024-11-08

## 2024-11-08 RX ORDER — TETRAKIS(2-METHOXYISOBUTYLISOCYANIDE)COPPER(I) TETRAFLUOROBORATE 1 MG/ML
8.2 INJECTION, POWDER, LYOPHILIZED, FOR SOLUTION INTRAVENOUS
Status: COMPLETED | OUTPATIENT
Start: 2024-11-08 | End: 2024-11-08

## 2024-11-08 RX ORDER — REGADENOSON 0.08 MG/ML
0.4 INJECTION, SOLUTION INTRAVENOUS
Status: COMPLETED | OUTPATIENT
Start: 2024-11-08 | End: 2024-11-08

## 2024-11-08 RX ADMIN — KIT FOR THE PREPARATION OF TECHNETIUM TC99M SESTAMIBI 23.7 MILLICURIE: 1 INJECTION, POWDER, LYOPHILIZED, FOR SOLUTION PARENTERAL at 10:11

## 2024-11-08 RX ADMIN — KIT FOR THE PREPARATION OF TECHNETIUM TC99M SESTAMIBI 8.2 MILLICURIE: 1 INJECTION, POWDER, LYOPHILIZED, FOR SOLUTION PARENTERAL at 10:11

## 2024-11-08 RX ADMIN — REGADENOSON 0.4 MG: 0.08 INJECTION, SOLUTION INTRAVENOUS at 10:11

## 2024-11-14 ENCOUNTER — LAB REQUISITION (OUTPATIENT)
Dept: LAB | Facility: HOSPITAL | Age: 69
End: 2024-11-14
Payer: MEDICARE

## 2024-11-14 DIAGNOSIS — D64.9 ANEMIA, UNSPECIFIED: ICD-10-CM

## 2024-11-14 DIAGNOSIS — E11.9 TYPE 2 DIABETES MELLITUS WITHOUT COMPLICATIONS: ICD-10-CM

## 2024-11-14 LAB
ANION GAP SERPL CALC-SCNC: 10 MEQ/L
BUN SERPL-MCNC: 20.5 MG/DL (ref 8.4–25.7)
CALCIUM SERPL-MCNC: 9 MG/DL (ref 8.8–10)
CHLORIDE SERPL-SCNC: 104 MMOL/L (ref 98–107)
CO2 SERPL-SCNC: 25 MMOL/L (ref 23–31)
CREAT SERPL-MCNC: 0.76 MG/DL (ref 0.72–1.25)
CREAT/UREA NIT SERPL: 27
GFR SERPLBLD CREATININE-BSD FMLA CKD-EPI: >60 ML/MIN/1.73/M2
GLUCOSE SERPL-MCNC: 92 MG/DL (ref 82–115)
POTASSIUM SERPL-SCNC: 4 MMOL/L (ref 3.5–5.1)
SODIUM SERPL-SCNC: 139 MMOL/L (ref 136–145)

## 2024-11-14 PROCEDURE — 80048 BASIC METABOLIC PNL TOTAL CA: CPT | Performed by: NURSE PRACTITIONER

## 2024-11-15 LAB
LEFT CCA DIST DIAS: 20 CM/S
LEFT CCA DIST SYS: 103 CM/S
LEFT CCA PROX DIAS: 15 CM/S
LEFT CCA PROX SYS: 110 CM/S
LEFT ECA DIAS: 0 CM/S
LEFT ECA SYS: 116 CM/S
LEFT ICA DIST DIAS: 37 CM/S
LEFT ICA DIST SYS: 115 CM/S
LEFT ICA MID DIAS: 38 CM/S
LEFT ICA MID SYS: 137 CM/S
LEFT ICA PROX DIAS: 24 CM/S
LEFT ICA PROX SYS: 139 CM/S
OHS CV CAROTID RIGHT ICA EDV HIGHEST: 23
OHS CV CAROTID ULTRASOUND LEFT ICA/CCA RATIO: 1.35
OHS CV CAROTID ULTRASOUND RIGHT ICA/CCA RATIO: 1.13
OHS CV PV CAROTID LEFT HIGHEST CCA: 110
OHS CV PV CAROTID LEFT HIGHEST ICA: 139
OHS CV PV CAROTID RIGHT HIGHEST CCA: 111
OHS CV PV CAROTID RIGHT HIGHEST ICA: 125
OHS CV US CAROTID LEFT HIGHEST EDV: 38
RIGHT CCA DIST DIAS: 8 CM/S
RIGHT CCA DIST SYS: 111 CM/S
RIGHT CCA PROX DIAS: 7 CM/S
RIGHT CCA PROX SYS: 107 CM/S
RIGHT ECA DIAS: 0 CM/S
RIGHT ECA SYS: 130 CM/S
RIGHT ICA DIST DIAS: 20 CM/S
RIGHT ICA DIST SYS: 115 CM/S
RIGHT ICA MID DIAS: 23 CM/S
RIGHT ICA MID SYS: 125 CM/S
RIGHT ICA PROX DIAS: 21 CM/S
RIGHT ICA PROX SYS: 120 CM/S
RIGHT VERTEBRAL DIAS: 9 CM/S
RIGHT VERTEBRAL SYS: 36 CM/S

## 2024-11-19 ENCOUNTER — LAB REQUISITION (OUTPATIENT)
Dept: LAB | Facility: HOSPITAL | Age: 69
End: 2024-11-19
Payer: MEDICARE

## 2024-11-19 DIAGNOSIS — N30.00 ACUTE CYSTITIS WITHOUT HEMATURIA: ICD-10-CM

## 2024-11-19 LAB
ANION GAP SERPL CALC-SCNC: 8 MEQ/L
BUN SERPL-MCNC: 23.5 MG/DL (ref 8.4–25.7)
CALCIUM SERPL-MCNC: 8.8 MG/DL (ref 8.8–10)
CHLORIDE SERPL-SCNC: 103 MMOL/L (ref 98–107)
CO2 SERPL-SCNC: 29 MMOL/L (ref 23–31)
CREAT SERPL-MCNC: 0.79 MG/DL (ref 0.72–1.25)
CREAT/UREA NIT SERPL: 30
GFR SERPLBLD CREATININE-BSD FMLA CKD-EPI: >60 ML/MIN/1.73/M2
GLUCOSE SERPL-MCNC: 101 MG/DL (ref 82–115)
POTASSIUM SERPL-SCNC: 3.7 MMOL/L (ref 3.5–5.1)
PREALB SERPL-MCNC: 18.9 MG/DL (ref 16–42)
SODIUM SERPL-SCNC: 140 MMOL/L (ref 136–145)

## 2024-11-19 PROCEDURE — 80048 BASIC METABOLIC PNL TOTAL CA: CPT | Performed by: NURSE PRACTITIONER

## 2024-11-19 PROCEDURE — 84134 ASSAY OF PREALBUMIN: CPT | Performed by: NURSE PRACTITIONER

## 2024-12-11 ENCOUNTER — LAB REQUISITION (OUTPATIENT)
Dept: LAB | Facility: HOSPITAL | Age: 69
End: 2024-12-11
Payer: MEDICARE

## 2024-12-11 DIAGNOSIS — N30.00 ACUTE CYSTITIS WITHOUT HEMATURIA: ICD-10-CM

## 2024-12-11 DIAGNOSIS — N17.9 ACUTE KIDNEY FAILURE, UNSPECIFIED: ICD-10-CM

## 2024-12-11 LAB
25(OH)D3+25(OH)D2 SERPL-MCNC: 35 NG/ML (ref 30–80)
ALBUMIN SERPL-MCNC: 3.2 G/DL (ref 3.4–4.8)
ALBUMIN/GLOB SERPL: 1 RATIO (ref 1.1–2)
ALP SERPL-CCNC: 79 UNIT/L (ref 40–150)
ALT SERPL-CCNC: 6 UNIT/L (ref 0–55)
ANION GAP SERPL CALC-SCNC: 8 MEQ/L
AST SERPL-CCNC: 10 UNIT/L (ref 5–34)
BASOPHILS # BLD AUTO: 0.07 X10(3)/MCL
BASOPHILS NFR BLD AUTO: 1.1 %
BILIRUB SERPL-MCNC: 0.4 MG/DL
BUN SERPL-MCNC: 26.2 MG/DL (ref 8.4–25.7)
CALCIUM SERPL-MCNC: 8.9 MG/DL (ref 8.8–10)
CHLORIDE SERPL-SCNC: 104 MMOL/L (ref 98–107)
CHOLEST SERPL-MCNC: 82 MG/DL
CHOLEST/HDLC SERPL: 3 {RATIO} (ref 0–5)
CO2 SERPL-SCNC: 29 MMOL/L (ref 23–31)
CREAT SERPL-MCNC: 0.84 MG/DL (ref 0.72–1.25)
CREAT/UREA NIT SERPL: 31
EOSINOPHIL # BLD AUTO: 0.24 X10(3)/MCL (ref 0–0.9)
EOSINOPHIL NFR BLD AUTO: 3.9 %
ERYTHROCYTE [DISTWIDTH] IN BLOOD BY AUTOMATED COUNT: 14.3 % (ref 11.5–17)
EST. AVERAGE GLUCOSE BLD GHB EST-MCNC: 85.3 MG/DL
GFR SERPLBLD CREATININE-BSD FMLA CKD-EPI: >60 ML/MIN/1.73/M2
GLOBULIN SER-MCNC: 3.1 GM/DL (ref 2.4–3.5)
GLUCOSE SERPL-MCNC: 94 MG/DL (ref 82–115)
HBA1C MFR BLD: 4.6 %
HCT VFR BLD AUTO: 30.9 % (ref 42–52)
HDLC SERPL-MCNC: 29 MG/DL (ref 35–60)
HGB BLD-MCNC: 10.3 G/DL (ref 14–18)
IMM GRANULOCYTES # BLD AUTO: 0.01 X10(3)/MCL (ref 0–0.04)
IMM GRANULOCYTES NFR BLD AUTO: 0.2 %
IRON SATN MFR SERPL: 28 % (ref 20–50)
IRON SERPL-MCNC: 47 UG/DL (ref 65–175)
LDLC SERPL CALC-MCNC: 36 MG/DL (ref 50–140)
LYMPHOCYTES # BLD AUTO: 1.93 X10(3)/MCL (ref 0.6–4.6)
LYMPHOCYTES NFR BLD AUTO: 31.4 %
MCH RBC QN AUTO: 30.5 PG (ref 27–31)
MCHC RBC AUTO-ENTMCNC: 33.3 G/DL (ref 33–36)
MCV RBC AUTO: 91.4 FL (ref 80–94)
MONOCYTES # BLD AUTO: 0.55 X10(3)/MCL (ref 0.1–1.3)
MONOCYTES NFR BLD AUTO: 8.9 %
NEUTROPHILS # BLD AUTO: 3.35 X10(3)/MCL (ref 2.1–9.2)
NEUTROPHILS NFR BLD AUTO: 54.5 %
NRBC BLD AUTO-RTO: 0 %
PLATELET # BLD AUTO: 209 X10(3)/MCL (ref 130–400)
PMV BLD AUTO: 10.6 FL (ref 7.4–10.4)
POTASSIUM SERPL-SCNC: 3.8 MMOL/L (ref 3.5–5.1)
PREALB SERPL-MCNC: 18.7 MG/DL (ref 16–42)
PROT SERPL-MCNC: 6.3 GM/DL (ref 5.8–7.6)
RBC # BLD AUTO: 3.38 X10(6)/MCL (ref 4.7–6.1)
SODIUM SERPL-SCNC: 141 MMOL/L (ref 136–145)
TIBC SERPL-MCNC: 118 UG/DL (ref 60–240)
TIBC SERPL-MCNC: 165 UG/DL (ref 250–450)
TRANSFERRIN SERPL-MCNC: 147 MG/DL (ref 163–344)
TRIGL SERPL-MCNC: 84 MG/DL (ref 34–140)
VLDLC SERPL CALC-MCNC: 17 MG/DL
WBC # BLD AUTO: 6.15 X10(3)/MCL (ref 4.5–11.5)

## 2024-12-11 PROCEDURE — 83540 ASSAY OF IRON: CPT | Performed by: NURSE PRACTITIONER

## 2024-12-11 PROCEDURE — 85025 COMPLETE CBC W/AUTO DIFF WBC: CPT | Performed by: NURSE PRACTITIONER

## 2024-12-11 PROCEDURE — 80053 COMPREHEN METABOLIC PANEL: CPT | Performed by: NURSE PRACTITIONER

## 2024-12-11 PROCEDURE — 80061 LIPID PANEL: CPT | Performed by: NURSE PRACTITIONER

## 2024-12-11 PROCEDURE — 83036 HEMOGLOBIN GLYCOSYLATED A1C: CPT | Performed by: NURSE PRACTITIONER

## 2024-12-11 PROCEDURE — 84134 ASSAY OF PREALBUMIN: CPT | Performed by: NURSE PRACTITIONER

## 2024-12-11 PROCEDURE — 82306 VITAMIN D 25 HYDROXY: CPT | Performed by: NURSE PRACTITIONER

## 2024-12-18 ENCOUNTER — LAB REQUISITION (OUTPATIENT)
Dept: LAB | Facility: HOSPITAL | Age: 69
End: 2024-12-18
Payer: MEDICARE

## 2024-12-18 DIAGNOSIS — I11.0 HYPERTENSIVE HEART DISEASE WITH HEART FAILURE: ICD-10-CM

## 2024-12-18 LAB
ANION GAP SERPL CALC-SCNC: 10 MEQ/L
BASOPHILS # BLD AUTO: 0.06 X10(3)/MCL
BASOPHILS NFR BLD AUTO: 0.6 %
BUN SERPL-MCNC: 23.4 MG/DL (ref 8.4–25.7)
CALCIUM SERPL-MCNC: 9.2 MG/DL (ref 8.8–10)
CHLORIDE SERPL-SCNC: 105 MMOL/L (ref 98–107)
CO2 SERPL-SCNC: 28 MMOL/L (ref 23–31)
CREAT SERPL-MCNC: 0.79 MG/DL (ref 0.72–1.25)
CREAT/UREA NIT SERPL: 30
EOSINOPHIL # BLD AUTO: 0.24 X10(3)/MCL (ref 0–0.9)
EOSINOPHIL NFR BLD AUTO: 2.3 %
ERYTHROCYTE [DISTWIDTH] IN BLOOD BY AUTOMATED COUNT: 14.2 % (ref 11.5–17)
GFR SERPLBLD CREATININE-BSD FMLA CKD-EPI: >60 ML/MIN/1.73/M2
GLUCOSE SERPL-MCNC: 90 MG/DL (ref 82–115)
HCT VFR BLD AUTO: 34.6 % (ref 42–52)
HGB BLD-MCNC: 11 G/DL (ref 14–18)
IMM GRANULOCYTES # BLD AUTO: 0.03 X10(3)/MCL (ref 0–0.04)
IMM GRANULOCYTES NFR BLD AUTO: 0.3 %
LYMPHOCYTES # BLD AUTO: 1.36 X10(3)/MCL (ref 0.6–4.6)
LYMPHOCYTES NFR BLD AUTO: 13.1 %
MCH RBC QN AUTO: 30.2 PG (ref 27–31)
MCHC RBC AUTO-ENTMCNC: 31.8 G/DL (ref 33–36)
MCV RBC AUTO: 95.1 FL (ref 80–94)
MONOCYTES # BLD AUTO: 0.77 X10(3)/MCL (ref 0.1–1.3)
MONOCYTES NFR BLD AUTO: 7.4 %
NEUTROPHILS # BLD AUTO: 7.94 X10(3)/MCL (ref 2.1–9.2)
NEUTROPHILS NFR BLD AUTO: 76.3 %
NRBC BLD AUTO-RTO: 0 %
PLATELET # BLD AUTO: 216 X10(3)/MCL (ref 130–400)
PMV BLD AUTO: 10.3 FL (ref 7.4–10.4)
POTASSIUM SERPL-SCNC: 3.8 MMOL/L (ref 3.5–5.1)
RBC # BLD AUTO: 3.64 X10(6)/MCL (ref 4.7–6.1)
SODIUM SERPL-SCNC: 143 MMOL/L (ref 136–145)
WBC # BLD AUTO: 10.4 X10(3)/MCL (ref 4.5–11.5)

## 2024-12-18 PROCEDURE — 80048 BASIC METABOLIC PNL TOTAL CA: CPT | Performed by: INTERNAL MEDICINE

## 2024-12-18 PROCEDURE — 85025 COMPLETE CBC W/AUTO DIFF WBC: CPT | Performed by: INTERNAL MEDICINE

## 2025-01-09 ENCOUNTER — LAB REQUISITION (OUTPATIENT)
Dept: LAB | Facility: HOSPITAL | Age: 70
End: 2025-01-09
Payer: MEDICARE

## 2025-01-09 DIAGNOSIS — E56.9 VITAMIN DEFICIENCY, UNSPECIFIED: ICD-10-CM

## 2025-01-09 DIAGNOSIS — E87.6 HYPOKALEMIA: ICD-10-CM

## 2025-01-09 LAB
ANION GAP SERPL CALC-SCNC: 7 MEQ/L
BUN SERPL-MCNC: 30.5 MG/DL (ref 8.4–25.7)
CALCIUM SERPL-MCNC: 8.9 MG/DL (ref 8.8–10)
CHLORIDE SERPL-SCNC: 103 MMOL/L (ref 98–107)
CO2 SERPL-SCNC: 28 MMOL/L (ref 23–31)
CREAT SERPL-MCNC: 0.86 MG/DL (ref 0.72–1.25)
CREAT/UREA NIT SERPL: 35
GFR SERPLBLD CREATININE-BSD FMLA CKD-EPI: >60 ML/MIN/1.73/M2
GLUCOSE SERPL-MCNC: 82 MG/DL (ref 82–115)
POTASSIUM SERPL-SCNC: 4.1 MMOL/L (ref 3.5–5.1)
PREALB SERPL-MCNC: 18.2 MG/DL (ref 16–42)
SODIUM SERPL-SCNC: 138 MMOL/L (ref 136–145)

## 2025-01-09 PROCEDURE — 84134 ASSAY OF PREALBUMIN: CPT | Performed by: NURSE PRACTITIONER

## 2025-01-09 PROCEDURE — 80048 BASIC METABOLIC PNL TOTAL CA: CPT | Performed by: NURSE PRACTITIONER

## 2025-02-10 ENCOUNTER — LAB REQUISITION (OUTPATIENT)
Dept: LAB | Facility: HOSPITAL | Age: 70
End: 2025-02-10
Payer: MEDICARE

## 2025-02-10 DIAGNOSIS — E11.9 TYPE 2 DIABETES MELLITUS WITHOUT COMPLICATIONS: ICD-10-CM

## 2025-02-10 LAB
ANION GAP SERPL CALC-SCNC: 8 MEQ/L
BUN SERPL-MCNC: 32.9 MG/DL (ref 8.4–25.7)
CALCIUM SERPL-MCNC: 8.7 MG/DL (ref 8.8–10)
CHLORIDE SERPL-SCNC: 104 MMOL/L (ref 98–107)
CO2 SERPL-SCNC: 28 MMOL/L (ref 23–31)
CREAT SERPL-MCNC: 0.91 MG/DL (ref 0.72–1.25)
CREAT/UREA NIT SERPL: 36
GFR SERPLBLD CREATININE-BSD FMLA CKD-EPI: >60 ML/MIN/1.73/M2
GLUCOSE SERPL-MCNC: 81 MG/DL (ref 82–115)
POTASSIUM SERPL-SCNC: 4 MMOL/L (ref 3.5–5.1)
PREALB SERPL-MCNC: 18.5 MG/DL (ref 16–42)
SODIUM SERPL-SCNC: 140 MMOL/L (ref 136–145)

## 2025-02-10 PROCEDURE — 84134 ASSAY OF PREALBUMIN: CPT | Performed by: NURSE PRACTITIONER

## 2025-02-10 PROCEDURE — 80048 BASIC METABOLIC PNL TOTAL CA: CPT | Performed by: NURSE PRACTITIONER

## 2025-03-10 ENCOUNTER — LAB REQUISITION (OUTPATIENT)
Dept: LAB | Facility: HOSPITAL | Age: 70
End: 2025-03-10
Payer: MEDICARE

## 2025-03-10 DIAGNOSIS — E11.9 TYPE 2 DIABETES MELLITUS WITHOUT COMPLICATIONS: ICD-10-CM

## 2025-03-10 DIAGNOSIS — D50.8 OTHER IRON DEFICIENCY ANEMIAS: ICD-10-CM

## 2025-03-10 DIAGNOSIS — R11.12 PROJECTILE VOMITING: ICD-10-CM

## 2025-03-10 LAB
ANION GAP SERPL CALC-SCNC: 7 MEQ/L
BUN SERPL-MCNC: 28.2 MG/DL (ref 8.4–25.7)
CALCIUM SERPL-MCNC: 8.7 MG/DL (ref 8.8–10)
CHLORIDE SERPL-SCNC: 103 MMOL/L (ref 98–107)
CO2 SERPL-SCNC: 32 MMOL/L (ref 23–31)
CREAT SERPL-MCNC: 0.82 MG/DL (ref 0.72–1.25)
CREAT/UREA NIT SERPL: 34
GFR SERPLBLD CREATININE-BSD FMLA CKD-EPI: >60 ML/MIN/1.73/M2
GLUCOSE SERPL-MCNC: 97 MG/DL (ref 82–115)
POTASSIUM SERPL-SCNC: 3.9 MMOL/L (ref 3.5–5.1)
PREALB SERPL-MCNC: 18.8 MG/DL (ref 16–42)
SODIUM SERPL-SCNC: 142 MMOL/L (ref 136–145)

## 2025-03-10 PROCEDURE — 80048 BASIC METABOLIC PNL TOTAL CA: CPT | Performed by: NURSE PRACTITIONER

## 2025-03-10 PROCEDURE — 84134 ASSAY OF PREALBUMIN: CPT | Performed by: NURSE PRACTITIONER

## 2025-04-03 ENCOUNTER — LAB REQUISITION (OUTPATIENT)
Dept: LAB | Facility: HOSPITAL | Age: 70
End: 2025-04-03
Payer: MEDICARE

## 2025-04-03 DIAGNOSIS — I61.3 NONTRAUMATIC INTRACEREBRAL HEMORRHAGE IN BRAIN STEM: ICD-10-CM

## 2025-04-03 LAB
EST. AVERAGE GLUCOSE BLD GHB EST-MCNC: 88.2 MG/DL
HBA1C MFR BLD: 4.7 %

## 2025-04-03 PROCEDURE — 83036 HEMOGLOBIN GLYCOSYLATED A1C: CPT | Performed by: INTERNAL MEDICINE

## 2025-04-09 ENCOUNTER — LAB REQUISITION (OUTPATIENT)
Dept: LAB | Facility: HOSPITAL | Age: 70
End: 2025-04-09
Payer: MEDICARE

## 2025-04-09 DIAGNOSIS — E56.9 VITAMIN DEFICIENCY, UNSPECIFIED: ICD-10-CM

## 2025-04-09 LAB
ANION GAP SERPL CALC-SCNC: 7 MEQ/L
BUN SERPL-MCNC: 26.2 MG/DL (ref 8.4–25.7)
CALCIUM SERPL-MCNC: 8.7 MG/DL (ref 8.8–10)
CHLORIDE SERPL-SCNC: 103 MMOL/L (ref 98–107)
CO2 SERPL-SCNC: 31 MMOL/L (ref 23–31)
CREAT SERPL-MCNC: 0.76 MG/DL (ref 0.72–1.25)
CREAT/UREA NIT SERPL: 34
GFR SERPLBLD CREATININE-BSD FMLA CKD-EPI: >60 ML/MIN/1.73/M2
GLUCOSE SERPL-MCNC: 95 MG/DL (ref 82–115)
POTASSIUM SERPL-SCNC: 3.5 MMOL/L (ref 3.5–5.1)
PREALB SERPL-MCNC: 19.5 MG/DL (ref 16–42)
SODIUM SERPL-SCNC: 141 MMOL/L (ref 136–145)

## 2025-04-09 PROCEDURE — 84134 ASSAY OF PREALBUMIN: CPT | Performed by: INTERNAL MEDICINE

## 2025-04-09 PROCEDURE — 80048 BASIC METABOLIC PNL TOTAL CA: CPT | Performed by: INTERNAL MEDICINE

## 2025-05-09 ENCOUNTER — LAB REQUISITION (OUTPATIENT)
Dept: LAB | Facility: HOSPITAL | Age: 70
End: 2025-05-09
Payer: MEDICARE

## 2025-05-09 DIAGNOSIS — E11.9 TYPE 2 DIABETES MELLITUS WITHOUT COMPLICATIONS: ICD-10-CM

## 2025-05-09 LAB
ANION GAP SERPL CALC-SCNC: 8 MEQ/L
BUN SERPL-MCNC: 29.7 MG/DL (ref 8.4–25.7)
CALCIUM SERPL-MCNC: 8.7 MG/DL (ref 8.8–10)
CHLORIDE SERPL-SCNC: 103 MMOL/L (ref 98–107)
CO2 SERPL-SCNC: 29 MMOL/L (ref 23–31)
CREAT SERPL-MCNC: 0.71 MG/DL (ref 0.72–1.25)
CREAT/UREA NIT SERPL: 42
GFR SERPLBLD CREATININE-BSD FMLA CKD-EPI: >60 ML/MIN/1.73/M2
GLUCOSE SERPL-MCNC: 104 MG/DL (ref 82–115)
POTASSIUM SERPL-SCNC: 3.7 MMOL/L (ref 3.5–5.1)
PREALB SERPL-MCNC: 20.2 MG/DL (ref 16–42)
SODIUM SERPL-SCNC: 140 MMOL/L (ref 136–145)

## 2025-05-09 PROCEDURE — 84134 ASSAY OF PREALBUMIN: CPT | Performed by: INTERNAL MEDICINE

## 2025-05-09 PROCEDURE — 80048 BASIC METABOLIC PNL TOTAL CA: CPT | Performed by: INTERNAL MEDICINE

## 2025-06-09 ENCOUNTER — LAB REQUISITION (OUTPATIENT)
Dept: LAB | Facility: HOSPITAL | Age: 70
End: 2025-06-09
Payer: MEDICARE

## 2025-06-09 DIAGNOSIS — I61.3 NONTRAUMATIC INTRACEREBRAL HEMORRHAGE IN BRAIN STEM: ICD-10-CM

## 2025-06-09 LAB
ANION GAP SERPL CALC-SCNC: 9 MEQ/L
BUN SERPL-MCNC: 22.6 MG/DL (ref 8.4–25.7)
CALCIUM SERPL-MCNC: 8.6 MG/DL (ref 8.8–10)
CHLORIDE SERPL-SCNC: 102 MMOL/L (ref 98–107)
CO2 SERPL-SCNC: 30 MMOL/L (ref 23–31)
CREAT SERPL-MCNC: 0.67 MG/DL (ref 0.72–1.25)
CREAT/UREA NIT SERPL: 34
GFR SERPLBLD CREATININE-BSD FMLA CKD-EPI: >60 ML/MIN/1.73/M2
GLUCOSE SERPL-MCNC: 80 MG/DL (ref 82–115)
POTASSIUM SERPL-SCNC: 3.6 MMOL/L (ref 3.5–5.1)
PREALB SERPL-MCNC: 13 MG/DL (ref 16–42)
SODIUM SERPL-SCNC: 141 MMOL/L (ref 136–145)

## 2025-06-09 PROCEDURE — 84134 ASSAY OF PREALBUMIN: CPT | Performed by: INTERNAL MEDICINE

## 2025-06-09 PROCEDURE — 80048 BASIC METABOLIC PNL TOTAL CA: CPT | Performed by: INTERNAL MEDICINE

## 2025-06-11 ENCOUNTER — LAB REQUISITION (OUTPATIENT)
Dept: LAB | Facility: HOSPITAL | Age: 70
End: 2025-06-11
Payer: MEDICARE

## 2025-06-11 DIAGNOSIS — E11.9 TYPE 2 DIABETES MELLITUS WITHOUT COMPLICATIONS: ICD-10-CM

## 2025-06-11 LAB
25(OH)D3+25(OH)D2 SERPL-MCNC: 38 NG/ML (ref 30–80)
ALBUMIN SERPL-MCNC: 2.5 G/DL (ref 3.4–4.8)
ALBUMIN/GLOB SERPL: 0.7 RATIO (ref 1.1–2)
ALP SERPL-CCNC: 75 UNIT/L (ref 40–150)
ALT SERPL-CCNC: 7 UNIT/L (ref 0–55)
ANION GAP SERPL CALC-SCNC: 7 MEQ/L
AST SERPL-CCNC: 13 UNIT/L (ref 11–45)
BASOPHILS # BLD AUTO: 0.03 X10(3)/MCL
BASOPHILS NFR BLD AUTO: 0.6 %
BILIRUB SERPL-MCNC: 0.3 MG/DL
BUN SERPL-MCNC: 24 MG/DL (ref 8.4–25.7)
CALCIUM SERPL-MCNC: 8.4 MG/DL (ref 8.8–10)
CHLORIDE SERPL-SCNC: 99 MMOL/L (ref 98–107)
CHOLEST SERPL-MCNC: 66 MG/DL
CHOLEST/HDLC SERPL: 3 {RATIO} (ref 0–5)
CO2 SERPL-SCNC: 34 MMOL/L (ref 23–31)
CREAT SERPL-MCNC: 0.76 MG/DL (ref 0.72–1.25)
CREAT/UREA NIT SERPL: 32
EOSINOPHIL # BLD AUTO: 0.25 X10(3)/MCL (ref 0–0.9)
EOSINOPHIL NFR BLD AUTO: 5.1 %
ERYTHROCYTE [DISTWIDTH] IN BLOOD BY AUTOMATED COUNT: 13.7 % (ref 11.5–17)
EST. AVERAGE GLUCOSE BLD GHB EST-MCNC: 85.3 MG/DL
GFR SERPLBLD CREATININE-BSD FMLA CKD-EPI: >60 ML/MIN/1.73/M2
GLOBULIN SER-MCNC: 3.6 GM/DL (ref 2.4–3.5)
GLUCOSE SERPL-MCNC: 90 MG/DL (ref 82–115)
HBA1C MFR BLD: 4.6 %
HCT VFR BLD AUTO: 29.6 % (ref 42–52)
HDLC SERPL-MCNC: 26 MG/DL (ref 35–60)
HGB BLD-MCNC: 9.5 G/DL (ref 14–18)
IMM GRANULOCYTES # BLD AUTO: 0.01 X10(3)/MCL (ref 0–0.04)
IMM GRANULOCYTES NFR BLD AUTO: 0.2 %
IRON SATN MFR SERPL: 15 % (ref 20–50)
IRON SERPL-MCNC: 21 UG/DL (ref 65–175)
LDLC SERPL CALC-MCNC: 27 MG/DL (ref 50–140)
LYMPHOCYTES # BLD AUTO: 1.45 X10(3)/MCL (ref 0.6–4.6)
LYMPHOCYTES NFR BLD AUTO: 29.3 %
MCH RBC QN AUTO: 29.5 PG (ref 27–31)
MCHC RBC AUTO-ENTMCNC: 32.1 G/DL (ref 33–36)
MCV RBC AUTO: 91.9 FL (ref 80–94)
MONOCYTES # BLD AUTO: 0.57 X10(3)/MCL (ref 0.1–1.3)
MONOCYTES NFR BLD AUTO: 11.5 %
NEUTROPHILS # BLD AUTO: 2.64 X10(3)/MCL (ref 2.1–9.2)
NEUTROPHILS NFR BLD AUTO: 53.3 %
NRBC BLD AUTO-RTO: 0 %
PLATELET # BLD AUTO: 212 X10(3)/MCL (ref 130–400)
PMV BLD AUTO: 9.3 FL (ref 7.4–10.4)
POTASSIUM SERPL-SCNC: 3.6 MMOL/L (ref 3.5–5.1)
PROT SERPL-MCNC: 6.1 GM/DL (ref 5.8–7.6)
RBC # BLD AUTO: 3.22 X10(6)/MCL (ref 4.7–6.1)
SODIUM SERPL-SCNC: 140 MMOL/L (ref 136–145)
TIBC SERPL-MCNC: 119 UG/DL (ref 60–240)
TIBC SERPL-MCNC: 140 UG/DL (ref 250–450)
TRANSFERRIN SERPL-MCNC: 123 MG/DL (ref 163–344)
TRIGL SERPL-MCNC: 67 MG/DL (ref 34–140)
VLDLC SERPL CALC-MCNC: 13 MG/DL
WBC # BLD AUTO: 4.95 X10(3)/MCL (ref 4.5–11.5)

## 2025-06-11 PROCEDURE — 80053 COMPREHEN METABOLIC PANEL: CPT | Performed by: INTERNAL MEDICINE

## 2025-06-11 PROCEDURE — 80061 LIPID PANEL: CPT | Performed by: INTERNAL MEDICINE

## 2025-06-11 PROCEDURE — 83550 IRON BINDING TEST: CPT | Performed by: INTERNAL MEDICINE

## 2025-06-11 PROCEDURE — 83036 HEMOGLOBIN GLYCOSYLATED A1C: CPT | Performed by: INTERNAL MEDICINE

## 2025-06-11 PROCEDURE — 82306 VITAMIN D 25 HYDROXY: CPT | Performed by: INTERNAL MEDICINE

## 2025-06-11 PROCEDURE — 85025 COMPLETE CBC W/AUTO DIFF WBC: CPT | Performed by: INTERNAL MEDICINE

## 2025-06-15 ENCOUNTER — LAB REQUISITION (OUTPATIENT)
Dept: LAB | Facility: HOSPITAL | Age: 70
End: 2025-06-15
Payer: MEDICARE

## 2025-06-15 DIAGNOSIS — R79.9 ABNORMAL FINDING OF BLOOD CHEMISTRY, UNSPECIFIED: ICD-10-CM

## 2025-06-15 LAB — HEMOCCULT SP1 STL QL: POSITIVE

## 2025-06-15 PROCEDURE — 82270 OCCULT BLOOD FECES: CPT | Performed by: NURSE PRACTITIONER

## 2025-06-17 ENCOUNTER — LAB REQUISITION (OUTPATIENT)
Dept: LAB | Facility: HOSPITAL | Age: 70
End: 2025-06-17
Payer: MEDICARE

## 2025-06-17 DIAGNOSIS — D64.9 ANEMIA, UNSPECIFIED: ICD-10-CM

## 2025-06-17 LAB — HEMOCCULT SP1 STL QL: NEGATIVE

## 2025-06-17 PROCEDURE — 82270 OCCULT BLOOD FECES: CPT | Performed by: INTERNAL MEDICINE

## 2025-06-18 ENCOUNTER — LAB REQUISITION (OUTPATIENT)
Dept: LAB | Facility: HOSPITAL | Age: 70
End: 2025-06-18
Payer: MEDICARE

## 2025-06-18 DIAGNOSIS — I10 ESSENTIAL (PRIMARY) HYPERTENSION: ICD-10-CM

## 2025-06-18 LAB
25(OH)D3+25(OH)D2 SERPL-MCNC: 37 NG/ML (ref 30–80)
ALBUMIN SERPL-MCNC: 2.8 G/DL (ref 3.4–4.8)
ALBUMIN/GLOB SERPL: 0.7 RATIO (ref 1.1–2)
ALP SERPL-CCNC: 73 UNIT/L (ref 40–150)
ALT SERPL-CCNC: 10 UNIT/L (ref 0–55)
ANION GAP SERPL CALC-SCNC: 7 MEQ/L
AST SERPL-CCNC: 14 UNIT/L (ref 11–45)
BASOPHILS # BLD AUTO: 0.06 X10(3)/MCL
BASOPHILS NFR BLD AUTO: 1.1 %
BILIRUB SERPL-MCNC: 0.3 MG/DL
BUN SERPL-MCNC: 24.4 MG/DL (ref 8.4–25.7)
CALCIUM SERPL-MCNC: 8.6 MG/DL (ref 8.8–10)
CHLORIDE SERPL-SCNC: 106 MMOL/L (ref 98–107)
CHOLEST SERPL-MCNC: 70 MG/DL
CHOLEST/HDLC SERPL: 3 {RATIO} (ref 0–5)
CO2 SERPL-SCNC: 28 MMOL/L (ref 23–31)
CREAT SERPL-MCNC: 0.71 MG/DL (ref 0.72–1.25)
CREAT/UREA NIT SERPL: 34
EOSINOPHIL # BLD AUTO: 0.23 X10(3)/MCL (ref 0–0.9)
EOSINOPHIL NFR BLD AUTO: 4.4 %
ERYTHROCYTE [DISTWIDTH] IN BLOOD BY AUTOMATED COUNT: 13.6 % (ref 11.5–17)
EST. AVERAGE GLUCOSE BLD GHB EST-MCNC: 88.2 MG/DL
GFR SERPLBLD CREATININE-BSD FMLA CKD-EPI: >60 ML/MIN/1.73/M2
GLOBULIN SER-MCNC: 3.9 GM/DL (ref 2.4–3.5)
GLUCOSE SERPL-MCNC: 86 MG/DL (ref 82–115)
HBA1C MFR BLD: 4.7 %
HCT VFR BLD AUTO: 31.3 % (ref 42–52)
HDLC SERPL-MCNC: 26 MG/DL (ref 35–60)
HGB BLD-MCNC: 10.1 G/DL (ref 14–18)
IMM GRANULOCYTES # BLD AUTO: 0.03 X10(3)/MCL (ref 0–0.04)
IMM GRANULOCYTES NFR BLD AUTO: 0.6 %
IRON SATN MFR SERPL: 24 % (ref 20–50)
IRON SERPL-MCNC: 39 UG/DL (ref 65–175)
LDLC SERPL CALC-MCNC: 27 MG/DL (ref 50–140)
LYMPHOCYTES # BLD AUTO: 1.7 X10(3)/MCL (ref 0.6–4.6)
LYMPHOCYTES NFR BLD AUTO: 32.3 %
MCH RBC QN AUTO: 29.4 PG (ref 27–31)
MCHC RBC AUTO-ENTMCNC: 32.3 G/DL (ref 33–36)
MCV RBC AUTO: 91.3 FL (ref 80–94)
MONOCYTES # BLD AUTO: 0.56 X10(3)/MCL (ref 0.1–1.3)
MONOCYTES NFR BLD AUTO: 10.6 %
NEUTROPHILS # BLD AUTO: 2.68 X10(3)/MCL (ref 2.1–9.2)
NEUTROPHILS NFR BLD AUTO: 51 %
NRBC BLD AUTO-RTO: 0 %
PLATELET # BLD AUTO: 260 X10(3)/MCL (ref 130–400)
PMV BLD AUTO: 9.3 FL (ref 7.4–10.4)
POTASSIUM SERPL-SCNC: 4.3 MMOL/L (ref 3.5–5.1)
PREALB SERPL-MCNC: 19.5 MG/DL (ref 16–42)
PROT SERPL-MCNC: 6.7 GM/DL (ref 5.8–7.6)
RBC # BLD AUTO: 3.43 X10(6)/MCL (ref 4.7–6.1)
SODIUM SERPL-SCNC: 141 MMOL/L (ref 136–145)
TIBC SERPL-MCNC: 121 UG/DL (ref 60–240)
TIBC SERPL-MCNC: 160 UG/DL (ref 250–450)
TRANSFERRIN SERPL-MCNC: 142 MG/DL (ref 163–344)
TRIGL SERPL-MCNC: 84 MG/DL (ref 34–140)
TSH SERPL-ACNC: 1.81 UIU/ML (ref 0.35–4.94)
VLDLC SERPL CALC-MCNC: 17 MG/DL
WBC # BLD AUTO: 5.26 X10(3)/MCL (ref 4.5–11.5)

## 2025-06-18 PROCEDURE — 80061 LIPID PANEL: CPT | Performed by: NURSE PRACTITIONER

## 2025-06-18 PROCEDURE — 83550 IRON BINDING TEST: CPT | Performed by: NURSE PRACTITIONER

## 2025-06-18 PROCEDURE — 80053 COMPREHEN METABOLIC PANEL: CPT | Performed by: NURSE PRACTITIONER

## 2025-06-18 PROCEDURE — 82306 VITAMIN D 25 HYDROXY: CPT | Performed by: NURSE PRACTITIONER

## 2025-06-18 PROCEDURE — 83036 HEMOGLOBIN GLYCOSYLATED A1C: CPT | Performed by: NURSE PRACTITIONER

## 2025-06-18 PROCEDURE — 85025 COMPLETE CBC W/AUTO DIFF WBC: CPT | Performed by: NURSE PRACTITIONER

## 2025-06-18 PROCEDURE — 84443 ASSAY THYROID STIM HORMONE: CPT | Performed by: NURSE PRACTITIONER

## 2025-06-18 PROCEDURE — 84134 ASSAY OF PREALBUMIN: CPT | Performed by: NURSE PRACTITIONER

## 2025-06-23 PROCEDURE — 82270 OCCULT BLOOD FECES: CPT | Performed by: INTERNAL MEDICINE

## 2025-06-24 ENCOUNTER — LAB REQUISITION (OUTPATIENT)
Dept: LAB | Facility: HOSPITAL | Age: 70
End: 2025-06-24
Payer: MEDICARE

## 2025-06-24 DIAGNOSIS — D64.9 ANEMIA, UNSPECIFIED: ICD-10-CM

## 2025-06-24 LAB — HEMOCCULT SP1 STL QL: POSITIVE

## 2025-06-27 ENCOUNTER — LAB REQUISITION (OUTPATIENT)
Dept: LAB | Facility: HOSPITAL | Age: 70
End: 2025-06-27
Payer: MEDICARE

## 2025-06-27 DIAGNOSIS — D50.8 OTHER IRON DEFICIENCY ANEMIAS: ICD-10-CM

## 2025-06-27 LAB
BASOPHILS # BLD AUTO: 0.08 X10(3)/MCL
BASOPHILS NFR BLD AUTO: 1.2 %
EOSINOPHIL # BLD AUTO: 0.31 X10(3)/MCL (ref 0–0.9)
EOSINOPHIL NFR BLD AUTO: 4.8 %
ERYTHROCYTE [DISTWIDTH] IN BLOOD BY AUTOMATED COUNT: 13.7 % (ref 11.5–17)
HCT VFR BLD AUTO: 30.7 % (ref 42–52)
HGB BLD-MCNC: 9.9 G/DL (ref 14–18)
IMM GRANULOCYTES # BLD AUTO: 0.01 X10(3)/MCL (ref 0–0.04)
IMM GRANULOCYTES NFR BLD AUTO: 0.2 %
IRON SATN MFR SERPL: 17 % (ref 20–50)
IRON SERPL-MCNC: 26 UG/DL (ref 65–175)
LYMPHOCYTES # BLD AUTO: 1.9 X10(3)/MCL (ref 0.6–4.6)
LYMPHOCYTES NFR BLD AUTO: 29.6 %
MCH RBC QN AUTO: 29.6 PG (ref 27–31)
MCHC RBC AUTO-ENTMCNC: 32.2 G/DL (ref 33–36)
MCV RBC AUTO: 91.9 FL (ref 80–94)
MONOCYTES # BLD AUTO: 0.53 X10(3)/MCL (ref 0.1–1.3)
MONOCYTES NFR BLD AUTO: 8.3 %
NEUTROPHILS # BLD AUTO: 3.59 X10(3)/MCL (ref 2.1–9.2)
NEUTROPHILS NFR BLD AUTO: 55.9 %
NRBC BLD AUTO-RTO: 0 %
PLATELET # BLD AUTO: 236 X10(3)/MCL (ref 130–400)
PMV BLD AUTO: 9.9 FL (ref 7.4–10.4)
RBC # BLD AUTO: 3.34 X10(6)/MCL (ref 4.7–6.1)
TIBC SERPL-MCNC: 126 UG/DL (ref 60–240)
TIBC SERPL-MCNC: 152 UG/DL (ref 250–450)
TRANSFERRIN SERPL-MCNC: 139 MG/DL (ref 163–344)
WBC # BLD AUTO: 6.42 X10(3)/MCL (ref 4.5–11.5)

## 2025-06-27 PROCEDURE — 85025 COMPLETE CBC W/AUTO DIFF WBC: CPT | Performed by: INTERNAL MEDICINE

## 2025-06-27 PROCEDURE — 83550 IRON BINDING TEST: CPT | Performed by: INTERNAL MEDICINE

## 2025-07-09 ENCOUNTER — LAB REQUISITION (OUTPATIENT)
Dept: LAB | Facility: HOSPITAL | Age: 70
End: 2025-07-09
Payer: MEDICARE

## 2025-07-09 DIAGNOSIS — I10 ESSENTIAL (PRIMARY) HYPERTENSION: ICD-10-CM

## 2025-07-09 LAB
ALBUMIN SERPL-MCNC: 3 G/DL (ref 3.4–4.8)
ALBUMIN/GLOB SERPL: 0.8 RATIO (ref 1.1–2)
ALP SERPL-CCNC: 85 UNIT/L (ref 40–150)
ALT SERPL-CCNC: 8 UNIT/L (ref 0–55)
ANION GAP SERPL CALC-SCNC: 7 MEQ/L
AST SERPL-CCNC: 12 UNIT/L (ref 11–45)
BILIRUB SERPL-MCNC: 0.4 MG/DL
BUN SERPL-MCNC: 32 MG/DL (ref 8.4–25.7)
CALCIUM SERPL-MCNC: 8.9 MG/DL (ref 8.8–10)
CHLORIDE SERPL-SCNC: 104 MMOL/L (ref 98–107)
CO2 SERPL-SCNC: 28 MMOL/L (ref 23–31)
CREAT SERPL-MCNC: 0.7 MG/DL (ref 0.72–1.25)
CREAT/UREA NIT SERPL: 46
GFR SERPLBLD CREATININE-BSD FMLA CKD-EPI: >60 ML/MIN/1.73/M2
GLOBULIN SER-MCNC: 3.9 GM/DL (ref 2.4–3.5)
GLUCOSE SERPL-MCNC: 86 MG/DL (ref 82–115)
POTASSIUM SERPL-SCNC: 4.2 MMOL/L (ref 3.5–5.1)
PREALB SERPL-MCNC: 20.4 MG/DL (ref 16–42)
PROT SERPL-MCNC: 6.9 GM/DL (ref 5.8–7.6)
SODIUM SERPL-SCNC: 139 MMOL/L (ref 136–145)

## 2025-07-09 PROCEDURE — 84134 ASSAY OF PREALBUMIN: CPT | Performed by: INTERNAL MEDICINE

## 2025-07-09 PROCEDURE — 80053 COMPREHEN METABOLIC PANEL: CPT | Performed by: INTERNAL MEDICINE

## 2025-07-11 ENCOUNTER — LAB REQUISITION (OUTPATIENT)
Dept: LAB | Facility: HOSPITAL | Age: 70
End: 2025-07-11
Payer: MEDICARE

## 2025-07-11 DIAGNOSIS — D64.9 ANEMIA, UNSPECIFIED: ICD-10-CM

## 2025-07-11 LAB
BASOPHILS # BLD AUTO: 0.07 X10(3)/MCL
BASOPHILS NFR BLD AUTO: 1 %
EOSINOPHIL # BLD AUTO: 0.32 X10(3)/MCL (ref 0–0.9)
EOSINOPHIL NFR BLD AUTO: 4.6 %
ERYTHROCYTE [DISTWIDTH] IN BLOOD BY AUTOMATED COUNT: 14.5 % (ref 11.5–17)
HCT VFR BLD AUTO: 32.4 % (ref 42–52)
HGB BLD-MCNC: 10.2 G/DL (ref 14–18)
IMM GRANULOCYTES # BLD AUTO: 0.01 X10(3)/MCL (ref 0–0.04)
IMM GRANULOCYTES NFR BLD AUTO: 0.1 %
IRON SATN MFR SERPL: 21 % (ref 20–50)
IRON SERPL-MCNC: 36 UG/DL (ref 65–175)
LYMPHOCYTES # BLD AUTO: 1.92 X10(3)/MCL (ref 0.6–4.6)
LYMPHOCYTES NFR BLD AUTO: 27.5 %
MCH RBC QN AUTO: 29.5 PG (ref 27–31)
MCHC RBC AUTO-ENTMCNC: 31.5 G/DL (ref 33–36)
MCV RBC AUTO: 93.6 FL (ref 80–94)
MONOCYTES # BLD AUTO: 0.69 X10(3)/MCL (ref 0.1–1.3)
MONOCYTES NFR BLD AUTO: 9.9 %
NEUTROPHILS # BLD AUTO: 3.97 X10(3)/MCL (ref 2.1–9.2)
NEUTROPHILS NFR BLD AUTO: 56.9 %
NRBC BLD AUTO-RTO: 0 %
PLATELET # BLD AUTO: 192 X10(3)/MCL (ref 130–400)
PMV BLD AUTO: 10.3 FL (ref 7.4–10.4)
RBC # BLD AUTO: 3.46 X10(6)/MCL (ref 4.7–6.1)
TIBC SERPL-MCNC: 134 UG/DL (ref 60–240)
TIBC SERPL-MCNC: 170 UG/DL (ref 250–450)
TRANSFERRIN SERPL-MCNC: 146 MG/DL (ref 163–344)
WBC # BLD AUTO: 6.98 X10(3)/MCL (ref 4.5–11.5)

## 2025-07-11 PROCEDURE — 85025 COMPLETE CBC W/AUTO DIFF WBC: CPT | Performed by: INTERNAL MEDICINE

## 2025-07-11 PROCEDURE — 83550 IRON BINDING TEST: CPT | Performed by: INTERNAL MEDICINE

## 2025-07-20 PROCEDURE — 81003 URINALYSIS AUTO W/O SCOPE: CPT | Performed by: NURSE PRACTITIONER

## 2025-07-20 PROCEDURE — 87086 URINE CULTURE/COLONY COUNT: CPT | Performed by: NURSE PRACTITIONER

## 2025-07-21 ENCOUNTER — LAB REQUISITION (OUTPATIENT)
Dept: LAB | Facility: HOSPITAL | Age: 70
End: 2025-07-21
Payer: MEDICARE

## 2025-07-21 DIAGNOSIS — Z01.812 ENCOUNTER FOR PREPROCEDURAL LABORATORY EXAMINATION: ICD-10-CM

## 2025-07-21 LAB
AMORPH PHOS CRY URNS QL MICRO: ABNORMAL /HPF
BACTERIA #/AREA URNS AUTO: ABNORMAL /HPF
BILIRUB UR QL STRIP.AUTO: NEGATIVE
CAOX CRY UR QL COMP ASSIST: ABNORMAL
CLARITY UR: ABNORMAL
COLOR UR AUTO: YELLOW
GLUCOSE UR QL STRIP: NEGATIVE
HGB UR QL STRIP: ABNORMAL
KETONES UR QL STRIP: NEGATIVE
LEUKOCYTE ESTERASE UR QL STRIP: ABNORMAL
NITRITE UR QL STRIP: NEGATIVE
PH UR STRIP: >=9 [PH]
PROT UR QL STRIP: 30
RBC #/AREA URNS AUTO: ABNORMAL /HPF
SP GR UR STRIP.AUTO: <=1.005 (ref 1–1.03)
SQUAMOUS #/AREA URNS AUTO: ABNORMAL /HPF
TRI-PHOS CRY UR QL COMP ASSIST: ABNORMAL
UROBILINOGEN UR STRIP-ACNC: 0.2
WBC #/AREA URNS AUTO: ABNORMAL /HPF

## 2025-07-22 LAB
BACTERIA UR CULT: ABNORMAL
BACTERIA UR CULT: ABNORMAL

## 2025-07-24 ENCOUNTER — LAB REQUISITION (OUTPATIENT)
Dept: LAB | Facility: HOSPITAL | Age: 70
End: 2025-07-24
Payer: MEDICARE

## 2025-07-24 DIAGNOSIS — I10 ESSENTIAL (PRIMARY) HYPERTENSION: ICD-10-CM

## 2025-07-24 LAB
ANION GAP SERPL CALC-SCNC: 7 MEQ/L
BASOPHILS # BLD AUTO: 0.06 X10(3)/MCL
BASOPHILS NFR BLD AUTO: 1.2 %
BUN SERPL-MCNC: 27.2 MG/DL (ref 8.4–25.7)
CALCIUM SERPL-MCNC: 8.4 MG/DL (ref 8.8–10)
CHLORIDE SERPL-SCNC: 104 MMOL/L (ref 98–107)
CO2 SERPL-SCNC: 29 MMOL/L (ref 23–31)
CREAT SERPL-MCNC: 0.69 MG/DL (ref 0.72–1.25)
CREAT/UREA NIT SERPL: 39
EOSINOPHIL # BLD AUTO: 0.31 X10(3)/MCL (ref 0–0.9)
EOSINOPHIL NFR BLD AUTO: 6 %
ERYTHROCYTE [DISTWIDTH] IN BLOOD BY AUTOMATED COUNT: 14.4 % (ref 11.5–17)
GFR SERPLBLD CREATININE-BSD FMLA CKD-EPI: >60 ML/MIN/1.73/M2
GLUCOSE SERPL-MCNC: 85 MG/DL (ref 82–115)
HCT VFR BLD AUTO: 31.6 % (ref 42–52)
HGB BLD-MCNC: 10.1 G/DL (ref 14–18)
IMM GRANULOCYTES # BLD AUTO: 0.01 X10(3)/MCL (ref 0–0.04)
IMM GRANULOCYTES NFR BLD AUTO: 0.2 %
LYMPHOCYTES # BLD AUTO: 1.74 X10(3)/MCL (ref 0.6–4.6)
LYMPHOCYTES NFR BLD AUTO: 33.9 %
MCH RBC QN AUTO: 29.8 PG (ref 27–31)
MCHC RBC AUTO-ENTMCNC: 32 G/DL (ref 33–36)
MCV RBC AUTO: 93.2 FL (ref 80–94)
MONOCYTES # BLD AUTO: 0.47 X10(3)/MCL (ref 0.1–1.3)
MONOCYTES NFR BLD AUTO: 9.2 %
NEUTROPHILS # BLD AUTO: 2.54 X10(3)/MCL (ref 2.1–9.2)
NEUTROPHILS NFR BLD AUTO: 49.5 %
NRBC BLD AUTO-RTO: 0 %
PLATELET # BLD AUTO: 194 X10(3)/MCL (ref 130–400)
PMV BLD AUTO: 10.2 FL (ref 7.4–10.4)
POTASSIUM SERPL-SCNC: 3.7 MMOL/L (ref 3.5–5.1)
RBC # BLD AUTO: 3.39 X10(6)/MCL (ref 4.7–6.1)
SODIUM SERPL-SCNC: 140 MMOL/L (ref 136–145)
WBC # BLD AUTO: 5.13 X10(3)/MCL (ref 4.5–11.5)

## 2025-07-24 PROCEDURE — 80048 BASIC METABOLIC PNL TOTAL CA: CPT | Performed by: INTERNAL MEDICINE

## 2025-07-24 PROCEDURE — 85025 COMPLETE CBC W/AUTO DIFF WBC: CPT | Performed by: INTERNAL MEDICINE

## 2025-08-11 ENCOUNTER — LAB REQUISITION (OUTPATIENT)
Dept: LAB | Facility: HOSPITAL | Age: 70
End: 2025-08-11
Payer: MEDICARE

## 2025-08-11 DIAGNOSIS — E86.0 DEHYDRATION: ICD-10-CM

## 2025-08-11 LAB
ANION GAP SERPL CALC-SCNC: 8 MEQ/L
BUN SERPL-MCNC: 26 MG/DL (ref 8.4–25.7)
CALCIUM SERPL-MCNC: 8.5 MG/DL (ref 8.8–10)
CHLORIDE SERPL-SCNC: 104 MMOL/L (ref 98–107)
CO2 SERPL-SCNC: 27 MMOL/L (ref 23–31)
CREAT SERPL-MCNC: 0.68 MG/DL (ref 0.72–1.25)
CREAT/UREA NIT SERPL: 38
GFR SERPLBLD CREATININE-BSD FMLA CKD-EPI: >60 ML/MIN/1.73/M2
GLUCOSE SERPL-MCNC: 80 MG/DL (ref 82–115)
POTASSIUM SERPL-SCNC: 4 MMOL/L (ref 3.5–5.1)
PREALB SERPL-MCNC: 17.9 MG/DL (ref 16–42)
SODIUM SERPL-SCNC: 139 MMOL/L (ref 136–145)

## 2025-08-11 PROCEDURE — 84134 ASSAY OF PREALBUMIN: CPT | Performed by: INTERNAL MEDICINE

## 2025-08-11 PROCEDURE — 80048 BASIC METABOLIC PNL TOTAL CA: CPT | Performed by: INTERNAL MEDICINE
